# Patient Record
Sex: FEMALE | Race: WHITE | Employment: OTHER | URBAN - NONMETROPOLITAN AREA
[De-identification: names, ages, dates, MRNs, and addresses within clinical notes are randomized per-mention and may not be internally consistent; named-entity substitution may affect disease eponyms.]

---

## 2017-01-27 ENCOUNTER — HOSPITAL ENCOUNTER (OUTPATIENT)
Dept: ULTRASOUND IMAGING | Age: 77
Discharge: HOME | End: 2017-01-27
Payer: MEDICARE

## 2017-01-27 DIAGNOSIS — N95.0 PMB (POSTMENOPAUSAL BLEEDING): ICD-10-CM

## 2017-01-27 DIAGNOSIS — R93.89 THICKENED ENDOMETRIUM: ICD-10-CM

## 2017-01-27 PROCEDURE — 76830 TRANSVAGINAL US NON-OB: CPT

## 2017-01-31 ENCOUNTER — OFFICE VISIT (OUTPATIENT)
Dept: OBGYN | Age: 77
End: 2017-01-31
Payer: MEDICARE

## 2017-01-31 VITALS
WEIGHT: 223 LBS | DIASTOLIC BLOOD PRESSURE: 80 MMHG | SYSTOLIC BLOOD PRESSURE: 148 MMHG | BODY MASS INDEX: 35.84 KG/M2 | HEIGHT: 66 IN

## 2017-01-31 DIAGNOSIS — N95.0 PMB (POSTMENOPAUSAL BLEEDING): Primary | ICD-10-CM

## 2017-01-31 DIAGNOSIS — R93.89 ENDOMETRIAL THICKENING ON ULTRA SOUND: ICD-10-CM

## 2017-01-31 PROCEDURE — 99213 OFFICE O/P EST LOW 20 MIN: CPT | Performed by: OBSTETRICS & GYNECOLOGY

## 2017-01-31 ASSESSMENT — ENCOUNTER SYMPTOMS
GASTROINTESTINAL NEGATIVE: 1
EYES NEGATIVE: 1
ALLERGIC/IMMUNOLOGIC NEGATIVE: 1
RESPIRATORY NEGATIVE: 1

## 2017-02-08 ENCOUNTER — HOSPITAL ENCOUNTER (OUTPATIENT)
Dept: PREADMISSION TESTING | Age: 77
Discharge: HOME | End: 2017-02-08
Payer: MEDICARE

## 2017-02-08 VITALS — BODY MASS INDEX: 35.03 KG/M2 | WEIGHT: 218 LBS | HEIGHT: 66 IN

## 2017-02-08 LAB
ANION GAP SERPL CALCULATED.3IONS-SCNC: 17 MMOL/L (ref 7–19)
BASOPHILS ABSOLUTE: 0 K/UL (ref 0–0.2)
BASOPHILS RELATIVE PERCENT: 0.5 % (ref 0–1)
BUN BLDV-MCNC: 32 MG/DL (ref 8–23)
CALCIUM SERPL-MCNC: 9.5 MG/DL (ref 8.8–10.2)
CHLORIDE BLD-SCNC: 100 MMOL/L (ref 98–111)
CO2: 25 MMOL/L (ref 22–29)
CREAT SERPL-MCNC: 2.3 MG/DL (ref 0.5–0.9)
EOSINOPHILS ABSOLUTE: 0.1 K/UL (ref 0–0.6)
EOSINOPHILS RELATIVE PERCENT: 1.4 % (ref 0–5)
GFR NON-AFRICAN AMERICAN: 21
GLUCOSE BLD-MCNC: 88 MG/DL (ref 74–109)
HCT VFR BLD CALC: 39.2 % (ref 37–47)
HEMOGLOBIN: 13 G/DL (ref 12–16)
LYMPHOCYTES ABSOLUTE: 1.8 K/UL (ref 1.1–4.5)
LYMPHOCYTES RELATIVE PERCENT: 24.5 % (ref 20–40)
MCH RBC QN AUTO: 30.7 PG (ref 27–31)
MCHC RBC AUTO-ENTMCNC: 33.2 G/DL (ref 33–37)
MCV RBC AUTO: 92.5 FL (ref 81–99)
MONOCYTES ABSOLUTE: 0.6 K/UL (ref 0–0.9)
MONOCYTES RELATIVE PERCENT: 8.3 % (ref 0–10)
NEUTROPHILS ABSOLUTE: 4.8 K/UL (ref 1.5–7.5)
NEUTROPHILS RELATIVE PERCENT: 65.3 % (ref 50–65)
PDW BLD-RTO: 12.8 % (ref 11.5–14.5)
PLATELET # BLD: 118 K/UL (ref 130–400)
PMV BLD AUTO: 12.9 FL (ref 7.4–10.4)
POTASSIUM SERPL-SCNC: 4.6 MMOL/L (ref 3.5–5)
RBC # BLD: 4.24 M/UL (ref 4.2–5.4)
SODIUM BLD-SCNC: 142 MMOL/L (ref 136–145)
WBC # BLD: 7.4 K/UL (ref 4.8–10.8)

## 2017-02-08 PROCEDURE — 80048 BASIC METABOLIC PNL TOTAL CA: CPT

## 2017-02-08 PROCEDURE — 85025 COMPLETE CBC W/AUTO DIFF WBC: CPT

## 2017-02-08 PROCEDURE — 93005 ELECTROCARDIOGRAM TRACING: CPT

## 2017-02-08 RX ORDER — PHENOL 1.4 %
1 AEROSOL, SPRAY (ML) MUCOUS MEMBRANE 2 TIMES DAILY
COMMUNITY

## 2017-02-09 DIAGNOSIS — I48.91 ATRIAL FIBRILLATION, UNSPECIFIED TYPE (HCC): ICD-10-CM

## 2017-02-09 LAB
EKG P AXIS: NORMAL DEGREES
EKG P-R INTERVAL: NORMAL MS
EKG Q-T INTERVAL: 492 MS
EKG QRS DURATION: 88 MS
EKG QTC CALCULATION (BAZETT): 480 MS
EKG T AXIS: -3 DEGREES

## 2017-02-16 ENCOUNTER — ANESTHESIA EVENT (OUTPATIENT)
Dept: OPERATING ROOM | Age: 77
End: 2017-02-16
Payer: MEDICARE

## 2017-02-16 ENCOUNTER — HOSPITAL ENCOUNTER (OUTPATIENT)
Age: 77
Setting detail: OUTPATIENT SURGERY
Discharge: HOME | End: 2017-02-16
Attending: OBSTETRICS & GYNECOLOGY | Admitting: OBSTETRICS & GYNECOLOGY
Payer: MEDICARE

## 2017-02-16 ENCOUNTER — ANESTHESIA (OUTPATIENT)
Dept: OPERATING ROOM | Age: 77
End: 2017-02-16
Payer: MEDICARE

## 2017-02-16 ENCOUNTER — SURGERY (OUTPATIENT)
Age: 77
End: 2017-02-16

## 2017-02-16 VITALS
OXYGEN SATURATION: 96 % | RESPIRATION RATE: 8 BRPM | DIASTOLIC BLOOD PRESSURE: 51 MMHG | SYSTOLIC BLOOD PRESSURE: 113 MMHG

## 2017-02-16 VITALS
DIASTOLIC BLOOD PRESSURE: 53 MMHG | HEIGHT: 66 IN | SYSTOLIC BLOOD PRESSURE: 133 MMHG | HEART RATE: 56 BPM | RESPIRATION RATE: 14 BRPM | TEMPERATURE: 97.6 F | BODY MASS INDEX: 35.03 KG/M2 | WEIGHT: 218 LBS | OXYGEN SATURATION: 96 %

## 2017-02-16 PROBLEM — R93.89 ENDOMETRIAL THICKENING ON ULTRA SOUND: Status: ACTIVE | Noted: 2017-02-16

## 2017-02-16 PROBLEM — N95.0 PMB (POSTMENOPAUSAL BLEEDING): Status: ACTIVE | Noted: 2017-02-16

## 2017-02-16 LAB
APTT: 36.5 SEC (ref 26–36.2)
INR BLD: 1.12 (ref 0.88–1.18)
PROTHROMBIN TIME: 14.4 SEC (ref 12–14.6)

## 2017-02-16 PROCEDURE — 7100000000 HC PACU RECOVERY - FIRST 15 MIN: Performed by: OBSTETRICS & GYNECOLOGY

## 2017-02-16 PROCEDURE — 36415 COLL VENOUS BLD VENIPUNCTURE: CPT

## 2017-02-16 PROCEDURE — 88305 TISSUE EXAM BY PATHOLOGIST: CPT

## 2017-02-16 PROCEDURE — 58558 HYSTEROSCOPY BIOPSY: CPT | Performed by: OBSTETRICS & GYNECOLOGY

## 2017-02-16 PROCEDURE — 2500000003 HC RX 250 WO HCPCS: Performed by: ANESTHESIOLOGY

## 2017-02-16 PROCEDURE — 2580000003 HC RX 258: Performed by: NURSE ANESTHETIST, CERTIFIED REGISTERED

## 2017-02-16 PROCEDURE — 6360000002 HC RX W HCPCS: Performed by: OBSTETRICS & GYNECOLOGY

## 2017-02-16 PROCEDURE — 2500000003 HC RX 250 WO HCPCS: Performed by: NURSE ANESTHETIST, CERTIFIED REGISTERED

## 2017-02-16 PROCEDURE — 3600000004 HC SURGERY LEVEL 4 BASE: Performed by: OBSTETRICS & GYNECOLOGY

## 2017-02-16 PROCEDURE — 3700000000 HC ANESTHESIA ATTENDED CARE: Performed by: OBSTETRICS & GYNECOLOGY

## 2017-02-16 PROCEDURE — 85610 PROTHROMBIN TIME: CPT

## 2017-02-16 PROCEDURE — 7100000011 HC PHASE II RECOVERY - ADDTL 15 MIN: Performed by: OBSTETRICS & GYNECOLOGY

## 2017-02-16 PROCEDURE — 2720000001 HC MISC SURG SUPPLY STERILE $51-500: Performed by: OBSTETRICS & GYNECOLOGY

## 2017-02-16 PROCEDURE — 3600000014 HC SURGERY LEVEL 4 ADDTL 15MIN: Performed by: OBSTETRICS & GYNECOLOGY

## 2017-02-16 PROCEDURE — 3700000001 HC ADD 15 MINUTES (ANESTHESIA): Performed by: OBSTETRICS & GYNECOLOGY

## 2017-02-16 PROCEDURE — 85730 THROMBOPLASTIN TIME PARTIAL: CPT

## 2017-02-16 PROCEDURE — 2580000003 HC RX 258: Performed by: OBSTETRICS & GYNECOLOGY

## 2017-02-16 PROCEDURE — 7100000010 HC PHASE II RECOVERY - FIRST 15 MIN: Performed by: OBSTETRICS & GYNECOLOGY

## 2017-02-16 PROCEDURE — 7100000001 HC PACU RECOVERY - ADDTL 15 MIN: Performed by: OBSTETRICS & GYNECOLOGY

## 2017-02-16 PROCEDURE — 6360000002 HC RX W HCPCS: Performed by: NURSE ANESTHETIST, CERTIFIED REGISTERED

## 2017-02-16 RX ORDER — METOCLOPRAMIDE HYDROCHLORIDE 5 MG/ML
10 INJECTION INTRAMUSCULAR; INTRAVENOUS
Status: DISCONTINUED | OUTPATIENT
Start: 2017-02-16 | End: 2017-02-16 | Stop reason: HOSPADM

## 2017-02-16 RX ORDER — DEXAMETHASONE SODIUM PHOSPHATE 10 MG/ML
INJECTION INTRAMUSCULAR; INTRAVENOUS PRN
Status: DISCONTINUED | OUTPATIENT
Start: 2017-02-16 | End: 2017-02-16 | Stop reason: SDUPTHER

## 2017-02-16 RX ORDER — SODIUM CHLORIDE, SODIUM LACTATE, POTASSIUM CHLORIDE, CALCIUM CHLORIDE 600; 310; 30; 20 MG/100ML; MG/100ML; MG/100ML; MG/100ML
INJECTION, SOLUTION INTRAVENOUS CONTINUOUS
Status: DISCONTINUED | OUTPATIENT
Start: 2017-02-16 | End: 2017-02-16 | Stop reason: HOSPADM

## 2017-02-16 RX ORDER — DIPHENHYDRAMINE HYDROCHLORIDE 50 MG/ML
12.5 INJECTION INTRAMUSCULAR; INTRAVENOUS
Status: DISCONTINUED | OUTPATIENT
Start: 2017-02-16 | End: 2017-02-16 | Stop reason: HOSPADM

## 2017-02-16 RX ORDER — ONDANSETRON 2 MG/ML
INJECTION INTRAMUSCULAR; INTRAVENOUS PRN
Status: DISCONTINUED | OUTPATIENT
Start: 2017-02-16 | End: 2017-02-16 | Stop reason: SDUPTHER

## 2017-02-16 RX ORDER — FENTANYL CITRATE 50 UG/ML
INJECTION, SOLUTION INTRAMUSCULAR; INTRAVENOUS PRN
Status: DISCONTINUED | OUTPATIENT
Start: 2017-02-16 | End: 2017-02-16 | Stop reason: SDUPTHER

## 2017-02-16 RX ORDER — PROMETHAZINE HYDROCHLORIDE 25 MG/ML
6.25 INJECTION, SOLUTION INTRAMUSCULAR; INTRAVENOUS
Status: DISCONTINUED | OUTPATIENT
Start: 2017-02-16 | End: 2017-02-16 | Stop reason: HOSPADM

## 2017-02-16 RX ORDER — MIDAZOLAM HYDROCHLORIDE 1 MG/ML
2 INJECTION INTRAMUSCULAR; INTRAVENOUS
Status: DISCONTINUED | OUTPATIENT
Start: 2017-02-16 | End: 2017-02-16 | Stop reason: HOSPADM

## 2017-02-16 RX ORDER — KETOROLAC TROMETHAMINE 30 MG/ML
INJECTION, SOLUTION INTRAMUSCULAR; INTRAVENOUS PRN
Status: DISCONTINUED | OUTPATIENT
Start: 2017-02-16 | End: 2017-02-16 | Stop reason: SDUPTHER

## 2017-02-16 RX ORDER — SODIUM CHLORIDE, SODIUM LACTATE, POTASSIUM CHLORIDE, CALCIUM CHLORIDE 600; 310; 30; 20 MG/100ML; MG/100ML; MG/100ML; MG/100ML
INJECTION, SOLUTION INTRAVENOUS CONTINUOUS PRN
Status: DISCONTINUED | OUTPATIENT
Start: 2017-02-16 | End: 2017-02-16 | Stop reason: SDUPTHER

## 2017-02-16 RX ORDER — SCOLOPAMINE TRANSDERMAL SYSTEM 1 MG/1
1 PATCH, EXTENDED RELEASE TRANSDERMAL
Status: DISCONTINUED | OUTPATIENT
Start: 2017-02-16 | End: 2017-02-16 | Stop reason: HOSPADM

## 2017-02-16 RX ORDER — MORPHINE SULFATE 4 MG/ML
4 INJECTION, SOLUTION INTRAMUSCULAR; INTRAVENOUS EVERY 5 MIN PRN
Status: DISCONTINUED | OUTPATIENT
Start: 2017-02-16 | End: 2017-02-16 | Stop reason: HOSPADM

## 2017-02-16 RX ORDER — SODIUM CHLORIDE 0.9 % (FLUSH) 0.9 %
10 SYRINGE (ML) INJECTION PRN
Status: DISCONTINUED | OUTPATIENT
Start: 2017-02-16 | End: 2017-02-16 | Stop reason: HOSPADM

## 2017-02-16 RX ORDER — SODIUM CHLORIDE 0.9 % (FLUSH) 0.9 %
10 SYRINGE (ML) INJECTION EVERY 12 HOURS SCHEDULED
Status: DISCONTINUED | OUTPATIENT
Start: 2017-02-16 | End: 2017-02-16 | Stop reason: HOSPADM

## 2017-02-16 RX ORDER — PROPOFOL 10 MG/ML
INJECTION, EMULSION INTRAVENOUS PRN
Status: DISCONTINUED | OUTPATIENT
Start: 2017-02-16 | End: 2017-02-16 | Stop reason: SDUPTHER

## 2017-02-16 RX ORDER — LABETALOL HYDROCHLORIDE 5 MG/ML
5 INJECTION, SOLUTION INTRAVENOUS EVERY 10 MIN PRN
Status: DISCONTINUED | OUTPATIENT
Start: 2017-02-16 | End: 2017-02-16 | Stop reason: HOSPADM

## 2017-02-16 RX ORDER — MORPHINE SULFATE 4 MG/ML
4 INJECTION, SOLUTION INTRAMUSCULAR; INTRAVENOUS
Status: DISCONTINUED | OUTPATIENT
Start: 2017-02-16 | End: 2017-02-16 | Stop reason: HOSPADM

## 2017-02-16 RX ORDER — LIDOCAINE HYDROCHLORIDE 10 MG/ML
INJECTION, SOLUTION EPIDURAL; INFILTRATION; INTRACAUDAL; PERINEURAL PRN
Status: DISCONTINUED | OUTPATIENT
Start: 2017-02-16 | End: 2017-02-16 | Stop reason: SDUPTHER

## 2017-02-16 RX ORDER — MEPERIDINE HYDROCHLORIDE 25 MG/ML
12.5 INJECTION INTRAMUSCULAR; INTRAVENOUS; SUBCUTANEOUS EVERY 5 MIN PRN
Status: DISCONTINUED | OUTPATIENT
Start: 2017-02-16 | End: 2017-02-16 | Stop reason: HOSPADM

## 2017-02-16 RX ORDER — HYDRALAZINE HYDROCHLORIDE 20 MG/ML
5 INJECTION INTRAMUSCULAR; INTRAVENOUS EVERY 10 MIN PRN
Status: DISCONTINUED | OUTPATIENT
Start: 2017-02-16 | End: 2017-02-16 | Stop reason: HOSPADM

## 2017-02-16 RX ORDER — LIDOCAINE HYDROCHLORIDE 10 MG/ML
1 INJECTION, SOLUTION EPIDURAL; INFILTRATION; INTRACAUDAL; PERINEURAL ONCE
Status: DISCONTINUED | OUTPATIENT
Start: 2017-02-16 | End: 2017-02-16 | Stop reason: HOSPADM

## 2017-02-16 RX ORDER — MORPHINE SULFATE 4 MG/ML
2 INJECTION, SOLUTION INTRAMUSCULAR; INTRAVENOUS EVERY 5 MIN PRN
Status: DISCONTINUED | OUTPATIENT
Start: 2017-02-16 | End: 2017-02-16 | Stop reason: HOSPADM

## 2017-02-16 RX ORDER — LIDOCAINE HYDROCHLORIDE 10 MG/ML
1 INJECTION, SOLUTION EPIDURAL; INFILTRATION; INTRACAUDAL; PERINEURAL
Status: COMPLETED | OUTPATIENT
Start: 2017-02-16 | End: 2017-02-16

## 2017-02-16 RX ORDER — FENTANYL CITRATE 50 UG/ML
50 INJECTION, SOLUTION INTRAMUSCULAR; INTRAVENOUS
Status: DISCONTINUED | OUTPATIENT
Start: 2017-02-16 | End: 2017-02-16 | Stop reason: HOSPADM

## 2017-02-16 RX ADMIN — LIDOCAINE HYDROCHLORIDE 50 MG: 10 INJECTION, SOLUTION EPIDURAL; INFILTRATION; INTRACAUDAL; PERINEURAL at 08:46

## 2017-02-16 RX ADMIN — FENTANYL CITRATE 100 MCG: 50 INJECTION INTRAMUSCULAR; INTRAVENOUS at 08:46

## 2017-02-16 RX ADMIN — CEFAZOLIN SODIUM 1 G: 1 INJECTION, SOLUTION INTRAVENOUS at 08:43

## 2017-02-16 RX ADMIN — LIDOCAINE HYDROCHLORIDE 1 ML: 10 INJECTION, SOLUTION EPIDURAL; INFILTRATION; INTRACAUDAL; PERINEURAL at 07:05

## 2017-02-16 RX ADMIN — PROPOFOL 180 MG: 10 INJECTION, EMULSION INTRAVENOUS at 08:46

## 2017-02-16 RX ADMIN — SODIUM CHLORIDE, SODIUM LACTATE, POTASSIUM CHLORIDE, AND CALCIUM CHLORIDE: 600; 310; 30; 20 INJECTION, SOLUTION INTRAVENOUS at 08:39

## 2017-02-16 RX ADMIN — SODIUM CHLORIDE, POTASSIUM CHLORIDE, SODIUM LACTATE AND CALCIUM CHLORIDE: 600; 310; 30; 20 INJECTION, SOLUTION INTRAVENOUS at 07:05

## 2017-02-16 RX ADMIN — KETOROLAC TROMETHAMINE 30 MG: 30 INJECTION, SOLUTION INTRAMUSCULAR at 09:03

## 2017-02-16 RX ADMIN — DEXAMETHASONE SODIUM PHOSPHATE 5 MG: 10 INJECTION INTRAMUSCULAR; INTRAVENOUS at 08:46

## 2017-02-16 RX ADMIN — ONDANSETRON HYDROCHLORIDE 4 MG: 2 INJECTION, SOLUTION INTRAVENOUS at 08:46

## 2017-02-16 ASSESSMENT — PAIN SCALES - GENERAL
PAINLEVEL_OUTOF10: 0

## 2017-02-16 ASSESSMENT — PAIN - FUNCTIONAL ASSESSMENT: PAIN_FUNCTIONAL_ASSESSMENT: 0-10

## 2017-02-17 ENCOUNTER — TELEPHONE (OUTPATIENT)
Dept: OBGYN | Age: 77
End: 2017-02-17

## 2017-02-21 ENCOUNTER — OFFICE VISIT (OUTPATIENT)
Dept: OBGYN | Age: 77
End: 2017-02-21

## 2017-02-21 VITALS
WEIGHT: 219 LBS | SYSTOLIC BLOOD PRESSURE: 130 MMHG | BODY MASS INDEX: 35.2 KG/M2 | DIASTOLIC BLOOD PRESSURE: 70 MMHG | HEIGHT: 66 IN

## 2017-02-21 DIAGNOSIS — Z09 POSTOP CHECK: Primary | ICD-10-CM

## 2017-02-21 PROCEDURE — 99024 POSTOP FOLLOW-UP VISIT: CPT | Performed by: OBSTETRICS & GYNECOLOGY

## 2017-02-21 ASSESSMENT — ENCOUNTER SYMPTOMS
EYES NEGATIVE: 1
RESPIRATORY NEGATIVE: 1
GASTROINTESTINAL NEGATIVE: 1

## 2017-05-12 DIAGNOSIS — I48.0 PAF (PAROXYSMAL ATRIAL FIBRILLATION) (HCC): ICD-10-CM

## 2017-05-12 RX ORDER — METOPROLOL SUCCINATE 25 MG/1
TABLET, EXTENDED RELEASE ORAL
Qty: 180 TABLET | Refills: 5 | Status: SHIPPED | OUTPATIENT
Start: 2017-05-12 | End: 2018-05-14 | Stop reason: SDUPTHER

## 2017-05-16 ENCOUNTER — OFFICE VISIT (OUTPATIENT)
Dept: CARDIOLOGY | Age: 77
End: 2017-05-16
Payer: MEDICARE

## 2017-05-16 VITALS
WEIGHT: 222 LBS | SYSTOLIC BLOOD PRESSURE: 130 MMHG | DIASTOLIC BLOOD PRESSURE: 70 MMHG | HEART RATE: 66 BPM | BODY MASS INDEX: 35.68 KG/M2 | HEIGHT: 66 IN

## 2017-05-16 DIAGNOSIS — Z79.01 CHRONIC ANTICOAGULATION: ICD-10-CM

## 2017-05-16 DIAGNOSIS — J01.10 ACUTE FRONTAL SINUSITIS, RECURRENCE NOT SPECIFIED: ICD-10-CM

## 2017-05-16 DIAGNOSIS — E78.2 MIXED HYPERLIPIDEMIA: ICD-10-CM

## 2017-05-16 DIAGNOSIS — I10 ESSENTIAL HYPERTENSION: ICD-10-CM

## 2017-05-16 DIAGNOSIS — I48.0 PAROXYSMAL ATRIAL FIBRILLATION (HCC): Primary | ICD-10-CM

## 2017-05-16 PROCEDURE — 93000 ELECTROCARDIOGRAM COMPLETE: CPT | Performed by: NURSE PRACTITIONER

## 2017-05-16 PROCEDURE — 99213 OFFICE O/P EST LOW 20 MIN: CPT | Performed by: NURSE PRACTITIONER

## 2017-05-16 RX ORDER — AMOXICILLIN 500 MG/1
500 CAPSULE ORAL 3 TIMES DAILY
Qty: 30 CAPSULE | Refills: 0 | Status: SHIPPED | OUTPATIENT
Start: 2017-05-16 | End: 2017-05-26

## 2017-05-16 RX ORDER — FLUTICASONE PROPIONATE 50 MCG
1 SPRAY, SUSPENSION (ML) NASAL DAILY
Qty: 1 BOTTLE | Refills: 3 | Status: SHIPPED | OUTPATIENT
Start: 2017-05-16 | End: 2018-02-27

## 2017-06-12 ENCOUNTER — OFFICE VISIT (OUTPATIENT)
Dept: CARDIOLOGY | Age: 77
End: 2017-06-12
Payer: MEDICARE

## 2017-06-12 VITALS
HEIGHT: 66 IN | SYSTOLIC BLOOD PRESSURE: 130 MMHG | WEIGHT: 221 LBS | DIASTOLIC BLOOD PRESSURE: 78 MMHG | BODY MASS INDEX: 35.52 KG/M2 | HEART RATE: 98 BPM

## 2017-06-12 DIAGNOSIS — I10 ESSENTIAL HYPERTENSION: ICD-10-CM

## 2017-06-12 DIAGNOSIS — I48.0 PAF (PAROXYSMAL ATRIAL FIBRILLATION) (HCC): Primary | ICD-10-CM

## 2017-06-12 LAB
INTERNATIONAL NORMALIZATION RATIO, POC: 2
PROTHROMBIN TIME, POC: NORMAL

## 2017-06-12 PROCEDURE — 99213 OFFICE O/P EST LOW 20 MIN: CPT | Performed by: INTERNAL MEDICINE

## 2017-06-12 PROCEDURE — 93000 ELECTROCARDIOGRAM COMPLETE: CPT | Performed by: INTERNAL MEDICINE

## 2017-06-12 PROCEDURE — 85610 PROTHROMBIN TIME: CPT | Performed by: INTERNAL MEDICINE

## 2017-06-14 ENCOUNTER — TELEPHONE (OUTPATIENT)
Dept: CARDIOLOGY | Age: 77
End: 2017-06-14

## 2017-06-14 ENCOUNTER — OFFICE VISIT (OUTPATIENT)
Dept: CARDIOLOGY | Age: 77
End: 2017-06-14
Payer: MEDICARE

## 2017-06-14 VITALS
BODY MASS INDEX: 36.82 KG/M2 | RESPIRATION RATE: 16 BRPM | HEIGHT: 65 IN | DIASTOLIC BLOOD PRESSURE: 82 MMHG | WEIGHT: 221 LBS | HEART RATE: 73 BPM | SYSTOLIC BLOOD PRESSURE: 150 MMHG

## 2017-06-14 DIAGNOSIS — I48.0 PAROXYSMAL ATRIAL FIBRILLATION (HCC): Primary | ICD-10-CM

## 2017-06-14 PROCEDURE — 93000 ELECTROCARDIOGRAM COMPLETE: CPT | Performed by: CLINICAL NURSE SPECIALIST

## 2017-06-14 PROCEDURE — 99999 PR OFFICE/OUTPT VISIT,PROCEDURE ONLY: CPT | Performed by: CLINICAL NURSE SPECIALIST

## 2017-06-27 ENCOUNTER — HOSPITAL ENCOUNTER (OUTPATIENT)
Dept: ULTRASOUND IMAGING | Age: 77
Discharge: HOME | End: 2017-06-27
Payer: MEDICARE

## 2017-06-27 DIAGNOSIS — N18.4 CHRONIC KIDNEY DISEASE, STAGE IV (SEVERE) (HCC): ICD-10-CM

## 2017-06-27 PROCEDURE — 76770 US EXAM ABDO BACK WALL COMP: CPT

## 2017-07-06 ENCOUNTER — ANTI-COAG VISIT (OUTPATIENT)
Dept: CARDIOLOGY | Age: 77
End: 2017-07-06
Payer: MEDICARE

## 2017-07-06 DIAGNOSIS — I48.0 PAF (PAROXYSMAL ATRIAL FIBRILLATION) (HCC): Primary | ICD-10-CM

## 2017-07-06 LAB
INTERNATIONAL NORMALIZATION RATIO, POC: 2.6
PROTHROMBIN TIME, POC: NORMAL

## 2017-07-06 PROCEDURE — 85610 PROTHROMBIN TIME: CPT | Performed by: NURSE PRACTITIONER

## 2017-08-04 ENCOUNTER — ANTI-COAG VISIT (OUTPATIENT)
Dept: CARDIOLOGY | Age: 77
End: 2017-08-04
Payer: MEDICARE

## 2017-08-04 DIAGNOSIS — I48.0 PAF (PAROXYSMAL ATRIAL FIBRILLATION) (HCC): Primary | ICD-10-CM

## 2017-08-04 LAB
INTERNATIONAL NORMALIZATION RATIO, POC: 2.1
PROTHROMBIN TIME, POC: NORMAL

## 2017-08-04 PROCEDURE — 85610 PROTHROMBIN TIME: CPT | Performed by: CLINICAL NURSE SPECIALIST

## 2017-09-07 ENCOUNTER — ANTI-COAG VISIT (OUTPATIENT)
Dept: CARDIOLOGY | Age: 77
End: 2017-09-07
Payer: MEDICARE

## 2017-09-07 DIAGNOSIS — I48.0 PAF (PAROXYSMAL ATRIAL FIBRILLATION) (HCC): Primary | ICD-10-CM

## 2017-09-07 LAB
INTERNATIONAL NORMALIZATION RATIO, POC: 2.1
PROTHROMBIN TIME, POC: NORMAL

## 2017-09-07 PROCEDURE — 85610 PROTHROMBIN TIME: CPT | Performed by: CLINICAL NURSE SPECIALIST

## 2017-10-05 ENCOUNTER — ANTI-COAG VISIT (OUTPATIENT)
Dept: CARDIOLOGY | Age: 77
End: 2017-10-05
Payer: MEDICARE

## 2017-10-05 DIAGNOSIS — I48.0 PAF (PAROXYSMAL ATRIAL FIBRILLATION) (HCC): Primary | ICD-10-CM

## 2017-10-05 LAB
INTERNATIONAL NORMALIZATION RATIO, POC: 2.5
PROTHROMBIN TIME, POC: NORMAL

## 2017-10-05 PROCEDURE — 85610 PROTHROMBIN TIME: CPT | Performed by: CLINICAL NURSE SPECIALIST

## 2017-10-05 NOTE — MR AVS SNAPSHOT
Your BMI as listed above is considered obese (30 or more). BMI is an estimate of body fat, calculated from your height and weight. The higher your BMI, the greater your risk of heart disease, high blood pressure, type 2 diabetes, stroke, gallstones, arthritis, sleep apnea, and certain cancers. BMI is not perfect. It may overestimate body fat in athletes and people who are more muscular. Even a small weight loss (between 5 and 10 percent of your current weight) by decreasing your calorie intake and becoming more physically active will help lower your risk of developing or worsening diseases associated with obesity.      Learn more at: [email or web address]             Medications and Orders      Your Current Medications Are              fluticasone (FLONASE) 50 MCG/ACT nasal spray 1 spray by Nasal route daily    metoprolol succinate (TOPROL XL) 25 MG extended release tablet TAKE ONE TABLET BY MOUTH TWICE DAILY    dronedarone hcl (MULTAQ) 400 MG TABS Take 1 tablet by mouth 2 times daily (with meals) Indications: AFIB    calcium carbonate 600 MG TABS tablet Take 1 tablet by mouth 2 times daily Indications: SUPPLEMENT    warfarin (COUMADIN) 5 MG tablet Take 5 mg by mouth Indications: AFIB 5 MG EVERY OTHER DAY ALTERNATING WITH 2.5 MG EVERY OTHER DAY    spironolactone (ALDACTONE) 25 MG tablet Take 25 mg by mouth nightly Indications: FLUID RETENTION     levothyroxine (SYNTHROID) 50 MCG tablet Take 50 mcg by mouth Daily Indications: HYPOTHROIDISM     polyethylene glycol (MIRALAX) powder Take 17 g by mouth daily Indications: REGULARITY     losartan (COZAAR) 100 MG tablet Take 100 mg by mouth Daily Indications: HYPERTENSION     ferrous sulfate 325 (65 FE) MG tablet Take 325 mg by mouth every other day Indications: ANEMIA     vitamin D (ERGOCALCIFEROL) 40595 UNIT CAPS capsule Take 50,000 Units by mouth once a week Indications: SUPPLEMENT [employer] username and password. If you don't have a [employer] username and password but a parent or guardian has access to your record, the parent or guardian should login with their own [employer] username and password and access your record to view the After Visit Summary. Additional Information  If you have questions, please contact the physician practice where you receive care. Remember, [employer] is NOT to be used for urgent needs. For medical emergencies, dial 911. For questions regarding your [employer] account call [phone]. If you have a clinical question, please call your doctor's office. October 2017 Details    Sun Mon Tue Wed Thu Fri Sat     1               2               3               4               5      2.5 mg   See details      6      2.5 mg         7      5 mg           8      5 mg         9      2.5 mg         10      2.5 mg         11      5 mg         12      2.5 mg         13      2.5 mg         14      5 mg           15      5 mg         16      2.5 mg         17      2.5 mg         18      5 mg         19      2.5 mg         20      2.5 mg         21      5 mg           22      5 mg         23      2.5 mg         24      2.5 mg         25      5 mg         26      2.5 mg         27      2.5 mg         28      5 mg           29      5 mg         30      2.5 mg         31      2.5 mg              Date Details   10/05 This INR check               How to take your warfarin dose              To take:  2.5 mg Take 0.5 of a 5 mg tablet. To take:  5 mg Take 1 of the 5 mg tablets.            November 2017 Details    Sun Mon Tue Wed Thu Fri Sat        1      5 mg         2      2.5 mg         3               4                 5               6               7               8               9               10               11                 12               13               14               15               16               17               18                 19               20 21               22               23               24               25                 26               27               28               29               30                  Date Details   No additional details    Date of next INR:  11/2/2017         How to take your warfarin dose              To take:  2.5 mg Take 0.5 of a 5 mg tablet. To take:  5 mg Take 1 of the 5 mg tablets.

## 2017-10-05 NOTE — PROGRESS NOTES
Patient reports no abnormal bruising or bleeding. Not awaiting cardioversion. Patient is going to have 2 teeth extracted on the 26th of this month with a doctor from [facility] , patient states the doctor told her she would need to be off her coumadin for 3 days. Johnnie Rodriguez ok'd patient to just hold it three days prior to procedure and come in to recheck INR a week after starting it back. No medication changes or antibiotic use, patient instructed to contact office if any changes.

## 2017-11-01 ENCOUNTER — TELEPHONE (OUTPATIENT)
Dept: CARDIOLOGY | Age: 77
End: 2017-11-01

## 2017-11-02 ENCOUNTER — ANTI-COAG VISIT (OUTPATIENT)
Dept: CARDIOLOGY | Age: 77
End: 2017-11-02
Payer: MEDICARE

## 2017-11-02 DIAGNOSIS — I48.0 PAF (PAROXYSMAL ATRIAL FIBRILLATION) (HCC): Primary | ICD-10-CM

## 2017-11-02 LAB
INTERNATIONAL NORMALIZATION RATIO, POC: 1.6
PROTHROMBIN TIME, POC: NORMAL

## 2017-11-02 PROCEDURE — 85610 PROTHROMBIN TIME: CPT | Performed by: CLINICAL NURSE SPECIALIST

## 2017-11-20 ENCOUNTER — OFFICE VISIT (OUTPATIENT)
Dept: CARDIOLOGY | Age: 77
End: 2017-11-20
Payer: MEDICARE

## 2017-11-20 VITALS
HEIGHT: 66 IN | BODY MASS INDEX: 35.2 KG/M2 | WEIGHT: 219 LBS | SYSTOLIC BLOOD PRESSURE: 130 MMHG | DIASTOLIC BLOOD PRESSURE: 82 MMHG

## 2017-11-20 DIAGNOSIS — Z79.01 CHRONIC ANTICOAGULATION: ICD-10-CM

## 2017-11-20 DIAGNOSIS — I10 ESSENTIAL HYPERTENSION: ICD-10-CM

## 2017-11-20 DIAGNOSIS — I48.0 PAF (PAROXYSMAL ATRIAL FIBRILLATION) (HCC): Primary | ICD-10-CM

## 2017-11-20 DIAGNOSIS — E78.2 MIXED HYPERLIPIDEMIA: ICD-10-CM

## 2017-11-20 LAB
INTERNATIONAL NORMALIZATION RATIO, POC: 2.3
PROTHROMBIN TIME, POC: NORMAL

## 2017-11-20 PROCEDURE — G8427 DOCREV CUR MEDS BY ELIG CLIN: HCPCS | Performed by: NURSE PRACTITIONER

## 2017-11-20 PROCEDURE — 4040F PNEUMOC VAC/ADMIN/RCVD: CPT | Performed by: NURSE PRACTITIONER

## 2017-11-20 PROCEDURE — 1036F TOBACCO NON-USER: CPT | Performed by: NURSE PRACTITIONER

## 2017-11-20 PROCEDURE — 1123F ACP DISCUSS/DSCN MKR DOCD: CPT | Performed by: NURSE PRACTITIONER

## 2017-11-20 PROCEDURE — G8484 FLU IMMUNIZE NO ADMIN: HCPCS | Performed by: NURSE PRACTITIONER

## 2017-11-20 PROCEDURE — 1090F PRES/ABSN URINE INCON ASSESS: CPT | Performed by: NURSE PRACTITIONER

## 2017-11-20 PROCEDURE — G8417 CALC BMI ABV UP PARAM F/U: HCPCS | Performed by: NURSE PRACTITIONER

## 2017-11-20 PROCEDURE — G8400 PT W/DXA NO RESULTS DOC: HCPCS | Performed by: NURSE PRACTITIONER

## 2017-11-20 PROCEDURE — 99213 OFFICE O/P EST LOW 20 MIN: CPT | Performed by: NURSE PRACTITIONER

## 2017-11-20 PROCEDURE — 85610 PROTHROMBIN TIME: CPT | Performed by: NURSE PRACTITIONER

## 2017-11-20 RX ORDER — ALLOPURINOL 100 MG/1
100 TABLET ORAL 2 TIMES DAILY
COMMUNITY
Start: 2017-10-06

## 2017-11-20 NOTE — PATIENT INSTRUCTIONS
Continue current medications as prescribed. Continue to follow up with primary care provider for non cardiac medical problems. Call the office with any problems, questions or concerns at [phone]. Follow up as scheduled with your cardiologist - one month coumadin blood test.  The following educational material has been included in this after visit summary for your review:  Coumadin safety.     Additional instructions:  Coumadin can increase your risk of bleeding. If you notice blood in urine or stool, bleeding gums, excessive bruising or cough productive of bloody sputum, notify the office. Information on this blood thinner has been included in your after visit summary. Coronary artery disease risk factors you can control: Smoking, high blood pressure, high cholesterol, diabetes, being overweight, lack of exercise and stress. Continue heart healthy diet. Take medications as directed. Exercise as tolerated. Strive for 15 minutes of exercise most days of the week. If asked to keep a blood pressure log, do so for 2 weeks. Call the office to report readings at [phone]. Blood pressure goal is 140/90 or less. If you are a diabetic, the goal is 130/80 or less. If you are taking cholesterol lowering medications, it is recommended that lab work be checked annually. Always keep a current medication list. Bring your medications to every office visit.      Patient Education     Taking Warfarin Safely: Care Instructions  Your Care Instructions  Warfarin is a medicine that you take to prevent blood clots. It is often called a blood thinner. Doctors give warfarin (such as Coumadin) to reduce the risk of blood clots. You may be at risk for blood clots if you have atrial fibrillation or deep vein thrombosis. Some other health problems may also put you at risk. Warfarin slows the amount of time it takes for your blood to clot. It can cause bleeding problems.  Even if you've been taking warfarin for a while, it's important to know how to take it safely. Foods and other medicines can affect the way warfarin works. Some can make warfarin work too well. This can cause bleeding problems. And some can make it work poorly, so that it does not prevent blood clots very well. You will need regular blood tests to check how long it takes for your blood to form a clot. This test is called a PT or prothrombin time test. The result of the test is called an INR level. Depending on the test results, your doctor or anticoagulation clinic may adjust your dose of warfarin. Follow-up care is a key part of your treatment and safety. Be sure to make and go to all appointments, and call your doctor if you are having problems. It's also a good idea to know your test results and keep a list of the medicines you take. How can you care for yourself at home? Take warfarin safely  · Take your warfarin at the same time each day. · If you miss a dose of warfarin, don't take an extra dose to make up for it. Your doctor can tell you exactly what to do so you don't take too much or too little. · Wear medical alert jewelry that lets others know that you take warfarin. You can buy this at most drugstores. · Don't take warfarin if you are pregnant or planning to get pregnant. Talk to your doctor about how you can prevent getting pregnant while you are taking it. · Don't change your dose or stop taking warfarin unless your doctor tells you to. Effects of medicines and food on warfarin  · Don't start or stop taking any medicines, vitamins, or natural remedies unless you first talk to your doctor. Many medicines can affect how warfarin works. These include aspirin and other pain relievers, over-the-counter medicines, multivitamins, dietary supplements, and herbal products. · Tell all of your doctors and pharmacists that you take warfarin. Some prescription medicines can affect how warfarin works.   · Keep the amount of vitamin K in your diet about the same from day to day. Do not suddenly eat a lot more or a lot less food that is rich in vitamin K than you usually do. Vitamin K affects how warfarin works and how your blood clots. Talk with your doctor before making big changes in your diet. Foods that have a lot of vitamin K include cooked green vegetables, such as:  ¨ Kale, spinach, turnip greens, jennifer greens, Swiss chard, and mustard greens. ¨ [place] sprouts, broccoli, and asparagus. · Limit your use of alcohol. Avoid bleeding by preventing falls and injuries  · Wear slippers or shoes with nonskid soles. · Remove throw rugs and clutter. · Rearrange furniture and electrical cords to keep them out of walking paths. · Keep stairways, porches, and outside walkways well lit. Use night-lights in hallways and bathrooms. · Be extra careful when you work with sharp tools or knives. When should you call for help? Call 911 anytime you think you may need emergency care. For example, call if:  · You have a sudden, severe headache that is different from past headaches. Call your doctor now or seek immediate medical care if:  · You have any abnormal bleeding, such as:  ¨ Nosebleeds. ¨ Vaginal bleeding that is different (heavier, more frequent, at a different time of the month) than what you are used to. ¨ Bloody or black stools, or rectal bleeding. ¨ Bloody or pink urine. Watch closely for changes in your health, and be sure to contact your doctor if you have any problems. Where can you learn more? Go to [email or web address] and sign in to your [employer] account. Enter M713 in the [employer] box to learn more about \"Taking Warfarin Safely: Care Instructions. \"     If you do not have an account, please click on the \"Sign Up Now\" link. Current as of: November 15, 2016  Content Version: 11.3  © 6903-7423 [employer]. Care instructions adapted under license by [facility] ([facility]).  If you have questions about a medical

## 2017-11-20 NOTE — PROGRESS NOTES
No acute distress. Body habitus - Body mass index is 35.89 kg/m². HEENT  Head is normocephalic. No circumoral cyanosis. Dentition is normal.  EYES -   Lids normal without ptosis. No discharge, edema or subconjunctival hemorrhage. Neck - Symmetrical without apparent mass or lymphadenopathy. Respiratory - Normal respiratory effort without use of accessory muscles. Ausculatation reveals vesicular breath sounds without crackles, wheezes, rub or rhonchi. Cardiovascular  No jugular venous distention. Auscultation reveals regular rate and rhythm. No audible clicks, gallop or rub. No murmur. No lower extremity varicosities. No carotid bruits. Abdominal -  No visible distention, mass or pulsations. Extremities - No clubbing or cyanosis. No statis dermatitis or ulcers. No edema. Musculoskeletal -   No Osler's nodes. No kyphosis or scoliosis. Gait is even and regular without limp or shuffle. Ambulates without assistance. Skin -  Warm and dry; no rash or pallor. No new surgical wound. Neurological - No focal neurological deficits. Thought processes coherent. No apparent tremor. Oriented to person, place and time. Psychiatric -  Appropriate affect and mood. Assessment:    1. PAF (paroxysmal atrial fibrillation) (HCC)  POCT INR   2. Essential hypertension     3. Mixed hyperlipidemia     4. Chronic anticoagulation      Coumadin managed by [facility]     Stable CV status without symptoms of overt heart failure, arrhythmia or angina. Patient is compliant with medication regimen.     BP Readings from Last 3 Encounters:   11/20/17 130/82   06/14/17 (!) 150/82   06/12/17 130/78    Pulse Readings from Last 3 Encounters:   06/14/17 73   06/12/17 98   05/16/17 66        Wt Readings from Last 3 Encounters:   11/20/17 219 lb (99.3 kg)   06/14/17 221 lb (100.2 kg)   06/12/17 221 lb (100.2 kg)       Recent Results (from the past 1008 hour(s))   POCT INR    Collection Time: 11/02/17  9:06 AM   Result Value Ref Range    INR 1.6     Protime     POCT INR    Collection Time: 11/20/17 10:23 AM   Result Value Ref Range    INR 2.3     Protime       Plan  Previous cardiac history and records reviewed. Continue current coumadin dose schedule. Continue current medications as prescribed. Continue to follow up with primary care provider for non cardiac medical problems. Call the office with any problems, questions or concerns at [phone]. Follow up as scheduled with your cardiologist - one month coumadin blood test.  The following educational material has been included in this after visit summary for your review:  Coumadin safety.     Additional instructions:  Coumadin can increase your risk of bleeding. If you notice blood in urine or stool, bleeding gums, excessive bruising or cough productive of bloody sputum, notify the office. Information on this blood thinner has been included in your after visit summary. Coronary artery disease risk factors you can control: Smoking, high blood pressure, high cholesterol, diabetes, being overweight, lack of exercise and stress. Continue heart healthy diet. Take medications as directed. Exercise as tolerated. Strive for 15 minutes of exercise most days of the week. If asked to keep a blood pressure log, do so for 2 weeks. Call the office to report readings at [phone]. Blood pressure goal is 140/90 or less. If you are a diabetic, the goal is 130/80 or less. If you are taking cholesterol lowering medications, it is recommended that lab work be checked annually. Always keep a current medication list. Bring your medications to every office visit.       KAYLA Vanessa Ma

## 2017-11-22 ENCOUNTER — HOSPITAL ENCOUNTER (OUTPATIENT)
Dept: MAMMOGRAPHY | Facility: HOSPITAL | Age: 77
Discharge: HOME | End: 2017-11-22
Attending: SPECIALIST | Admitting: SPECIALIST

## 2017-11-22 DIAGNOSIS — Z13.9 SCREENING: ICD-10-CM

## 2017-11-22 PROCEDURE — 77063 BREAST TOMOSYNTHESIS BI: CPT

## 2017-11-22 PROCEDURE — G0202 SCR MAMMO BI INCL CAD: HCPCS

## 2017-11-27 ENCOUNTER — TRANSCRIBE ORDERS (OUTPATIENT)
Dept: ADMINISTRATIVE | Facility: HOSPITAL | Age: 77
End: 2017-11-27

## 2017-11-27 DIAGNOSIS — Z12.31 ENCOUNTER FOR SCREENING MAMMOGRAM FOR MALIGNANT NEOPLASM OF BREAST: Primary | ICD-10-CM

## 2017-12-22 ENCOUNTER — ANTI-COAG VISIT (OUTPATIENT)
Dept: CARDIOLOGY | Age: 77
End: 2017-12-22
Payer: MEDICARE

## 2017-12-22 DIAGNOSIS — I48.0 PAF (PAROXYSMAL ATRIAL FIBRILLATION) (HCC): Primary | ICD-10-CM

## 2017-12-22 LAB
INTERNATIONAL NORMALIZATION RATIO, POC: 2.6
PROTHROMBIN TIME, POC: NORMAL

## 2017-12-22 PROCEDURE — 85610 PROTHROMBIN TIME: CPT | Performed by: CLINICAL NURSE SPECIALIST

## 2018-01-26 ENCOUNTER — ANTI-COAG VISIT (OUTPATIENT)
Dept: CARDIOLOGY | Age: 78
End: 2018-01-26
Payer: MEDICARE

## 2018-01-26 DIAGNOSIS — I48.0 PAF (PAROXYSMAL ATRIAL FIBRILLATION) (HCC): Primary | ICD-10-CM

## 2018-01-26 LAB
INTERNATIONAL NORMALIZATION RATIO, POC: 2.2
PROTHROMBIN TIME, POC: NORMAL

## 2018-01-26 PROCEDURE — 85610 PROTHROMBIN TIME: CPT | Performed by: CLINICAL NURSE SPECIALIST

## 2018-02-05 DIAGNOSIS — I48.91 ATRIAL FIBRILLATION, UNSPECIFIED TYPE (HCC): ICD-10-CM

## 2018-02-05 RX ORDER — DRONEDARONE 400 MG/1
TABLET, FILM COATED ORAL
Qty: 180 TABLET | Refills: 3 | Status: SHIPPED | OUTPATIENT
Start: 2018-02-05 | End: 2019-02-26 | Stop reason: SDUPTHER

## 2018-02-27 ENCOUNTER — OFFICE VISIT (OUTPATIENT)
Dept: OBGYN | Age: 78
End: 2018-02-27
Payer: MEDICARE

## 2018-02-27 VITALS
SYSTOLIC BLOOD PRESSURE: 121 MMHG | DIASTOLIC BLOOD PRESSURE: 67 MMHG | TEMPERATURE: 98.4 F | WEIGHT: 220 LBS | BODY MASS INDEX: 36.65 KG/M2 | HEIGHT: 65 IN | HEART RATE: 59 BPM

## 2018-02-27 DIAGNOSIS — Z01.419 VISIT FOR PELVIC EXAM: Primary | ICD-10-CM

## 2018-02-27 PROCEDURE — G8427 DOCREV CUR MEDS BY ELIG CLIN: HCPCS | Performed by: OBSTETRICS & GYNECOLOGY

## 2018-02-27 PROCEDURE — G8417 CALC BMI ABV UP PARAM F/U: HCPCS | Performed by: OBSTETRICS & GYNECOLOGY

## 2018-02-27 PROCEDURE — G8400 PT W/DXA NO RESULTS DOC: HCPCS | Performed by: OBSTETRICS & GYNECOLOGY

## 2018-02-27 PROCEDURE — 4040F PNEUMOC VAC/ADMIN/RCVD: CPT | Performed by: OBSTETRICS & GYNECOLOGY

## 2018-02-27 PROCEDURE — 1090F PRES/ABSN URINE INCON ASSESS: CPT | Performed by: OBSTETRICS & GYNECOLOGY

## 2018-02-27 PROCEDURE — 1123F ACP DISCUSS/DSCN MKR DOCD: CPT | Performed by: OBSTETRICS & GYNECOLOGY

## 2018-02-27 PROCEDURE — G0101 CA SCREEN;PELVIC/BREAST EXAM: HCPCS | Performed by: OBSTETRICS & GYNECOLOGY

## 2018-02-27 PROCEDURE — 1036F TOBACCO NON-USER: CPT | Performed by: OBSTETRICS & GYNECOLOGY

## 2018-02-27 PROCEDURE — G8484 FLU IMMUNIZE NO ADMIN: HCPCS | Performed by: OBSTETRICS & GYNECOLOGY

## 2018-02-27 ASSESSMENT — ENCOUNTER SYMPTOMS
RESPIRATORY NEGATIVE: 1
EYES NEGATIVE: 1
GASTROINTESTINAL NEGATIVE: 1

## 2018-02-27 NOTE — PROGRESS NOTES
 simvastatin (ZOCOR) 40 MG tablet Take 40 mg by mouth nightly Indications: CHOLESTEROL        No current facility-administered medications for this visit. Allergies: Bactrim [sulfamethoxazole-trimethoprim] and Codeine  Past Medical History:   Diagnosis Date    Atrial fibrillation ([address]     Hyperlipidemia     Hypertension     Hypothyroidism     Kidney disease     type 4    LONG TERM ANTICOAGULENT USE      Past Surgical History:   Procedure Laterality Date    BREAST BIOPSY      CARPAL TUNNEL RELEASE      CARPAL TUNNEL RELEASE      CHOLECYSTECTOMY      COLECTOMY      COLON SURGERY      DILATION AND CURETTAGE OF UTERUS N/A 2/16/2017    DILATATION AND CURETTAGE HYSTEROSCOPY performed by Ramana Schulz MD at [address] [place] [address] [address] OF UTERUS  02/17/2017    JOINT REPLACEMENT      TONSILLECTOMY       Family History   Problem Relation Age of Onset    Heart Disease Other      Family History    Cancer Other      Family History    Hypertension Other      Family History    High Blood Pressure Mother     Colon Cancer Mother     Other Father      UNKNOWN     Social History   Substance Use Topics    Smoking status: Never Smoker    Smokeless tobacco: Never Used    Alcohol use No       Review of Systems   Constitutional: Negative. HENT: Negative. Eyes: Negative. Respiratory: Negative. Cardiovascular: Negative. Gastrointestinal: Negative. Genitourinary: Negative. Musculoskeletal: Negative. Skin: Negative. Neurological: Negative. Psychiatric/Behavioral: Negative. Objective:   Physical Exam   Constitutional: She is oriented to person, place, and time. She appears well-developed and well-nourished. HENT:   Head: Normocephalic and atraumatic. Eyes: Pupils are equal, round, and reactive to light. Neck: Normal range of motion. Genitourinary: Vagina normal.       There is no rash, tenderness, lesion or injury on the right labia.  There is no rash,

## 2018-03-02 ENCOUNTER — ANTI-COAG VISIT (OUTPATIENT)
Dept: CARDIOLOGY | Age: 78
End: 2018-03-02
Payer: MEDICARE

## 2018-03-02 DIAGNOSIS — I48.0 PAF (PAROXYSMAL ATRIAL FIBRILLATION) (HCC): Primary | ICD-10-CM

## 2018-03-02 LAB
INTERNATIONAL NORMALIZATION RATIO, POC: 1
PROTHROMBIN TIME, POC: NORMAL

## 2018-03-02 PROCEDURE — 85610 PROTHROMBIN TIME: CPT | Performed by: CLINICAL NURSE SPECIALIST

## 2018-03-05 ENCOUNTER — ANTI-COAG VISIT (OUTPATIENT)
Dept: CARDIOLOGY | Age: 78
End: 2018-03-05
Payer: MEDICARE

## 2018-03-05 DIAGNOSIS — I48.0 PAF (PAROXYSMAL ATRIAL FIBRILLATION) (HCC): Primary | ICD-10-CM

## 2018-03-05 LAB
INTERNATIONAL NORMALIZATION RATIO, POC: 1.3
PROTHROMBIN TIME, POC: NORMAL

## 2018-03-05 PROCEDURE — 85610 PROTHROMBIN TIME: CPT | Performed by: NURSE PRACTITIONER

## 2018-03-08 ENCOUNTER — ANTI-COAG VISIT (OUTPATIENT)
Dept: CARDIOLOGY | Age: 78
End: 2018-03-08
Payer: MEDICARE

## 2018-03-08 DIAGNOSIS — I48.0 PAF (PAROXYSMAL ATRIAL FIBRILLATION) (HCC): Primary | ICD-10-CM

## 2018-03-08 LAB
INTERNATIONAL NORMALIZATION RATIO, POC: 2.2
PROTHROMBIN TIME, POC: NORMAL

## 2018-03-08 PROCEDURE — 85610 PROTHROMBIN TIME: CPT | Performed by: CLINICAL NURSE SPECIALIST

## 2018-03-23 ENCOUNTER — ANTI-COAG VISIT (OUTPATIENT)
Dept: CARDIOLOGY | Age: 78
End: 2018-03-23
Payer: MEDICARE

## 2018-03-23 DIAGNOSIS — I48.0 PAF (PAROXYSMAL ATRIAL FIBRILLATION) (HCC): Primary | ICD-10-CM

## 2018-03-23 LAB
INTERNATIONAL NORMALIZATION RATIO, POC: 2.5
PROTHROMBIN TIME, POC: NORMAL

## 2018-03-23 PROCEDURE — 85610 PROTHROMBIN TIME: CPT | Performed by: CLINICAL NURSE SPECIALIST

## 2018-04-27 ENCOUNTER — ANTI-COAG VISIT (OUTPATIENT)
Dept: CARDIOLOGY | Age: 78
End: 2018-04-27
Payer: MEDICARE

## 2018-04-27 DIAGNOSIS — I48.0 PAF (PAROXYSMAL ATRIAL FIBRILLATION) (HCC): Primary | ICD-10-CM

## 2018-04-27 LAB
INTERNATIONAL NORMALIZATION RATIO, POC: 2.5
PROTHROMBIN TIME, POC: NORMAL

## 2018-04-27 PROCEDURE — 85610 PROTHROMBIN TIME: CPT | Performed by: CLINICAL NURSE SPECIALIST

## 2018-05-14 DIAGNOSIS — I48.0 PAF (PAROXYSMAL ATRIAL FIBRILLATION) (HCC): ICD-10-CM

## 2018-05-14 RX ORDER — METOPROLOL SUCCINATE 25 MG/1
TABLET, EXTENDED RELEASE ORAL
Qty: 180 TABLET | Refills: 3 | Status: SHIPPED | OUTPATIENT
Start: 2018-05-14 | End: 2019-05-28 | Stop reason: SDUPTHER

## 2018-06-04 ENCOUNTER — OFFICE VISIT (OUTPATIENT)
Dept: CARDIOLOGY | Age: 78
End: 2018-06-04
Payer: MEDICARE

## 2018-06-04 VITALS
HEIGHT: 66 IN | DIASTOLIC BLOOD PRESSURE: 84 MMHG | BODY MASS INDEX: 35.2 KG/M2 | HEART RATE: 60 BPM | WEIGHT: 219 LBS | SYSTOLIC BLOOD PRESSURE: 132 MMHG

## 2018-06-04 DIAGNOSIS — I48.0 PAF (PAROXYSMAL ATRIAL FIBRILLATION) (HCC): Primary | ICD-10-CM

## 2018-06-04 DIAGNOSIS — I10 ESSENTIAL HYPERTENSION: ICD-10-CM

## 2018-06-04 LAB
INTERNATIONAL NORMALIZATION RATIO, POC: 2.9
PROTHROMBIN TIME, POC: NORMAL

## 2018-06-04 PROCEDURE — 99214 OFFICE O/P EST MOD 30 MIN: CPT | Performed by: INTERNAL MEDICINE

## 2018-06-04 PROCEDURE — 1123F ACP DISCUSS/DSCN MKR DOCD: CPT | Performed by: INTERNAL MEDICINE

## 2018-06-04 PROCEDURE — 85610 PROTHROMBIN TIME: CPT | Performed by: INTERNAL MEDICINE

## 2018-06-04 PROCEDURE — G8417 CALC BMI ABV UP PARAM F/U: HCPCS | Performed by: INTERNAL MEDICINE

## 2018-06-04 PROCEDURE — G8427 DOCREV CUR MEDS BY ELIG CLIN: HCPCS | Performed by: INTERNAL MEDICINE

## 2018-06-04 PROCEDURE — 1036F TOBACCO NON-USER: CPT | Performed by: INTERNAL MEDICINE

## 2018-06-04 PROCEDURE — G8400 PT W/DXA NO RESULTS DOC: HCPCS | Performed by: INTERNAL MEDICINE

## 2018-06-04 PROCEDURE — 1090F PRES/ABSN URINE INCON ASSESS: CPT | Performed by: INTERNAL MEDICINE

## 2018-06-04 PROCEDURE — 4040F PNEUMOC VAC/ADMIN/RCVD: CPT | Performed by: INTERNAL MEDICINE

## 2018-06-26 NOTE — PROGRESS NOTES
[address], [address] [place] [address], [address] [facility]  The following was transcribed by Albert Downing M.T. Juvenal Dubois : 1940, 65 y.o. Female        Office Visit:  2018    Chief Complaint   Patient presents with    6 Month Follow-Up     6 month follow up, pt denies chest pain at this time and SOB     Atrial Fibrillation    Shortness of Breath     Reason for visit:   1. Follow up of paroxysmal atrial fibrillation and hypertension. History of Present Illness:   Ms. Juvenal Dubois presents today for routine follow-up. She is doing well from a cardiac standpoint. There has been no chest discomfort, unusual dyspnea, paroxysmal nocturnal dyspnea, orthopnea, syncope or near syncope. Cholesterol is monitored by her primary care provider. There has been no reported bleeding issues on Coumadin.       Patient Active Problem List   Diagnosis Code    Hypothyroidism E03.9    Mixed hyperlipidemia E78.2    Hypertension I10    Atrial fibrillation ([address] I48.91    PAF (paroxysmal atrial fibrillation) (HCC) I48.0    Essential hypertension I10    Endometrial thickening on ultra sound R93.8    PMB (postmenopausal bleeding) N95.0    Acute frontal sinusitis J01.10    Chronic anticoagulation Z79.01     Past Medical History:   Diagnosis Date    Atrial fibrillation (HCC)     Hyperlipidemia     Hypertension     Hypothyroidism     Kidney disease     type 4    LONG TERM ANTICOAGULENT USE      Past Surgical History:   Procedure Laterality Date    BREAST BIOPSY      CARPAL TUNNEL RELEASE      CARPAL TUNNEL RELEASE      CHOLECYSTECTOMY      COLECTOMY      COLON SURGERY      DILATION AND CURETTAGE OF UTERUS N/A 2017    DILATATION AND CURETTAGE HYSTEROSCOPY performed by Da Julien MD at [address]  2017    JOINT REPLACEMENT      TONSILLECTOMY        Family History   Problem Relation Age and have updated the computerized patient record. Data:   BP Readings from Last 3 Encounters:   06/04/18 132/84   02/27/18 121/67   11/20/17 130/82    Pulse Readings from Last 3 Encounters:   06/04/18 60   02/27/18 59   06/14/17 73        Wt Readings from Last 3 Encounters:   06/04/18 219 lb (99.3 kg)   02/27/18 220 lb (99.8 kg)   11/20/17 219 lb (99.3 kg)      Estimated body mass index is 35.35 kg/m² as calculated from the following:    Height as of this encounter: 5' 6\" (1.676 m). Weight as of this encounter: 219 lb (99.3 kg). Review of Systems - As per HPI, otherwise negative. Ten organ review performed. Physical Exam:  Vital Signs:  /84   Pulse 60   Ht 5' 6\" (1.676 m)   Wt 219 lb (99.3 kg)   BMI 35.35 kg/m²   Constitutional:  The patient is a pleasant 65 y.o. female in no acute distress. She appears well-developed and well-nourished. HEAD:  Normocephalic without evidence of old or recent trauma. EYES:  Sclerae clear. Conjunctivae pink. EOMs intact. Pupils equal and round. NOSE:  No nasal discharge or epistaxis. MOUTH:  Teeth, gums and palate normal.   THROAT:  No lesions on lips or buccal mucosa. Tongue protrudes in midline and is well papillated. NECK:  There are no carotid bruits. No noted jugular venous distention. No thyromegaly. CHEST:  Clear bilateral breath sounds without wheezes or rhonchi. RESPIRATORY:  The lungs are clear to auscultation bilaterally. CARDIOVASCULAR:  The heart's rhythm is regular without audible murmurs or gallops. ABDOMEN:  Soft, no tenderness or mass. UPPER EXTREMITY EVALUATION:  Radial pulses palpable bilaterally. LOWER EXTREMITY EVALUATION:  Negative for peripheral edema. Femoral, popliteal, dorsalis pedis, and posterior tibialis pulses 2+ to palpation bilaterally. No cyanosis or clubbing. MUSCULOSKELETAL:  Normal muscle strength and tone. No atrophy or abnormalities. SKIN:  Warm, dry, intact.   No dermatitis or

## 2018-07-06 ENCOUNTER — ANTI-COAG VISIT (OUTPATIENT)
Dept: CARDIOLOGY | Age: 78
End: 2018-07-06
Payer: MEDICARE

## 2018-07-06 DIAGNOSIS — I48.0 PAF (PAROXYSMAL ATRIAL FIBRILLATION) (HCC): Primary | ICD-10-CM

## 2018-07-06 LAB
INTERNATIONAL NORMALIZATION RATIO, POC: 3.2
PROTHROMBIN TIME, POC: NORMAL

## 2018-07-06 PROCEDURE — 85610 PROTHROMBIN TIME: CPT | Performed by: CLINICAL NURSE SPECIALIST

## 2018-08-10 ENCOUNTER — ANTI-COAG VISIT (OUTPATIENT)
Dept: CARDIOLOGY | Age: 78
End: 2018-08-10
Payer: MEDICARE

## 2018-08-10 DIAGNOSIS — I48.0 PAF (PAROXYSMAL ATRIAL FIBRILLATION) (HCC): Primary | ICD-10-CM

## 2018-08-10 LAB
INTERNATIONAL NORMALIZATION RATIO, POC: 3.3
PROTHROMBIN TIME, POC: NORMAL

## 2018-08-10 PROCEDURE — 85610 PROTHROMBIN TIME: CPT | Performed by: CLINICAL NURSE SPECIALIST

## 2018-08-15 ENCOUNTER — HOSPITAL ENCOUNTER (OUTPATIENT)
Dept: NON INVASIVE DIAGNOSTICS | Age: 78
Discharge: HOME | End: 2018-08-15
Payer: MEDICARE

## 2018-08-15 ENCOUNTER — OFFICE VISIT (OUTPATIENT)
Dept: CARDIOLOGY | Age: 78
End: 2018-08-15
Payer: MEDICARE

## 2018-08-15 VITALS
HEART RATE: 85 BPM | WEIGHT: 217 LBS | SYSTOLIC BLOOD PRESSURE: 98 MMHG | HEIGHT: 65 IN | BODY MASS INDEX: 36.15 KG/M2 | DIASTOLIC BLOOD PRESSURE: 58 MMHG

## 2018-08-15 DIAGNOSIS — R53.83 OTHER FATIGUE: ICD-10-CM

## 2018-08-15 DIAGNOSIS — I48.91 ATRIAL FIBRILLATION, UNSPECIFIED TYPE (HCC): ICD-10-CM

## 2018-08-15 DIAGNOSIS — I10 ESSENTIAL HYPERTENSION: ICD-10-CM

## 2018-08-15 DIAGNOSIS — I34.0 MITRAL VALVE INSUFFICIENCY, UNSPECIFIED ETIOLOGY: Primary | ICD-10-CM

## 2018-08-15 DIAGNOSIS — E03.9 HYPOTHYROIDISM, UNSPECIFIED TYPE: ICD-10-CM

## 2018-08-15 LAB
ALBUMIN SERPL-MCNC: 4.3 G/DL (ref 3.5–5.2)
ALP BLD-CCNC: 78 U/L (ref 35–104)
ALT SERPL-CCNC: 16 U/L (ref 5–33)
ANION GAP SERPL CALCULATED.3IONS-SCNC: 16 MMOL/L (ref 7–19)
ANISOCYTOSIS: ABNORMAL
AST SERPL-CCNC: 20 U/L (ref 5–32)
BASOPHILS ABSOLUTE: 0.1 K/UL (ref 0–0.2)
BASOPHILS RELATIVE PERCENT: 0.5 % (ref 0–1)
BILIRUB SERPL-MCNC: 0.4 MG/DL (ref 0.2–1.2)
BUN BLDV-MCNC: 32 MG/DL (ref 8–23)
CALCIUM SERPL-MCNC: 9.4 MG/DL (ref 8.8–10.2)
CHLORIDE BLD-SCNC: 96 MMOL/L (ref 98–111)
CO2: 27 MMOL/L (ref 22–29)
CREAT SERPL-MCNC: 2.7 MG/DL (ref 0.5–0.9)
EOSINOPHILS ABSOLUTE: 0.2 K/UL (ref 0–0.6)
EOSINOPHILS RELATIVE PERCENT: 1.5 % (ref 0–5)
GFR NON-AFRICAN AMERICAN: 17
GLUCOSE BLD-MCNC: 92 MG/DL (ref 74–109)
HCT VFR BLD CALC: 40.2 % (ref 37–47)
HEMOGLOBIN: 12.9 G/DL (ref 12–16)
LYMPHOCYTES ABSOLUTE: 3.1 K/UL (ref 1.1–4.5)
LYMPHOCYTES RELATIVE PERCENT: 29.9 % (ref 20–40)
MCH RBC QN AUTO: 30.8 PG (ref 27–31)
MCHC RBC AUTO-ENTMCNC: 32.1 G/DL (ref 33–37)
MCV RBC AUTO: 95.9 FL (ref 81–99)
MONOCYTES ABSOLUTE: 0.8 K/UL (ref 0–0.9)
MONOCYTES RELATIVE PERCENT: 7.6 % (ref 0–10)
NEUTROPHILS ABSOLUTE: 6.3 K/UL (ref 1.5–7.5)
NEUTROPHILS RELATIVE PERCENT: 59.7 % (ref 50–65)
PDW BLD-RTO: 13.8 % (ref 11.5–14.5)
PLATELET # BLD: 174 K/UL (ref 130–400)
PLATELET SLIDE REVIEW: ADEQUATE
PMV BLD AUTO: 11.7 FL (ref 9.4–12.3)
POTASSIUM SERPL-SCNC: 4.1 MMOL/L (ref 3.5–5)
RBC # BLD: 4.19 M/UL (ref 4.2–5.4)
SODIUM BLD-SCNC: 139 MMOL/L (ref 136–145)
T3 FREE: 2.7 PG/ML (ref 2–4.4)
T4 FREE: 1.6 NG/DL (ref 0.9–1.7)
TOTAL PROTEIN: 7.1 G/DL (ref 6.6–8.7)
TSH SERPL DL<=0.05 MIU/L-ACNC: 3.88 UIU/ML (ref 0.27–4.2)
WBC # BLD: 10.5 K/UL (ref 4.8–10.8)

## 2018-08-15 PROCEDURE — 93226 XTRNL ECG REC<48 HR SCAN A/R: CPT

## 2018-08-15 PROCEDURE — 93000 ELECTROCARDIOGRAM COMPLETE: CPT | Performed by: NURSE PRACTITIONER

## 2018-08-15 PROCEDURE — 4040F PNEUMOC VAC/ADMIN/RCVD: CPT | Performed by: NURSE PRACTITIONER

## 2018-08-15 PROCEDURE — G8427 DOCREV CUR MEDS BY ELIG CLIN: HCPCS | Performed by: NURSE PRACTITIONER

## 2018-08-15 PROCEDURE — G8400 PT W/DXA NO RESULTS DOC: HCPCS | Performed by: NURSE PRACTITIONER

## 2018-08-15 PROCEDURE — G8417 CALC BMI ABV UP PARAM F/U: HCPCS | Performed by: NURSE PRACTITIONER

## 2018-08-15 PROCEDURE — 1123F ACP DISCUSS/DSCN MKR DOCD: CPT | Performed by: NURSE PRACTITIONER

## 2018-08-15 PROCEDURE — 93227 XTRNL ECG REC<48 HR R&I: CPT | Performed by: INTERNAL MEDICINE

## 2018-08-15 PROCEDURE — 1036F TOBACCO NON-USER: CPT | Performed by: NURSE PRACTITIONER

## 2018-08-15 PROCEDURE — 1101F PT FALLS ASSESS-DOCD LE1/YR: CPT | Performed by: NURSE PRACTITIONER

## 2018-08-15 PROCEDURE — 99214 OFFICE O/P EST MOD 30 MIN: CPT | Performed by: NURSE PRACTITIONER

## 2018-08-15 PROCEDURE — 1090F PRES/ABSN URINE INCON ASSESS: CPT | Performed by: NURSE PRACTITIONER

## 2018-08-15 PROCEDURE — 93225 XTRNL ECG REC<48 HRS REC: CPT

## 2018-08-16 NOTE — PROGRESS NOTES
Dear Dr. Kermit Alvarez MD,    Thank you for allowing me to participate in the care of Ms. Zayda Wang. She presents today at the [address] [place] [address] in the [facility]. As you know, Ms. Adilia Hooker is a 65 y.o. female with history of hypertension, hyperlipidemia, PAF, chronic anticoagulation, and CKD Stage IV who presents with the chief complaint of being out of rhythm. She states that she has been out of rhythm since Saturday. She has felt fatigued and her hear fluttering and racing at times. She is on Multaq, Toprol, and anticoagulated on Coumadin. We manage her coumadin. She denies any bleeding issues. She denies any chest pain or SOA. She was seen for sinus infection last Wednesday and was started on an antibiotic and steroid. She has been having episodes for 6 months to 1 year where she will be up doing something and a real weak feeling comes over her. She states that she does not really get dizzy but states that she feels if she doesn't sit down that she might faint. She sits down and it goes away. She denies any fast or slow heart rhythms with this. It only lasts a few seconds but she feel like it is starting to happen more frequently. It is happening about once a month. She has been compliant with her medications. She otherwise denies chest pain, SOA, CARVALHO, PND, orthopnea, syncope or near syncope. She has no other complaints. Review of Systems   Constitutional: Positive for fatigue. Negative for fever, chills, diaphoresis, activity change, appetite change,  and unexpected weight change. Eyes: Negative for photophobia, pain, redness and visual disturbance. Respiratory: Negative for apnea, cough, chest tightness, shortness of breath, wheezing and stridor. Cardiovascular: Positive for fluttering and racing. Negative for chest pain and leg swelling. Gastrointestinal: Negative for abdominal distention.    Genitourinary: Negative for dysuria, urgency XL) 25 MG extended release tablet, TAKE ONE TABLET BY MOUTH TWICE DAILY, Disp: 180 tablet, Rfl: 3    MULTAQ 400 MG TABS, TAKE ONE TABLET BY MOUTH TWICE DAILY WITH MEALS, Disp: 180 tablet, Rfl: 3    allopurinol (ZYLOPRIM) 100 MG tablet, Take 100 mg by mouth daily , Disp: , Rfl:     calcium carbonate 600 MG TABS tablet, Take 1 tablet by mouth 2 times daily Indications: SUPPLEMENT, Disp: , Rfl:     warfarin (COUMADIN) 5 MG tablet, Take 5 mg by mouth 2 days of 5mg and then 2 days of 2.5 (alternating every 2 days), Disp: , Rfl:     spironolactone (ALDACTONE) 25 MG tablet, Take 25 mg by mouth nightly Indications: FLUID RETENTION , Disp: , Rfl:     levothyroxine (SYNTHROID) 50 MCG tablet, Take 50 mcg by mouth Daily Indications: HYPOTHROIDISM , Disp: , Rfl:     polyethylene glycol (MIRALAX) powder, Take 17 g by mouth daily Indications: REGULARITY , Disp: , Rfl:     losartan (COZAAR) 100 MG tablet, Take 100 mg by mouth Daily Indications: HYPERTENSION , Disp: , Rfl:     ferrous sulfate 325 (65 FE) MG tablet, Take 325 mg by mouth every other day Indications: ANEMIA , Disp: , Rfl:     vitamin D (ERGOCALCIFEROL) 47427 UNIT CAPS capsule, Take 50,000 Units by mouth once a week Indications: SUPPLEMENT , Disp: , Rfl:     bumetanide (BUMEX) 1 MG tablet, Take 0.5 mg by mouth daily , Disp: , Rfl:     simvastatin (ZOCOR) 40 MG tablet, Take 40 mg by mouth nightly Indications: CHOLESTEROL , Disp: , Rfl:     PE:  Vitals:    08/15/18 1528   BP: (!) 98/58   Pulse:        Estimated body mass index is 36.11 kg/m² as calculated from the following:    Height as of this encounter: 5' 5\" (1.651 m). Weight as of this encounter: 217 lb (98.4 kg). Constitutional: She is oriented to person, place, and time. She appears well-developed and well-nourished in no acute distress. Head: Normocephalic and atraumatic. Neck:  Neck supple without JVD present. Cardiovascular: Normal rate, Irregular rhythm, normal heart sounds.  no murmur

## 2018-08-17 ENCOUNTER — HOSPITAL ENCOUNTER (OUTPATIENT)
Dept: NON INVASIVE DIAGNOSTICS | Age: 78
Discharge: HOME | End: 2018-08-17
Payer: MEDICARE

## 2018-08-17 DIAGNOSIS — I48.91 ATRIAL FIBRILLATION, UNSPECIFIED TYPE (HCC): ICD-10-CM

## 2018-08-17 DIAGNOSIS — R53.83 OTHER FATIGUE: ICD-10-CM

## 2018-08-17 DIAGNOSIS — E03.9 HYPOTHYROIDISM, UNSPECIFIED TYPE: ICD-10-CM

## 2018-08-17 DIAGNOSIS — I10 ESSENTIAL HYPERTENSION: ICD-10-CM

## 2018-08-17 DIAGNOSIS — I34.0 MITRAL VALVE INSUFFICIENCY, UNSPECIFIED ETIOLOGY: ICD-10-CM

## 2018-08-17 LAB
LV EF: 60 %
LVEF MODALITY: NORMAL

## 2018-08-17 PROCEDURE — 93306 TTE W/DOPPLER COMPLETE: CPT

## 2018-08-20 ENCOUNTER — OFFICE VISIT (OUTPATIENT)
Dept: CARDIOLOGY | Age: 78
End: 2018-08-20
Payer: MEDICARE

## 2018-08-20 VITALS
HEART RATE: 60 BPM | HEIGHT: 65 IN | DIASTOLIC BLOOD PRESSURE: 70 MMHG | BODY MASS INDEX: 35.99 KG/M2 | WEIGHT: 216 LBS | SYSTOLIC BLOOD PRESSURE: 138 MMHG

## 2018-08-20 DIAGNOSIS — I48.0 PAF (PAROXYSMAL ATRIAL FIBRILLATION) (HCC): Primary | ICD-10-CM

## 2018-08-20 DIAGNOSIS — E78.2 MIXED HYPERLIPIDEMIA: ICD-10-CM

## 2018-08-20 DIAGNOSIS — Z79.01 CHRONIC ANTICOAGULATION: ICD-10-CM

## 2018-08-20 DIAGNOSIS — I10 ESSENTIAL HYPERTENSION: ICD-10-CM

## 2018-08-20 LAB
INTERNATIONAL NORMALIZATION RATIO, POC: 2.5
PROTHROMBIN TIME, POC: NORMAL

## 2018-08-20 PROCEDURE — 85610 PROTHROMBIN TIME: CPT | Performed by: NURSE PRACTITIONER

## 2018-08-20 PROCEDURE — 1101F PT FALLS ASSESS-DOCD LE1/YR: CPT | Performed by: NURSE PRACTITIONER

## 2018-08-20 PROCEDURE — G8417 CALC BMI ABV UP PARAM F/U: HCPCS | Performed by: NURSE PRACTITIONER

## 2018-08-20 PROCEDURE — G8427 DOCREV CUR MEDS BY ELIG CLIN: HCPCS | Performed by: NURSE PRACTITIONER

## 2018-08-20 PROCEDURE — 1090F PRES/ABSN URINE INCON ASSESS: CPT | Performed by: NURSE PRACTITIONER

## 2018-08-20 PROCEDURE — G8400 PT W/DXA NO RESULTS DOC: HCPCS | Performed by: NURSE PRACTITIONER

## 2018-08-20 PROCEDURE — 1123F ACP DISCUSS/DSCN MKR DOCD: CPT | Performed by: NURSE PRACTITIONER

## 2018-08-20 PROCEDURE — 93000 ELECTROCARDIOGRAM COMPLETE: CPT | Performed by: NURSE PRACTITIONER

## 2018-08-20 PROCEDURE — 99213 OFFICE O/P EST LOW 20 MIN: CPT | Performed by: NURSE PRACTITIONER

## 2018-08-20 PROCEDURE — 1036F TOBACCO NON-USER: CPT | Performed by: NURSE PRACTITIONER

## 2018-08-20 PROCEDURE — 4040F PNEUMOC VAC/ADMIN/RCVD: CPT | Performed by: NURSE PRACTITIONER

## 2018-08-20 NOTE — PROGRESS NOTES
normal. Thought content normal.     Lab Results   Component Value Date    CREATININE 2.7 08/15/2018    CREATININE 2.3 02/08/2017    HGB 12.9 08/15/2018    HGB 13.0 02/08/2017       ECG 08/20/18  Normal sinus rhythm and BPM 61    TTE 8/17/2018    Summary   Left ventricular size is normal .   Normal left ventricular wall thickness.   Left ventricular ejection fraction is visually estimated at 60%.   No regional wall motion abnormalities.      Signature      ----------------------------------------------------------------   Electronically signed by James Varma MD(Interpreting   physician) on 08/19/2018 09:59 AM        Assessment    1. PAF (paroxysmal atrial fibrillation) ([facility] [address]    2. Mixed hyperlipidemia    3. Essential hypertension    4. Chronic anticoagulation          Plan:    INR 2.5 today. Resume normal dosing and recheck in one month. Continue current medications. No need for cardioversion as she is in NSR. Prelim on 48 hour does show some atrial flutter. On coumadin already. Keep regular follow ups. Disposition - RTC in keep already scheduled apts months or sooner if needed    Please do not hesitate to contact me for any questions or concerns.     Sincerely yours,    KAYLA Antunez

## 2018-08-21 NOTE — PROCEDURES
HOLTER MONITOR REPORT    PATIENT:  Nilay Bhagat   :  1940  MRN:  [id number]    ORDERING PROVIDER:  Ms ABBE Levi:   afib or Irregular heart rate    TEST DATE:  8-15-18    INTERPRETATION DATE: 2018      INTERPRETING CARDIOLOGIST:  Graciela Conn MD, MSc    FINDINGS  1. Nearly 45 hours of rhythm were processed. 2. There were [id number] total beats. 3. The underlying rhythm is normal sinus at a mean heart rate of 66 bpm, with a range of 53 to 111 bpm. The patient was in atrial flutter from the baseline recording till 2 hrs and 20 minutes later. No other recordings show afib/flutter  4. There was normal diurnal variation. 5. Bradycardia was present for 15 hr(s) and 19 min. 6. Tachycardia was present 5 min. 7. There were 1688 extra supraventricular beats. The majority of these were single PAC's. Some of the SV events have been labeled as PACs, but it is atrial flutter. 8. There were 3 extra ventricular beats. The majority of these were single PVC's. 9. Prolonged pause(s) was not noted. 10. A symptom diary was not included for rhythm-symptom correlation    CONCLUSIONS  This Holter monitor shows no evidence of significant supraventricular or ventricular ectopy and is without prolonged pauses. There was atrial flutter seen for at least 2 hrs and 20 min.     Carrie Gray MD  [facility]  18

## 2018-09-19 ENCOUNTER — ANTI-COAG VISIT (OUTPATIENT)
Dept: CARDIOLOGY | Age: 78
End: 2018-09-19
Payer: MEDICARE

## 2018-09-19 DIAGNOSIS — I48.0 PAF (PAROXYSMAL ATRIAL FIBRILLATION) (HCC): Primary | ICD-10-CM

## 2018-09-19 LAB
INTERNATIONAL NORMALIZATION RATIO, POC: 2.9
PROTHROMBIN TIME, POC: NORMAL

## 2018-09-19 PROCEDURE — 85610 PROTHROMBIN TIME: CPT | Performed by: CLINICAL NURSE SPECIALIST

## 2018-09-20 LAB
BACTERIA: NEGATIVE /HPF
BILIRUBIN URINE: NEGATIVE
BLOOD, URINE: ABNORMAL
CLARITY: ABNORMAL
COLOR: ABNORMAL
EPITHELIAL CELLS, UA: 0 /HPF (ref 0–5)
GLUCOSE URINE: NEGATIVE MG/DL
HYALINE CASTS: 4 /HPF (ref 0–8)
KETONES, URINE: NEGATIVE MG/DL
LEUKOCYTE ESTERASE, URINE: ABNORMAL
NITRITE, URINE: NEGATIVE
PH UA: 6
PROTEIN UA: 30 MG/DL
RBC UA: >900 /HPF (ref 0–4)
SPECIFIC GRAVITY UA: 1.01
UROBILINOGEN, URINE: 0.2 E.U./DL
WBC UA: 135 /HPF (ref 0–5)

## 2018-10-17 ENCOUNTER — ANTI-COAG VISIT (OUTPATIENT)
Dept: CARDIOLOGY | Age: 78
End: 2018-10-17
Payer: MEDICARE

## 2018-10-17 DIAGNOSIS — I48.0 PAF (PAROXYSMAL ATRIAL FIBRILLATION) (HCC): Primary | ICD-10-CM

## 2018-10-17 LAB
INTERNATIONAL NORMALIZATION RATIO, POC: 2.6
PROTHROMBIN TIME, POC: NORMAL

## 2018-10-17 PROCEDURE — 85610 PROTHROMBIN TIME: CPT | Performed by: CLINICAL NURSE SPECIALIST

## 2018-11-26 ENCOUNTER — HOSPITAL ENCOUNTER (OUTPATIENT)
Dept: MAMMOGRAPHY | Facility: HOSPITAL | Age: 78
Discharge: HOME | End: 2018-11-26
Attending: SPECIALIST | Admitting: SPECIALIST

## 2018-11-26 DIAGNOSIS — Z12.31 ENCOUNTER FOR SCREENING MAMMOGRAM FOR MALIGNANT NEOPLASM OF BREAST: ICD-10-CM

## 2018-11-26 PROCEDURE — 77063 BREAST TOMOSYNTHESIS BI: CPT

## 2018-11-26 PROCEDURE — 77067 SCR MAMMO BI INCL CAD: CPT

## 2018-11-29 ENCOUNTER — ANTI-COAG VISIT (OUTPATIENT)
Dept: CARDIOLOGY | Age: 78
End: 2018-11-29
Payer: MEDICARE

## 2018-11-29 DIAGNOSIS — I48.0 PAF (PAROXYSMAL ATRIAL FIBRILLATION) (HCC): Primary | ICD-10-CM

## 2018-11-29 LAB
INTERNATIONAL NORMALIZATION RATIO, POC: 2.7
PROTHROMBIN TIME, POC: NORMAL

## 2018-11-29 PROCEDURE — 85610 PROTHROMBIN TIME: CPT | Performed by: CLINICAL NURSE SPECIALIST

## 2018-12-03 ENCOUNTER — TRANSCRIBE ORDERS (OUTPATIENT)
Dept: ADMINISTRATIVE | Facility: HOSPITAL | Age: 78
End: 2018-12-03

## 2018-12-03 DIAGNOSIS — Z12.31 ENCOUNTER FOR SCREENING MAMMOGRAM FOR MALIGNANT NEOPLASM OF BREAST: Primary | ICD-10-CM

## 2018-12-18 ENCOUNTER — OFFICE VISIT (OUTPATIENT)
Dept: CARDIOLOGY | Age: 78
End: 2018-12-18
Payer: MEDICARE

## 2018-12-18 VITALS
SYSTOLIC BLOOD PRESSURE: 122 MMHG | BODY MASS INDEX: 36.65 KG/M2 | HEART RATE: 57 BPM | HEIGHT: 65 IN | DIASTOLIC BLOOD PRESSURE: 68 MMHG | WEIGHT: 220 LBS

## 2018-12-18 DIAGNOSIS — Z79.01 CHRONIC ANTICOAGULATION: ICD-10-CM

## 2018-12-18 DIAGNOSIS — I10 ESSENTIAL HYPERTENSION: ICD-10-CM

## 2018-12-18 DIAGNOSIS — E78.2 MIXED HYPERLIPIDEMIA: ICD-10-CM

## 2018-12-18 DIAGNOSIS — I48.0 PAF (PAROXYSMAL ATRIAL FIBRILLATION) (HCC): Primary | ICD-10-CM

## 2018-12-18 PROCEDURE — G8484 FLU IMMUNIZE NO ADMIN: HCPCS | Performed by: NURSE PRACTITIONER

## 2018-12-18 PROCEDURE — G8417 CALC BMI ABV UP PARAM F/U: HCPCS | Performed by: NURSE PRACTITIONER

## 2018-12-18 PROCEDURE — G8400 PT W/DXA NO RESULTS DOC: HCPCS | Performed by: NURSE PRACTITIONER

## 2018-12-18 PROCEDURE — 4040F PNEUMOC VAC/ADMIN/RCVD: CPT | Performed by: NURSE PRACTITIONER

## 2018-12-18 PROCEDURE — 1101F PT FALLS ASSESS-DOCD LE1/YR: CPT | Performed by: NURSE PRACTITIONER

## 2018-12-18 PROCEDURE — 1036F TOBACCO NON-USER: CPT | Performed by: NURSE PRACTITIONER

## 2018-12-18 PROCEDURE — 93000 ELECTROCARDIOGRAM COMPLETE: CPT | Performed by: NURSE PRACTITIONER

## 2018-12-18 PROCEDURE — 1123F ACP DISCUSS/DSCN MKR DOCD: CPT | Performed by: NURSE PRACTITIONER

## 2018-12-18 PROCEDURE — 1090F PRES/ABSN URINE INCON ASSESS: CPT | Performed by: NURSE PRACTITIONER

## 2018-12-18 PROCEDURE — G8427 DOCREV CUR MEDS BY ELIG CLIN: HCPCS | Performed by: NURSE PRACTITIONER

## 2018-12-18 PROCEDURE — 99213 OFFICE O/P EST LOW 20 MIN: CPT | Performed by: NURSE PRACTITIONER

## 2018-12-18 NOTE — PATIENT INSTRUCTIONS
greens, jennifer greens, Swiss chard, and mustard greens. ? [place] sprouts, broccoli, and asparagus. · Limit your use of alcohol. Avoid bleeding by preventing falls and injuries  · Wear slippers or shoes with nonskid soles. · Remove throw rugs and clutter. · Rearrange furniture and electrical cords to keep them out of walking paths. · Keep stairways, porches, and outside walkways well lit. Use night-lights in hallways and bathrooms. · Be extra careful when you work with sharp tools or knives. When should you call for help? Call 911 anytime you think you may need emergency care. For example, call if:    · You have a sudden, severe headache that is different from past headaches.    Call your doctor now or seek immediate medical care if:    · You have any abnormal bleeding, such as:  ? Nosebleeds. ? Vaginal bleeding that is different (heavier, more frequent, at a different time of the month) than what you are used to.  ? Bloody or black stools, or rectal bleeding. ? Bloody or pink urine.    Watch closely for changes in your health, and be sure to contact your doctor if you have any problems. Where can you learn more? Go to [email or web address] org and sign in to your MyVR account. Enter O152 in the [employer] box to learn more about \"Taking Warfarin Safely: Care Instructions. \"     If you do not have an account, please click on the \"Sign Up Now\" link. Current as of: December 6, 2017  Content Version: 11.8  © 7068-3614 Healthwise, Incorporated. Care instructions adapted under license by [facility] ([facility]). If you have questions about a medical condition or this instruction, always ask your healthcare professional. [place] [address] any warranty or liability for your use of this information.

## 2018-12-18 NOTE — PROGRESS NOTES
respiratory effort without use of accessory muscles. Ausculatation reveals vesicular breath sounds without crackles, wheezes, rub or rhonchi. Cardiovascular - No jugular venous distention. Auscultation reveals regular rate and rhythm. No audible clicks, gallop or rub. No murmur. No lower extremity varicosities. No carotid bruits. Abdominal -  No visible distention, mass or pulsations. Extremities - No clubbing or cyanosis. No statis dermatitis or ulcers. No edema. Musculoskeletal -   No Osler's nodes. No kyphosis or scoliosis. Gait is even and regular without limp or shuffle. Ambulates without assistance. Skin -  Warm and dry; no rash or pallor. No new surgical wound. Neurological - No focal neurological deficits. Thought processes coherent. No apparent tremor. Oriented to person, place and time. Psychiatric -  Appropriate affect and mood. Assessment:     Diagnosis Orders   1. PAF (paroxysmal atrial fibrillation) (HCC)  EKG 12 lead   2. Essential hypertension     3. Mixed hyperlipidemia     4. Chronic anticoagulation       EKG reviewed:  NSR at 57 bpm; no acute ischemic changes or ectopy. QTc .461  Stable CV status without symptoms of overt heart failure, sustained arrhythmia or angina. BP well controlled. No bleeding issues on Coumadin. No recurrent AF on Multaq. PCP follows lipids. Tolerating Zocor. Patient is compliant with medication regimen. BP Readings from Last 3 Encounters:   12/18/18 122/68   08/20/18 138/70   08/15/18 (!) 98/58    Pulse Readings from Last 3 Encounters:   12/18/18 57   08/20/18 60   08/15/18 85        Wt Readings from Last 3 Encounters:   12/18/18 220 lb (99.8 kg)   08/20/18 216 lb (98 kg)   08/15/18 217 lb (98.4 kg)       Recent Results (from the past 1008 hour(s))   POCT INR    Collection Time: 11/29/18  9:15 AM   Result Value Ref Range    INR 2.7     Protime       Plan  Previous cardiac history and records reviewed.        Valentino Jacobson, APRN

## 2019-01-10 ENCOUNTER — ANTI-COAG VISIT (OUTPATIENT)
Dept: CARDIOLOGY | Age: 79
End: 2019-01-10
Payer: MEDICARE

## 2019-01-10 DIAGNOSIS — I48.0 PAF (PAROXYSMAL ATRIAL FIBRILLATION) (HCC): Primary | ICD-10-CM

## 2019-01-10 LAB
INTERNATIONAL NORMALIZATION RATIO, POC: 3
PROTHROMBIN TIME, POC: NORMAL

## 2019-01-10 PROCEDURE — 85610 PROTHROMBIN TIME: CPT | Performed by: CLINICAL NURSE SPECIALIST

## 2019-02-21 ENCOUNTER — ANTI-COAG VISIT (OUTPATIENT)
Dept: CARDIOLOGY | Age: 79
End: 2019-02-21
Payer: MEDICARE

## 2019-02-21 DIAGNOSIS — I48.0 PAF (PAROXYSMAL ATRIAL FIBRILLATION) (HCC): Primary | ICD-10-CM

## 2019-02-21 LAB
INTERNATIONAL NORMALIZATION RATIO, POC: 2.7
PROTHROMBIN TIME, POC: NORMAL

## 2019-02-21 PROCEDURE — 85610 PROTHROMBIN TIME: CPT | Performed by: CLINICAL NURSE SPECIALIST

## 2019-02-26 DIAGNOSIS — I48.91 ATRIAL FIBRILLATION, UNSPECIFIED TYPE (HCC): ICD-10-CM

## 2019-02-26 RX ORDER — DRONEDARONE 400 MG/1
TABLET, FILM COATED ORAL
Qty: 180 TABLET | Refills: 1 | Status: SHIPPED | OUTPATIENT
Start: 2019-02-26 | End: 2019-08-26 | Stop reason: SDUPTHER

## 2019-03-28 ENCOUNTER — OFFICE VISIT (OUTPATIENT)
Dept: OBGYN | Age: 79
End: 2019-03-28
Payer: MEDICARE

## 2019-03-28 VITALS
BODY MASS INDEX: 36.15 KG/M2 | SYSTOLIC BLOOD PRESSURE: 132 MMHG | DIASTOLIC BLOOD PRESSURE: 82 MMHG | HEART RATE: 76 BPM | WEIGHT: 217 LBS | HEIGHT: 65 IN | RESPIRATION RATE: 16 BRPM

## 2019-03-28 DIAGNOSIS — Z01.419 VISIT FOR PELVIC EXAM: Primary | ICD-10-CM

## 2019-03-28 DIAGNOSIS — Z12.39 SCREENING BREAST EXAMINATION: ICD-10-CM

## 2019-03-28 PROCEDURE — G8427 DOCREV CUR MEDS BY ELIG CLIN: HCPCS | Performed by: OBSTETRICS & GYNECOLOGY

## 2019-03-28 PROCEDURE — 99213 OFFICE O/P EST LOW 20 MIN: CPT | Performed by: OBSTETRICS & GYNECOLOGY

## 2019-03-28 PROCEDURE — 1090F PRES/ABSN URINE INCON ASSESS: CPT | Performed by: OBSTETRICS & GYNECOLOGY

## 2019-03-28 PROCEDURE — G8417 CALC BMI ABV UP PARAM F/U: HCPCS | Performed by: OBSTETRICS & GYNECOLOGY

## 2019-03-28 PROCEDURE — 4040F PNEUMOC VAC/ADMIN/RCVD: CPT | Performed by: OBSTETRICS & GYNECOLOGY

## 2019-03-28 PROCEDURE — G8400 PT W/DXA NO RESULTS DOC: HCPCS | Performed by: OBSTETRICS & GYNECOLOGY

## 2019-03-28 PROCEDURE — 1036F TOBACCO NON-USER: CPT | Performed by: OBSTETRICS & GYNECOLOGY

## 2019-03-28 PROCEDURE — 1123F ACP DISCUSS/DSCN MKR DOCD: CPT | Performed by: OBSTETRICS & GYNECOLOGY

## 2019-03-28 PROCEDURE — G8484 FLU IMMUNIZE NO ADMIN: HCPCS | Performed by: OBSTETRICS & GYNECOLOGY

## 2019-03-28 ASSESSMENT — ENCOUNTER SYMPTOMS
GASTROINTESTINAL NEGATIVE: 1
RESPIRATORY NEGATIVE: 1
EYES NEGATIVE: 1

## 2019-04-04 ENCOUNTER — ANTI-COAG VISIT (OUTPATIENT)
Dept: CARDIOLOGY | Age: 79
End: 2019-04-04
Payer: MEDICARE

## 2019-04-04 DIAGNOSIS — I48.0 PAF (PAROXYSMAL ATRIAL FIBRILLATION) (HCC): Primary | ICD-10-CM

## 2019-04-04 LAB
INTERNATIONAL NORMALIZATION RATIO, POC: 3.1
PROTHROMBIN TIME, POC: NORMAL

## 2019-04-04 PROCEDURE — 93793 ANTICOAG MGMT PT WARFARIN: CPT | Performed by: NURSE PRACTITIONER

## 2019-04-04 PROCEDURE — 85610 PROTHROMBIN TIME: CPT | Performed by: NURSE PRACTITIONER

## 2019-04-11 ENCOUNTER — TELEPHONE (OUTPATIENT)
Dept: CARDIOLOGY | Age: 79
End: 2019-04-11

## 2019-04-11 NOTE — TELEPHONE ENCOUNTER
Busy; Unable to contact pt; Dr. Luis Alberto Juárez will be out of the office; OK to r/s with any NP;       Pt has 2 appts in June;  Appt with Dr. Luis Alberto Juárez will be cancelled and pt is to keep appt with Carjoselito Scales

## 2019-05-16 ENCOUNTER — ANTI-COAG VISIT (OUTPATIENT)
Dept: CARDIOLOGY | Age: 79
End: 2019-05-16
Payer: MEDICARE

## 2019-05-16 DIAGNOSIS — I48.0 PAF (PAROXYSMAL ATRIAL FIBRILLATION) (HCC): Primary | ICD-10-CM

## 2019-05-16 LAB
INTERNATIONAL NORMALIZATION RATIO, POC: 2.3
PROTHROMBIN TIME, POC: NORMAL

## 2019-05-16 PROCEDURE — 93793 ANTICOAG MGMT PT WARFARIN: CPT | Performed by: CLINICAL NURSE SPECIALIST

## 2019-05-16 PROCEDURE — 85610 PROTHROMBIN TIME: CPT | Performed by: CLINICAL NURSE SPECIALIST

## 2019-05-28 DIAGNOSIS — I48.0 PAF (PAROXYSMAL ATRIAL FIBRILLATION) (HCC): ICD-10-CM

## 2019-05-28 RX ORDER — METOPROLOL SUCCINATE 25 MG/1
TABLET, EXTENDED RELEASE ORAL
Qty: 180 TABLET | Refills: 1 | Status: SHIPPED | OUTPATIENT
Start: 2019-05-28 | End: 2019-11-18 | Stop reason: SDUPTHER

## 2019-06-14 ENCOUNTER — TELEPHONE (OUTPATIENT)
Dept: CARDIOLOGY | Age: 79
End: 2019-06-14

## 2019-06-14 NOTE — TELEPHONE ENCOUNTER
Pt called stating she is in A-Fib. Pt is on blood thinner and feels fine. No SOA, no chest pain. Pt just wanted us aware. Pt states she has apt 6-16-19. Pt advised if symptoms get worse to report to ER before her apt. Pt verbally understood.

## 2019-06-19 ENCOUNTER — OFFICE VISIT (OUTPATIENT)
Dept: CARDIOLOGY | Age: 79
End: 2019-06-19
Payer: MEDICARE

## 2019-06-19 VITALS
DIASTOLIC BLOOD PRESSURE: 66 MMHG | HEIGHT: 67 IN | SYSTOLIC BLOOD PRESSURE: 122 MMHG | BODY MASS INDEX: 33.43 KG/M2 | HEART RATE: 72 BPM | WEIGHT: 213 LBS

## 2019-06-19 DIAGNOSIS — E78.2 MIXED HYPERLIPIDEMIA: ICD-10-CM

## 2019-06-19 DIAGNOSIS — I10 ESSENTIAL HYPERTENSION: ICD-10-CM

## 2019-06-19 DIAGNOSIS — I48.0 PAF (PAROXYSMAL ATRIAL FIBRILLATION) (HCC): Primary | ICD-10-CM

## 2019-06-19 DIAGNOSIS — Z79.01 CHRONIC ANTICOAGULATION: ICD-10-CM

## 2019-06-19 LAB
INTERNATIONAL NORMALIZATION RATIO, POC: 3.3
PROTHROMBIN TIME, POC: NORMAL

## 2019-06-19 PROCEDURE — 1090F PRES/ABSN URINE INCON ASSESS: CPT | Performed by: NURSE PRACTITIONER

## 2019-06-19 PROCEDURE — G8400 PT W/DXA NO RESULTS DOC: HCPCS | Performed by: NURSE PRACTITIONER

## 2019-06-19 PROCEDURE — 4040F PNEUMOC VAC/ADMIN/RCVD: CPT | Performed by: NURSE PRACTITIONER

## 2019-06-19 PROCEDURE — 85610 PROTHROMBIN TIME: CPT | Performed by: NURSE PRACTITIONER

## 2019-06-19 PROCEDURE — 99214 OFFICE O/P EST MOD 30 MIN: CPT | Performed by: NURSE PRACTITIONER

## 2019-06-19 PROCEDURE — G8417 CALC BMI ABV UP PARAM F/U: HCPCS | Performed by: NURSE PRACTITIONER

## 2019-06-19 PROCEDURE — 1036F TOBACCO NON-USER: CPT | Performed by: NURSE PRACTITIONER

## 2019-06-19 PROCEDURE — 1123F ACP DISCUSS/DSCN MKR DOCD: CPT | Performed by: NURSE PRACTITIONER

## 2019-06-19 PROCEDURE — G8427 DOCREV CUR MEDS BY ELIG CLIN: HCPCS | Performed by: NURSE PRACTITIONER

## 2019-06-19 NOTE — PROGRESS NOTES
[facility] [place], [place]. [address], [place] [address] [facility]  [phone] office  [phone] fax      OFFICE VISIT:  2019    Leeroy Antunez - : 1940    Reason For Visit:  Heladio Colorado is a 77 y.o. female who is here for 6 Month Follow-Up; Atrial Fibrillation; Hypertension; and Hyperlipidemia  Patient followed for:  PAF (paroxysmal atrial fibrillation) (HCC)    Essential hypertension    Chronic anticoagulation    Mixed hyperlipidemia      The patient presents today for cardiology follow up. Overall, the patient is doing well from a cardiac standpoint without symptoms to suggest myocardial ischemia. BP has been running low on current regimen. The patient's PCP monitors cholesterol. 10 AF episodes this year. Last week out from Wed to Monday of this week. Subjective  Aniya denies exertional chest pain, shortness of breath, orthopnea, paroxysmal nocturnal dyspnea, syncope, presyncope,  edema and fatigue. The patient denies numbness or weakness to suggest cerebrovascular accident or transient ischemic attack. + arrhythmia.     Leeroy Antunez has the following history as recorded in [facility]:    Patient Active Problem List   Diagnosis Code    Hypothyroidism E03.9    Mixed hyperlipidemia E78.2    Hypertension I10    PAF (paroxysmal atrial fibrillation) (HCC) I48.0    Endometrial thickening on ultrasound R93.89    PMB (postmenopausal bleeding) N95.0    Acute frontal sinusitis J01.10    Chronic anticoagulation Z79.01     Past Medical History:   Diagnosis Date    Hyperlipidemia     Hypertension     Hypothyroidism     Kidney disease     type 4    LONG TERM ANTICOAGULENT USE      Past Surgical History:   Procedure Laterality Date    BREAST BIOPSY      CARPAL TUNNEL RELEASE      CARPAL TUNNEL RELEASE      CHOLECYSTECTOMY      COLECTOMY      COLON SURGERY      DILATION AND CURETTAGE OF UTERUS N/A 2017 DILATATION AND CURETTAGE HYSTEROSCOPY performed by Dianne Grossman MD at [address]  02/17/2017    JOINT REPLACEMENT      TONSILLECTOMY       Family History   Problem Relation Age of Onset    Heart Disease Other         Family History    Cancer Other         Family History    Hypertension Other         Family History    High Blood Pressure Mother     Colon Cancer Mother     Other Father         UNKNOWN     Social History     Tobacco Use    Smoking status: Never Smoker    Smokeless tobacco: Never Used   Substance Use Topics    Alcohol use: No     Alcohol/week: 0.0 oz      Current Outpatient Medications   Medication Sig Dispense Refill    metoprolol succinate (TOPROL XL) 25 MG extended release tablet TAKE 1 TABLET BY MOUTH TWICE DAILY 180 tablet 1    MULTAQ 400 MG TABS TAKE ONE TABLET BY MOUTH TWICE DAILY WITH MEALS 180 tablet 1    vitamin D (CHOLECALCIFEROL) 1000 UNIT TABS tablet Take 1,000 Units by mouth daily      allopurinol (ZYLOPRIM) 100 MG tablet Take 100 mg by mouth daily       calcium carbonate 600 MG TABS tablet Take 1 tablet by mouth 2 times daily Indications: SUPPLEMENT      warfarin (COUMADIN) 5 MG tablet Take as directed      spironolactone (ALDACTONE) 25 MG tablet Take 25 mg by mouth nightly Indications: FLUID RETENTION       levothyroxine (SYNTHROID) 50 MCG tablet Take 50 mcg by mouth Daily Indications: HYPOTHROIDISM       polyethylene glycol (MIRALAX) powder Take 17 g by mouth daily Indications: REGULARITY       losartan (COZAAR) 100 MG tablet Take 100 mg by mouth Daily Indications: HYPERTENSION       ferrous sulfate 325 (65 FE) MG tablet Take 325 mg by mouth every other day Indications: ANEMIA       bumetanide (BUMEX) 1 MG tablet Take 0.5 mg by mouth daily       simvastatin (ZOCOR) 40 MG tablet Take 40 mg by mouth nightly Indications: CHOLESTEROL        No current facility-administered medications for this visit.       Allergies: Bactrim [sulfamethoxazole-trimethoprim] and Codeine    Review of Systems  Constitutional - no appetite change, or unexpected weight change. No fever, chills or diaphoresis. No significant change in activity level or new onset of fatigue. HEENT - no significant rhinorrhea or epistaxis. No tinnitus or significant hearing loss. Eyes - no sudden vision change or amaurosis. No corneal arcus, xantholasma, subconjunctival hemorrhage or discharge. Respiratory - no significant wheezing, stridor, apnea or cough. No dyspnea on exertion or shortness of air. Cardiovascular - no exertional chest pain to suggest myocardial ischemia. No orthopnea or PND. No occurrence of slow heart rate. No palpitations. No claudication. No leg edema. sensation of sustained arrythmia. Gastrointestinal - no abdominal swelling or pain. No blood in stool. No severe constipation, diarrhea, nausea, or vomiting. Genitourinary - no dysuria, frequency, or urgency. No flank pain or hematuria. Musculoskeletal - no back pain or myalgia. No problems with gait. Extremities - no clubbing, cyanosis or edema. Skin - no color change or rash. No pallor. No new surgical incision. Neurologic - no speech difficulty, facial asymmetry or lateralizing weakness. No seizures, presyncope or syncope. No significant dizziness. Hematologic - no easy bruising or excessive bleeding. Psychiatric - no severe anxiety or insomnia. No confusion. All other review of systems are negative. Objective  Vital Signs - /66   Pulse 72   Ht 5' 7\" (1.702 m)   Wt 213 lb (96.6 kg)   BMI 33.36 kg/m²   General - Aniya is alert, cooperative, and pleasant. Well groomed. No acute distress. Body habitus - Body mass index is 33.36 kg/m². HEENT - Head is normocephalic. No circumoral cyanosis. Dentition is normal.  EYES -   Lids normal without ptosis. No discharge, edema or subconjunctival hemorrhage.    Neck - Symmetrical without apparent mass or lymphadenopathy. Respiratory - Normal respiratory effort without use of accessory muscles. Ausculatation reveals vesicular breath sounds without crackles, wheezes, rub or rhonchi. Cardiovascular - No jugular venous distention. Auscultation reveals regular rate and rhythm. No audible clicks, gallop or rub. No murmur. No lower extremity varicosities. No carotid bruits. Abdominal -  No visible distention, mass or pulsations. Extremities - No clubbing or cyanosis. No statis dermatitis or ulcers. No edema. Musculoskeletal -   No Osler's nodes. No kyphosis or scoliosis. Gait is even and regular without limp or shuffle. Ambulates without assistance. Skin -  Warm and dry; no rash or pallor. No new surgical wound. Neurological - No focal neurological deficits. Thought processes coherent. No apparent tremor. Oriented to person, place and time. Psychiatric -  Appropriate affect and mood. Assessment:     Diagnosis Orders   1. PAF (paroxysmal atrial fibrillation) ([facility] [address]     2. Essential hypertension     3. Chronic anticoagulation      Coumadin   4.  Mixed hyperlipidemia       Data reviewed:  8/19/18 echo   Conclusions    Summary   Left ventricular size is normal .   Normal left ventricular wall thickness.   Left ventricular ejection fraction is visually estimated at 60%.   No regional wall motion abnormalities.    Signature    ----------------------------------------------------------------   Electronically signed by Pola Lucero MD(Interpreting   physician) on 08/19/2018 09:59 AM   ----------------------------------------------------------------   Findings    Mitral Valve   Mitral valve leaflets are mildly thickened with preserved leaflet   mobility.   Moderate mitral regurgitation is present.    Aortic Valve   The aortic valve is trileaflet, moderately thickened & calcified with some   restriction of leaflet mobility.   No evidence of aortic stenosis.   No evidence of aortic valve regurgitation.    Tricuspid Valve   Tricuspid valve is structurally normal.   Mild tricuspid regurgitation with estimated RVSP of 36 mm Hg.    Pulmonic Valve   The pulmonic valve was not well visualized .    Left Atrium   Mildly dilated left atrium.    Left Ventricle   Left ventricular size is normal .   Normal left ventricular wall thickness.   Left ventricular ejection fraction is visually estimated at 60%.   No regional wall motion abnormalities.    Right Atrium   Mildly dilated right atrium.    Right Ventricle   Normal right ventricular size with preserved RV function.    Pericardial Effusion   No evidence of significant pericardial effusion is noted.    Miscellaneous   Aortic root dimension within normal limits. Stable CV status without symptoms of overt heart failure or angina. Some intermittent AF on Toprol XL and Coumadin. Tolerating well at this time. BP well controlled. PCP follows lipids - tolerating Zocor. Patient is compliant with medication regimen. BP Readings from Last 3 Encounters:   06/19/19 122/66   03/28/19 132/82   12/18/18 122/68    Pulse Readings from Last 3 Encounters:   06/19/19 72   03/28/19 76   12/18/18 57        Wt Readings from Last 3 Encounters:   06/19/19 213 lb (96.6 kg)   03/28/19 217 lb (98.4 kg)   12/18/18 220 lb (99.8 kg)       Recent Results (from the past 1008 hour(s))   POCT INR    Collection Time: 05/16/19 10:07 AM   Result Value Ref Range    INR 2.3     Protime       Plan  Previous cardiac history and records reviewed. Decrease Losartan to 100 mg 1/2 tab once daily due to report of some low blood pressure readings. Continue other current medications as prescribed. Continue to follow up with primary care provider for non cardiac medical problems. Call the office with any problems, questions or concerns at [phone]. Follow up as scheduled with your cardiologist.  Dr. Austen Martines 3 months.   The following educational material has been included in this after visit increases your length and quality of life. 5)  Eat better - A healthy diet is one of your best weapons for fighting cardiovascular disease. When you eat a heart healthy diet, you improve your chances for feeling good and staying healthy for life. 6)  Lose weight - when you shed extra fat an unnecessary pounds, you reduce the burden on your hear, lungs, blood vessels and skeleton. You give yourself the gift of active living, you lower your blood pressure and help yourself feel better. 7) Stop smoking - cigarette smokers have a higher risk of developing cardiovascular disease. If  You smoke, quitting is the best thing you can do for your health. Check American [employer] Association on line for more information on Life's Simple 7 and tips for healthy living.      KAYLA Justice

## 2019-06-19 NOTE — PATIENT INSTRUCTIONS
Decrease Losartan to 100 mg 1/2 tab once daily due to report of some low blood pressure readings. Continue other current medications as prescribed. Continue to follow up with primary care provider for non cardiac medical problems. Call the office with any problems, questions or concerns at [phone]. Follow up as scheduled with your cardiologist.  Dr. Makayla Kraft 3 months. The following educational material has been included in this after visit summary for your review: Life simple 7. Coumadin safety. Atrial fibrillation.     Additional instructions: Follow coumadin dose schedule. Follow up as scheduled for recheck. Coumadin can increase your risk of bleeding. If you notice blood in urine or stool, bleeding gums, excessive bruising or cough productive of bloody sputum, notify the office. Information on this blood thinner has been included in your after visit summary. Coronary artery disease risk factors you can control: Smoking, high blood pressure, high cholesterol, diabetes, being overweight, lack of exercise and stress. Continue heart healthy diet. Take medications as directed. Exercise as tolerated. Strive for 15 minutes of exercise most days of the week. Keep a blood pressure log, do so for 2 weeks. Call the office to report readings at [phone]. Blood pressure goal  is less than 120/70. Elevated blood pressure at 120-129/80 or less. High blood pressure at 130-139/80-89. If you are taking cholesterol lowering medications, it is recommended that lab work be checked annually. Always keep a current medication list. Bring your medications to every office visit. Life simple 7  1) Manage blood pressure - high blood pressure is a major risk factor for heart disease and stroke. Keeping blood pressure in health range reduces strain on your heart, arteries and kidneys. 2) Control cholesterol - contributes to plaque, which can clog arteries and lead to heart disease and stroke.  When you control your cholesterol you are giving your arteries their best chance to remain clear. 3) Reduce blood sugar - most of the food we eat is turning into glucose or blood sugar that our body uses for energy. Over time, high levels of blood sugar can damage your heart, kidneys, eyes and nerves. 4) Get active - living an active life is one of the most rewarding gifts you can give yourself and those you love. Simply put, daily physical activity increases your length and quality of life. 5)  Eat better - A healthy diet is one of your best weapons for fighting cardiovascular disease. When you eat a heart healthy diet, you improve your chances for feeling good and staying healthy for life. 6)  Lose weight - when you shed extra fat an unnecessary pounds, you reduce the burden on your hear, lungs, blood vessels and skeleton. You give yourself the gift of active living, you lower your blood pressure and help yourself feel better. 7) Stop smoking - cigarette smokers have a higher risk of developing cardiovascular disease. If  You smoke, quitting is the best thing you can do for your health. Check American Heart Association on line for more information on Life's Simple 7 and tips for healthy living.       Patient Education        Learning About Atrial Fibrillation  What is atrial fibrillation? Atrial fibrillation (say \"AY-tree-debbie mwd-okih-ZHQ-shun\") is the most common type of irregular heartbeat (arrhythmia). Normally, the heart beats in a strong, steady rhythm. In atrial fibrillation, a problem with the heart's electrical system causes the two upper parts of the heart (the atria) to quiver, or fibrillate. Your heart rate also may be faster than normal.  Atrial fibrillation can be dangerous because if the heartbeat isn't strong and steady, blood can collect, or pool, in the atria. And pooled blood is more likely to form clots. Clots can travel to the brain, block blood flow, and cause a stroke.  Atrial fibrillation can also lead to heart failure. Treatment for atrial fibrillation helps prevent stroke and heart failure. It also helps relieve symptoms. Atrial fibrillation is often caused by another heart problem. It may happen after heart surgery. It may also be caused by other problems, such as an overactive thyroid gland or lung disease. Many people with atrial fibrillation are able to live full and active lives. What are the symptoms? Some people feel symptoms when they have episodes of atrial fibrillation. But other people don't notice any symptoms. If you have symptoms, you may feel:  · A fluttering, racing, or pounding feeling in your chest called palpitations. · Weak or tired. · Dizzy or lightheaded. · Short of breath. · Chest pain. · Confused. You may notice signs of atrial fibrillation when you check your pulse. Your pulse may seem uneven or fast.  What can you expect when you have atrial fibrillation? At first, spells of atrial fibrillation may come on suddenly and last a short time. It may go away on its own or it goes away after treatment. This is called paroxysmal atrial fibrillation. Over time, the spells may last longer and occur more often. They often don't go away on their own. How is it treated? Treatments can help you feel better and prevent future problems, especially stroke and heart failure. The main types of treatment slow the heart rate, control the heart rhythm, and help prevent stroke. Your treatment will depend on the cause of your atrial fibrillation, your symptoms, and your risk for stroke. · Heart rate treatment. Medicine may be used to slow your heart rate. Your heartbeat may still be irregular. But these medicines keep your heart from beating too fast. They may also help relieve your symptoms. · Heart rhythm treatment. Different treatments may be used to try to stop atrial fibrillation and keep it from returning. They can also relieve symptoms.  These treatments include medicine, electrical cardioversion to shock the heart back to a normal rhythm, a procedure called catheter ablation, and heart surgery. · Stroke prevention. You and your doctor can decide how to lower your risk. You may decide to take a blood-thinning medicine, such as aspirin or an anticoagulant. How can you live well with it? You can live well and help manage atrial fibrillation by having a heart-healthy lifestyle. This lifestyle may help reduce how often you have episodes of atrial fibrillation. If you are overweight, losing weight can help relieve symptoms. To have a heart-healthy lifestyle:  · Don't smoke. · Eat heart-healthy foods. · Be active. Talk to your doctor about what type and level of exercise is safe for you. · Stay at a healthy weight. Lose weight if you need to. · Avoid alcohol if it triggers symptoms. · Manage other health problems such as high blood pressure, high cholesterol, and diabetes. · Avoid getting sick from the flu. Get a flu shot every year. · Manage stress. Where can you learn more? Go to https://TrustRadius.TeraDiode. org and sign in to your [employer] account. Enter X476 in the [employer] box to learn more about \"Learning About Atrial Fibrillation. \"     If you do not have an account, please click on the \"Sign Up Now\" link. Current as of: July 22, 2018  Content Version: 12.0  © 6350-0372 [employer]. Care instructions adapted under license by [facility] ([facility] [place] If you have questions about a medical condition or this instruction, always ask your healthcare professional. [place] [address] any warranty or liability for your use of this information. Patient Education        Taking Warfarin Safely: Care Instructions  Your Care Instructions    Warfarin is a medicine that you take to prevent blood clots. It is often called a blood thinner. Doctors give warfarin (such as Coumadin) to reduce the risk of blood clots.  You may be at risk for blood clots if you have atrial fibrillation or deep vein thrombosis. Some other health problems may also put you at risk. Warfarin slows the amount of time it takes for your blood to clot. It can cause bleeding problems. Even if you've been taking warfarin for a while, it's important to know how to take it safely. Foods and other medicines can affect the way warfarin works. Some can make warfarin work too well. This can cause bleeding problems. And some can make it work poorly, so that it does not prevent blood clots very well. You will need regular blood tests to check how long it takes for your blood to form a clot. This test is called a PT or prothrombin time test. The result of the test is called an INR level. Depending on the test results, your doctor or anticoagulation clinic may adjust your dose of warfarin. Follow-up care is a key part of your treatment and safety. Be sure to make and go to all appointments, and call your doctor if you are having problems. It's also a good idea to know your test results and keep a list of the medicines you take. How can you care for yourself at home? Take warfarin safely  · Take your warfarin at the same time each day. · If you miss a dose of warfarin, don't take an extra dose to make up for it. Your doctor can tell you exactly what to do so you don't take too much or too little. · Wear medical alert jewelry that lets others know that you take warfarin. You can buy this at most drugstores. · Don't take warfarin if you are pregnant or planning to get pregnant. Talk to your doctor about how you can prevent getting pregnant while you are taking it. · Don't change your dose or stop taking warfarin unless your doctor tells you to. Effects of medicines and food on warfarin  · Don't start or stop taking any medicines, vitamins, or natural remedies unless you first talk to your doctor. Many medicines can affect how warfarin works.  These include aspirin and other pain relievers, over-the-counter medicines, multivitamins, dietary supplements, and herbal products. · Tell all of your doctors and pharmacists that you take warfarin. Some prescription medicines can affect how warfarin works. · Keep the amount of vitamin K in your diet about the same from day to day. Do not suddenly eat a lot more or a lot less food that is rich in vitamin K than you usually do. Vitamin K affects how warfarin works and how your blood clots. Talk with your doctor before making big changes in your diet. Foods that have a lot of vitamin K include cooked green vegetables, such as:  ? Kale, spinach, turnip greens, jennifer greens, Swiss chard, and mustard greens. ? [place] sprouts, broccoli, and asparagus. · Limit your use of alcohol. Avoid bleeding by preventing falls and injuries  · Wear slippers or shoes with nonskid soles. · Remove throw rugs and clutter. · Rearrange furniture and electrical cords to keep them out of walking paths. · Keep stairways, porches, and outside walkways well lit. Use night-lights in hallways and bathrooms. · Be extra careful when you work with sharp tools or knives. When should you call for help? Call 911 anytime you think you may need emergency care. For example, call if:    · You have a sudden, severe headache that is different from past headaches.    Call your doctor now or seek immediate medical care if:    · You have any abnormal bleeding, such as:  ? Nosebleeds. ? Vaginal bleeding that is different (heavier, more frequent, at a different time of the month) than what you are used to.  ? Bloody or black stools, or rectal bleeding. ? Bloody or pink urine.    Watch closely for changes in your health, and be sure to contact your doctor if you have any problems. Where can you learn more? Go to https://nataliyaeb.Smart Education. org and sign in to your [employer] account.  Enter F742 in the [employer]staRx box to learn more about \"Taking Warfarin Safely: Care Instructions. \"     If you do not have an account, please click on the \"Sign Up Now\" link. Current as of: July 22, 2018  Content Version: 12.0  © 3049-1865 [employer]. Care instructions adapted under license by [facility] ([facility]). If you have questions about a medical condition or this instruction, always ask your healthcare professional. [address] any warranty or liability for your use of this information.

## 2019-06-25 ENCOUNTER — OFFICE VISIT (OUTPATIENT)
Dept: GASTROENTEROLOGY | Facility: CLINIC | Age: 79
End: 2019-06-25

## 2019-06-25 VITALS
WEIGHT: 211 LBS | BODY MASS INDEX: 33.91 KG/M2 | HEART RATE: 69 BPM | HEIGHT: 66 IN | DIASTOLIC BLOOD PRESSURE: 70 MMHG | OXYGEN SATURATION: 96 % | SYSTOLIC BLOOD PRESSURE: 128 MMHG

## 2019-06-25 DIAGNOSIS — Z80.0 FAMILY HX OF COLON CANCER: ICD-10-CM

## 2019-06-25 DIAGNOSIS — I10 HTN (HYPERTENSION), BENIGN: ICD-10-CM

## 2019-06-25 DIAGNOSIS — Z79.01 ANTICOAGULATED ON COUMADIN: ICD-10-CM

## 2019-06-25 DIAGNOSIS — Z86.010 HX OF ADENOMATOUS COLONIC POLYPS: Primary | ICD-10-CM

## 2019-06-25 PROBLEM — Z86.0101 HX OF ADENOMATOUS COLONIC POLYPS: Status: ACTIVE | Noted: 2019-06-25

## 2019-06-25 PROCEDURE — S0260 H&P FOR SURGERY: HCPCS | Performed by: CLINICAL NURSE SPECIALIST

## 2019-06-25 RX ORDER — POLYETHYLENE GLYCOL 3350 17 G/17G
17 POWDER, FOR SOLUTION ORAL DAILY
COMMUNITY

## 2019-06-25 RX ORDER — SIMVASTATIN 40 MG
40 TABLET ORAL DAILY
Refills: 3 | COMMUNITY
Start: 2019-06-17 | End: 2020-02-24

## 2019-06-25 RX ORDER — ALLOPURINOL 100 MG/1
100 TABLET ORAL 2 TIMES DAILY
COMMUNITY
Start: 2017-10-06 | End: 2019-06-25 | Stop reason: SDUPTHER

## 2019-06-25 RX ORDER — LOSARTAN POTASSIUM 100 MG/1
50 TABLET ORAL 2 TIMES DAILY
Refills: 3 | COMMUNITY
Start: 2019-04-02 | End: 2020-05-04

## 2019-06-25 RX ORDER — BUMETANIDE 1 MG/1
0.5 TABLET ORAL DAILY
COMMUNITY
End: 2020-04-09

## 2019-06-25 RX ORDER — FERROUS SULFATE 325(65) MG
325 TABLET ORAL 3 TIMES WEEKLY
Refills: 5 | COMMUNITY
Start: 2019-03-22 | End: 2020-06-15

## 2019-06-25 RX ORDER — SPIRONOLACTONE 25 MG/1
25 TABLET ORAL DAILY
Refills: 3 | COMMUNITY
Start: 2019-04-02 | End: 2020-04-09

## 2019-06-25 RX ORDER — METOPROLOL SUCCINATE 25 MG/1
25 TABLET, EXTENDED RELEASE ORAL 2 TIMES DAILY
Refills: 1 | COMMUNITY
Start: 2019-05-28

## 2019-06-25 RX ORDER — SODIUM, POTASSIUM,MAG SULFATES 17.5-3.13G
SOLUTION, RECONSTITUTED, ORAL ORAL
Qty: 2 BOTTLE | Refills: 0 | Status: SHIPPED | OUTPATIENT
Start: 2019-06-25 | End: 2019-12-26

## 2019-06-25 RX ORDER — ALLOPURINOL 100 MG/1
100 TABLET ORAL 2 TIMES DAILY
Refills: 3 | COMMUNITY
Start: 2019-05-29

## 2019-06-25 RX ORDER — LEVOTHYROXINE SODIUM 0.05 MG/1
50 TABLET ORAL DAILY
Refills: 3 | COMMUNITY
Start: 2019-04-16 | End: 2020-10-05

## 2019-06-25 RX ORDER — WARFARIN SODIUM 5 MG/1
TABLET ORAL TAKE AS DIRECTED
Refills: 5 | COMMUNITY
Start: 2019-06-17

## 2019-06-25 NOTE — PROGRESS NOTES
Heidi Ansari  1940 6/25/2019  Chief Complaint   Patient presents with   • Colonoscopy     Subjective   HPI  Heidi Ansari is a 78 y.o. female who presents with a complaint of hx of polyps. She has no current rectal bleeding. No change in bowels. No lower abdominal pain. No wt loss. No fever chills or sweats. No current issues. Her mother had a hx of colon cancer at the age of 73.    Past Medical History:   Diagnosis Date   • Atrial fibrillation (CMS/HCC)    • Disease of thyroid gland    • Hx of colonic polyp    • Hyperlipidemia    • Hypertension      Past Surgical History:   Procedure Laterality Date   • BREAST BIOPSY Left 2015   • BREAST EXCISIONAL BIOPSY Left 2001   • BREAST EXCISIONAL BIOPSY Left 2001   • CHOLECYSTECTOMY     • COLON SURGERY      Partial right   • COLONOSCOPY W/ POLYPECTOMY  06/30/2016    2 Adenomatous polyps at 80 & 40 cm, Diverticulosis repeat exam in 3 years   • HERNIA REPAIR     • NECK SURGERY     • REPLACEMENT TOTAL KNEE         Outpatient Medications Marked as Taking for the 6/25/19 encounter (Office Visit) with Clarice Ramirez APRN   Medication Sig Dispense Refill   • allopurinol (ZYLOPRIM) 100 MG tablet Take 200 mg by mouth Daily.  3   • bumetanide (BUMEX) 1 MG tablet Take 0.5 mg by mouth Daily.     • Cholecalciferol (VITAMIN D) 1000 units tablet Take 1,000 Units by mouth Daily.     • dronedarone (MULTAQ) 400 MG tablet TAKE ONE TABLET BY MOUTH TWICE DAILY WITH MEALS     • ferrous sulfate 325 (65 FE) MG tablet Three times a week  5   • levothyroxine (SYNTHROID, LEVOTHROID) 50 MCG tablet Daily.  3   • losartan (COZAAR) 100 MG tablet Daily.  3   • metoprolol succinate XL (TOPROL-XL) 25 MG 24 hr tablet Take 25 mg by mouth 2 (Two) Times a Day.  1   • polyethylene glycol (MIRALAX) powder Take 17 g by mouth. daily     • simvastatin (ZOCOR) 40 MG tablet Take 40 mg by mouth Daily.  3   • spironolactone (ALDACTONE) 25 MG tablet Daily.  3   • warfarin (COUMADIN) 5 MG tablet Take  5 mg by mouth See Admin Instructions.  5     Allergies   Allergen Reactions   • Bactrim [Sulfamethoxazole-Trimethoprim] Other (See Comments)     Patient has kidney disease shouldn't take Bactrim   • Codeine Nausea Only     Social History     Socioeconomic History   • Marital status:      Spouse name: Not on file   • Number of children: Not on file   • Years of education: Not on file   • Highest education level: Not on file   Tobacco Use   • Smoking status: Never Smoker   • Smokeless tobacco: Never Used   Substance and Sexual Activity   • Alcohol use: No     Frequency: Never     Family History   Problem Relation Age of Onset   • Colon cancer Mother    • Colon polyps Mother    • No Known Problems Father    • No Known Problems Sister    • No Known Problems Brother    • No Known Problems Daughter    • No Known Problems Son    • No Known Problems Maternal Grandmother    • No Known Problems Paternal Grandmother    • No Known Problems Maternal Aunt    • No Known Problems Paternal Aunt    • Breast cancer Neg Hx    • BRCA 1/2 Neg Hx    • Endometrial cancer Neg Hx    • Ovarian cancer Neg Hx      Health Maintenance   Topic Date Due   • TDAP/TD VACCINES (1 - Tdap) 12/08/1958   • ZOSTER VACCINE (1 of 2) 12/08/1989   • PNEUMOCOCCAL VACCINES (65+ LOW/MEDIUM RISK) (1 of 2 - PCV13) 12/08/2004   • MEDICARE ANNUAL WELLNESS  03/07/2019   • LIPID PANEL  06/24/2019   • INFLUENZA VACCINE  08/01/2019     Review of Systems   Constitutional: Negative for activity change, appetite change, chills, diaphoresis, fatigue, fever and unexpected weight change.   HENT: Negative for ear pain, hearing loss, mouth sores, sore throat, trouble swallowing and voice change.    Eyes: Negative.    Respiratory: Negative for cough, choking, shortness of breath and wheezing.    Cardiovascular: Negative for chest pain and palpitations.   Gastrointestinal: Negative for abdominal pain, blood in stool, constipation, diarrhea, nausea and vomiting.   Endocrine:  "Negative for cold intolerance and heat intolerance.   Genitourinary: Negative for decreased urine volume, dysuria, frequency, hematuria and urgency.   Musculoskeletal: Negative for back pain, gait problem and myalgias.   Skin: Negative for color change, pallor and rash.   Allergic/Immunologic: Negative for food allergies and immunocompromised state.   Neurological: Negative for dizziness, tremors, seizures, syncope, weakness, light-headedness, numbness and headaches.   Hematological: Negative for adenopathy. Does not bruise/bleed easily.   Psychiatric/Behavioral: Negative for agitation and confusion. The patient is not nervous/anxious.    All other systems reviewed and are negative.    Objective   Vitals:    06/25/19 1019   BP: 128/70   Pulse: 69   SpO2: 96%   Weight: 95.7 kg (211 lb)   Height: 166.4 cm (65.5\")     Body mass index is 34.58 kg/m².  Physical Exam   Constitutional: She is oriented to person, place, and time. She appears well-developed and well-nourished.   HENT:   Head: Normocephalic and atraumatic.   Eyes: Pupils are equal, round, and reactive to light.   Neck: Normal range of motion. Neck supple. No tracheal deviation present.   Cardiovascular: Normal rate, regular rhythm and normal heart sounds. Exam reveals no gallop and no friction rub.   No murmur heard.  Pulmonary/Chest: Effort normal and breath sounds normal. No respiratory distress. She has no wheezes. She has no rales. She exhibits no tenderness.   Abdominal: Soft. Bowel sounds are normal. She exhibits no distension. There is no hepatosplenomegaly. There is no tenderness. There is no rigidity, no rebound and no guarding.   Musculoskeletal: Normal range of motion. She exhibits no edema, tenderness or deformity.   Neurological: She is alert and oriented to person, place, and time. She has normal reflexes.   Skin: Skin is warm and dry. No rash noted. No pallor.   Psychiatric: She has a normal mood and affect. Her behavior is normal. Judgment and " thought content normal.     Assessment/Plan   Heidi was seen today for colonoscopy.    Diagnoses and all orders for this visit:    Hx of adenomatous colonic polyps  -     Case Request; Standing  -     Follow Anesthesia Guidelines / Standing Orders; Future  -     Obtain Informed Consent; Future  -     Implement Anesthesia Orders Day of Procedure; Standing  -     Obtain Informed Consent; Standing  -     Verify bowel prep was successful; Standing  -     Case Request  -     SUPREP BOWEL PREP KIT 17.5-3.13-1.6 GM/177ML solution oral solution; Take as directed by office instructions provided    HTN (hypertension), benign  Comments:  cont bp medication the day of procedure    Anticoagulated on Coumadin  Comments:  TO hold per Candis Covarrubias with [facility] she takes due to Afib     Family hx of colon cancer      COLONOSCOPY WITH ANESTHESIA (N/A)  EMR Dragon/transcription disclaimer: Much of this encounter note is electronic transcription/translation of spoken language to printed text. The electronic translation of spoken language may be erroneous, or at times, nonsensical words or phrases may be inadvertently transcribed. Although I have reviewed the note for such errors, some may still exist.  Body mass index is 34.58 kg/m².  No Follow-up on file.  There are no Patient Instructions on file for this visit.  Patient's Body mass index is 34.58 kg/m². BMI is above normal parameters. Recommendations include: nutrition counseling.      All risks, benefits, alternatives, and indications of colonoscopy and/or Endoscopy procedure have been discussed with the patient. Risks to include perforation of the colon requiring possible surgery or colostomy, risk of bleeding from biopsies or removal of colon tissue, possibility of missing a colon polyp or cancer, or adverse drug reaction.  Benefits to include the diagnosis and management of disease of the colon and rectum. Alternatives to include barium enema, radiographic evaluation, lab  testing or no intervention. Pt verbalizes understanding and agrees.     Clarice French James, APRN  6/25/2019  10:45 AM      Obesity, Adult  Obesity is the condition of having too much total body fat. Being overweight or obese means that your weight is greater than what is considered healthy for your body size. Obesity is determined by a measurement called BMI. BMI is an estimate of body fat and is calculated from height and weight. For adults, a BMI of 30 or higher is considered obese.  Obesity can eventually lead to other health concerns and major illnesses, including:  · Stroke.  · Coronary artery disease (CAD).  · Type 2 diabetes.  · Some types of cancer, including cancers of the colon, breast, uterus, and gallbladder.  · Osteoarthritis.  · High blood pressure (hypertension).  · High cholesterol.  · Sleep apnea.  · Gallbladder stones.  · Infertility problems.  What are the causes?  The main cause of obesity is taking in (consuming) more calories than your body uses for energy. Other factors that contribute to this condition may include:  · Being born with genes that make you more likely to become obese.  · Having a medical condition that causes obesity. These conditions include:  ¨ Hypothyroidism.  ¨ Polycystic ovarian syndrome (PCOS).  ¨ Binge-eating disorder.  ¨ Cushing syndrome.  · Taking certain medicines, such as steroids, antidepressants, and seizure medicines.  · Not being physically active (sedentary lifestyle).  · Living where there are limited places to exercise safely or buy healthy foods.  · Not getting enough sleep.  What increases the risk?  The following factors may increase your risk of this condition:  · Having a family history of obesity.  · Being a woman of -American descent.  · Being a man of  descent.  What are the signs or symptoms?  Having excessive body fat is the main symptom of this condition.  How is this diagnosed?  This condition may be diagnosed based on:  · Your  symptoms.  · Your medical history.  · A physical exam. Your health care provider may measure:  ¨ Your BMI. If you are an adult with a BMI between 25 and less than 30, you are considered overweight. If you are an adult with a BMI of 30 or higher, you are considered obese.  ¨ The distances around your hips and your waist (circumferences). These may be compared to each other to help diagnose your condition.  ¨ Your skinfold thickness. Your health care provider may gently pinch a fold of your skin and measure it.  How is this treated?  Treatment for this condition often includes changing your lifestyle. Treatment may include some or all of the following:  · Dietary changes. Work with your health care provider and a dietitian to set a weight-loss goal that is healthy and reasonable for you. Dietary changes may include eating:  ¨ Smaller portions. A portion size is the amount of a particular food that is healthy for you to eat at one time. This varies from person to person.  ¨ Low-calorie or low-fat options.  ¨ More whole grains, fruits, and vegetables.  · Regular physical activity. This may include aerobic activity (cardio) and strength training.  · Medicine to help you lose weight. Your health care provider may prescribe medicine if you are unable to lose 1 pound a week after 6 weeks of eating more healthily and doing more physical activity.  · Surgery. Surgical options may include gastric banding and gastric bypass. Surgery may be done if:  ¨ Other treatments have not helped to improve your condition.  ¨ You have a BMI of 40 or higher.  ¨ You have life-threatening health problems related to obesity.  Follow these instructions at home:     Eating and drinking     · Follow recommendations from your health care provider about what you eat and drink. Your health care provider may advise you to:  ¨ Limit fast foods, sweets, and processed snack foods.  ¨ Choose low-fat options, such as low-fat milk instead of whole  milk.  ¨ Eat 5 or more servings of fruits or vegetables every day.  ¨ Eat at home more often. This gives you more control over what you eat.  ¨ Choose healthy foods when you eat out.  ¨ Learn what a healthy portion size is.  ¨ Keep low-fat snacks on hand.  ¨ Avoid sugary drinks, such as soda, fruit juice, iced tea sweetened with sugar, and flavored milk.  ¨ Eat a healthy breakfast.  · Drink enough water to keep your urine clear or pale yellow.  · Do not go without eating for long periods of time (do not fast) or follow a fad diet. Fasting and fad diets can be unhealthy and even dangerous.  Physical Activity   · Exercise regularly, as told by your health care provider. Ask your health care provider what types of exercise are safe for you and how often you should exercise.  · Warm up and stretch before being active.  · Cool down and stretch after being active.  · Rest between periods of activity.  Lifestyle   · Limit the time that you spend in front of your TV, computer, or video game system.  · Find ways to reward yourself that do not involve food.  · Limit alcohol intake to no more than 1 drink a day for nonpregnant women and 2 drinks a day for men. One drink equals 12 oz of beer, 5 oz of wine, or 1½ oz of hard liquor.  General instructions   · Keep a weight loss journal to keep track of the food you eat and how much you exercise you get.  · Take over-the-counter and prescription medicines only as told by your health care provider.  · Take vitamins and supplements only as told by your health care provider.  · Consider joining a support group. Your health care provider may be able to recommend a support group.  · Keep all follow-up visits as told by your health care provider. This is important.  Contact a health care provider if:  · You are unable to meet your weight loss goal after 6 weeks of dietary and lifestyle changes.  This information is not intended to replace advice given to you by your health care provider.  Make sure you discuss any questions you have with your health care provider.  Document Released: 01/25/2006 Document Revised: 05/22/2017 Document Reviewed: 10/05/2016  [employer] Interactive Patient Education © 2017 [employer]      If you smoke or use tobacco, 4 minutes reading provided  Steps to Quit Smoking  Smoking tobacco can be harmful to your health and can affect almost every organ in your body. Smoking puts you, and those around you, at risk for developing many serious chronic diseases. Quitting smoking is difficult, but it is one of the best things that you can do for your health. It is never too late to quit.  What are the benefits of quitting smoking?  When you quit smoking, you lower your risk of developing serious diseases and conditions, such as:  · Lung cancer or lung disease, such as COPD.  · Heart disease.  · Stroke.  · Heart attack.  · Infertility.  · Osteoporosis and bone fractures.  Additionally, symptoms such as coughing, wheezing, and shortness of breath may get better when you quit. You may also find that you get sick less often because your body is stronger at fighting off colds and infections. If you are pregnant, quitting smoking can help to reduce your chances of having a baby of low birth weight.  How do I get ready to quit?  When you decide to quit smoking, create a plan to make sure that you are successful. Before you quit:  · Pick a date to quit. Set a date within the next two weeks to give you time to prepare.  · Write down the reasons why you are quitting. Keep this list in places where you will see it often, such as on your bathroom mirror or in your car or wallet.  · Identify the people, places, things, and activities that make you want to smoke (triggers) and avoid them. Make sure to take these actions:  ¨ Throw away all cigarettes at home, at work, and in your car.  ¨ Throw away smoking accessories, such as ashtrays and lighters.  ¨ Clean your car and make sure to empty the  ashtray.  ¨ Clean your home, including curtains and carpets.  · Tell your family, friends, and coworkers that you are quitting. Support from your loved ones can make quitting easier.  · Talk with your health care provider about your options for quitting smoking.  · Find out what treatment options are covered by your health insurance.  What strategies can I use to quit smoking?  Talk with your healthcare provider about different strategies to quit smoking. Some strategies include:  · Quitting smoking altogether instead of gradually lessening how much you smoke over a period of time. Research shows that quitting “cold turkey” is more successful than gradually quitting.  · Attending in-person counseling to help you build problem-solving skills. You are more likely to have success in quitting if you attend several counseling sessions. Even short sessions of 10 minutes can be effective.  · Finding resources and support systems that can help you to quit smoking and remain smoke-free after you quit. These resources are most helpful when you use them often. They can include:  ¨ Online chats with a counselor.  ¨ Telephone quitlines.  ¨ Printed self-help materials.  ¨ Support groups or group counseling.  ¨ Text messaging programs.  ¨ Mobile phone applications.  · Taking medicines to help you quit smoking. (If you are pregnant or breastfeeding, talk with your health care provider first.) Some medicines contain nicotine and some do not. Both types of medicines help with cravings, but the medicines that include nicotine help to relieve withdrawal symptoms. Your health care provider may recommend:  ¨ Nicotine patches, gum, or lozenges.  ¨ Nicotine inhalers or sprays.  ¨ Non-nicotine medicine that is taken by mouth.  Talk with your health care provider about combining strategies, such as taking medicines while you are also receiving in-person counseling. Using these two strategies together makes you more likely to succeed in  quitting than if you used either strategy on its own.  If you are pregnant or breastfeeding, talk with your health care provider about finding counseling or other support strategies to quit smoking. Do not take medicine to help you quit smoking unless told to do so by your health care provider.  What things can I do to make it easier to quit?  Quitting smoking might feel overwhelming at first, but there is a lot that you can do to make it easier. Take these important actions:  · Reach out to your family and friends and ask that they support and encourage you during this time. Call telephone quitlines, reach out to support groups, or work with a counselor for support.  · Ask people who smoke to avoid smoking around you.  · Avoid places that trigger you to smoke, such as bars, parties, or smoke-break areas at work.  · Spend time around people who do not smoke.  · Lessen stress in your life, because stress can be a smoking trigger for some people. To lessen stress, try:  ¨ Exercising regularly.  ¨ Deep-breathing exercises.  ¨ Yoga.  ¨ Meditating.  ¨ Performing a body scan. This involves closing your eyes, scanning your body from head to toe, and noticing which parts of your body are particularly tense. Purposefully relax the muscles in those areas.  · Download or purchase mobile phone or tablet apps (applications) that can help you stick to your quit plan by providing reminders, tips, and encouragement. There are many free apps, such as QuitGuide from the CDC (Centers for Disease Control and Prevention). You can find other support for quitting smoking (smoking cessation) through smokefree.gov and other websites.  How will I feel when I quit smoking?  Within the first 24 hours of quitting smoking, you may start to feel some withdrawal symptoms. These symptoms are usually most noticeable 2-3 days after quitting, but they usually do not last beyond 2-3 weeks. Changes or symptoms that you might experience include:  · Mood  swings.  · Restlessness, anxiety, or irritation.  · Difficulty concentrating.  · Dizziness.  · Strong cravings for sugary foods in addition to nicotine.  · Mild weight gain.  · Constipation.  · Nausea.  · Coughing or a sore throat.  · Changes in how your medicines work in your body.  · A depressed mood.  · Difficulty sleeping (insomnia).  After the first 2-3 weeks of quitting, you may start to notice more positive results, such as:  · Improved sense of smell and taste.  · Decreased coughing and sore throat.  · Slower heart rate.  · Lower blood pressure.  · Clearer skin.  · The ability to breathe more easily.  · Fewer sick days.  Quitting smoking is very challenging for most people. Do not get discouraged if you are not successful the first time. Some people need to make many attempts to quit before they achieve long-term success. Do your best to stick to your quit plan, and talk with your health care provider if you have any questions or concerns.  This information is not intended to replace advice given to you by your health care provider. Make sure you discuss any questions you have with your health care provider.  Document Released: 12/12/2002 Document Revised: 08/15/2017 Document Reviewed: 05/03/2016  [employer] Interactive Patient Education © 2017 [employer]

## 2019-07-23 ENCOUNTER — ANTI-COAG VISIT (OUTPATIENT)
Dept: CARDIOLOGY | Age: 79
End: 2019-07-23
Payer: MEDICARE

## 2019-07-23 DIAGNOSIS — I48.0 PAF (PAROXYSMAL ATRIAL FIBRILLATION) (HCC): Primary | ICD-10-CM

## 2019-07-23 LAB
INTERNATIONAL NORMALIZATION RATIO, POC: 3.9
PROTHROMBIN TIME, POC: NORMAL

## 2019-07-23 PROCEDURE — 85610 PROTHROMBIN TIME: CPT | Performed by: NURSE PRACTITIONER

## 2019-07-24 ENCOUNTER — TELEPHONE (OUTPATIENT)
Dept: CARDIOLOGY | Age: 79
End: 2019-07-24

## 2019-07-26 NOTE — ADDENDUM NOTE
Addended by: Marguerite Friedman on: 7/26/2019 10:05 AM     Modules accepted: Level of Service, SmartSet

## 2019-08-02 RX ORDER — WARFARIN SODIUM 5 MG/1
TABLET ORAL
Qty: 30 TABLET | Refills: 3 | Status: SHIPPED | OUTPATIENT
Start: 2019-08-02 | End: 2020-03-02

## 2019-08-26 DIAGNOSIS — I48.91 ATRIAL FIBRILLATION, UNSPECIFIED TYPE (HCC): ICD-10-CM

## 2019-08-26 RX ORDER — DRONEDARONE 400 MG/1
TABLET, FILM COATED ORAL
Qty: 180 TABLET | Refills: 3 | Status: SHIPPED | OUTPATIENT
Start: 2019-08-26 | End: 2020-08-24

## 2019-09-03 ENCOUNTER — ANTI-COAG VISIT (OUTPATIENT)
Dept: CARDIOLOGY | Age: 79
End: 2019-09-03
Payer: MEDICARE

## 2019-09-03 DIAGNOSIS — I48.0 PAF (PAROXYSMAL ATRIAL FIBRILLATION) (HCC): Primary | ICD-10-CM

## 2019-09-03 LAB
INTERNATIONAL NORMALIZATION RATIO, POC: 2
PROTHROMBIN TIME, POC: NORMAL

## 2019-09-03 PROCEDURE — 85610 PROTHROMBIN TIME: CPT | Performed by: CLINICAL NURSE SPECIALIST

## 2019-09-17 ENCOUNTER — TELEPHONE (OUTPATIENT)
Dept: CARDIOLOGY | Age: 79
End: 2019-09-17

## 2019-09-24 ENCOUNTER — ANTI-COAG VISIT (OUTPATIENT)
Dept: CARDIOLOGY | Age: 79
End: 2019-09-24
Payer: MEDICARE

## 2019-09-24 DIAGNOSIS — I48.0 PAF (PAROXYSMAL ATRIAL FIBRILLATION) (HCC): Primary | ICD-10-CM

## 2019-09-24 LAB
INTERNATIONAL NORMALIZATION RATIO, POC: 2.3
PROTHROMBIN TIME, POC: NORMAL

## 2019-09-24 PROCEDURE — 85610 PROTHROMBIN TIME: CPT | Performed by: CLINICAL NURSE SPECIALIST

## 2019-10-09 ENCOUNTER — OFFICE VISIT (OUTPATIENT)
Dept: CARDIOLOGY | Age: 79
End: 2019-10-09
Payer: MEDICARE

## 2019-10-09 VITALS
DIASTOLIC BLOOD PRESSURE: 72 MMHG | HEIGHT: 66 IN | BODY MASS INDEX: 35.03 KG/M2 | SYSTOLIC BLOOD PRESSURE: 150 MMHG | HEART RATE: 65 BPM | WEIGHT: 218 LBS

## 2019-10-09 DIAGNOSIS — I10 ESSENTIAL HYPERTENSION: ICD-10-CM

## 2019-10-09 DIAGNOSIS — I48.0 PAF (PAROXYSMAL ATRIAL FIBRILLATION) (HCC): Primary | ICD-10-CM

## 2019-10-09 PROCEDURE — 93000 ELECTROCARDIOGRAM COMPLETE: CPT | Performed by: INTERNAL MEDICINE

## 2019-10-09 PROCEDURE — 99213 OFFICE O/P EST LOW 20 MIN: CPT | Performed by: INTERNAL MEDICINE

## 2019-10-09 ASSESSMENT — ENCOUNTER SYMPTOMS
WHEEZING: 0
DIARRHEA: 0
VOMITING: 0
BACK PAIN: 0
SHORTNESS OF BREATH: 0
COUGH: 0
EYE DISCHARGE: 0
CONSTIPATION: 0
ABDOMINAL DISTENTION: 0
BLOOD IN STOOL: 0

## 2019-10-17 ENCOUNTER — ANESTHESIA EVENT (OUTPATIENT)
Dept: GASTROENTEROLOGY | Facility: HOSPITAL | Age: 79
End: 2019-10-17

## 2019-10-17 ENCOUNTER — HOSPITAL ENCOUNTER (OUTPATIENT)
Facility: HOSPITAL | Age: 79
Setting detail: HOSPITAL OUTPATIENT SURGERY
Discharge: HOME | End: 2019-10-17
Attending: INTERNAL MEDICINE | Admitting: INTERNAL MEDICINE

## 2019-10-17 ENCOUNTER — ANESTHESIA (OUTPATIENT)
Dept: GASTROENTEROLOGY | Facility: HOSPITAL | Age: 79
End: 2019-10-17

## 2019-10-17 VITALS
RESPIRATION RATE: 18 BRPM | OXYGEN SATURATION: 95 % | TEMPERATURE: 97.2 F | SYSTOLIC BLOOD PRESSURE: 135 MMHG | DIASTOLIC BLOOD PRESSURE: 57 MMHG | BODY MASS INDEX: 33.75 KG/M2 | HEIGHT: 66 IN | HEART RATE: 61 BPM | WEIGHT: 210 LBS

## 2019-10-17 DIAGNOSIS — Z86.010 HX OF ADENOMATOUS COLONIC POLYPS: ICD-10-CM

## 2019-10-17 PROCEDURE — 25010000002 PROPOFOL 10 MG/ML EMULSION: Performed by: NURSE ANESTHETIST, CERTIFIED REGISTERED

## 2019-10-17 PROCEDURE — 88305 TISSUE EXAM BY PATHOLOGIST: CPT | Performed by: INTERNAL MEDICINE

## 2019-10-17 PROCEDURE — 45385 COLONOSCOPY W/LESION REMOVAL: CPT | Performed by: INTERNAL MEDICINE

## 2019-10-17 RX ORDER — PROPOFOL 10 MG/ML
VIAL (ML) INTRAVENOUS AS NEEDED
Status: DISCONTINUED | OUTPATIENT
Start: 2019-10-17 | End: 2019-10-17 | Stop reason: SURG

## 2019-10-17 RX ORDER — SODIUM CHLORIDE 0.9 % (FLUSH) 0.9 %
10 SYRINGE (ML) INJECTION AS NEEDED
Status: DISCONTINUED | OUTPATIENT
Start: 2019-10-17 | End: 2019-10-17 | Stop reason: HOSPADM

## 2019-10-17 RX ORDER — SODIUM CHLORIDE 9 MG/ML
500 INJECTION, SOLUTION INTRAVENOUS CONTINUOUS PRN
Status: DISCONTINUED | OUTPATIENT
Start: 2019-10-17 | End: 2019-10-17 | Stop reason: HOSPADM

## 2019-10-17 RX ADMIN — PROPOFOL 300 MG: 10 INJECTION, EMULSION INTRAVENOUS at 10:46

## 2019-10-17 RX ADMIN — SODIUM CHLORIDE 500 ML: 9 INJECTION, SOLUTION INTRAVENOUS at 09:59

## 2019-10-17 RX ADMIN — SODIUM CHLORIDE: 9 INJECTION, SOLUTION INTRAVENOUS at 10:55

## 2019-10-17 RX ADMIN — LIDOCAINE HYDROCHLORIDE 100 MG: 20 INJECTION, SOLUTION INTRAVENOUS at 10:46

## 2019-10-17 NOTE — ANESTHESIA PREPROCEDURE EVALUATION
Anesthesia Evaluation     no history of anesthetic complications:  NPO Solid Status: > 8 hours  NPO Liquid Status: > 2 hours           Airway   Mallampati: I  TM distance: >3 FB  Neck ROM: full  No difficulty expected  Dental      Pulmonary    (-) sleep apnea  Cardiovascular   Exercise tolerance: poor (<4 METS)    Patient on routine beta blocker and Beta blocker given within 24 hours of surgery    (+) hypertension, dysrhythmias Atrial Fib, hyperlipidemia,       Neuro/Psych  (-) seizures, TIA, CVA  GI/Hepatic/Renal/Endo    (+)   renal disease CRI, hypothyroidism,   (-) liver disease, diabetes    Musculoskeletal     Abdominal    Substance History      OB/GYN          Other                        Anesthesia Plan    ASA 3     MAC     intravenous induction   Anesthetic plan, all risks, benefits, and alternatives have been provided, discussed and informed consent has been obtained with: patient.

## 2019-10-17 NOTE — H&P
[facility]  Pre Procedure History & Physical    Chief Complaint:   History of polyps    Subjective     HPI:   Here today for colonoscopy.  History of polyps    Past Medical History:   Past Medical History:   Diagnosis Date   • Arthritis    • Atrial fibrillation (CMS/HCC)    • Disease of thyroid gland    • Hx of colonic polyp    • Hyperlipidemia    • Hypertension        Past Surgical History:  Past Surgical History:   Procedure Laterality Date   • BREAST BIOPSY Left 2015   • BREAST EXCISIONAL BIOPSY Left 2001   • BREAST EXCISIONAL BIOPSY Left 2001   • CHOLECYSTECTOMY     • COLON SURGERY      Partial right   • COLONOSCOPY     • COLONOSCOPY W/ POLYPECTOMY  06/30/2016    2 Adenomatous polyps at 80 & 40 cm, Diverticulosis repeat exam in 3 years   • HERNIA REPAIR     • REPLACEMENT TOTAL KNEE         Family History:  Family History   Problem Relation Age of Onset   • Colon cancer Mother    • Colon polyps Mother    • No Known Problems Father    • No Known Problems Sister    • No Known Problems Brother    • No Known Problems Daughter    • No Known Problems Son    • No Known Problems Maternal Grandmother    • No Known Problems Paternal Grandmother    • No Known Problems Maternal Aunt    • No Known Problems Paternal Aunt    • Breast cancer Neg Hx    • BRCA 1/2 Neg Hx    • Endometrial cancer Neg Hx    • Ovarian cancer Neg Hx        Social History:   reports that she has never smoked. She has never used smokeless tobacco. She reports that she does not drink alcohol or use drugs.    Medications:   Prior to Admission medications    Medication Sig Start Date End Date Taking? Authorizing Provider   allopurinol (ZYLOPRIM) 100 MG tablet Take 200 mg by mouth Daily. 5/29/19  Yes Ceci Alcala MD   bumetanide (BUMEX) 1 MG tablet Take 0.5 mg by mouth Daily.   Yes Ceci Alcala MD   Cholecalciferol (VITAMIN D) 1000 units tablet Take 1,000 Units by mouth Daily.   Yes Ceci Alcala MD   dronedarone  "(MULTAQ) 400 MG tablet TAKE ONE TABLET BY MOUTH TWICE DAILY WITH MEALS 2/26/19  Yes Ceci Alcala MD   ferrous sulfate 325 (65 FE) MG tablet Three times a week 3/22/19  Yes Ceci Alcala MD   levothyroxine (SYNTHROID, LEVOTHROID) 50 MCG tablet Daily. 4/16/19  Yes Ceci Alcala MD   losartan (COZAAR) 100 MG tablet Daily. 4/2/19  Yes Ceci Alcala MD   metoprolol succinate XL (TOPROL-XL) 25 MG 24 hr tablet Take 25 mg by mouth 2 (Two) Times a Day. 5/28/19  Yes Ceci Alcala MD   polyethylene glycol (MIRALAX) powder Take 17 g by mouth. daily   Yes Ceci Alcala MD   simvastatin (ZOCOR) 40 MG tablet Take 40 mg by mouth Daily. 6/17/19  Yes Ceci Alcala MD   spironolactone (ALDACTONE) 25 MG tablet Daily. 4/2/19  Yes Ceci Alcala MD   SUPREP BOWEL PREP KIT 17.5-3.13-1.6 GM/177ML solution oral solution Take as directed by office instructions provided 6/25/19  Yes Clarice Ramirez APRN   warfarin (COUMADIN) 5 MG tablet Take 5 mg by mouth See Admin Instructions. 6/17/19   Ceci Alcala MD       Allergies:  Bactrim [sulfamethoxazole-trimethoprim] and Codeine    Objective     Blood pressure (!) 181/70, pulse 70, temperature 97.2 °F (36.2 °C), temperature source Temporal, resp. rate 18, height 167.6 cm (66\"), weight 95.3 kg (210 lb), SpO2 95 %, not currently breastfeeding.    Physical Exam   Constitutional: Pt is oriented to person, place, and in no distress.   HENT: Mouth/Throat: Oropharynx is clear.   Cardiovascular: Normal rate, regular rhythm.    Pulmonary/Chest: Effort normal. No respiratory distress. No  wheezes.   Abdominal: Soft. Non-distended.  Skin: Skin is warm and dry.   Psychiatric: Mood, memory, affect and judgment appear normal.     Assessment/Plan     Diagnosis:  History of polyps    Anticipated Surgical Procedure:    Proceed with colonoscopy as scheduled    The following major R/B/A were discussed with the patient, however the list is " not all inclusive . Risk:  Bleeding (immediate and delayed), perforation (rupture or tear), reaction to medication, missed lesion/cancer, pain during the procedure, infection, need for surgery, need for ostomy, need for mechanical ventilation (breathing machine), death.  Benefits: removal of polyp/tissue, burn/clip/or inject to stop bleeding, removal of foreign body, dilate any stricture.  Alternatives: Xray or CT, surgery, do nothing with associated risk   The patient was given time to ask question and received explanation, and agrees to proceed as per History and Physical.   No guarantee given or expressed.    EMR Dragon/transcription disclaimer: Much of this encounter note is an electronic transcription/translation of spoken language to printed text.  The electronic translation of spoken language may permit erroneous, or at times, nonsensical words or phrases to be inadvertently transcribed.  Although I have reviewed the note for such errors, some may still exist.    Rigoberto Shaw MD  10:44 AM  10/17/2019

## 2019-10-20 LAB
CYTO UR: NORMAL
LAB AP CASE REPORT: NORMAL
PATH REPORT.FINAL DX SPEC: NORMAL
PATH REPORT.GROSS SPEC: NORMAL

## 2019-11-05 ENCOUNTER — ANTI-COAG VISIT (OUTPATIENT)
Dept: CARDIOLOGY | Age: 79
End: 2019-11-05
Payer: MEDICARE

## 2019-11-05 DIAGNOSIS — I48.0 PAF (PAROXYSMAL ATRIAL FIBRILLATION) (HCC): Primary | ICD-10-CM

## 2019-11-05 LAB
INTERNATIONAL NORMALIZATION RATIO, POC: 2.5
PROTHROMBIN TIME, POC: NORMAL

## 2019-11-05 PROCEDURE — 85610 PROTHROMBIN TIME: CPT | Performed by: NURSE PRACTITIONER

## 2019-11-18 DIAGNOSIS — I48.0 PAF (PAROXYSMAL ATRIAL FIBRILLATION) (HCC): ICD-10-CM

## 2019-11-18 RX ORDER — METOPROLOL SUCCINATE 25 MG/1
TABLET, EXTENDED RELEASE ORAL
Qty: 180 TABLET | Refills: 3 | Status: SHIPPED | OUTPATIENT
Start: 2019-11-18 | End: 2020-11-30

## 2019-11-27 ENCOUNTER — HOSPITAL ENCOUNTER (OUTPATIENT)
Dept: ULTRASOUND IMAGING | Facility: HOSPITAL | Age: 79
Discharge: HOME | End: 2019-11-27

## 2019-11-27 ENCOUNTER — HOSPITAL ENCOUNTER (OUTPATIENT)
Dept: MAMMOGRAPHY | Facility: HOSPITAL | Age: 79
Discharge: HOME | End: 2019-11-27
Attending: SPECIALIST | Admitting: SPECIALIST

## 2019-11-27 DIAGNOSIS — N63.20 LUMP OF LEFT BREAST: ICD-10-CM

## 2019-11-27 DIAGNOSIS — N63.20 LEFT BREAST LUMP: ICD-10-CM

## 2019-11-27 DIAGNOSIS — Z12.31 ENCOUNTER FOR SCREENING MAMMOGRAM FOR MALIGNANT NEOPLASM OF BREAST: ICD-10-CM

## 2019-11-27 PROCEDURE — 77066 DX MAMMO INCL CAD BI: CPT

## 2019-11-27 PROCEDURE — 76642 ULTRASOUND BREAST LIMITED: CPT

## 2019-11-27 PROCEDURE — G0279 TOMOSYNTHESIS, MAMMO: HCPCS

## 2019-12-02 ENCOUNTER — TRANSCRIBE ORDERS (OUTPATIENT)
Dept: ADMINISTRATIVE | Facility: HOSPITAL | Age: 79
End: 2019-12-02

## 2019-12-02 DIAGNOSIS — R92.8 ABNORMAL MAMMOGRAM: Primary | ICD-10-CM

## 2019-12-06 ENCOUNTER — APPOINTMENT (OUTPATIENT)
Dept: LAB | Facility: HOSPITAL | Age: 79
End: 2019-12-06

## 2019-12-06 ENCOUNTER — HOSPITAL ENCOUNTER (OUTPATIENT)
Dept: MAMMOGRAPHY | Facility: HOSPITAL | Age: 79
Discharge: HOME | End: 2019-12-06

## 2019-12-06 ENCOUNTER — HOSPITAL ENCOUNTER (OUTPATIENT)
Dept: ULTRASOUND IMAGING | Facility: HOSPITAL | Age: 79
Discharge: HOME | End: 2019-12-06
Admitting: SPECIALIST

## 2019-12-06 DIAGNOSIS — R92.8 ABNORMAL MAMMOGRAM: ICD-10-CM

## 2019-12-06 LAB
INR PPP: 1.2 (ref 0.91–1.09)
PROTHROMBIN TIME: 14 SECONDS (ref 10–13.8)

## 2019-12-06 PROCEDURE — 88342 IMHCHEM/IMCYTCHM 1ST ANTB: CPT | Performed by: SPECIALIST

## 2019-12-06 PROCEDURE — 88361 TUMOR IMMUNOHISTOCHEM/COMPUT: CPT

## 2019-12-06 PROCEDURE — 88305 TISSUE EXAM BY PATHOLOGIST: CPT | Performed by: SPECIALIST

## 2019-12-06 PROCEDURE — 85610 PROTHROMBIN TIME: CPT

## 2019-12-06 PROCEDURE — A4648 IMPLANTABLE TISSUE MARKER: HCPCS

## 2019-12-06 PROCEDURE — 88377 M/PHMTRC ALYS ISHQUANT/SEMIQ: CPT

## 2019-12-06 PROCEDURE — 88341 IMHCHEM/IMCYTCHM EA ADD ANTB: CPT | Performed by: SPECIALIST

## 2019-12-06 RX ORDER — LIDOCAINE HYDROCHLORIDE 10 MG/ML
5 INJECTION, SOLUTION INFILTRATION; PERINEURAL ONCE
Status: DISCONTINUED | OUTPATIENT
Start: 2019-12-06 | End: 2019-12-26

## 2019-12-06 RX ORDER — LIDOCAINE HYDROCHLORIDE AND EPINEPHRINE 10; 10 MG/ML; UG/ML
5 INJECTION, SOLUTION INFILTRATION; PERINEURAL ONCE
Status: DISCONTINUED | OUTPATIENT
Start: 2019-12-06 | End: 2019-12-26

## 2019-12-16 ENCOUNTER — TRANSCRIBE ORDERS (OUTPATIENT)
Dept: ADMINISTRATIVE | Facility: HOSPITAL | Age: 79
End: 2019-12-16

## 2019-12-16 DIAGNOSIS — D05.90 CARCINOMA IN SITU OF BREAST, UNSPECIFIED LATERALITY, UNSPECIFIED TYPE: Primary | ICD-10-CM

## 2019-12-19 ENCOUNTER — ANTI-COAG VISIT (OUTPATIENT)
Dept: CARDIOLOGY | Age: 79
End: 2019-12-19
Payer: MEDICARE

## 2019-12-19 DIAGNOSIS — I48.0 PAF (PAROXYSMAL ATRIAL FIBRILLATION) (HCC): Primary | ICD-10-CM

## 2019-12-19 LAB
INTERNATIONAL NORMALIZATION RATIO, POC: 2.1
LAB AP CASE REPORT: NORMAL
LAB AP CLINICAL INFORMATION: NORMAL
LAB AP DIAGNOSIS COMMENT: NORMAL
Lab: NORMAL
PATH REPORT.ADDENDUM SPEC: NORMAL
PATH REPORT.FINAL DX SPEC: NORMAL
PATH REPORT.GROSS SPEC: NORMAL
PROTHROMBIN TIME, POC: NORMAL

## 2019-12-19 PROCEDURE — 85610 PROTHROMBIN TIME: CPT | Performed by: CLINICAL NURSE SPECIALIST

## 2019-12-26 ENCOUNTER — APPOINTMENT (OUTPATIENT)
Dept: PREADMISSION TESTING | Facility: HOSPITAL | Age: 79
End: 2019-12-26

## 2019-12-26 VITALS
RESPIRATION RATE: 16 BRPM | OXYGEN SATURATION: 94 % | HEIGHT: 65 IN | SYSTOLIC BLOOD PRESSURE: 150 MMHG | BODY MASS INDEX: 35 KG/M2 | WEIGHT: 210.1 LBS | HEART RATE: 61 BPM | DIASTOLIC BLOOD PRESSURE: 69 MMHG

## 2019-12-26 LAB
ALBUMIN SERPL-MCNC: 4.3 G/DL (ref 3.5–5.2)
ALBUMIN/GLOB SERPL: 1.4 G/DL
ALP SERPL-CCNC: 78 U/L (ref 39–117)
ALT SERPL W P-5'-P-CCNC: 7 U/L (ref 1–33)
ANION GAP SERPL CALCULATED.3IONS-SCNC: 13 MMOL/L (ref 5–15)
AST SERPL-CCNC: 16 U/L (ref 1–32)
BASOPHILS # BLD AUTO: 0.02 10*3/MM3 (ref 0–0.2)
BASOPHILS NFR BLD AUTO: 0.3 % (ref 0–1.5)
BILIRUB SERPL-MCNC: 0.7 MG/DL (ref 0.2–1.2)
BUN BLD-MCNC: 17 MG/DL (ref 8–23)
BUN/CREAT SERPL: 10.3 (ref 7–25)
CALCIUM SPEC-SCNC: 9.8 MG/DL (ref 8.6–10.5)
CHLORIDE SERPL-SCNC: 100 MMOL/L (ref 98–107)
CO2 SERPL-SCNC: 29 MMOL/L (ref 22–29)
CREAT BLD-MCNC: 1.65 MG/DL (ref 0.57–1)
DEPRECATED RDW RBC AUTO: 46.7 FL (ref 37–54)
EOSINOPHIL # BLD AUTO: 0.09 10*3/MM3 (ref 0–0.4)
EOSINOPHIL NFR BLD AUTO: 1.3 % (ref 0.3–6.2)
ERYTHROCYTE [DISTWIDTH] IN BLOOD BY AUTOMATED COUNT: 13.5 % (ref 12.3–15.4)
GFR SERPL CREATININE-BSD FRML MDRD: 30 ML/MIN/1.73
GLOBULIN UR ELPH-MCNC: 3 GM/DL
GLUCOSE BLD-MCNC: 95 MG/DL (ref 65–99)
HCT VFR BLD AUTO: 37.2 % (ref 34–46.6)
HGB BLD-MCNC: 12.3 G/DL (ref 12–15.9)
LYMPHOCYTES # BLD AUTO: 1.4 10*3/MM3 (ref 0.7–3.1)
LYMPHOCYTES NFR BLD AUTO: 20.6 % (ref 19.6–45.3)
MCH RBC QN AUTO: 31.1 PG (ref 26.6–33)
MCHC RBC AUTO-ENTMCNC: 33.1 G/DL (ref 31.5–35.7)
MCV RBC AUTO: 93.9 FL (ref 79–97)
MONOCYTES # BLD AUTO: 0.46 10*3/MM3 (ref 0.1–0.9)
MONOCYTES NFR BLD AUTO: 6.8 % (ref 5–12)
NEUTROPHILS # BLD AUTO: 4.81 10*3/MM3 (ref 1.7–7)
NEUTROPHILS NFR BLD AUTO: 70.6 % (ref 42.7–76)
PLATELET # BLD AUTO: 90 10*3/MM3 (ref 140–450)
PMV BLD AUTO: 12.8 FL (ref 6–12)
POTASSIUM BLD-SCNC: 4.1 MMOL/L (ref 3.5–5.2)
PROT SERPL-MCNC: 7.3 G/DL (ref 6–8.5)
RBC # BLD AUTO: 3.96 10*6/MM3 (ref 3.77–5.28)
SODIUM BLD-SCNC: 142 MMOL/L (ref 136–145)
WBC NRBC COR # BLD: 6.81 10*3/MM3 (ref 3.4–10.8)

## 2019-12-26 PROCEDURE — 36415 COLL VENOUS BLD VENIPUNCTURE: CPT

## 2019-12-26 PROCEDURE — 85025 COMPLETE CBC W/AUTO DIFF WBC: CPT | Performed by: SPECIALIST

## 2019-12-26 PROCEDURE — 80053 COMPREHEN METABOLIC PANEL: CPT | Performed by: SPECIALIST

## 2019-12-26 RX ORDER — CARBOXYMETHYLCELLULOSE SODIUM 5 MG/ML
1 SOLUTION/ DROPS OPHTHALMIC 3 TIMES DAILY PRN
COMMUNITY

## 2019-12-26 RX ORDER — ACETAMINOPHEN 500 MG
500 TABLET ORAL EVERY 6 HOURS PRN
COMMUNITY

## 2019-12-26 NOTE — DISCHARGE INSTRUCTIONS
DAY OF SURGERY INSTRUCTIONS        YOUR SURGEON: ***Dr. Nikki Zuniga    PROCEDURE: ***left simple mastectomy    DATE OF SURGERY: ***1/2/2020    ARRIVAL TIME: AS DIRECTED BY OFFICE    YOU MAY TAKE THE FOLLOWING MEDICATION(S) THE MORNING OF SURGERY WITH A SIP OF WATER: ***metoprolol, multaq      ALL OTHER HOME MEDICATION CHECK WITH YOUR PHYSICIAN                MANAGING PAIN AFTER SURGERY    We know you are probably wondering what your pain will be like after surgery.  Following surgery it is unrealistic to expect you will not have pain.   Pain is how our bodies let us know that something is wrong or cautions us to be careful.  That said, our goal is to make your pain tolerable.    Methods we may use to treat your pain include (oral or IV medications, PCAs, epidurals, nerve blocks, etc.)   While some procedures require IV pain medications for a short time after surgery, transitioning to pain medications by mouth allows for better management of pain.   Your nurse will encourage you to take oral pain medications whenever possible.  IV medications work almost immediately, but only last a short while.  Taking medications by mouth allows for a more constant level of medication in your blood stream for a longer period of time.      Once your pain is out of control it is harder to get back under control.  It is important you are aware when your next dose of pain medication is due.  If you are admitted, your nurse may write the time of your next dose on the white board in your room to help you remember.      We are interested in your pain and encourage you to inform us about aggravating factors during your visit.   Many times a simple repositioning every few hours can make a big difference.    If your physician says it is okay, do not let your pain prevent you from getting out of bed. Be sure to call your nurse for assistance prior to getting up so you do not fall.      Before surgery, please decide your tolerable pain  goal.  These faces help describe the pain ratings we use on a 0-10 scale.   Be prepared to tell us your goal and whether or not you take pain or anxiety medications at home.          BEFORE YOU COME TO THE HOSPITAL  (Pre-op instructions)  • Do not eat, drink, smoke or chew gum after midnight the night before surgery.  This also includes no mints.  • Morning of surgery take only the medicines you have been instructed with a sip of water unless otherwise instructed  by your physician.  • Do not shave, wear makeup or dark nail polish.  • Remove all jewelry including rings.  • Leave anything you consider valuable at home.  • Leave your suitcase in the car until after your surgery.  • Bring the following with you if applicable:  o Picture ID and insurance, Medicare or Medicaid cards  o Co-pay/deductible required by insurance (cash, check, credit card)  o Copy of advance directive, living will or power-of- documents if not brought to PAT  o CPAP or BIPAP mask and tubing  o Relaxation aids ( book, magazine), etc.  o Hearing aids                        ON THE DAY OF SURGERY  · On the day of surgery check in at registration located at the main entrance of the hospital.   ? You will be registered and given a beeper with instructions where to wait in the main lobby.  ? When your beeper lights up and vibrates a member of the Outpatient Surgery staff will meet you at the double doors under the stair steps and escort you to your preoperative room.   · You may have cloth compression devices placed on your legs. These help to prevent blood clots and reduce swelling in your legs.  · An IV may be inserted into one of your veins.  · In the operating room, you may be given one or more of the following:  ? A medicine to help you relax (sedative).  ? A medicine to numb the area (local anesthetic).  ? A medicine to make you fall asleep (general anesthetic).  ? A medicine that is injected into an area of your body to numb  "everything below the injection site (regional anesthetic).  · Your surgical site will be marked or identified.  · You may be given an antibiotic through your IV to help prevent infection.  Contact a health care provider if you:  · Develop a fever of more than 100.4°F (38°C) or other feelings of illness during the 48 hours before your surgery.  · Have symptoms that get worse.  Have questions or concerns about your surgery    General Anesthesia/Surgery, Adult  General anesthesia is the use of medicines to make a person \"go to sleep\" (unconscious) for a medical procedure. General anesthesia must be used for certain procedures, and is often recommended for procedures that:  · Last a long time.  · Require you to be still or in an unusual position.  · Are major and can cause blood loss.  The medicines used for general anesthesia are called general anesthetics. As well as making you unconscious for a certain amount of time, these medicines:  · Prevent pain.  · Control your blood pressure.  · Relax your muscles.  Tell a health care provider about:  · Any allergies you have.  · All medicines you are taking, including vitamins, herbs, eye drops, creams, and over-the-counter medicines.  · Any problems you or family members have had with anesthetic medicines.  · Types of anesthetics you have had in the past.  · Any blood disorders you have.  · Any surgeries you have had.  · Any medical conditions you have.  · Any recent upper respiratory, chest, or ear infections.  · Any history of:  ? Heart or lung conditions, such as heart failure, sleep apnea, asthma, or chronic obstructive pulmonary disease (COPD).  ?  service.  ? Depression or anxiety.  · Any tobacco or drug use, including marijuana or alcohol use.  · Whether you are pregnant or may be pregnant.  What are the risks?  Generally, this is a safe procedure. However, problems may occur, including:  · Allergic reaction.  · Lung and heart problems.  · Inhaling food or " liquid from the stomach into the lungs (aspiration).  · Nerve injury.  · Air in the bloodstream, which can lead to stroke.  · Extreme agitation or confusion (delirium) when you wake up from the anesthetic.  · Waking up during your procedure and being unable to move. This is rare.  These problems are more likely to develop if you are having a major surgery or if you have an advanced or serious medical condition. You can prevent some of these complications by answering all of your health care provider's questions thoroughly and by following all instructions before your procedure.  General anesthesia can cause side effects, including:  · Nausea or vomiting.  · A sore throat from the breathing tube.  · Hoarseness.  · Wheezing or coughing.  · Shaking chills.  · Tiredness.  · Body aches.  · Anxiety.  · Sleepiness or drowsiness.  · Confusion or agitation.  RISKS AND COMPLICATIONS OF SURGERY  Your health care provider will discuss possible risks and complications with you before surgery. Common risks and complications include:    · Problems due to the use of anesthetics.  · Blood loss and replacement (does not apply to minor surgical procedures).  · Temporary increase in pain due to surgery.  · Uncorrected pain or problems that the surgery was meant to correct.  · Infection.  · New damage.    What happens before the procedure?    Medicines  Ask your health care provider about:  · Changing or stopping your regular medicines. This is especially important if you are taking diabetes medicines or blood thinners.  · Taking medicines such as aspirin and ibuprofen. These medicines can thin your blood. Do not take these medicines unless your health care provider tells you to take them.  · Taking over-the-counter medicines, vitamins, herbs, and supplements. Do not take these during the week before your procedure unless your health care provider approves them.  General instructions  · Starting 3-6 weeks before the procedure, do not  use any products that contain nicotine or tobacco, such as cigarettes and e-cigarettes. If you need help quitting, ask your health care provider.  · If you brush your teeth on the morning of the procedure, make sure to spit out all of the toothpaste.  · Tell your health care provider if you become ill or develop a cold, cough, or fever.  · If instructed by your health care provider, bring your sleep apnea device with you on the day of your surgery (if applicable).  · Ask your health care provider if you will be going home the same day, the following day, or after a longer hospital stay.  ? Plan to have someone take you home from the hospital or clinic.  ? Plan to have a responsible adult care for you for at least 24 hours after you leave the hospital or clinic. This is important.  What happens during the procedure?  · You will be given anesthetics through both of the following:  ? A mask placed over your nose and mouth.  ? An IV in one of your veins.  · You may receive a medicine to help you relax (sedative).  · After you are unconscious, a breathing tube may be inserted down your throat to help you breathe. This will be removed before you wake up.  · An anesthesia specialist will stay with you throughout your procedure. He or she will:  ? Keep you comfortable and safe by continuing to give you medicines and adjusting the amount of medicine that you get.  ? Monitor your blood pressure, pulse, and oxygen levels to make sure that the anesthetics do not cause any problems.  The procedure may vary among health care providers and hospitals.  What happens after the procedure?  · Your blood pressure, temperature, heart rate, breathing rate, and blood oxygen level will be monitored until the medicines you were given have worn off.  · You will wake up in a recovery area. You may wake up slowly.  · If you feel anxious or agitated, you may be given medicine to help you calm down.  · If you will be going home the same day, your  health care provider may check to make sure you can walk, drink, and urinate.  · Your health care provider will treat any pain or side effects you have before you go home.  · Do not drive for 24 hours if you were given a sedative.  Summary  · General anesthesia is used to keep you still and prevent pain during a procedure.  · It is important to tell your healthcare provider about your medical history and any surgeries you have had, and previous experience with anesthesia.  · Follow your healthcare provider’s instructions about when to stop eating, drinking, or taking certain medicines before your procedure.  · Plan to have someone take you home from the hospital or clinic.  This information is not intended to replace advice given to you by your health care provider. Make sure you discuss any questions you have with your health care provider.  Document Released: 03/26/2009 Document Revised: 08/03/2018 Document Reviewed: 08/03/2018  [employer] Interactive Patient Education © 2019 [employer]       Fall Prevention in Hospitals, Adult  As a hospital patient, your condition and the treatments you receive can increase your risk for falls. Some additional risk factors for falls in a hospital include:  · Being in an unfamiliar environment.  · Being on bed rest.  · Your surgery.  · Taking certain medicines.  · Your tubing requirements, such as intravenous (IV) therapy or catheters.  It is important that you learn how to decrease fall risks while at the hospital. Below are important tips that can help prevent falls.  SAFETY TIPS FOR PREVENTING FALLS  Talk about your risk of falling.  · Ask your health care provider why you are at risk for falling. Is it your medicine, illness, tubing placement, or something else?  · Make a plan with your health care provider to keep you safe from falls.  · Ask your health care provider or pharmacist about side effects of your medicines. Some medicines can make you dizzy or affect your  coordination.  Ask for help.  · Ask for help before getting out of bed. You may need to press your call button.  · Ask for assistance in getting safely to the toilet.  · Ask for a walker or cane to be put at your bedside. Ask that most of the side rails on your bed be placed up before your health care provider leaves the room.  · Ask family or friends to sit with you.  · Ask for things that are out of your reach, such as your glasses, hearing aids, telephone, bedside table, or call button.  Follow these tips to avoid falling:  · Stay lying or seated, rather than standing, while waiting for help.  · Wear rubber-soled slippers or shoes whenever you walk in the hospital.  · Avoid quick, sudden movements.  ¨ Change positions slowly.  ¨ Sit on the side of your bed before standing.  ¨ Stand up slowly and wait before you start to walk.  · Let your health care provider know if there is a spill on the floor.  · Pay careful attention to the medical equipment, electrical cords, and tubes around you.  · When you need help, use your call button by your bed or in the bathroom. Wait for one of your health care providers to help you.  · If you feel dizzy or unsure of your footing, return to bed and wait for assistance.  · Avoid being distracted by the TV, telephone, or another person in your room.  · Do not lean or support yourself on rolling objects, such as IV poles or bedside tables.     This information is not intended to replace advice given to you by your health care provider. Make sure you discuss any questions you have with your health care provider.     Document Released: 12/15/2001 Document Revised: 01/08/2016 Document Reviewed: 08/25/2013  [employer] Interactive Patient Education ©2016 [employer]       Surgical Site Infections FAQs  What is a Surgical Site Infection (SSI)?  A surgical site infection is an infection that occurs after surgery in the part of the body where the surgery took place. Most patients who have  surgery do not develop an infection. However, infections develop in about 1 to 3 out of every 100 patients who have surgery.  Some of the common symptoms of a surgical site infection are:  · Redness and pain around the area where you had surgery  · Drainage of cloudy fluid from your surgical wound  · Fever  Can SSIs be treated?  Yes. Most surgical site infections can be treated with antibiotics. The antibiotic given to you depends on the bacteria (germs) causing the infection. Sometimes patients with SSIs also need another surgery to treat the infection.  What are some of the things that hospitals are doing to prevent SSIs?  To prevent SSIs, doctors, nurses, and other healthcare providers:  · Clean their hands and arms up to their elbows with an antiseptic agent just before the surgery.  · Clean their hands with soap and water or an alcohol-based hand rub before and after caring for each patient.  · May remove some of your hair immediately before your surgery using electric clippers if the hair is in the same area where the procedure will occur. They should not shave you with a razor.  · Wear special hair covers, masks, gowns, and gloves during surgery to keep the surgery area clean.  · Give you antibiotics before your surgery starts. In most cases, you should get antibiotics within 60 minutes before the surgery starts and the antibiotics should be stopped within 24 hours after surgery.  · Clean the skin at the site of your surgery with a special soap that kills germs.  What can I do to help prevent SSIs?  Before your surgery:  · Tell your doctor about other medical problems you may have. Health problems such as allergies, diabetes, and obesity could affect your surgery and your treatment.  · Quit smoking. Patients who smoke get more infections. Talk to your doctor about how you can quit before your surgery.  · Do not shave near where you will have surgery. Shaving with a razor can irritate your skin and make it  easier to develop an infection.  At the time of your surgery:  · Speak up if someone tries to shave you with a razor before surgery. Ask why you need to be shaved and talk with your surgeon if you have any concerns.  · Ask if you will get antibiotics before surgery.  After your surgery:  · Make sure that your healthcare providers clean their hands before examining you, either with soap and water or an alcohol-based hand rub.  · If you do not see your providers clean their hands, please ask them to do so.  · Family and friends who visit you should not touch the surgical wound or dressings.  · Family and friends should clean their hands with soap and water or an alcohol-based hand rub before and after visiting you. If you do not see them clean their hands, ask them to clean their hands.  What do I need to do when I go home from the hospital?  · Before you go home, your doctor or nurse should explain everything you need to know about taking care of your wound. Make sure you understand how to care for your wound before you leave the hospital.  · Always clean your hands before and after caring for your wound.  · Before you go home, make sure you know who to contact if you have questions or problems after you get home.  · If you have any symptoms of an infection, such as redness and pain at the surgery site, drainage, or fever, call your doctor immediately.  If you have additional questions, please ask your doctor or nurse.  Developed and co-sponsored by The Society for Healthcare Epidemiology of [place] (SHEA); Infectious Diseases Society of [place] (IDSA); American Hospital Association; Association for Professionals in Infection Control and Epidemiology (APIC); Centers for Disease Control and Prevention (CDC); and The Joint Commission.     This information is not intended to replace advice given to you by your health care provider. Make sure you discuss any questions you have with your health care provider.     Document  Released: 12/23/2014 Document Revised: 01/08/2016 Document Reviewed: 03/02/2016  [employer] Interactive Patient Education ©2016 [employer]           [facility]  CHG 4% Patient Instruction Sheet    Chlorhexidine Before Surgery  Chlorhexidine gluconate (CHG) is a germ-killing (antiseptic) solution that is used to clean the skin. It gets rid of the bacteria that normally live on the skin. Cleaning your skin with CHG before surgery helps lower the risk for infection after surgery.    How to use CHG solution  · You will take 2 showers, one shower the night before surgery, the second shower the morning of surgery before coming to the hospital.  · Use CHG only as told by your health care provider, and follow the instructions on the label.  · Use CHG solution while taking a shower. Follow these steps when using CHG solution (unless your health care provider gives you different instructions):  1. Start the shower.  2. Use your normal soap and shampoo to wash your face and hair.  3. Turn off the shower or move out of the shower stream.  4. Pour the CHG onto a clean washcloth. Do not use any type of brush or rough-edged sponge.  5. Starting at your neck, lather your body down to your toes. Make sure you:  6. Pay special attention to the part of your body where you will be having surgery. Scrub this area for at least 1 minute.  7. Use the full amount of CHG as directed. Usually, this is one half bottle for each shower.  8. Do not use CHG on your head or face. If the solution gets into your ears or eyes, rinse them well with water.  9. Avoid your genital area.  10. Avoid any areas of skin that have broken skin, cuts, or scrapes.  11. Scrub your back and under your arms. Make sure to wash skin folds.  12. Let the lather sit on your skin for 1-2 minutes or as long as told by your health care  provider.  13. Thoroughly rinse your entire body in the shower. Make sure that all body creases and crevices are rinsed  well.  14. Dry off with a clean towel. Do not put any substances on your body afterward, such as powder, lotion, or perfume.  15. Put on clean clothes or pajamas.  16. If it is the night before your surgery, sleep in clean sheets.    What are the risks?  Risks of using CHG include:  · A skin reaction.  · Hearing loss, if CHG gets in your ears.  · Eye injury, if CHG gets in your eyes and is not rinsed out.  · The CHG product catching fire.  Make sure that you avoid smoking and flames after applying CHG to your skin.  Do not use CHG:  · If you have a chlorhexidine allergy or have previously reacted to chlorhexidine.  · On babies younger than 2 months of age.      On the day of surgery, when you are taken to your room in Outpatient Surgery you will be given a CHG prepackaged cloth to wipe the site for your surgery.  How to use CHG prepackaged cloths  · Follow the instructions on the label.  · Use the CHG cloth on clean, dry skin. Follow these steps when using a CHG cloth (unless your health care provider gives you different instructions):  1. Using the CHG cloth, vigorously scrub the part of your body where you will be having surgery. Scrub using a back-and-forth motion for 3 minutes. The area on your body should be completely wet with CHG when you are finished scrubbing.  2. Do not rinse. Discard the cloth and let the area air-dry for 1 minute. Do not put any substances on your body afterward, such as powder, lotion, or perfume.  Contact a health care provider if:  · Your skin gets irritated after scrubbing.  · You have questions about using your solution or cloth.  Get help right away if:  · Your eyes become very red or swollen.  · Your eyes itch badly.  · Your skin itches badly and is red or swollen.  · Your hearing changes.  · You have trouble seeing.  · You have swelling or tingling in your mouth or throat.  · You have trouble breathing.  · You swallow any chlorhexidine.  Summary  · Chlorhexidine gluconate (CHG)  is a germ-killing (antiseptic) solution that is used to clean the skin. Cleaning your skin with CHG before surgery helps lower the risk for infection after surgery.  · You may be given CHG to use at home. It may be in a bottle or in a prepackaged cloth to use on your skin. Carefully follow your health care provider's instructions and the instructions on the product label.  · Do not use CHG if you have a chlorhexidine allergy.  · Contact your health care provider if your skin gets irritated after scrubbing.  This information is not intended to replace advice given to you by your health care provider. Make sure you discuss any questions you have with your health care provider.  Document Released: 09/11/2013 Document Revised: 11/15/2018 Document Reviewed: 11/15/2018  [employer] Interactive Patient Education © 2019 [employer]

## 2020-01-02 ENCOUNTER — ANESTHESIA (OUTPATIENT)
Dept: PERIOP | Facility: HOSPITAL | Age: 80
End: 2020-01-02

## 2020-01-02 ENCOUNTER — HOSPITAL ENCOUNTER (OUTPATIENT)
Facility: HOSPITAL | Age: 80
Discharge: HOME | End: 2020-01-03
Attending: SPECIALIST | Admitting: SPECIALIST

## 2020-01-02 ENCOUNTER — HOSPITAL ENCOUNTER (OUTPATIENT)
Dept: NUCLEAR MEDICINE | Facility: HOSPITAL | Age: 80
Discharge: HOME | End: 2020-01-02

## 2020-01-02 ENCOUNTER — ANESTHESIA EVENT (OUTPATIENT)
Dept: PERIOP | Facility: HOSPITAL | Age: 80
End: 2020-01-02

## 2020-01-02 DIAGNOSIS — C50.919 BREAST CANCER (HCC): ICD-10-CM

## 2020-01-02 DIAGNOSIS — D05.90 CARCINOMA IN SITU OF BREAST, UNSPECIFIED LATERALITY, UNSPECIFIED TYPE: ICD-10-CM

## 2020-01-02 LAB
INR PPP: 1.11 (ref 0.91–1.09)
PROTHROMBIN TIME: 14.7 SECONDS (ref 11.9–14.6)

## 2020-01-02 PROCEDURE — 63710000001 DRONEDARONE 400 MG TABLET: Performed by: SPECIALIST

## 2020-01-02 PROCEDURE — A9270 NON-COVERED ITEM OR SERVICE: HCPCS | Performed by: SPECIALIST

## 2020-01-02 PROCEDURE — 63710000001 LOSARTAN 50 MG TABLET: Performed by: SPECIALIST

## 2020-01-02 PROCEDURE — 25010000002 FENTANYL CITRATE (PF) 100 MCG/2ML SOLUTION: Performed by: NURSE ANESTHETIST, CERTIFIED REGISTERED

## 2020-01-02 PROCEDURE — 25010000002 ONDANSETRON PER 1 MG: Performed by: NURSE ANESTHETIST, CERTIFIED REGISTERED

## 2020-01-02 PROCEDURE — 25010000002 DEXAMETHASONE PER 1 MG: Performed by: NURSE ANESTHETIST, CERTIFIED REGISTERED

## 2020-01-02 PROCEDURE — 88307 TISSUE EXAM BY PATHOLOGIST: CPT | Performed by: SPECIALIST

## 2020-01-02 PROCEDURE — 63710000001 OXYCODONE-ACETAMINOPHEN 10-325 MG TABLET: Performed by: NURSE ANESTHETIST, CERTIFIED REGISTERED

## 2020-01-02 PROCEDURE — 0 TECHNETIUM FILTERED SULFUR COLLOID: Performed by: SPECIALIST

## 2020-01-02 PROCEDURE — 85610 PROTHROMBIN TIME: CPT | Performed by: SPECIALIST

## 2020-01-02 PROCEDURE — 25010000002 PROPOFOL 10 MG/ML EMULSION: Performed by: NURSE ANESTHETIST, CERTIFIED REGISTERED

## 2020-01-02 PROCEDURE — G0378 HOSPITAL OBSERVATION PER HR: HCPCS

## 2020-01-02 PROCEDURE — 25010000002 MIDAZOLAM PER 1 MG: Performed by: NURSE ANESTHETIST, CERTIFIED REGISTERED

## 2020-01-02 PROCEDURE — A9270 NON-COVERED ITEM OR SERVICE: HCPCS | Performed by: NURSE ANESTHETIST, CERTIFIED REGISTERED

## 2020-01-02 PROCEDURE — 88309 TISSUE EXAM BY PATHOLOGIST: CPT | Performed by: SPECIALIST

## 2020-01-02 PROCEDURE — 94760 N-INVAS EAR/PLS OXIMETRY 1: CPT

## 2020-01-02 PROCEDURE — 94799 UNLISTED PULMONARY SVC/PX: CPT

## 2020-01-02 PROCEDURE — 63710000001 SPIRONOLACTONE 25 MG TABLET: Performed by: SPECIALIST

## 2020-01-02 PROCEDURE — 63710000001 BUMETANIDE 0.5 MG TABLET: Performed by: SPECIALIST

## 2020-01-02 PROCEDURE — 63710000001 ALLOPURINOL 100 MG TABLET: Performed by: SPECIALIST

## 2020-01-02 PROCEDURE — A9541 TC99M SULFUR COLLOID: HCPCS | Performed by: SPECIALIST

## 2020-01-02 PROCEDURE — 78195 LYMPH SYSTEM IMAGING: CPT

## 2020-01-02 PROCEDURE — 25010000002 NEOSTIGMINE 10 MG/10ML SOLUTION 10 ML VIAL: Performed by: NURSE ANESTHETIST, CERTIFIED REGISTERED

## 2020-01-02 RX ORDER — SPIRONOLACTONE 25 MG/1
25 TABLET ORAL DAILY
Status: DISCONTINUED | OUTPATIENT
Start: 2020-01-02 | End: 2020-01-03 | Stop reason: HOSPADM

## 2020-01-02 RX ORDER — ROCURONIUM BROMIDE 10 MG/ML
INJECTION, SOLUTION INTRAVENOUS AS NEEDED
Status: DISCONTINUED | OUTPATIENT
Start: 2020-01-02 | End: 2020-01-02 | Stop reason: SURG

## 2020-01-02 RX ORDER — PROPOFOL 10 MG/ML
VIAL (ML) INTRAVENOUS AS NEEDED
Status: DISCONTINUED | OUTPATIENT
Start: 2020-01-02 | End: 2020-01-02 | Stop reason: SURG

## 2020-01-02 RX ORDER — OXYCODONE HYDROCHLORIDE AND ACETAMINOPHEN 5; 325 MG/1; MG/1
1 TABLET ORAL EVERY 4 HOURS PRN
Status: DISCONTINUED | OUTPATIENT
Start: 2020-01-02 | End: 2020-01-03 | Stop reason: HOSPADM

## 2020-01-02 RX ORDER — LIDOCAINE HYDROCHLORIDE 40 MG/ML
SOLUTION TOPICAL AS NEEDED
Status: DISCONTINUED | OUTPATIENT
Start: 2020-01-02 | End: 2020-01-02 | Stop reason: SURG

## 2020-01-02 RX ORDER — LOSARTAN POTASSIUM 50 MG/1
50 TABLET ORAL EVERY 12 HOURS SCHEDULED
Status: DISCONTINUED | OUTPATIENT
Start: 2020-01-03 | End: 2020-01-03 | Stop reason: HOSPADM

## 2020-01-02 RX ORDER — LIDOCAINE AND PRILOCAINE 25; 25 MG/G; MG/G
CREAM TOPICAL ONCE
Status: COMPLETED | OUTPATIENT
Start: 2020-01-02 | End: 2020-01-02

## 2020-01-02 RX ORDER — IBUPROFEN 600 MG/1
600 TABLET ORAL ONCE AS NEEDED
Status: DISCONTINUED | OUTPATIENT
Start: 2020-01-02 | End: 2020-01-02 | Stop reason: HOSPADM

## 2020-01-02 RX ORDER — GUAIFENESIN 600 MG/1
1200 TABLET, EXTENDED RELEASE ORAL DAILY PRN
COMMUNITY
End: 2020-06-08 | Stop reason: ALTCHOICE

## 2020-01-02 RX ORDER — ONDANSETRON 2 MG/ML
4 INJECTION INTRAMUSCULAR; INTRAVENOUS ONCE AS NEEDED
Status: DISCONTINUED | OUTPATIENT
Start: 2020-01-02 | End: 2020-01-02 | Stop reason: HOSPADM

## 2020-01-02 RX ORDER — FENTANYL CITRATE 50 UG/ML
25 INJECTION, SOLUTION INTRAMUSCULAR; INTRAVENOUS AS NEEDED
Status: DISCONTINUED | OUTPATIENT
Start: 2020-01-02 | End: 2020-01-02 | Stop reason: HOSPADM

## 2020-01-02 RX ORDER — LEVOTHYROXINE SODIUM 0.05 MG/1
50 TABLET ORAL
Status: DISCONTINUED | OUTPATIENT
Start: 2020-01-03 | End: 2020-01-03 | Stop reason: HOSPADM

## 2020-01-02 RX ORDER — GUAIFENESIN 600 MG/1
1200 TABLET, EXTENDED RELEASE ORAL DAILY PRN
Status: DISCONTINUED | OUTPATIENT
Start: 2020-01-02 | End: 2020-01-03 | Stop reason: HOSPADM

## 2020-01-02 RX ORDER — LABETALOL HYDROCHLORIDE 5 MG/ML
5 INJECTION, SOLUTION INTRAVENOUS
Status: DISCONTINUED | OUTPATIENT
Start: 2020-01-02 | End: 2020-01-02 | Stop reason: HOSPADM

## 2020-01-02 RX ORDER — ALLOPURINOL 100 MG/1
200 TABLET ORAL DAILY
Status: DISCONTINUED | OUTPATIENT
Start: 2020-01-02 | End: 2020-01-03 | Stop reason: HOSPADM

## 2020-01-02 RX ORDER — SODIUM CHLORIDE, SODIUM LACTATE, POTASSIUM CHLORIDE, CALCIUM CHLORIDE 600; 310; 30; 20 MG/100ML; MG/100ML; MG/100ML; MG/100ML
75 INJECTION, SOLUTION INTRAVENOUS CONTINUOUS
Status: DISCONTINUED | OUTPATIENT
Start: 2020-01-02 | End: 2020-01-03 | Stop reason: HOSPADM

## 2020-01-02 RX ORDER — MAGNESIUM HYDROXIDE 1200 MG/15ML
LIQUID ORAL AS NEEDED
Status: DISCONTINUED | OUTPATIENT
Start: 2020-01-02 | End: 2020-01-02 | Stop reason: HOSPADM

## 2020-01-02 RX ORDER — GLYCOPYRROLATE 0.2 MG/ML
INJECTION INTRAMUSCULAR; INTRAVENOUS AS NEEDED
Status: DISCONTINUED | OUTPATIENT
Start: 2020-01-02 | End: 2020-01-02 | Stop reason: SURG

## 2020-01-02 RX ORDER — PHENYLEPHRINE HCL IN 0.9% NACL 0.8MG/10ML
SYRINGE (ML) INTRAVENOUS AS NEEDED
Status: DISCONTINUED | OUTPATIENT
Start: 2020-01-02 | End: 2020-01-02 | Stop reason: SURG

## 2020-01-02 RX ORDER — NEOSTIGMINE METHYLSULFATE 1 MG/ML
INJECTION, SOLUTION INTRAVENOUS AS NEEDED
Status: DISCONTINUED | OUTPATIENT
Start: 2020-01-02 | End: 2020-01-02 | Stop reason: SURG

## 2020-01-02 RX ORDER — DEXAMETHASONE SODIUM PHOSPHATE 4 MG/ML
4 INJECTION, SOLUTION INTRA-ARTICULAR; INTRALESIONAL; INTRAMUSCULAR; INTRAVENOUS; SOFT TISSUE ONCE AS NEEDED
Status: COMPLETED | OUTPATIENT
Start: 2020-01-02 | End: 2020-01-02

## 2020-01-02 RX ORDER — IPRATROPIUM BROMIDE AND ALBUTEROL SULFATE 2.5; .5 MG/3ML; MG/3ML
3 SOLUTION RESPIRATORY (INHALATION) ONCE AS NEEDED
Status: DISCONTINUED | OUTPATIENT
Start: 2020-01-02 | End: 2020-01-02 | Stop reason: HOSPADM

## 2020-01-02 RX ORDER — ACETAMINOPHEN 500 MG
1000 TABLET ORAL ONCE
Status: DISCONTINUED | OUTPATIENT
Start: 2020-01-02 | End: 2020-01-02 | Stop reason: HOSPADM

## 2020-01-02 RX ORDER — SODIUM CHLORIDE 0.9 % (FLUSH) 0.9 %
3-10 SYRINGE (ML) INJECTION AS NEEDED
Status: DISCONTINUED | OUTPATIENT
Start: 2020-01-02 | End: 2020-01-02 | Stop reason: HOSPADM

## 2020-01-02 RX ORDER — SODIUM CHLORIDE 0.9 % (FLUSH) 0.9 %
3 SYRINGE (ML) INJECTION EVERY 12 HOURS SCHEDULED
Status: DISCONTINUED | OUTPATIENT
Start: 2020-01-02 | End: 2020-01-02 | Stop reason: HOSPADM

## 2020-01-02 RX ORDER — SODIUM CHLORIDE 0.9 % (FLUSH) 0.9 %
3 SYRINGE (ML) INJECTION AS NEEDED
Status: DISCONTINUED | OUTPATIENT
Start: 2020-01-02 | End: 2020-01-02 | Stop reason: HOSPADM

## 2020-01-02 RX ORDER — MIDAZOLAM HYDROCHLORIDE 1 MG/ML
2 INJECTION INTRAMUSCULAR; INTRAVENOUS
Status: DISCONTINUED | OUTPATIENT
Start: 2020-01-02 | End: 2020-01-02 | Stop reason: HOSPADM

## 2020-01-02 RX ORDER — LOSARTAN POTASSIUM 50 MG/1
100 TABLET ORAL DAILY
Status: DISCONTINUED | OUTPATIENT
Start: 2020-01-02 | End: 2020-01-02

## 2020-01-02 RX ORDER — SODIUM CHLORIDE, SODIUM LACTATE, POTASSIUM CHLORIDE, CALCIUM CHLORIDE 600; 310; 30; 20 MG/100ML; MG/100ML; MG/100ML; MG/100ML
1000 INJECTION, SOLUTION INTRAVENOUS CONTINUOUS
Status: DISCONTINUED | OUTPATIENT
Start: 2020-01-02 | End: 2020-01-02

## 2020-01-02 RX ORDER — ONDANSETRON 2 MG/ML
INJECTION INTRAMUSCULAR; INTRAVENOUS AS NEEDED
Status: DISCONTINUED | OUTPATIENT
Start: 2020-01-02 | End: 2020-01-02 | Stop reason: SURG

## 2020-01-02 RX ORDER — METOCLOPRAMIDE HYDROCHLORIDE 5 MG/ML
5 INJECTION INTRAMUSCULAR; INTRAVENOUS
Status: DISCONTINUED | OUTPATIENT
Start: 2020-01-02 | End: 2020-01-02 | Stop reason: HOSPADM

## 2020-01-02 RX ORDER — BUPIVACAINE HYDROCHLORIDE AND EPINEPHRINE 2.5; 5 MG/ML; UG/ML
INJECTION, SOLUTION INFILTRATION; PERINEURAL AS NEEDED
Status: DISCONTINUED | OUTPATIENT
Start: 2020-01-02 | End: 2020-01-02 | Stop reason: HOSPADM

## 2020-01-02 RX ORDER — SODIUM CHLORIDE, SODIUM LACTATE, POTASSIUM CHLORIDE, CALCIUM CHLORIDE 600; 310; 30; 20 MG/100ML; MG/100ML; MG/100ML; MG/100ML
100 INJECTION, SOLUTION INTRAVENOUS CONTINUOUS
Status: DISCONTINUED | OUTPATIENT
Start: 2020-01-02 | End: 2020-01-02

## 2020-01-02 RX ORDER — OXYCODONE AND ACETAMINOPHEN 10; 325 MG/1; MG/1
1 TABLET ORAL ONCE AS NEEDED
Status: COMPLETED | OUTPATIENT
Start: 2020-01-02 | End: 2020-01-02

## 2020-01-02 RX ORDER — NALOXONE HCL 0.4 MG/ML
0.4 VIAL (ML) INJECTION AS NEEDED
Status: DISCONTINUED | OUTPATIENT
Start: 2020-01-02 | End: 2020-01-02 | Stop reason: HOSPADM

## 2020-01-02 RX ORDER — MIDAZOLAM HYDROCHLORIDE 1 MG/ML
INJECTION INTRAMUSCULAR; INTRAVENOUS AS NEEDED
Status: DISCONTINUED | OUTPATIENT
Start: 2020-01-02 | End: 2020-01-02 | Stop reason: SURG

## 2020-01-02 RX ORDER — BUMETANIDE 0.5 MG/1
0.5 TABLET ORAL DAILY
Status: DISCONTINUED | OUTPATIENT
Start: 2020-01-02 | End: 2020-01-03 | Stop reason: HOSPADM

## 2020-01-02 RX ORDER — MIDAZOLAM HYDROCHLORIDE 1 MG/ML
1 INJECTION INTRAMUSCULAR; INTRAVENOUS
Status: DISCONTINUED | OUTPATIENT
Start: 2020-01-02 | End: 2020-01-02 | Stop reason: HOSPADM

## 2020-01-02 RX ORDER — LIDOCAINE HYDROCHLORIDE 20 MG/ML
INJECTION, SOLUTION INFILTRATION; PERINEURAL AS NEEDED
Status: DISCONTINUED | OUTPATIENT
Start: 2020-01-02 | End: 2020-01-02 | Stop reason: SURG

## 2020-01-02 RX ORDER — METOPROLOL SUCCINATE 25 MG/1
25 TABLET, EXTENDED RELEASE ORAL
Status: DISCONTINUED | OUTPATIENT
Start: 2020-01-03 | End: 2020-01-03 | Stop reason: HOSPADM

## 2020-01-02 RX ORDER — OXYCODONE AND ACETAMINOPHEN 7.5; 325 MG/1; MG/1
2 TABLET ORAL EVERY 4 HOURS PRN
Status: DISCONTINUED | OUTPATIENT
Start: 2020-01-02 | End: 2020-01-02 | Stop reason: HOSPADM

## 2020-01-02 RX ORDER — FENTANYL CITRATE 50 UG/ML
INJECTION, SOLUTION INTRAMUSCULAR; INTRAVENOUS AS NEEDED
Status: DISCONTINUED | OUTPATIENT
Start: 2020-01-02 | End: 2020-01-02 | Stop reason: SURG

## 2020-01-02 RX ADMIN — LIDOCAINE HYDROCHLORIDE 1 EACH: 40 SOLUTION TOPICAL at 11:10

## 2020-01-02 RX ADMIN — FENTANYL CITRATE 50 MCG: 50 INJECTION, SOLUTION INTRAMUSCULAR; INTRAVENOUS at 11:39

## 2020-01-02 RX ADMIN — BUMETANIDE 0.5 MG: 0.5 TABLET ORAL at 15:07

## 2020-01-02 RX ADMIN — LIDOCAINE HYDROCHLORIDE 100 MG: 20 INJECTION, SOLUTION INFILTRATION; PERINEURAL at 11:08

## 2020-01-02 RX ADMIN — LOSARTAN POTASSIUM 50 MG: 50 TABLET, FILM COATED ORAL at 18:49

## 2020-01-02 RX ADMIN — CEFAZOLIN 1 G: 1 INJECTION, POWDER, FOR SOLUTION INTRAMUSCULAR; INTRAVENOUS; PARENTERAL at 11:12

## 2020-01-02 RX ADMIN — TECHNETIUM TC 99M SULFUR COLLOID 1 DOSE: KIT at 09:40

## 2020-01-02 RX ADMIN — FENTANYL CITRATE 50 MCG: 50 INJECTION, SOLUTION INTRAMUSCULAR; INTRAVENOUS at 11:08

## 2020-01-02 RX ADMIN — NEOSTIGMINE METHYLSULFATE 3 MG: 1 INJECTION INTRAVENOUS at 11:46

## 2020-01-02 RX ADMIN — DEXAMETHASONE SODIUM PHOSPHATE 4 MG: 4 INJECTION, SOLUTION INTRAMUSCULAR; INTRAVENOUS at 11:12

## 2020-01-02 RX ADMIN — ALLOPURINOL 200 MG: 100 TABLET ORAL at 18:46

## 2020-01-02 RX ADMIN — ONDANSETRON HYDROCHLORIDE 4 MG: 2 SOLUTION INTRAMUSCULAR; INTRAVENOUS at 11:43

## 2020-01-02 RX ADMIN — FENTANYL CITRATE 25 MCG: 50 INJECTION, SOLUTION INTRAMUSCULAR; INTRAVENOUS at 12:13

## 2020-01-02 RX ADMIN — PROPOFOL 25 MG: 10 INJECTION, EMULSION INTRAVENOUS at 12:04

## 2020-01-02 RX ADMIN — MIDAZOLAM HYDROCHLORIDE 2 MG: 1 INJECTION, SOLUTION INTRAMUSCULAR; INTRAVENOUS at 11:03

## 2020-01-02 RX ADMIN — DRONEDARONE 400 MG: 400 TABLET, FILM COATED ORAL at 18:46

## 2020-01-02 RX ADMIN — ROCURONIUM BROMIDE 30 MG: 10 INJECTION INTRAVENOUS at 11:08

## 2020-01-02 RX ADMIN — SODIUM CHLORIDE, POTASSIUM CHLORIDE, SODIUM LACTATE AND CALCIUM CHLORIDE 75 ML/HR: 600; 310; 30; 20 INJECTION, SOLUTION INTRAVENOUS at 15:07

## 2020-01-02 RX ADMIN — PROPOFOL 150 MG: 10 INJECTION, EMULSION INTRAVENOUS at 11:08

## 2020-01-02 RX ADMIN — SPIRONOLACTONE 25 MG: 25 TABLET ORAL at 18:51

## 2020-01-02 RX ADMIN — OXYCODONE HYDROCHLORIDE AND ACETAMINOPHEN 1 TABLET: 10; 325 TABLET ORAL at 12:42

## 2020-01-02 RX ADMIN — Medication 80 MCG: at 11:13

## 2020-01-02 RX ADMIN — SODIUM CHLORIDE, POTASSIUM CHLORIDE, SODIUM LACTATE AND CALCIUM CHLORIDE 1000 ML: 600; 310; 30; 20 INJECTION, SOLUTION INTRAVENOUS at 07:52

## 2020-01-02 RX ADMIN — LIDOCAINE AND PRILOCAINE 1 APPLICATION: 25; 25 CREAM TOPICAL at 07:40

## 2020-01-02 RX ADMIN — GLYCOPYRROLATE 0.4 MG: 0.2 INJECTION, SOLUTION INTRAMUSCULAR; INTRAVENOUS at 11:46

## 2020-01-02 NOTE — H&P
Nikki Zuniga MD [facility] History and Physical     Referring Provider: Nikki Zuniga MD    Patient Care Team:  Mitch Davidson MD as PCP - General  Mitch Davidson MD as PCP - Family Medicine    Chief complaint breast cancer    Subjective .     History of present illness:  The patient is a 79 y.o. 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    Review of Systems    Review of Systems - General ROS: negative  ENT ROS: negative  Respiratory ROS: no cough, shortness of breath, or wheezing  Cardiovascular ROS: no chest pain or dyspnea on exertion  Gastrointestinal ROS: no abdominal pain, change in bowel habits, or black or bloody stools  Genito-Urinary ROS: no dysuria, trouble voiding, or hematuria  Dermatological ROS: negative   Breast ROS: negative for breast lumps  Hematological and Lymphatic ROS: negative  Musculoskeletal ROS: negative   Neurological ROS: no TIA or stroke symptoms    Psychological ROS: negative  Endocrine ROS: negative    History  Past Medical History:   Diagnosis Date   • Arthritis    • Atrial fibrillation (CMS/HCC)     INTERMITTENT   • Cancer (CMS/HCC)    • Disease of thyroid gland    • Hx of colonic polyp    • Hyperlipidemia    • Hypertension    • Kidney disease     STAGE 4   ,   Past Surgical History:   Procedure Laterality Date   • BREAST BIOPSY Left 2015   • BREAST EXCISIONAL BIOPSY Left 2001   • BREAST EXCISIONAL BIOPSY Left 2001   • CARPAL TUNNEL RELEASE Right    • CHOLECYSTECTOMY     • COLON SURGERY      Partial right   • COLONOSCOPY     • COLONOSCOPY N/A 10/17/2019    Procedure: COLONOSCOPY WITH ANESTHESIA;  Surgeon: Rigoberto Shaw MD;  [facility];  Service: Gastroenterology   • COLONOSCOPY W/ POLYPECTOMY  06/30/2016    2 Adenomatous polyps at 79 & 40 cm, Diverticulosis repeat exam in 3 years   • EYE SURGERY Bilateral     cataract extraction   •  EYE SURGERY Left     detached retina   • HERNIA REPAIR     • REPLACEMENT TOTAL KNEE Bilateral    • THROAT SURGERY      duct   ,   Family History   Problem Relation Age of Onset   • Colon cancer Mother    • Colon polyps Mother    • No Known Problems Father    • No Known Problems Sister    • No Known Problems Brother    • No Known Problems Daughter    • No Known Problems Son    • No Known Problems Maternal Grandmother    • No Known Problems Paternal Grandmother    • No Known Problems Maternal Aunt    • No Known Problems Paternal Aunt    • Breast cancer Neg Hx    • BRCA 1/2 Neg Hx    • Endometrial cancer Neg Hx    • Ovarian cancer Neg Hx    ,   Social History     Tobacco Use   • Smoking status: Never Smoker   • Smokeless tobacco: Never Used   Substance Use Topics   • Alcohol use: No     Frequency: Never   • Drug use: No   ,   Medications Prior to Admission   Medication Sig Dispense Refill Last Dose   • allopurinol (ZYLOPRIM) 100 MG tablet Take 200 mg by mouth Daily.  3 1/1/2020 at 1800   • bumetanide (BUMEX) 1 MG tablet Take 0.5 mg by mouth Daily.   1/1/2020 at 0800   • carboxymethylcellulose (REFRESH PLUS) 0.5 % solution Administer 1 drop to both eyes 3 (Three) Times a Day As Needed for Dry Eyes.   1/1/2020 at 1800   • dronedarone (MULTAQ) 400 MG tablet TAKE ONE TABLET BY MOUTH TWICE DAILY WITH MEALS   1/2/2020 at 0445   • guaiFENesin (MUCINEX) 600 MG 12 hr tablet Take 1,200 mg by mouth Daily As Needed for Cough.   1/1/2020 at 0800   • levothyroxine (SYNTHROID, LEVOTHROID) 50 MCG tablet Take 50 mcg by mouth Daily.  3 1/1/2020 at 0800   • losartan (COZAAR) 100 MG tablet Take 100 mg by mouth Daily.  3 1/1/2020 at 1800   • metoprolol succinate XL (TOPROL-XL) 25 MG 24 hr tablet Take 25 mg by mouth 2 (Two) Times a Day.  1 1/2/2020 at 0445   • polyethylene glycol (MIRALAX) powder Take 17 g by mouth. daily   1/1/2020 at 0800   • simvastatin (ZOCOR) 40 MG tablet Take 40 mg by mouth Daily.  3 1/1/2020 at 1800   •  spironolactone (ALDACTONE) 25 MG tablet Take 25 mg by mouth Daily.  3 1/1/2020 at 1800   • acetaminophen (TYLENOL) 500 MG tablet Take 500 mg by mouth Every 6 (Six) Hours As Needed for Mild Pain .   More than a month at Unknown time   • Calcium Carbonate-Vitamin D (CALTRATE 600+D PO) Take 1 tablet by mouth Daily.   12/28/2019   • Cholecalciferol (VITAMIN D) 1000 units tablet Take 1,000 Units by mouth Daily.   12/28/2019   • ferrous sulfate 325 (65 FE) MG tablet Three times a week  5 12/28/2019   • warfarin (COUMADIN) 5 MG tablet Take 5 mg by mouth See Admin Instructions. 5 mg 1 day per week and 2.5 mg other 6 days  5 12/28/2019    and Allergies:  Bactrim [sulfamethoxazole-trimethoprim] and Codeine    Current Facility-Administered Medications:   •  acetaminophen (TYLENOL) tablet 1,000 mg, 1,000 mg, Oral, Once, Rima Amador CRNA  •  buffered lidocaine syringe 0.5 mL, 0.5 mL, Intradermal, Once PRN, Nikki Zuniga MD  •  buffered lidocaine syringe 0.5 mL, 0.5 mL, Injection, Once PRN, Rima Amador CRNA  •  bupivacaine-EPINEPHrine (MARCAINE w/EPI) injection, , , PRN, Nikki Zuniga MD, 30 mL at 01/02/20 1038  •  ceFAZolin (ANCEF) 1 g/100 mL 0.9% NS IVPB (mbp), 1 g, Intravenous, Once, Nikki Zuniga MD  •  dexamethasone (DECADRON) injection 4 mg, 4 mg, Intravenous, Once PRN, Rima Amador CRNA  •  lactated ringers infusion 1,000 mL, 1,000 mL, Intravenous, Continuous, Nikki Zuniga MD, Last Rate: 25 mL/hr at 01/02/20 0752, 1,000 mL at 01/02/20 0752  •  lactated ringers infusion, 100 mL/hr, Intravenous, Continuous, Rima Amador CRNA  •  midazolam (VERSED) injection 1 mg, 1 mg, Intravenous, Q5 Min PRN **OR** midazolam (VERSED) injection 2 mg, 2 mg, Intravenous, Q5 Min PRN, Rima Amador CRNA  •  sodium chloride 0.9 % flush 3 mL, 3 mL, Intravenous, PRN, Nikki Zuniga MD  •  sodium chloride 0.9 % flush 3 mL, 3 mL, Intravenous, Q12H, Rima Amador CRNA  •   sodium chloride 0.9 % flush 3-10 mL, 3-10 mL, Intravenous, PRN, Rima Amador CRNA  •  sterile water irrigation solution, , , PRN, Nikki Zuniga MD, 1,000 mL at 01/02/20 1038    Objective     Vital Signs   Temp:  [98 °F (36.7 °C)] 98 °F (36.7 °C)  Heart Rate:  [61-66] 61  Resp:  [16] 16  BP: (149)/(65) 149/65    Physical Exam:  General appearance - alert, well appearing, and in no distress  Mental status - alert, oriented to person, place, and time  Neck - supple, no significant adenopathy  Chest - clear to auscultation, no wheezes, rales or rhonchi, symmetric air entry  Heart - normal rate, regular rhythm, normal S1, S2, no murmurs, rubs, clicks or gallops  Abdomen - soft, nontender, nondistended, no masses or organomegaly  Neurological - alert, oriented, normal speech, no focal findings or movement disorder noted  Musculoskeletal - no joint tenderness, deformity or swelling  Extremities - peripheral pulses normal, no pedal edema, no clubbing or cyanosis    Results Review:     Lab Results (last 24 hours)     Procedure Component Value Units Date/Time    Protime-INR [[id number]]  (Abnormal) Collected:  01/02/20 0753    Specimen:  Blood Updated:  01/02/20 0809     Protime 14.7 Seconds      INR 1.11        Imaging Results (Last 24 Hours)     ** No results found for the last 24 hours. **            Assessment/Plan       Left breast cancer.   She will undergo left simple mastectomy with sentinel lymph node biopsy.  The risks, benefits, complications, and possible alternatives were discussed with the patient who agreed to proceed.      Nikki Zuniga MD  01/02/20  10:42 AM

## 2020-01-02 NOTE — PLAN OF CARE
Problem: Patient Care Overview  Goal: Plan of Care Review  Outcome: Ongoing (interventions implemented as appropriate)  Flowsheets (Taken 1/2/2020 9499)  Progress: improving  Plan of Care Reviewed With: patient  Note:   SURGERY TODAY. GAUZE AND TEGADERM DRESSING DRY AND INTACT LEFT CHEST AREA. CINDY X 2 PATENT WITH BLOODY DRNG. NOTED. NO REQUEST FOR PRN PAIN MED AVAILABLE. PT. HAS BEEN UP TO THE BATHROOM AND VOIDED. TOLERATING DIET OK. NO DISTRESS NOTED.  Goal: Individualization and Mutuality  Outcome: Ongoing (interventions implemented as appropriate)  Goal: Discharge Needs Assessment  Outcome: Ongoing (interventions implemented as appropriate)  Goal: Interprofessional Rounds/Family Conf  Outcome: Ongoing (interventions implemented as appropriate)     Problem: Pain, Chronic (Adult)  Goal: Identify Related Risk Factors and Signs and Symptoms  Outcome: Ongoing (interventions implemented as appropriate)  Goal: Acceptable Pain/Comfort Level and Functional Ability  Outcome: Ongoing (interventions implemented as appropriate)     Problem: Patient Care Overview  Goal: Plan of Care Review  Outcome: Ongoing (interventions implemented as appropriate)  Goal: Discharge Needs Assessment  Outcome: Ongoing (interventions implemented as appropriate)     Problem: Pain, Acute (Adult)  Goal: Identify Related Risk Factors and Signs and Symptoms  Outcome: Ongoing (interventions implemented as appropriate)

## 2020-01-02 NOTE — ANESTHESIA POSTPROCEDURE EVALUATION
Patient: Heidi Ansari    Procedure Summary     Date:  01/02/20 Room / Location:   [facility] OR  /  [facility]    Anesthesia Start:  1103 Anesthesia Stop:  1224    Procedure:  LEFT SIMPLE MASTECTOMY WITH SENTINEL NODE BIOPSY, INJECTION AND SCAN, RADIOLOGIST WILL INJECT (Left Breast) Diagnosis:  (BREAST CANCER)    Surgeon:  Nikki Zuniga MD Provider:  Rima Amador CRNA    Anesthesia Type:  general ASA Status:  2          Anesthesia Type: general    Vitals  Vitals Value Taken Time   /54 1/2/2020 12:40 PM   Temp 98.2 °F (36.8 °C) 1/2/2020 12:22 PM   Pulse 59 1/2/2020 12:45 PM   Resp 14 1/2/2020 12:40 PM   SpO2 95 % 1/2/2020 12:45 PM   Vitals shown include unvalidated device data.        Post Anesthesia Care and Evaluation    Patient location during evaluation: PACU  Level of consciousness: awake  Pain management: adequate  Airway patency: patent  Anesthetic complications: No anesthetic complications  PONV Status: none  Cardiovascular status: acceptable  Respiratory status: acceptable  Hydration status: acceptable

## 2020-01-02 NOTE — ANESTHESIA PREPROCEDURE EVALUATION
Anesthesia Evaluation     Patient summary reviewed   no history of anesthetic complications:  NPO Solid Status: > 8 hours             Airway   Mallampati: II  TM distance: >3 FB  Neck ROM: full  Dental      Pulmonary    (-) COPD, sleep apnea, not a smoker  Cardiovascular   Exercise tolerance: good (4-7 METS)    PT is on anticoagulation therapy  Patient on routine beta blocker and Beta blocker given within 24 hours of surgery    (+) hypertension, dysrhythmias Atrial Fib, hyperlipidemia,   (-) pacemaker, angina, cardiac stents, CABG      Neuro/Psych  (-) seizures, CVA  GI/Hepatic/Renal/Endo    (+)   renal disease CRI, thyroid problem hypothyroidism  (-) GERD, liver disease, diabetes    Musculoskeletal     Abdominal    Substance History      OB/GYN          Other                        Anesthesia Plan    ASA 2     general     intravenous induction     Anesthetic plan, all risks, benefits, and alternatives have been provided, discussed and informed consent has been obtained with: patient.

## 2020-01-02 NOTE — NURSING NOTE
Pt to OR without preop meds.  Unable to see any meds ordered for pt in the [facility].  Call to pharmacy, [facility], to notify of this issue.

## 2020-01-02 NOTE — ANESTHESIA PROCEDURE NOTES
Airway  Urgency: elective    Date/Time: 1/2/2020 11:10 AM  Airway not difficult    General Information and Staff    Patient location during procedure: OR  CRNA: Rima Amador CRNA    Indications and Patient Condition  Indications for airway management: airway protection    Preoxygenated: yes  Mask difficulty assessment: 1 - vent by mask    Final Airway Details  Final airway type: endotracheal airway      Successful airway: ETT  Cuffed: yes   Successful intubation technique: direct laryngoscopy  Facilitating devices/methods: intubating stylet and anterior pressure/BURP  Endotracheal tube insertion site: oral  Blade: Guerrero  Blade size: 2  ETT size (mm): 7.5  Cormack-Lehane Classification: grade IIb - view of arytenoids or posterior of glottis only  Placement verified by: chest auscultation and capnometry   Cuff volume (mL): 5  Measured from: lips  ETT/EBT  to lips (cm): 22  Number of attempts at approach: 1  Assessment: lips, teeth, and gum same as pre-op and atraumatic intubation    Additional Comments  Atraumatic

## 2020-01-03 VITALS
RESPIRATION RATE: 18 BRPM | DIASTOLIC BLOOD PRESSURE: 52 MMHG | TEMPERATURE: 97.8 F | OXYGEN SATURATION: 93 % | SYSTOLIC BLOOD PRESSURE: 118 MMHG | WEIGHT: 210 LBS | BODY MASS INDEX: 34.99 KG/M2 | HEART RATE: 63 BPM | HEIGHT: 65 IN

## 2020-01-03 LAB
ANION GAP SERPL CALCULATED.3IONS-SCNC: 13 MMOL/L (ref 5–15)
BUN BLD-MCNC: 19 MG/DL (ref 8–23)
BUN/CREAT SERPL: 13.4 (ref 7–25)
CALCIUM SPEC-SCNC: 9.4 MG/DL (ref 8.6–10.5)
CHLORIDE SERPL-SCNC: 102 MMOL/L (ref 98–107)
CO2 SERPL-SCNC: 24 MMOL/L (ref 22–29)
CREAT BLD-MCNC: 1.42 MG/DL (ref 0.57–1)
DEPRECATED RDW RBC AUTO: 44.4 FL (ref 37–54)
ERYTHROCYTE [DISTWIDTH] IN BLOOD BY AUTOMATED COUNT: 13.3 % (ref 12.3–15.4)
GFR SERPL CREATININE-BSD FRML MDRD: 36 ML/MIN/1.73
GLUCOSE BLD-MCNC: 131 MG/DL (ref 65–99)
HCT VFR BLD AUTO: 32.2 % (ref 34–46.6)
HGB BLD-MCNC: 10.9 G/DL (ref 12–15.9)
INR PPP: 1.12 (ref 0.91–1.09)
MCH RBC QN AUTO: 31.1 PG (ref 26.6–33)
MCHC RBC AUTO-ENTMCNC: 33.9 G/DL (ref 31.5–35.7)
MCV RBC AUTO: 92 FL (ref 79–97)
PLATELET # BLD AUTO: 138 10*3/MM3 (ref 140–450)
PMV BLD AUTO: 12.4 FL (ref 6–12)
POTASSIUM BLD-SCNC: 4.5 MMOL/L (ref 3.5–5.2)
PROTHROMBIN TIME: 14.8 SECONDS (ref 11.9–14.6)
RBC # BLD AUTO: 3.5 10*6/MM3 (ref 3.77–5.28)
SODIUM BLD-SCNC: 139 MMOL/L (ref 136–145)
WBC NRBC COR # BLD: 8.84 10*3/MM3 (ref 3.4–10.8)

## 2020-01-03 PROCEDURE — 85610 PROTHROMBIN TIME: CPT | Performed by: SPECIALIST

## 2020-01-03 PROCEDURE — A9270 NON-COVERED ITEM OR SERVICE: HCPCS | Performed by: SPECIALIST

## 2020-01-03 PROCEDURE — G0378 HOSPITAL OBSERVATION PER HR: HCPCS

## 2020-01-03 PROCEDURE — 63710000001 DRONEDARONE 400 MG TABLET: Performed by: SPECIALIST

## 2020-01-03 PROCEDURE — 80048 BASIC METABOLIC PNL TOTAL CA: CPT | Performed by: SPECIALIST

## 2020-01-03 PROCEDURE — 63710000001 LEVOTHYROXINE 50 MCG TABLET: Performed by: SPECIALIST

## 2020-01-03 PROCEDURE — 63710000001 METOPROLOL SUCCINATE XL 25 MG TABLET SUSTAINED-RELEASE 24 HOUR: Performed by: SPECIALIST

## 2020-01-03 PROCEDURE — 63710000001 BUMETANIDE 0.5 MG TABLET: Performed by: SPECIALIST

## 2020-01-03 PROCEDURE — 63710000001 LOSARTAN 50 MG TABLET: Performed by: SPECIALIST

## 2020-01-03 PROCEDURE — 85027 COMPLETE CBC AUTOMATED: CPT | Performed by: SPECIALIST

## 2020-01-03 RX ORDER — OXYCODONE HYDROCHLORIDE AND ACETAMINOPHEN 5; 325 MG/1; MG/1
1 TABLET ORAL EVERY 4 HOURS PRN
Qty: 20 TABLET | Refills: 0 | Status: SHIPPED | OUTPATIENT
Start: 2020-01-03 | End: 2020-01-12

## 2020-01-03 RX ADMIN — BUMETANIDE 0.5 MG: 0.5 TABLET ORAL at 08:11

## 2020-01-03 RX ADMIN — DRONEDARONE 400 MG: 400 TABLET, FILM COATED ORAL at 08:11

## 2020-01-03 RX ADMIN — LOSARTAN POTASSIUM 50 MG: 50 TABLET, FILM COATED ORAL at 08:11

## 2020-01-03 RX ADMIN — LEVOTHYROXINE SODIUM 50 MCG: 50 TABLET ORAL at 06:14

## 2020-01-03 RX ADMIN — METOPROLOL SUCCINATE 25 MG: 25 TABLET, EXTENDED RELEASE ORAL at 08:11

## 2020-01-03 NOTE — OP NOTE
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 The risks, benefits, complications, and possible alternatives were discussed with the patient who agreed to proceed.  Description of procedure: the patient was laid supine. The chest was prepped and draped.  An elliptical incision was created around the left nipple areolar complex.  The dermis was grasped with single toothed tenaculae and skin flaps were created with bovie.  The flaps were created superiorly to just inferior to the clavicle, medially just lateral to the sternum, and inferiorly just inferior to the pectoralis major.  The breast was elevated from the underlying pectoralis major and minor medially to laterally including the deep fascia with bovie.  The clavipectoral fascia was opened and the axilla was dissected bluntly.  neoprobe was used to identify the sentinel lymph node and it was dissected from the axillary contents and sent to pathology separately.  The breast was labeled with a short stitch superiorly and long laterally and labeled left breast with axillary contents.  Two #7 CINDY drains were placed, anchored to the skin with 2-0 silk, and placed to bulb suction. The skin was closed with 3-0 and 4-0 vicyrl. The sponge, needle, and instrument counts were correct.  Complications:  None  Disposition: good to pacu  Findings;  Left breast and axillary contents

## 2020-01-03 NOTE — PROGRESS NOTES
Nikki Zuniga MD FACS  Progress Note     LOS: 0 days   Patient Care Team:  Mitch Davidson MD as PCP - General  Mtich Davidson MD as PCP - Family Medicine      Subjective     Interval History:      no complaint.       Objective     Vital Signs  Temp:  [97.2 °F (36.2 °C)-98.7 °F (37.1 °C)] 97.8 °F (36.6 °C)  Heart Rate:  [54-69] 63  Resp:  [12-18] 18  BP: (118-143)/(30-60) 118/52    Physical Exam:  General appearance - alert, well appearing, and in no distress  Breasts - clean, CINDY's serosangouions      Results Review:    Lab Results (last 24 hours)     Procedure Component Value Units Date/Time    Basic Metabolic Panel [[id number]]  (Abnormal) Collected:  01/03/20 0546    Specimen:  Blood Updated:  01/03/20 0626     Glucose 131 mg/dL      BUN 19 mg/dL      Creatinine 1.42 mg/dL      Sodium 139 mmol/L      Potassium 4.5 mmol/L      Chloride 102 mmol/L      CO2 24.0 mmol/L      Calcium 9.4 mg/dL      eGFR Non African Amer 36 mL/min/1.73      BUN/Creatinine Ratio 13.4     Anion Gap 13.0 mmol/L     Narrative:       GFR Normal >60  Chronic Kidney Disease <60  Kidney Failure <15      CBC (No Diff) [[id number]]  (Abnormal) Collected:  01/03/20 0546    Specimen:  Blood Updated:  01/03/20 0621     WBC 8.84 10*3/mm3      RBC 3.50 10*6/mm3      Hemoglobin 10.9 g/dL      Hematocrit 32.2 %      MCV 92.0 fL      MCH 31.1 pg      MCHC 33.9 g/dL      RDW 13.3 %      RDW-SD 44.4 fl      MPV 12.4 fL      Platelets 138 10*3/mm3     Protime-INR [[id number]]  (Abnormal) Collected:  01/03/20 0546    Specimen:  Blood Updated:  01/03/20 0616     Protime 14.8 Seconds      INR 1.12    Tissue Pathology Exam [[id number]] Collected:  01/02/20 1031    Specimen:  Tissue from Breast, Left; Tissue from [place] Lymph Node Updated:  01/02/20 1408        Imaging Results (Last 24 Hours)     ** No results found for the last 24 hours. **            Assessment/Plan       home      Nikki Zuniga MD  01/03/20  11:35 AM

## 2020-01-03 NOTE — PLAN OF CARE
Problem: Patient Care Overview  Goal: Plan of Care Review  Outcome: Ongoing (interventions implemented as appropriate)  Flowsheets (Taken 1/3/2020 2499)  Progress: improving  Plan of Care Reviewed With: patient  Outcome Summary:    Left breast incision CDI; JPx2; Up with assist; IVF; void; denies pain and nausea; will cont to monitor

## 2020-01-09 ENCOUNTER — TRANSCRIBE ORDERS (OUTPATIENT)
Dept: ADMINISTRATIVE | Facility: HOSPITAL | Age: 80
End: 2020-01-09

## 2020-01-09 DIAGNOSIS — C50.412 MALIGNANT NEOPLASM OF UPPER-OUTER QUADRANT OF LEFT FEMALE BREAST, UNSPECIFIED ESTROGEN RECEPTOR STATUS (HCC): Primary | ICD-10-CM

## 2020-01-17 ENCOUNTER — HOSPITAL ENCOUNTER (OUTPATIENT)
Dept: CT IMAGING | Facility: HOSPITAL | Age: 80
Discharge: HOME | End: 2020-01-17
Admitting: SPECIALIST

## 2020-01-17 DIAGNOSIS — C50.412 MALIGNANT NEOPLASM OF UPPER-OUTER QUADRANT OF LEFT FEMALE BREAST, UNSPECIFIED ESTROGEN RECEPTOR STATUS (HCC): ICD-10-CM

## 2020-01-17 PROCEDURE — 71250 CT THORAX DX C-: CPT

## 2020-01-17 PROCEDURE — 74176 CT ABD & PELVIS W/O CONTRAST: CPT

## 2020-01-17 PROCEDURE — 70450 CT HEAD/BRAIN W/O DYE: CPT

## 2020-01-20 PROBLEM — N18.30 STAGE III CHRONIC KIDNEY DISEASE: Status: ACTIVE | Noted: 2020-01-20

## 2020-01-20 PROBLEM — D63.1 ANEMIA DUE TO STAGE 3 CHRONIC KIDNEY DISEASE: Status: ACTIVE | Noted: 2020-01-20

## 2020-01-21 NOTE — PROGRESS NOTES
[facility] ONC [facility] HEMATOLOGY AND ONCOLOGY  [address]  [facility] [id number] [phone]    Patient Name: Heidi Ansari  Encounter Date: 01/22/2020  YOB: 1940  Patient Number: [id number]    Initial Note    REASON FOR CONSULTATION: Newly diagnosed left breast cancer    HISTORY OF PRESENT ILLNESS: Heidi Ansari is an 79-year-old female, medical patient of Dr. Jermaine Davidson whose history includes hypertension, colon polyps, hypothyroidism, chronic kidney disease and recently diagnosed left breast carcinoma.    11/27/2019- palpable left breast lump x3 weeks.  Diagnostic mammogram and left breast ultrasound.  Impression: Spiculated mass approximately 2 cm upper outer quadrant of the left breast.  Oval well-circumscribed mass appears new in the lower slightly lateral left breast measuring approximately 10 mm.  Targeted left breast ultrasound at the 2 o'clock position 5 cm from nipple an irregular spiculated hypoechoic mass is seen measuring 1.7 x 1.5 x 1.6 cm.  Smaller adjacent hypoechoic irregular mass seen measuring 0.3 cm.  Third irregular hypoechoic mass at the 3 o'clock position measures 2.2 x 0.9 x 1.2 cm in size.  Ultrasound-guided biopsy recommended.  BI-RADS Category 5-highly suggestive of malignancy.  No mammographic evidence of right breast malignancy.    12/06/2019- ultrasound-guided left breast biopsy.  Final diagnosis: 1.left breast mass 2 o'clock position: Core biopsy: Invasive ductal and lobular carcinoma.  Estimated grade 1, involving approximately 90% of the core biopsy specimens.  Focal ductal carcinoma in situ, nuclear grade 1.  Microcalcifications identified in invasive ductal carcinoma.  2.left breast mass 3 o'clock position, core biopsy: Invasive lobular carcinoma, estimated grade 1, involving approximately 90% of the core biopsy specimens.  Microcalcifications not identified.    12/26/2019- hemoglobin 12.3,  hematocrit 37.2, MCV 93.9, platelets 90,000, WBC 6.81 with a normal differential.  CMP notable for a creatinine of 1.65 (GFR 30) otherwise normal.  Immunohistochemistries revealed: Estrogen +99 to 100%; progesterone +81 to 90%, HER-2 1-2+ (equivocal).  FISH: Negative (signal ratio: 1.2)    01/02/2020- left breast simple mastectomy with sentinel lymph node biopsy.  Final diagnosis: 1.left breast, left skin sparing mastectomy: Invasive ductal and lobular carcinoma, grade 1 (2.3 cm).  Associated low-grade ductal carcinoma in situ, solid type.  Margins of excision free of tumor.  Tumor approaches closest deep margin and 1.6 cm.  2 axillary lymph nodes, negative for metastatic carcinoma.  2.left sentinel lymph node excision: One sentinel lymph node, negative for metastatic carcinoma (0/1).  AJCC pathologic stage: pT2, N0 (SN).    Synoptic checklist: Procedure: Total mastectomy.  Specimen laterality: Left.  Histologic type: Invasive carcinoma with ductal and lobular features (mixed type carcinoma).  Overall ndgndrndanddndend:nd nd2nd. Tumor size: Greatest dimension of largest invasive focus 23 mm.  Tumor focality: Single focus of invasive carcinoma.  DCIS: Present.  Negative for extensive intraductal component.  Grade 1.  Lobular carcinoma in situ: None in specimen.  Tumor extent: Lymphovascular invasion not identified.  Dermal lymphovascular invasion not identified.  Microcalcifications not identified.  Margins: Uninvolved by invasive carcinoma.  Lymph nodes: Number of lymph nodes examined: 3.  TNM classification: pT2, pN0 (SN).    01/17/2020-CT brain: Mild cerebral and cerebellar volume loss with chronic microvascular disease.  Focus of ill-defined hypodensity at the gray-white juncture in the right frontal lobe.  Site of previous infarction versus metastasis.  Suggest follow-up MRI with and without gadolinium.  No additional lesions present.    01/17/2020- CT chest.  Groundglass nodule within the right upper lobe and left lower lobe.   Likely inflammatory in nature.  Coronary calcifications in the LAD and circumflex distribution.  Moderate cardiomegaly.  Postoperative changes left axilla from recent axillary dissection as well as mastectomy.  No enlarged mediastinal or axillary lymph nodes present.  Irregular nodule along the anterior margin of the medial left pectoralis major musculature within the mastectomy bed.  No adjacent inflammatory/postoperative stranding.  May represent postoperative seroma.  Recommend directed ultrasound over this area.    01/17/2020-CT abdomen/pelvis.  Impression: Multiple hypodensities within the liver.  Favor hepatic cysts.  Recommend ultrasound for further evaluations.  Cannot be fully characterized without IV contrast.  Small cortical cyst of the right kidney suspected.  Postoperative changes of the right colon.  Multiple diverticula throughout the colon without evidence of diverticulitis or mechanical bowel obstruction.  Diastases of the abdominal musculature at the umbilicus.  Previous umbilical hernia repair was performed with a mesh.  Residual recurrent tyra-umbilical hernia containing fat.  No evidence of herniated bowel loop.  7 mm groundglass nodule left lower lobe.    Postoperatively the patient is seen for subsequent management considerations.            LABS    Lab Results - Last 18 Months   Lab Units 01/03/20  0546 12/26/19  0954 08/15/18  1644   HEMOGLOBIN g/dL 10.9* 12.3 12.9   HEMATOCRIT % 32.2* 37.2 40.2   MCV fL 92.0 93.9 95.9   WBC 10*3/mm3 8.84 6.81 10.5   RDW % 13.3 13.5 13.8   MPV fL 12.4* 12.8* 11.7   PLATELETS 10*3/mm3 138* 90* 174   NEUTROS ABS 10*3/mm3  --  4.81 6.3   LYMPHS ABS 10*3/mm3  --  1.40 3.1   MONOS ABS 10*3/mm3  --  0.46 0.80   EOS ABS 10*3/mm3  --  0.09 0.20   BASOS ABS 10*3/mm3  --  0.02 0.10   ANISOCYTOSIS   --   --  1+*       Lab Results - Last 18 Months   Lab Units 01/03/20  0546 12/26/19  0954 08/15/18  1644   GLUCOSE mg/dL 131* 95 92   SODIUM mmol/L 139 142 139   POTASSIUM  mmol/L 4.5 4.1 4.1   TOTAL CO2 mmol/L  --   --  27   CO2 mmol/L 24.0 29.0  --    CHLORIDE mmol/L 102 100 96*   ANION GAP mmol/L 13.0 13.0 16   CREATININE mg/dL 1.42* 1.65* 2.7*   BUN mg/dL 19 17 32*   BUN / CREAT RATIO  13.4 10.3  --    CALCIUM mg/dL 9.4 9.8 9.4   EGFR IF NONAFRICN AM mL/min/1.73 36* 30* 17*   ALK PHOS U/L  --  78 78   TOTAL PROTEIN g/dL  --  7.3 7.1   ALT (SGPT) U/L  --  7 16   AST (SGOT) U/L  --  16 20   BILIRUBIN mg/dL  --  0.7 0.4   ALBUMIN g/dL  --  4.30 4.3   GLOBULIN gm/dL  --  3.0  --        No results for input(s): MSPIKE, KAPPALAMB, IGLFLC, URICACID, FREEKAPPAL, CEA, LDH, REFLABREPO in the last 94827 hours.    Lab Results - Last 18 Months   Lab Units 08/15/18  1644   TSH uIU/mL 3.880         PAST MEDICAL HISTORY:  ALLERGIES:  Allergies   Allergen Reactions   • Bactrim [Sulfamethoxazole-Trimethoprim] Other (See Comments)     Patient has kidney disease (stage 4) shouldn't take Bactrim   • Codeine Nausea Only     CURRENT MEDICATIONS:  Outpatient Encounter Medications as of 1/22/2020   Medication Sig Dispense Refill   • acetaminophen (TYLENOL) 500 MG tablet Take 500 mg by mouth Every 6 (Six) Hours As Needed for Mild Pain .     • allopurinol (ZYLOPRIM) 100 MG tablet Take 200 mg by mouth Daily.  3   • bumetanide (BUMEX) 1 MG tablet Take 0.5 mg by mouth Daily.     • Calcium Carbonate-Vitamin D (CALTRATE 600+D PO) Take 1 tablet by mouth 2 (Two) Times a Day.     • carboxymethylcellulose (REFRESH PLUS) 0.5 % solution Administer 1 drop to both eyes 3 (Three) Times a Day As Needed for Dry Eyes.     • Cholecalciferol (VITAMIN D) 1000 units tablet Take 1,000 Units by mouth Daily.     • dronedarone (MULTAQ) 400 MG tablet Take 400 mg by mouth 2 (Two) Times a Day With Meals.     • ferrous sulfate 325 (65 FE) MG tablet Take 325 mg by mouth 3 (Three) Times a Week. Monday, Wednesday, and Friday.  5   • guaiFENesin (MUCINEX) 600 MG 12 hr tablet Take 1,200 mg by mouth Daily As Needed for Cough or Congestion.      • levothyroxine (SYNTHROID, LEVOTHROID) 50 MCG tablet Take 50 mcg by mouth Daily.  3   • losartan (COZAAR) 100 MG tablet Take 50 mg by mouth 2 (Two) Times a Day.  3   • metoprolol succinate XL (TOPROL-XL) 25 MG 24 hr tablet Take 25 mg by mouth 2 (Two) Times a Day.  1   • simvastatin (ZOCOR) 40 MG tablet Take 40 mg by mouth Daily.  3   • spironolactone (ALDACTONE) 25 MG tablet Take 25 mg by mouth Daily.  3   • warfarin (COUMADIN) 5 MG tablet Take  by mouth Take As Directed. Take 1 whole tablet (5 mg) on Wednesday only. Take 1/2 tablet (2.5 mg) all other days.  5   • polyethylene glycol (MIRALAX) powder Take 17 g by mouth Daily.       No facility-administered encounter medications on file as of 1/22/2020.      Adult illnesses:  Hypothyroidism  Atrial fibrillation  Hyperlipidemia  Obesity  Chronic kidney disease stage 4 - Dr. Daniels    ADULT ILLNESSES:  Patient Active Problem List   Diagnosis Code   • Hx of adenomatous colonic polyps Z86.010   • HTN (hypertension), benign I10   • Anticoagulated on Coumadin Z79.01   • Family hx of colon cancer Z80.0   • Breast cancer (CMS/HCC) C50.919   • Anemia due to stage 3 chronic kidney disease (CMS/HCC) N18.3, D63.1   • Acute frontal sinusitis J01.10   • Chronic anticoagulation Z79.01   • Endometrial thickening on ultrasound R93.89   • Excessive anticoagulation [id number]   • History of hypertension Z86.79   • Hypothyroidism E03.9   • Kidney disease N28.9   • Mixed hyperlipidemia E78.2   • Paroxysmal atrial fibrillation (CMS/HCC) I48.0   • PMB (postmenopausal bleeding) N95.0   • Essential hypertension I10     SURGERIES:  Past Surgical History:   Procedure Laterality Date   • BREAST BIOPSY Left 2015   • BREAST EXCISIONAL BIOPSY Left 2001   • BREAST EXCISIONAL BIOPSY Left 2001   • CARPAL TUNNEL RELEASE Right    • CHOLECYSTECTOMY     • COLON SURGERY      Partial right   • COLONOSCOPY     • COLONOSCOPY N/A 10/17/2019    Procedure: COLONOSCOPY WITH ANESTHESIA;  Surgeon: Rigoberto Shaw  MD HOLLI;  Location: [facility] ENDOSCOPY;  Service: Gastroenterology   • COLONOSCOPY W/ POLYPECTOMY  06/30/2016    2 Adenomatous polyps at 80 & 40 cm, Diverticulosis repeat exam in 3 years   • EYE SURGERY Bilateral     cataract extraction   • EYE SURGERY Left     detached retina   • HERNIA REPAIR     • MASTECTOMY W/ SENTINEL NODE BIOPSY Left 1/2/2020    Procedure: LEFT SIMPLE MASTECTOMY WITH SENTINEL NODE BIOPSY, INJECTION AND SCAN, RADIOLOGIST WILL INJECT;  Surgeon: Nikki Zuniga MD;  Location: [facility] OR;  Service: General   • REPLACEMENT TOTAL KNEE Bilateral    • THROAT SURGERY      duct     HEALTH MAINTENANCE ITEMS:  Health Maintenance Due   Topic Date Due   • TDAP/TD VACCINES (1 - Tdap) 12/08/1951   • PNEUMOCOCCAL VACCINE (65+ HIGH RISK) (1 of 2 - PCV13) 12/08/2004   • MEDICARE ANNUAL WELLNESS  03/07/2019   • LIPID PANEL  06/24/2019       <no information>  Last Completed Colonoscopy       Status Date      COLONOSCOPY Done 10/17/2019 COLONOSCOPY     Patient has more history with this topic...          There is no immunization history on file for this patient.  Last Completed Mammogram     Patient has no health maintenance due at this time            FAMILY HISTORY:  Family History   Problem Relation Age of Onset   • Colon cancer Mother    • Colon polyps Mother    • No Known Problems Father    • No Known Problems Sister    • No Known Problems Brother    • No Known Problems Daughter    • No Known Problems Son    • No Known Problems Maternal Grandmother    • No Known Problems Paternal Grandmother    • No Known Problems Maternal Aunt    • No Known Problems Paternal Aunt    • Breast cancer Neg Hx    • BRCA 1/2 Neg Hx    • Endometrial cancer Neg Hx    • Ovarian cancer Neg Hx      SOCIAL HISTORY:  Social History     Socioeconomic History   • Marital status:      Spouse name: Not on file   • Number of children: Not on file   • Years of education: Not on file   • Highest education level: Not on file   Tobacco Use   •  "Smoking status: Never Smoker   • Smokeless tobacco: Never Used   Substance and Sexual Activity   • Alcohol use: No     Frequency: Never   • Drug use: No   • Sexual activity: Defer   Homemaker    REVIEW OF SYSTEMS:  Review of Systems   Constitutional: Positive for fatigue (baseline but manages all her ADLs).   HENT: Positive for postnasal drip and rhinorrhea.    Eyes: Negative.    Respiratory: Negative.    Cardiovascular: Positive for palpitations.   Gastrointestinal: Negative.  Negative for blood in stool (dark stool from oral iron tiw.  gets constipated if more often).   Endocrine: Negative.    Genitourinary: Negative.    Musculoskeletal: Positive for arthralgias (left hip and knees inspite of prior TKRs bilaterally).   Allergic/Immunologic: Negative.    Neurological: Positive for dizziness (postural - standing) and numbness (left foot at times).   Hematological: Bruises/bleeds easily (coumadin).   Psychiatric/Behavioral: Negative.        /82   Pulse 73   Temp 97.6 °F (36.4 °C)   Resp 16   Ht 160.9 cm (63.35\")   Wt 93.6 kg (206 lb 6.4 oz)   SpO2 95%   Breastfeeding No   BMI 36.16 kg/m²  Body surface area is 1.97 meters squared.  Pain Score    01/22/20 1053   PainSc: 0-No pain       Physical Exam:  Physical Exam   Constitutional: She is oriented to person, place, and time. No distress.   Pleasant, cooperative, heavy-set, modeastly kept elderly female.  Ambulatory.  ECOG 0.  She is here with her souse [place] and daughter Fátima VANCET:   Head: Normocephalic and atraumatic.   Mouth/Throat: No oropharyngeal exudate.   Eyes: Pupils are equal, round, and reactive to light. Conjunctivae and EOM are normal. No scleral icterus.   Neck: No JVD present. No tracheal deviation present. No thyromegaly (on synthroid) present.   Cardiovascular:   Irregular rate and rhythm   Pulmonary/Chest: Effort normal and breath sounds normal. No stridor. No respiratory distress. She has no wheezes. She has no rales.   Abdominal: " Soft. Bowel sounds are normal. She exhibits no distension and no mass. There is no tenderness. There is no rebound and no guarding.   Musculoskeletal: Normal range of motion. She exhibits edema (1+ ankles). She exhibits no deformity.   Lymphadenopathy:     She has no cervical adenopathy.   Neurological: She is alert and oriented to person, place, and time. No cranial nerve deficit.   Skin: Skin is warm and dry. No erythema. No pallor.   Psychiatric: She has a normal mood and affect. Her behavior is normal. Judgment and thought content normal.   Vitals reviewed.  Breasts:  Right breast no masses.  Left mastectomy site healing well.     ASSESSMENT:   1. Invasive ductal/lobular (mixed) carcinoma:   · Stage: IIA (pT2, pN0 [sn], MX, G1) ER (+) %, FL (+) 81-90%, HER-2/kelby 1-2 + (IHC) - negative. HER-2 1-2+ (equivocal).  FISH: Negative (signal ratio: 1.2)  · Tumor burden: 23 mm tumor in the left breast. 0/3 axillary sentinel lymph node negative for metastatic carcinoma. Nuclear grade 1.    · Complications of tumor: None.   · Tumor status: No evidence of disease (SUZE) since left skin sparing mastectomy.               2.    Hypothyroidism        3.    Chronic kidney disease, at least stage III.  GFR 36, 01/03/2020.        4.    Normocytic anemia, hemoglobin 10.9 MCV 92, 01/03/2020.  Contribution from chronic kidney disease suspected.        5.    Abnormal CT scans of the head, chest, abdomen and pelvis (see above) indicating ill-defined hypodensity at the gray-white juncture in the right frontal lobe, hepatic and right renal cysts.  Unable to perform MRI with contrast due to chronic kidney disease.     PLAN:   1.   Re: Apprised of the diagnostic information and the reason she has been referred to this examiner. Will need repeat chest CT down the road.  2.   Re: Breast cancer.  NCCN guidelines version 1.2020 reviewed.  4 ductal, lobular, mixed tumors, pT1, pT2, pT3 and pN0 with tumors greater than 0.5 cm-  21 gene RT-PCR assay.  Recurrence score less than 26-adjuvant endocrine therapy.  Recurrence score 26-30- adjuvant endocrine therapy or adjuvant chemotherapy followed by endocrine therapy.  Recurrence score greater than 31-adjuvant chemotherapy followed by endocrine therapy.  However limited data to make chemotherapy recommendations for those greater than 70 years of age.  3.   Re:: The rationale for adjuvant hormonal manipulation with AI's is discussed. The potential risks (to include but not limited to: Hot flashes, asthenia, pain, arthralgia, arthritis, nausea/vomiting, headache, pharyngitis, depression, rash, hypertension, lymphedema, insomnia, edema, weight gain, dyspnea, abdominal pain, constipation, osteoporosis, fractures, cough, bone pain, diarrhea, breast pain, paresthesias, infection, cataracts, myalgias, thromboembolism, angina, endometrial carcinoma, myocardial infarction, stroke, anemia, leukopenia, erythema multiforme, Belle-Collins syndrome) are discussed at length. Questions answered. She agrees to initiate a trial of therapy.  4.  Send path from 1/02/2020 for Oncotype DX.   5.  Draw iron, iron saturation, ferritin, B12, folate, RASHEL, SPIEP, baseline CBC with differential, CMP, CEA, and CA27-29.   6.  Rx:   · Arimidex 1 mg p.o. daily, #30 x 2 RF   · Calcium 1200 mg + 800 units D3  p.o. daily - otc    7.  Schedule DEXA scan at [facility]  8.   Schedule ultrasound of the medial left pectoralis major, liver and right kidney at [facility].  Compare with CT scans of the chest abdomen and pelvis, 01/17/2020.  9.   Return to the [place] office in 5 weeks with pre-office CBC and differential, CMP, CEA and CA-27-29.  10. Further recommendations pending.     MEDICAL DECISION MAKING: High Complexity   AMOUNT OF DATA: Extensive      TIME SPENT: Face to face time on this encounter,as defined by the American Medical Association in the 2020 Current Procedural Terminology codebook; assessment, record review,  lab/imaging/pathology review, planning and education -at least 60 minutes.         Cc:   Nikki Zuniga MD

## 2020-01-22 ENCOUNTER — CONSULT (OUTPATIENT)
Dept: ONCOLOGY | Facility: CLINIC | Age: 80
End: 2020-01-22

## 2020-01-22 ENCOUNTER — LAB (OUTPATIENT)
Dept: LAB | Facility: HOSPITAL | Age: 80
End: 2020-01-22

## 2020-01-22 VITALS
WEIGHT: 206.4 LBS | DIASTOLIC BLOOD PRESSURE: 82 MMHG | OXYGEN SATURATION: 95 % | BODY MASS INDEX: 36.57 KG/M2 | RESPIRATION RATE: 16 BRPM | HEIGHT: 63 IN | SYSTOLIC BLOOD PRESSURE: 148 MMHG | HEART RATE: 73 BPM | TEMPERATURE: 97.6 F

## 2020-01-22 DIAGNOSIS — N18.30 STAGE III CHRONIC KIDNEY DISEASE (HCC): ICD-10-CM

## 2020-01-22 DIAGNOSIS — C50.912 MALIGNANT NEOPLASM OF LEFT BREAST IN FEMALE, ESTROGEN RECEPTOR POSITIVE, UNSPECIFIED SITE OF BREAST (HCC): Primary | ICD-10-CM

## 2020-01-22 DIAGNOSIS — Z86.010 HX OF ADENOMATOUS COLONIC POLYPS: ICD-10-CM

## 2020-01-22 DIAGNOSIS — Z17.0 MALIGNANT NEOPLASM OF LEFT BREAST IN FEMALE, ESTROGEN RECEPTOR POSITIVE, UNSPECIFIED SITE OF BREAST (HCC): ICD-10-CM

## 2020-01-22 DIAGNOSIS — C50.912 MALIGNANT NEOPLASM OF LEFT BREAST IN FEMALE, ESTROGEN RECEPTOR POSITIVE, UNSPECIFIED SITE OF BREAST (HCC): ICD-10-CM

## 2020-01-22 DIAGNOSIS — M81.8 OTHER OSTEOPOROSIS WITHOUT CURRENT PATHOLOGICAL FRACTURE: ICD-10-CM

## 2020-01-22 DIAGNOSIS — Z17.0 MALIGNANT NEOPLASM OF LEFT BREAST IN FEMALE, ESTROGEN RECEPTOR POSITIVE, UNSPECIFIED SITE OF BREAST (HCC): Primary | ICD-10-CM

## 2020-01-22 PROBLEM — E03.9 HYPOTHYROIDISM: Status: ACTIVE | Noted: 2020-01-22

## 2020-01-22 PROBLEM — I10 ESSENTIAL HYPERTENSION: Status: ACTIVE | Noted: 2020-01-22

## 2020-01-22 PROBLEM — N95.0 PMB (POSTMENOPAUSAL BLEEDING): Status: ACTIVE | Noted: 2017-02-16

## 2020-01-22 PROBLEM — Z86.79 HISTORY OF HYPERTENSION: Status: ACTIVE | Noted: 2019-10-31

## 2020-01-22 PROBLEM — N28.9 KIDNEY DISEASE: Status: ACTIVE | Noted: 2019-10-31

## 2020-01-22 PROBLEM — E78.2 MIXED HYPERLIPIDEMIA: Status: ACTIVE | Noted: 2019-10-31

## 2020-01-22 PROBLEM — J01.10 ACUTE FRONTAL SINUSITIS: Status: ACTIVE | Noted: 2017-05-16

## 2020-01-22 PROBLEM — R93.89 ENDOMETRIAL THICKENING ON ULTRASOUND: Status: ACTIVE | Noted: 2017-02-16

## 2020-01-22 PROBLEM — IMO0002 EXCESSIVE ANTICOAGULATION: Status: ACTIVE | Noted: 2020-01-22

## 2020-01-22 PROBLEM — Z79.01 CHRONIC ANTICOAGULATION: Status: ACTIVE | Noted: 2017-05-16

## 2020-01-22 LAB
ALBUMIN SERPL-MCNC: 4.3 G/DL (ref 3.5–5.2)
ALBUMIN/GLOB SERPL: 1.5 G/DL
ALP SERPL-CCNC: 80 U/L (ref 39–117)
ALT SERPL W P-5'-P-CCNC: 7 U/L (ref 1–33)
ANION GAP SERPL CALCULATED.3IONS-SCNC: 11 MMOL/L (ref 5–15)
AST SERPL-CCNC: 17 U/L (ref 1–32)
BASOPHILS # BLD AUTO: 0.03 10*3/MM3 (ref 0–0.2)
BASOPHILS NFR BLD AUTO: 0.6 % (ref 0–1.5)
BILIRUB SERPL-MCNC: 0.7 MG/DL (ref 0.2–1.2)
BUN BLD-MCNC: 18 MG/DL (ref 8–23)
BUN/CREAT SERPL: 11.3 (ref 7–25)
CALCIUM SPEC-SCNC: 9.7 MG/DL (ref 8.6–10.5)
CEA SERPL-MCNC: 1.41 NG/ML
CHLORIDE SERPL-SCNC: 104 MMOL/L (ref 98–107)
CO2 SERPL-SCNC: 26 MMOL/L (ref 22–29)
CREAT BLD-MCNC: 1.6 MG/DL (ref 0.57–1)
DEPRECATED RDW RBC AUTO: 46.5 FL (ref 37–54)
EOSINOPHIL # BLD AUTO: 0.1 10*3/MM3 (ref 0–0.4)
EOSINOPHIL NFR BLD AUTO: 2 % (ref 0.3–6.2)
ERYTHROCYTE [DISTWIDTH] IN BLOOD BY AUTOMATED COUNT: 13.7 % (ref 12.3–15.4)
FERRITIN SERPL-MCNC: 240.9 NG/ML (ref 13–150)
GFR SERPL CREATININE-BSD FRML MDRD: 31 ML/MIN/1.73
GLOBULIN UR ELPH-MCNC: 2.8 GM/DL
GLUCOSE BLD-MCNC: 91 MG/DL (ref 65–99)
HCT VFR BLD AUTO: 34.9 % (ref 34–46.6)
HGB BLD-MCNC: 11.7 G/DL (ref 12–15.9)
HOLD SPECIMEN: NORMAL
IMM GRANULOCYTES # BLD AUTO: 0.01 10*3/MM3 (ref 0–0.05)
IMM GRANULOCYTES NFR BLD AUTO: 0.2 % (ref 0–0.5)
IRON 24H UR-MRATE: 88 MCG/DL (ref 37–145)
IRON SATN MFR SERPL: 23 % (ref 20–50)
LYMPHOCYTES # BLD AUTO: 1.67 10*3/MM3 (ref 0.7–3.1)
LYMPHOCYTES NFR BLD AUTO: 32.9 % (ref 19.6–45.3)
MCH RBC QN AUTO: 30.7 PG (ref 26.6–33)
MCHC RBC AUTO-ENTMCNC: 33.5 G/DL (ref 31.5–35.7)
MCV RBC AUTO: 91.6 FL (ref 79–97)
MONOCYTES # BLD AUTO: 0.35 10*3/MM3 (ref 0.1–0.9)
MONOCYTES NFR BLD AUTO: 6.9 % (ref 5–12)
NEUTROPHILS # BLD AUTO: 2.92 10*3/MM3 (ref 1.7–7)
NEUTROPHILS NFR BLD AUTO: 57.4 % (ref 42.7–76)
NRBC BLD AUTO-RTO: 0 /100 WBC (ref 0–0.2)
PLATELET # BLD AUTO: 130 10*3/MM3 (ref 140–450)
PMV BLD AUTO: 12.2 FL (ref 6–12)
POTASSIUM BLD-SCNC: 4.1 MMOL/L (ref 3.5–5.2)
PROT SERPL-MCNC: 7.1 G/DL (ref 6–8.5)
RBC # BLD AUTO: 3.81 10*6/MM3 (ref 3.77–5.28)
SODIUM BLD-SCNC: 141 MMOL/L (ref 136–145)
TIBC SERPL-MCNC: 390 MCG/DL (ref 298–536)
TRANSFERRIN SERPL-MCNC: 262 MG/DL (ref 200–360)
WBC NRBC COR # BLD: 5.08 10*3/MM3 (ref 3.4–10.8)
WHOLE BLOOD HOLD SPECIMEN: NORMAL

## 2020-01-22 PROCEDURE — 86300 IMMUNOASSAY TUMOR CA 15-3: CPT

## 2020-01-22 PROCEDURE — 86235 NUCLEAR ANTIGEN ANTIBODY: CPT

## 2020-01-22 PROCEDURE — 82378 CARCINOEMBRYONIC ANTIGEN: CPT

## 2020-01-22 PROCEDURE — 99205 OFFICE O/P NEW HI 60 MIN: CPT | Performed by: INTERNAL MEDICINE

## 2020-01-22 PROCEDURE — 86038 ANTINUCLEAR ANTIBODIES: CPT

## 2020-01-22 PROCEDURE — 83540 ASSAY OF IRON: CPT

## 2020-01-22 PROCEDURE — 82784 ASSAY IGA/IGD/IGG/IGM EACH: CPT

## 2020-01-22 PROCEDURE — 85025 COMPLETE CBC W/AUTO DIFF WBC: CPT

## 2020-01-22 PROCEDURE — 84165 PROTEIN E-PHORESIS SERUM: CPT

## 2020-01-22 PROCEDURE — 82728 ASSAY OF FERRITIN: CPT

## 2020-01-22 PROCEDURE — 84466 ASSAY OF TRANSFERRIN: CPT

## 2020-01-22 PROCEDURE — 36415 COLL VENOUS BLD VENIPUNCTURE: CPT

## 2020-01-22 PROCEDURE — 86225 DNA ANTIBODY NATIVE: CPT

## 2020-01-22 PROCEDURE — 86334 IMMUNOFIX E-PHORESIS SERUM: CPT

## 2020-01-22 PROCEDURE — 80053 COMPREHEN METABOLIC PANEL: CPT

## 2020-01-22 RX ORDER — ANASTROZOLE 1 MG/1
1 TABLET ORAL DAILY
Qty: 30 TABLET | Refills: 2 | Status: SHIPPED | OUTPATIENT
Start: 2020-01-22 | End: 2020-04-09

## 2020-01-23 LAB
ALBUMIN SERPL-MCNC: 3.6 G/DL (ref 2.9–4.4)
ALBUMIN/GLOB SERPL: 1.3 {RATIO} (ref 0.7–1.7)
ALPHA1 GLOB FLD ELPH-MCNC: 0.3 G/DL (ref 0–0.4)
ALPHA2 GLOB SERPL ELPH-MCNC: 1.1 G/DL (ref 0.4–1)
B-GLOBULIN SERPL ELPH-MCNC: 1 G/DL (ref 0.7–1.3)
CANCER AG27-29 SERPL-ACNC: 22.8 U/ML (ref 0–38.6)
GAMMA GLOB SERPL ELPH-MCNC: 0.6 G/DL (ref 0.4–1.8)
GLOBULIN SER CALC-MCNC: 3 G/DL (ref 2.2–3.9)
IGA SERPL-MCNC: 215 MG/DL (ref 64–422)
IGG SERPL-MCNC: 588 MG/DL (ref 700–1600)
IGM SERPL-MCNC: 93 MG/DL (ref 26–217)
INTERPRETATION SERPL IEP-IMP: ABNORMAL
Lab: ABNORMAL
M-SPIKE: ABNORMAL G/DL
PROT SERPL-MCNC: 6.6 G/DL (ref 6–8.5)

## 2020-01-25 LAB
ANA HOMOGEN TITR SER: NORMAL {TITER}
ANA SER QL IA: POSITIVE
ANA SPECKLED TITR SER: NORMAL {TITER}
CENTROMERE B AB SER-ACNC: <0.2 AI (ref 0–0.9)
CHROMATIN AB SERPL-ACNC: <0.2 AI (ref 0–0.9)
DSDNA AB SER-ACNC: 1 IU/ML (ref 0–9)
ENA JO1 AB SER-ACNC: <0.2 AI (ref 0–0.9)
ENA RNP AB SER-ACNC: <0.2 AI (ref 0–0.9)
ENA SCL70 AB SER-ACNC: <0.2 AI (ref 0–0.9)
ENA SM AB SER-ACNC: <0.2 AI (ref 0–0.9)
ENA SS-A AB SER-ACNC: <0.2 AI (ref 0–0.9)
ENA SS-B AB SER-ACNC: <0.2 AI (ref 0–0.9)
Lab: ABNORMAL
Lab: NORMAL
Lab: NORMAL

## 2020-01-30 ENCOUNTER — ANTI-COAG VISIT (OUTPATIENT)
Dept: CARDIOLOGY | Age: 80
End: 2020-01-30
Payer: MEDICARE

## 2020-01-30 LAB
ANION GAP SERPL CALCULATED.3IONS-SCNC: 15 MMOL/L (ref 7–19)
BACTERIA: ABNORMAL /HPF
BILIRUBIN URINE: NEGATIVE
BLOOD, URINE: ABNORMAL
BUN BLDV-MCNC: 16 MG/DL (ref 8–23)
CALCIUM SERPL-MCNC: 10 MG/DL (ref 8.8–10.2)
CHLORIDE BLD-SCNC: 100 MMOL/L (ref 98–111)
CLARITY: CLEAR
CO2: 26 MMOL/L (ref 22–29)
COLOR: YELLOW
CREAT SERPL-MCNC: 1.9 MG/DL (ref 0.5–0.9)
EPITHELIAL CELLS, UA: 0 /HPF (ref 0–5)
GFR NON-AFRICAN AMERICAN: 25
GLUCOSE BLD-MCNC: 94 MG/DL (ref 74–109)
GLUCOSE URINE: NEGATIVE MG/DL
HYALINE CASTS: 9 /HPF (ref 0–8)
INTERNATIONAL NORMALIZATION RATIO, POC: 2
KETONES, URINE: NEGATIVE MG/DL
LEUKOCYTE ESTERASE, URINE: ABNORMAL
NITRITE, URINE: NEGATIVE
PH UA: 6 (ref 5–8)
POTASSIUM SERPL-SCNC: 4.2 MMOL/L (ref 3.5–5)
PROTEIN UA: NEGATIVE MG/DL
PROTHROMBIN TIME, POC: NORMAL
RBC UA: 63 /HPF (ref 0–4)
SODIUM BLD-SCNC: 141 MMOL/L (ref 136–145)
SPECIFIC GRAVITY UA: 1.02 (ref 1–1.03)
URINE REFLEX TO CULTURE: YES
UROBILINOGEN, URINE: 0.2 E.U./DL
WBC UA: 77 /HPF (ref 0–5)

## 2020-01-30 PROCEDURE — 85610 PROTHROMBIN TIME: CPT | Performed by: NURSE PRACTITIONER

## 2020-02-01 LAB
ORGANISM: ABNORMAL
URINE CULTURE, ROUTINE: ABNORMAL
URINE CULTURE, ROUTINE: ABNORMAL

## 2020-02-03 LAB
CYTO UR: NORMAL
LAB AP CASE REPORT: NORMAL
LAB AP SYNOPTIC CHECKLIST: NORMAL
PATH REPORT.ADDENDUM SPEC: NORMAL
PATH REPORT.FINAL DX SPEC: NORMAL
PATH REPORT.GROSS SPEC: NORMAL

## 2020-02-05 ENCOUNTER — HOSPITAL ENCOUNTER (OUTPATIENT)
Dept: ULTRASOUND IMAGING | Facility: HOSPITAL | Age: 80
Discharge: HOME | End: 2020-02-05

## 2020-02-05 ENCOUNTER — HOSPITAL ENCOUNTER (OUTPATIENT)
Dept: ULTRASOUND IMAGING | Facility: HOSPITAL | Age: 80
Discharge: HOME | End: 2020-02-05
Admitting: INTERNAL MEDICINE

## 2020-02-05 DIAGNOSIS — C50.912 MALIGNANT NEOPLASM OF LEFT BREAST IN FEMALE, ESTROGEN RECEPTOR POSITIVE, UNSPECIFIED SITE OF BREAST (HCC): ICD-10-CM

## 2020-02-05 DIAGNOSIS — Z17.0 MALIGNANT NEOPLASM OF LEFT BREAST IN FEMALE, ESTROGEN RECEPTOR POSITIVE, UNSPECIFIED SITE OF BREAST (HCC): ICD-10-CM

## 2020-02-05 DIAGNOSIS — N18.30 STAGE III CHRONIC KIDNEY DISEASE (HCC): ICD-10-CM

## 2020-02-05 DIAGNOSIS — Z86.010 HX OF ADENOMATOUS COLONIC POLYPS: ICD-10-CM

## 2020-02-05 PROCEDURE — 76775 US EXAM ABDO BACK WALL LIM: CPT

## 2020-02-05 PROCEDURE — 76705 ECHO EXAM OF ABDOMEN: CPT

## 2020-02-05 PROCEDURE — 76604 US EXAM CHEST: CPT

## 2020-02-20 ENCOUNTER — LAB (OUTPATIENT)
Dept: LAB | Facility: HOSPITAL | Age: 80
End: 2020-02-20

## 2020-02-20 DIAGNOSIS — N18.30 STAGE III CHRONIC KIDNEY DISEASE (HCC): ICD-10-CM

## 2020-02-20 DIAGNOSIS — C50.912 MALIGNANT NEOPLASM OF LEFT BREAST IN FEMALE, ESTROGEN RECEPTOR POSITIVE, UNSPECIFIED SITE OF BREAST (HCC): ICD-10-CM

## 2020-02-20 DIAGNOSIS — Z86.010 HX OF ADENOMATOUS COLONIC POLYPS: ICD-10-CM

## 2020-02-20 DIAGNOSIS — Z17.0 MALIGNANT NEOPLASM OF LEFT BREAST IN FEMALE, ESTROGEN RECEPTOR POSITIVE, UNSPECIFIED SITE OF BREAST (HCC): ICD-10-CM

## 2020-02-20 LAB
BASOPHILS # BLD AUTO: 0.04 10*3/MM3 (ref 0–0.2)
BASOPHILS NFR BLD AUTO: 0.7 % (ref 0–1.5)
DEPRECATED RDW RBC AUTO: 46.2 FL (ref 37–54)
EOSINOPHIL # BLD AUTO: 0.1 10*3/MM3 (ref 0–0.4)
EOSINOPHIL NFR BLD AUTO: 1.6 % (ref 0.3–6.2)
ERYTHROCYTE [DISTWIDTH] IN BLOOD BY AUTOMATED COUNT: 13.7 % (ref 12.3–15.4)
HCT VFR BLD AUTO: 36.4 % (ref 34–46.6)
HGB BLD-MCNC: 12.2 G/DL (ref 12–15.9)
LYMPHOCYTES # BLD AUTO: 2.13 10*3/MM3 (ref 0.7–3.1)
LYMPHOCYTES NFR BLD AUTO: 34.9 % (ref 19.6–45.3)
MCH RBC QN AUTO: 30.8 PG (ref 26.6–33)
MCHC RBC AUTO-ENTMCNC: 33.5 G/DL (ref 31.5–35.7)
MCV RBC AUTO: 91.9 FL (ref 79–97)
MONOCYTES # BLD AUTO: 0.41 10*3/MM3 (ref 0.1–0.9)
MONOCYTES NFR BLD AUTO: 6.7 % (ref 5–12)
NEUTROPHILS # BLD AUTO: 3.42 10*3/MM3 (ref 1.7–7)
NEUTROPHILS NFR BLD AUTO: 55.9 % (ref 42.7–76)
PLATELET # BLD AUTO: 147 10*3/MM3 (ref 140–450)
PMV BLD AUTO: 11.5 FL (ref 6–12)
RBC # BLD AUTO: 3.96 10*6/MM3 (ref 3.77–5.28)
WBC NRBC COR # BLD: 6.11 10*3/MM3 (ref 3.4–10.8)

## 2020-02-20 PROCEDURE — 82378 CARCINOEMBRYONIC ANTIGEN: CPT

## 2020-02-20 PROCEDURE — 86300 IMMUNOASSAY TUMOR CA 15-3: CPT

## 2020-02-20 PROCEDURE — 85025 COMPLETE CBC W/AUTO DIFF WBC: CPT

## 2020-02-20 PROCEDURE — 36415 COLL VENOUS BLD VENIPUNCTURE: CPT

## 2020-02-21 ENCOUNTER — APPOINTMENT (OUTPATIENT)
Dept: BONE DENSITY | Facility: HOSPITAL | Age: 80
End: 2020-02-21

## 2020-02-21 LAB
CANCER AG27-29 SERPL-ACNC: 21.9 U/ML (ref 0–38.6)
CEA SERPL-MCNC: 1.37 NG/ML

## 2020-02-24 RX ORDER — SIMVASTATIN 40 MG
TABLET ORAL
Qty: 90 TABLET | Refills: 0 | Status: SHIPPED | OUTPATIENT
Start: 2020-02-24 | End: 2020-05-26

## 2020-02-24 NOTE — PROGRESS NOTES
MGW ONC [facility] HEMATOLOGY AND ONCOLOGY  [address]  [place] [facility] [address] [phone]    Patient Name: Heidi Ansari  Encounter Date: 02/28/2020  YOB: 1940  Patient Number: [id number]      REASON FOR VISIT: Heidi Ansari is an 79-year-old female who returns in follow-up of newly diagnosed stage IIA invasive ductal/lobular breast carcinoma.  She is seen to discuss the diagnostic information gathered since her initial visit and for subsequent management planning.  She has been on adjuvant Arimidex since 01/22/2020.  She is here with her spouse Montana and daughter Fátima.    DIAGNOSTIC ABNORMALITIES:           1.   11/27/2019- palpable left breast lump x3 weeks.  Diagnostic mammogram and left breast ultrasound.  Impression: Spiculated mass approximately 2 cm upper outer quadrant of the left breast.  Oval well-circumscribed mass appears new in the lower slightly lateral left breast measuring approximately 10 mm.  Targeted left breast ultrasound at the 2 o'clock position 5 cm from nipple an irregular spiculated hypoechoic mass is seen measuring 1.7 x 1.5 x 1.6 cm.  Smaller adjacent hypoechoic irregular mass seen measuring 0.3 cm.  Third irregular hypoechoic mass at the 3 o'clock position measures 2.2 x 0.9 x 1.2 cm in size.  Ultrasound-guided biopsy recommended.  BI-RADS Category 5-highly suggestive of malignancy.  No mammographic evidence of right breast malignancy.          2.   12/06/2019- ultrasound-guided left breast biopsy.  Final diagnosis: 1.left breast mass 2 o'clock position: Core biopsy: Invasive ductal and lobular carcinoma.  Estimated grade 1, involving approximately 90% of the core biopsy specimens.  Focal ductal carcinoma in situ, nuclear grade 1.  Microcalcifications identified in invasive ductal carcinoma.  2.left breast mass 3 o'clock position, core biopsy: Invasive lobular carcinoma, estimated grade 1, involving  approximately 90% of the core biopsy specimens.  Microcalcifications not identified.          3.   12/26/2019- hemoglobin 12.3, hematocrit 37.2, MCV 93.9, platelets 90,000, WBC 6.81 with a normal differential.  CMP notable for a creatinine of 1.65 (GFR 30) otherwise normal.  Immunohistochemistries revealed: Estrogen +99 to 100%; progesterone +81 to 90%, HER-2 1-2+ (equivocal).  FISH: Negative (signal ratio: 1.2)          4.   01/17/2020-CT brain: Mild cerebral and cerebellar volume loss with chronic microvascular disease.  Focus of ill-defined hypodensity at the gray-white juncture in the right frontal lobe.  Site of previous infarction versus metastasis.  Suggest follow-up MRI with and without gadolinium.  No additional lesions present.          5.   01/17/2020- CT chest.  Groundglass nodule within the right upper lobe and left lower lobe.  Likely inflammatory in nature.  Coronary calcifications in the LAD and circumflex distribution.  Moderate cardiomegaly.  Postoperative changes left axilla from recent axillary dissection as well as mastectomy.  No enlarged mediastinal or axillary lymph nodes present.  Irregular nodule along the anterior margin of the medial left pectoralis major musculature within the mastectomy bed.  No adjacent inflammatory/postoperative stranding.  May represent postoperative seroma.  Recommend directed ultrasound over this area.          6.   01/17/2020-CT abdomen/pelvis.  Impression: Multiple hypodensities within the liver.  Favor hepatic cysts.  Recommend ultrasound for further evaluations.  Cannot be fully characterized without IV contrast.  Small cortical cyst of the right kidney suspected.  Postoperative changes of the right colon.  Multiple diverticula throughout the colon without evidence of diverticulitis or mechanical bowel obstruction.  Diastases of the abdominal musculature at the umbilicus.  Previous umbilical hernia repair was performed with a mesh.  Residual recurrent  tyra-umbilical hernia containing fat.  No evidence of herniated bowel loop.  7 mm groundglass nodule left lower lobe.          7.   01/22/2020-CMP notable for creatinine 1.6 and GFR 31 otherwise normal.  CEA 1.41, CA-27-29 22.8, ferritin 240.9, iron 88, iron saturation 23%, TIBC 390, RASHEL positive, homogenous/speckled pattern 1:80, otherwise negative reflex panel.  SPIEP negative (M spike not observed, EARLE: No monoclonality detected).  Hemoglobin 11.7, hematocrit 34.9, MCV 91.6, platelets 130,000, WBC 5.08 with a normal differential.  No schistocytes reported.          8.   02/05/2020- liver ultrasound.  Impression: Multiple hepatic cysts.  Additional tiny hepatic lesions are too small to characterize.          9.   02/05/2020- renal ultrasound.  Impression: Right renal cyst.  Otherwise negative exam.         10.  02/05/2020- chest ultrasound.  Impression: No abnormality identified in the left chest.  Area seen on recent CT exam may represent a postoperative seroma.         11.   02/20/2020- hemoglobin 12.2, hematocrit 36.4, MCV 91.9, platelets 147,000, WBC 6.11 with a normal differential.  CEA 1.37, CA-27-29 21.9.         12.   03/01/2020- Oncotype DX: Recurrence score:  0; distant recurrence risk at 9 years with AI or BARNHART alone: 3%; group average absolute chemotherapy benefit:< 1%    PREVIOUS INTERVENTIONS:          1.   01/02/2020- left breast simple mastectomy with sentinel lymph node biopsy.  Final diagnosis: 1.left breast, left skin sparing mastectomy: Invasive ductal and lobular carcinoma, grade 1 (2.3 cm).  Associated low-grade ductal carcinoma in situ, solid type.  Margins of excision free of tumor.  Tumor approaches closest deep margin and 1.6 cm.  2 axillary lymph nodes, negative for metastatic carcinoma.  2.left sentinel lymph node excision: One sentinel lymph node, negative for metastatic carcinoma (0/1).  AJCC pathologic stage: pT2, N0 (SN).    Synoptic checklist: Procedure: Total mastectomy.  Specimen  laterality: Left.  Histologic type: Invasive carcinoma with ductal and lobular features (mixed type carcinoma).  Overall ndgndrndanddndend:nd nd2nd. Tumor size: Greatest dimension of largest invasive focus 23 mm.  Tumor focality: Single focus of invasive carcinoma.  DCIS: Present.  Negative for extensive intraductal component.  Grade 1.  Lobular carcinoma in situ: None in specimen.  Tumor extent: Lymphovascular invasion not identified.  Dermal lymphovascular invasion not identified.  Microcalcifications not identified.  Margins: Uninvolved by invasive carcinoma.  Lymph nodes: Number of lymph nodes examined: 3.  TNM classification: pT2, pN0 (SN).            2.   01/22/2020- adjuvant Arimidex 1 mg p.o. daily    LABS    Lab Results - Last 18 Months   Lab Units 02/20/20  1323 01/22/20  1214 01/03/20  0546 12/26/19  0954   HEMOGLOBIN g/dL 12.2 11.7* 10.9* 12.3   HEMATOCRIT % 36.4 34.9 32.2* 37.2   MCV fL 91.9 91.6 92.0 93.9   WBC 10*3/mm3 6.11 5.08 8.84 6.81   RDW % 13.7 13.7 13.3 13.5   MPV fL 11.5 12.2* 12.4* 12.8*   PLATELETS 10*3/mm3 147 130* 138* 90*   IMM GRAN % %  --  0.2  --   --    NEUTROS ABS 10*3/mm3 3.42 2.92  --  4.81   LYMPHS ABS 10*3/mm3 2.13 1.67  --  1.40   MONOS ABS 10*3/mm3 0.41 0.35  --  0.46   EOS ABS 10*3/mm3 0.10 0.10  --  0.09   BASOS ABS 10*3/mm3 0.04 0.03  --  0.02   IMMATURE GRANS (ABS) 10*3/mm3  --  0.01  --   --    NRBC /100 WBC  --  0.0  --   --        Lab Results - Last 18 Months   Lab Units 01/30/20  1150 01/22/20  1214 01/03/20  0546 12/26/19  0954   GLUCOSE mg/dL 94 91 131* 95   SODIUM mmol/L 141 141 139 142   POTASSIUM mmol/L 4.2 4.1 4.5 4.1   TOTAL CO2 mmol/L 26  --   --   --    CO2 mmol/L  --  26.0 24.0 29.0   CHLORIDE mmol/L 100 104 102 100   ANION GAP mmol/L 15 11.0 13.0 13.0   CREATININE mg/dL 1.9* 1.60* 1.42* 1.65*   BUN mg/dL 16 18 19 17   BUN / CREAT RATIO   --  11.3 13.4 10.3   CALCIUM mg/dL 10.0 9.7 9.4 9.8   EGFR IF NONAFRICN AM  25* 31* 36* 30*   ALK PHOS U/L  --  80  --  78   TOTAL PROTEIN  g/dL  --  7.1  --  7.3   ALT (SGPT) U/L  --  7  --  7   AST (SGOT) U/L  --  17  --  16   BILIRUBIN mg/dL  --  0.7  --  0.7   ALBUMIN g/dL  --  4.30  3.6  --  4.30   GLOBULIN gm/dL  --  2.8  --  3.0       Lab Results - Last 18 Months   Lab Units 02/20/20  1323 01/22/20  1214   M-SPIKE g/dL  --  Not Observed   CEA ng/mL 1.37 1.41       Lab Results - Last 18 Months   Lab Units 01/22/20  1214   IRON mcg/dL 88   TIBC mcg/dL 390   IRON SATURATION % 23   FERRITIN ng/mL 240.90*         PAST MEDICAL HISTORY:  ALLERGIES:  Allergies   Allergen Reactions   • Bactrim [Sulfamethoxazole-Trimethoprim] Provider Review Needed     Patient has kidney disease (stage 4) shouldn't take Bactrim   • Codeine Nausea Only     CURRENT MEDICATIONS:  Outpatient Encounter Medications as of 2/28/2020   Medication Sig Dispense Refill   • acetaminophen (TYLENOL) 500 MG tablet Take 500 mg by mouth Every 6 (Six) Hours As Needed for Mild Pain .     • allopurinol (ZYLOPRIM) 100 MG tablet Take 200 mg by mouth Daily.  3   • anastrozole (ARIMIDEX) 1 MG tablet Take 1 tablet by mouth Daily. 30 tablet 2   • bumetanide (BUMEX) 1 MG tablet Take 0.5 mg by mouth Daily.     • calcium carbonate-vitamin d (CALCIUM 600+D) 600-400 MG-UNIT per tablet Take 1 tablet by mouth 2 (Two) Times a Day. 60 tablet 3   • carboxymethylcellulose (REFRESH PLUS) 0.5 % solution Administer 1 drop to both eyes 3 (Three) Times a Day As Needed for Dry Eyes.     • dronedarone (MULTAQ) 400 MG tablet Take 400 mg by mouth 2 (Two) Times a Day With Meals.     • ferrous sulfate 325 (65 FE) MG tablet Take 325 mg by mouth 3 (Three) Times a Week. Monday, Wednesday, and Friday.  5   • guaiFENesin (MUCINEX) 600 MG 12 hr tablet Take 1,200 mg by mouth Daily As Needed for Cough or Congestion.     • levothyroxine (SYNTHROID, LEVOTHROID) 50 MCG tablet Take 50 mcg by mouth Daily.  3   • losartan (COZAAR) 100 MG tablet Take 50 mg by mouth 2 (Two) Times a Day.  3   • metoprolol succinate XL (TOPROL-XL) 25 MG  24 hr tablet Take 25 mg by mouth 2 (Two) Times a Day.  1   • polyethylene glycol (MIRALAX) powder Take 17 g by mouth Daily.     • simvastatin (ZOCOR) 40 MG tablet TAKE 1 TABLET BY MOUTH ONCE DAILY 90 tablet 0   • spironolactone (ALDACTONE) 25 MG tablet Take 25 mg by mouth Daily.  3   • warfarin (COUMADIN) 5 MG tablet Take  by mouth Take As Directed. Take 1 whole tablet (5 mg) on Wednesday only. Take 1/2 tablet (2.5 mg) all other days.  5   • Cholecalciferol (VITAMIN D) 1000 units tablet Take 1,000 Units by mouth Daily.     • [DISCONTINUED] calcium carbonate (OS-LAURA) 600 MG tablet Take 1 tablet by mouth.     • [DISCONTINUED] simvastatin (ZOCOR) 40 MG tablet Take 40 mg by mouth Daily.  3     No facility-administered encounter medications on file as of 2/28/2020.      Adult illnesses:  Hypothyroidism  Atrial fibrillation  Hyperlipidemia  Obesity  Chronic kidney disease stage 4 - Dr. Daniels    ADULT ILLNESSES:  Patient Active Problem List   Diagnosis Code   • Hx of adenomatous colonic polyps Z86.010   • HTN (hypertension), benign I10   • Anticoagulated on Coumadin Z79.01   • Family hx of colon cancer Z80.0   • Breast cancer (CMS/HCC) C50.919   • Anemia due to stage 3 chronic kidney disease (CMS/HCC) N18.3, D63.1   • Acute frontal sinusitis J01.10   • Chronic anticoagulation Z79.01   • Endometrial thickening on ultrasound R93.89   • Excessive anticoagulation [id number]   • History of hypertension Z86.79   • Hypothyroidism E03.9   • Kidney disease N28.9   • Mixed hyperlipidemia E78.2   • Paroxysmal atrial fibrillation (CMS/HCC) I48.0   • PMB (postmenopausal bleeding) N95.0   • Essential hypertension I10     SURGERIES:  Past Surgical History:   Procedure Laterality Date   • BREAST BIOPSY Left 2015   • BREAST EXCISIONAL BIOPSY Left 2001   • BREAST EXCISIONAL BIOPSY Left 2001   • CARPAL TUNNEL RELEASE Right    • CHOLECYSTECTOMY     • COLON SURGERY      Partial right   • COLONOSCOPY     • COLONOSCOPY N/A 10/17/2019     Procedure: COLONOSCOPY WITH ANESTHESIA;  Surgeon: Rigoberto Shaw MD;  Location: [facility] ENDOSCOPY;  Service: Gastroenterology   • COLONOSCOPY W/ POLYPECTOMY  06/30/2016    2 Adenomatous polyps at 80 & 40 cm, Diverticulosis repeat exam in 3 years   • EYE SURGERY Bilateral     cataract extraction   • EYE SURGERY Left     detached retina   • HERNIA REPAIR     • MASTECTOMY W/ SENTINEL NODE BIOPSY Left 1/2/2020    Procedure: LEFT SIMPLE MASTECTOMY WITH SENTINEL NODE BIOPSY, INJECTION AND SCAN, RADIOLOGIST WILL INJECT;  Surgeon: Nikki Zuniga MD;  Location: [facility] OR;  Service: General   • REPLACEMENT TOTAL KNEE Bilateral    • THROAT SURGERY      duct     HEALTH MAINTENANCE ITEMS:  Health Maintenance Due   Topic Date Due   • TDAP/TD VACCINES (1 - Tdap) 12/08/1951   • PNEUMOCOCCAL VACCINE (65+ HIGH RISK) (1 of 2 - PCV13) 12/08/2004   • LIPID PANEL  06/24/2019   • DXA SCAN  01/22/2020       <no information>  Last Completed Colonoscopy       Status Date      COLONOSCOPY Done 10/17/2019 COLONOSCOPY     Patient has more history with this topic...          There is no immunization history on file for this patient.  Last Completed Mammogram     Patient has no health maintenance due at this time            FAMILY HISTORY:  Family History   Problem Relation Age of Onset   • Colon cancer Mother    • Colon polyps Mother    • No Known Problems Father    • No Known Problems Sister    • No Known Problems Brother    • No Known Problems Daughter    • No Known Problems Son    • No Known Problems Maternal Grandmother    • No Known Problems Paternal Grandmother    • No Known Problems Maternal Aunt    • No Known Problems Paternal Aunt    • Breast cancer Neg Hx    • BRCA 1/2 Neg Hx    • Endometrial cancer Neg Hx    • Ovarian cancer Neg Hx      SOCIAL HISTORY:  Social History     Socioeconomic History   • Marital status:      Spouse name: Not on file   • Number of children: Not on file   • Years of education: Not on file   •  "Highest education level: Not on file   Tobacco Use   • Smoking status: Never Smoker   • Smokeless tobacco: Never Used   Substance and Sexual Activity   • Alcohol use: No     Frequency: Never   • Drug use: No   • Sexual activity: Defer   Homemaker    REVIEW OF SYSTEMS:  Review of Systems   Constitutional: Positive for fatigue (baseline but manages all her ADLs including the chores, errands and driving.  \"Not as tired.\").        Arimidex tolerance discussed: Doing well.  No worsening hot flashes.  No arthralgias.   HENT: Positive for postnasal drip and rhinorrhea.    Eyes: Negative.    Respiratory: Negative.    Cardiovascular: Positive for palpitations.   Gastrointestinal: Negative.  Negative for blood in stool (dark stool from oral iron tiw.  gets constipated if more often).   Endocrine: Negative.    Genitourinary: Negative.    Musculoskeletal: Positive for arthralgias (left hip and knees inspite of prior TKRs bilaterally).   Allergic/Immunologic: Negative.    Neurological: Positive for dizziness (postural - standing) and numbness (left foot at times).   Hematological: Bruises/bleeds easily (coumadin).   Psychiatric/Behavioral: Negative.        /74   Pulse 70   Temp 96.8 °F (36 °C)   Resp 16   Ht 160.9 cm (63.35\")   Wt 93.1 kg (205 lb 3.2 oz)   SpO2 95%   Breastfeeding No   BMI 35.95 kg/m²  Body surface area is 1.96 meters squared.  Pain Score    02/28/20 0901   PainSc: 0-No pain       Physical Exam:  Physical Exam   Constitutional: She is oriented to person, place, and time. No distress.   Pleasant, cooperative, heavy-set, modeastly kept elderly female.  Ambulatory.  ECOG 0.      She has lost 1 pound since her last visit.   HENT:   Head: Normocephalic and atraumatic.   Mouth/Throat: No oropharyngeal exudate.   Eyes: Pupils are equal, round, and reactive to light. Conjunctivae and EOM are normal. No scleral icterus.   Neck: No JVD present. No tracheal deviation present. No thyromegaly (on synthroid) " present.   Cardiovascular:   Irregular rate and rhythm   Pulmonary/Chest: Effort normal and breath sounds normal. No stridor. No respiratory distress. She has no wheezes. She has no rales.   Abdominal: Soft. Bowel sounds are normal. She exhibits no distension and no mass. There is no tenderness. There is no rebound and no guarding.   Musculoskeletal: Normal range of motion. She exhibits edema (1+ ankles). She exhibits no deformity.   Lymphadenopathy:     She has no cervical adenopathy.   Neurological: She is alert and oriented to person, place, and time. No cranial nerve deficit.   Skin: Skin is warm and dry. No erythema. No pallor.   Psychiatric: She has a normal mood and affect. Her behavior is normal. Judgment and thought content normal.   Vitals reviewed.  Breasts:  Right breast no masses.  Left mastectomy site well healed.     ASSESSMENT:   1. Invasive ductal/lobular (mixed) carcinoma:   · Stage: IIA (pT2, pN0 [sn], MX, G1) ER (+) %, MN (+) 81-90%, HER-2/kelby 1-2 + (IHC) - negative. HER-2 1-2+ (equivocal).  FISH: Negative (signal ratio: 1.2)  · Tumor burden: 23 mm tumor in the left breast. 0/3 axillary sentinel lymph node negative for metastatic carcinoma. Nuclear grade 1.    · Oncotype DX: Recurrence score:  0; distant recurrence risk at 9 years with AI or BARNHART alone: 3%; group average absolute chemotherapy benefit:< 1%  · Complications of tumor: None.   · Tumor status: No evidence of disease (SUZE) since left skin sparing mastectomy.               2.    Chronic kidney disease, at least stage III.  GFR 31, 01/22/2020 (from 36, 01/03/2020).        3.    Hypothyroism        4.    Normocytic anemia, hemoglobin 10.9 MCV 92, 01/03/2020.  Contribution from chronic kidney disease suspected.        5.    Abnormal CT scans of the head, chest, abdomen and pelvis (see above) indicating ill-defined hypodensity at the gray-white juncture in the right frontal lobe, hepatic and right renal cysts.  Unable to perform MRI  with contrast due to chronic kidney disease.        6.    (+) RASHEL with joint pains     PLAN:   1.   Re: Apprised of the labs, 01/22/2020 and 02/20/2020 (above).  Note normal CBC.  Low (stable) GFR, otherwise normal CMP, normal CEA, normal CA-27-29, positive RASHEL but negative reflex panel, negative SPIEP, repleted iron levels.  The liver ultrasound, renal ultrasound and chest ultrasound findings (above) are noted.  Oncotype DX recurrence score (above) noted (RS 0; absolute chemotherapy benefit < 1%).  DEXA scan is pending.  Arimidex tolerance discussed.  So far so good.  2.  Breast cancer.  NCCN guidelines version 1.2020 reviewed.  4 ductal, lobular, mixed tumors, pT1, pT2, pT3 and pN0 with tumors greater than 0.5 cm- 21 gene RT-PCR assay.  Recurrence score less than 26-adjuvant endocrine therapy.  Recurrence score 26-30- adjuvant endocrine therapy or adjuvant chemotherapy followed by endocrine therapy.  Recurrence score greater than 31-adjuvant chemotherapy followed by endocrine therapy.  However limited data to make chemotherapy recommendations for those greater than 70 years of age.  3.  The rationale for adjuvant hormonal manipulation with AI's are previously discussed. The potential risks (to include but not limited to: Hot flashes, asthenia, pain, arthralgia, arthritis, nausea/vomiting, headache, pharyngitis, depression, rash, hypertension, lymphedema, insomnia, edema, weight gain, dyspnea, abdominal pain, constipation, osteoporosis, fractures, cough, bone pain, diarrhea, breast pain, paresthesias, infection, cataracts, myalgias, thromboembolism, angina, endometrial carcinoma, myocardial infarction, stroke, anemia, leukopenia, erythema multiforme, Belle-Collins syndrome) are discussed at length. Questions answered. She agrees to press on with therapy.  4.  Rx:   · Arimidex 1 mg p.o. daily, #30 x 2 RF   · Calcium 1200 mg + 800 units D3  p.o. daily - otc  · Prosthetic bra    5.  [facility]  6.  Appoint to Dr. Jaramillo Re:  Abnormal CT head  7.  Appoint to Dr. Caputo Re:  The (+) RASHEL with joint pains  8.  Return to the [place] office in 12 weeks with pre-office CBC and differential, CMP, CEA and CA-27-29     MEDICAL DECISION MAKING: High Complexity   AMOUNT OF DATA: Extensive      TIME SPENT: Face to face time on this encounter,as defined by the American Medical Association in the 2020 Current Procedural Terminology codebook; assessment, record review, lab/imaging/pathology review, planning and education - 44 minutes.         Cc:   MD Ron Miranda MD Christopher Phillips, MD

## 2020-02-25 ENCOUNTER — TELEPHONE (OUTPATIENT)
Dept: ONCOLOGY | Facility: CLINIC | Age: 80
End: 2020-02-25

## 2020-02-25 NOTE — TELEPHONE ENCOUNTER
I am needing to see where the patient would like to have her bone density scan. The machine is still down at [facility] and they do not know when it will be repaired.

## 2020-02-28 ENCOUNTER — OFFICE VISIT (OUTPATIENT)
Dept: ONCOLOGY | Facility: CLINIC | Age: 80
End: 2020-02-28

## 2020-02-28 VITALS
DIASTOLIC BLOOD PRESSURE: 74 MMHG | BODY MASS INDEX: 36.36 KG/M2 | WEIGHT: 205.2 LBS | HEIGHT: 63 IN | RESPIRATION RATE: 16 BRPM | OXYGEN SATURATION: 95 % | SYSTOLIC BLOOD PRESSURE: 140 MMHG | HEART RATE: 70 BPM | TEMPERATURE: 96.8 F

## 2020-02-28 DIAGNOSIS — C50.912 MALIGNANT NEOPLASM OF LEFT BREAST IN FEMALE, ESTROGEN RECEPTOR POSITIVE, UNSPECIFIED SITE OF BREAST (HCC): Primary | ICD-10-CM

## 2020-02-28 DIAGNOSIS — Z17.0 MALIGNANT NEOPLASM OF LEFT BREAST IN FEMALE, ESTROGEN RECEPTOR POSITIVE, UNSPECIFIED SITE OF BREAST (HCC): Primary | ICD-10-CM

## 2020-02-28 DIAGNOSIS — Z79.01 CHRONIC ANTICOAGULATION: ICD-10-CM

## 2020-02-28 DIAGNOSIS — N18.30 ANEMIA DUE TO STAGE 3 CHRONIC KIDNEY DISEASE (HCC): ICD-10-CM

## 2020-02-28 DIAGNOSIS — D63.1 ANEMIA DUE TO STAGE 3 CHRONIC KIDNEY DISEASE (HCC): ICD-10-CM

## 2020-02-28 LAB
BACTERIA: ABNORMAL /HPF
BILIRUBIN URINE: NEGATIVE
BLOOD, URINE: NEGATIVE
CLARITY: ABNORMAL
COLOR: YELLOW
EPITHELIAL CELLS, UA: 2 /HPF (ref 0–5)
GLUCOSE URINE: NEGATIVE MG/DL
HYALINE CASTS: 14 /HPF (ref 0–8)
KETONES, URINE: NEGATIVE MG/DL
LEUKOCYTE ESTERASE, URINE: ABNORMAL
NITRITE, URINE: NEGATIVE
PH UA: 5.5 (ref 5–8)
PROTEIN UA: NEGATIVE MG/DL
RBC UA: 2 /HPF (ref 0–4)
SPECIFIC GRAVITY UA: 1.01 (ref 1–1.03)
UROBILINOGEN, URINE: 0.2 E.U./DL
WBC UA: 26 /HPF (ref 0–5)

## 2020-02-28 PROCEDURE — 99214 OFFICE O/P EST MOD 30 MIN: CPT | Performed by: INTERNAL MEDICINE

## 2020-02-28 RX ORDER — PHENOL 1.4 %
1 AEROSOL, SPRAY (ML) MUCOUS MEMBRANE
COMMUNITY
End: 2020-02-28 | Stop reason: ALTCHOICE

## 2020-02-28 NOTE — TELEPHONE ENCOUNTER
Spoke with patient about the bone density adyday golden was here for office visit and she would like to wait until the machine is up at [facility].

## 2020-03-01 LAB
ORGANISM: ABNORMAL
URINE CULTURE, ROUTINE: ABNORMAL
URINE CULTURE, ROUTINE: ABNORMAL

## 2020-03-02 ENCOUNTER — TELEPHONE (OUTPATIENT)
Dept: ONCOLOGY | Facility: CLINIC | Age: 80
End: 2020-03-02

## 2020-03-02 RX ORDER — WARFARIN SODIUM 5 MG/1
TABLET ORAL
Qty: 30 TABLET | Refills: 5 | Status: SHIPPED | OUTPATIENT
Start: 2020-03-02 | End: 2021-01-19

## 2020-03-02 NOTE — TELEPHONE ENCOUNTER
SONIA WITH [facility] IN [place], [place] CALLED STATING SHE RECEIVED A REFERRAL DATED 2/28/20 FOR PT TO SEE DR JIM FINNEY. THEY DO NOT HAVE A DR FINNEY IN THEIR OFFICE, NOR DO THEY SEE PT'S FOR POSITIVE A & A RESULTS OR JOINT PAIN. SHE BELIEVES THIS WAS FAXED TO THE INCORRECT OFFICE. PT IS NOT GOING TO BE SEEN IN THIS OFFICE.

## 2020-03-03 ENCOUNTER — TELEPHONE (OUTPATIENT)
Dept: ONCOLOGY | Facility: CLINIC | Age: 80
End: 2020-03-03

## 2020-03-03 DIAGNOSIS — C50.912 MALIGNANT NEOPLASM OF LEFT BREAST IN FEMALE, ESTROGEN RECEPTOR POSITIVE, UNSPECIFIED SITE OF BREAST (HCC): Primary | ICD-10-CM

## 2020-03-03 DIAGNOSIS — Z17.0 MALIGNANT NEOPLASM OF LEFT BREAST IN FEMALE, ESTROGEN RECEPTOR POSITIVE, UNSPECIFIED SITE OF BREAST (HCC): Primary | ICD-10-CM

## 2020-03-03 NOTE — TELEPHONE ENCOUNTER
----- Message from Adriana Law sent at 3/3/2020 11:33 AM CST -----  Regarding: RE: Urgent referral      ----- Message -----  From: Marsha Gentile MA  Sent: 3/3/2020   8:35 AM CST  To: Adriana Law Daniella Brooks  Subject: Urgent referral                                  Received urgent referred for this patient from Dr. Franco on Friday, will be contacting patient for quick appointment. However, would Dr. Franco be willing to order MRI brain with and without contrast for Dr. Jaramillo to review at her appointment in our office? Wasn't sure who his medical assistant was, if I don't have the right person, could you forward it on?    Thanks!  FILIBERTO Larson MD

## 2020-03-06 ENCOUNTER — TELEPHONE (OUTPATIENT)
Dept: ONCOLOGY | Facility: CLINIC | Age: 80
End: 2020-03-06

## 2020-03-09 ENCOUNTER — HOSPITAL ENCOUNTER (OUTPATIENT)
Dept: MRI IMAGING | Facility: HOSPITAL | Age: 80
Discharge: HOME | End: 2020-03-09
Admitting: INTERNAL MEDICINE

## 2020-03-09 DIAGNOSIS — C50.912 MALIGNANT NEOPLASM OF LEFT BREAST IN FEMALE, ESTROGEN RECEPTOR POSITIVE, UNSPECIFIED SITE OF BREAST (HCC): ICD-10-CM

## 2020-03-09 DIAGNOSIS — Z17.0 MALIGNANT NEOPLASM OF LEFT BREAST IN FEMALE, ESTROGEN RECEPTOR POSITIVE, UNSPECIFIED SITE OF BREAST (HCC): ICD-10-CM

## 2020-03-09 PROCEDURE — 70551 MRI BRAIN STEM W/O DYE: CPT

## 2020-03-12 ENCOUNTER — ANTI-COAG VISIT (OUTPATIENT)
Dept: CARDIOLOGY | Age: 80
End: 2020-03-12
Payer: MEDICARE

## 2020-03-12 LAB
INTERNATIONAL NORMALIZATION RATIO, POC: 2
PROTHROMBIN TIME, POC: NORMAL

## 2020-03-12 PROCEDURE — 85610 PROTHROMBIN TIME: CPT | Performed by: NURSE PRACTITIONER

## 2020-03-20 ENCOUNTER — TELEPHONE (OUTPATIENT)
Dept: NEUROSURGERY | Facility: CLINIC | Age: 80
End: 2020-03-20

## 2020-03-20 NOTE — TELEPHONE ENCOUNTER
"Patient calls, she is concerned to attend her appointment on 03/23/2020 due to COVID19 infection and her current status with renal failure and cancer. She is asking if the appointment with Dr. Jaramillo is necessary. Requesting if we can review MRI prior to her coming in to determine if it's truly \"urgent\" like Dr. Franco requested, of if she can wait a few weeks before coming to clinic. Advised I would cancel appointment for 03/23/2020 and would review with Dr. Jaramillo and contact her back sometime Monday afternoon. She voiced understanding.  "

## 2020-03-23 ENCOUNTER — OFFICE VISIT (OUTPATIENT)
Dept: NEUROSURGERY | Facility: CLINIC | Age: 80
End: 2020-03-23

## 2020-03-23 VITALS — HEIGHT: 63 IN | BODY MASS INDEX: 36.68 KG/M2 | WEIGHT: 207 LBS

## 2020-03-23 DIAGNOSIS — E66.9 OBESITY (BMI 30-39.9): ICD-10-CM

## 2020-03-23 DIAGNOSIS — Z78.9 NON-TOBACCO USER: ICD-10-CM

## 2020-03-23 DIAGNOSIS — Z17.0 MALIGNANT NEOPLASM OF LEFT BREAST IN FEMALE, ESTROGEN RECEPTOR POSITIVE, UNSPECIFIED SITE OF BREAST (HCC): Primary | ICD-10-CM

## 2020-03-23 DIAGNOSIS — C50.912 MALIGNANT NEOPLASM OF LEFT BREAST IN FEMALE, ESTROGEN RECEPTOR POSITIVE, UNSPECIFIED SITE OF BREAST (HCC): Primary | ICD-10-CM

## 2020-03-23 DIAGNOSIS — R90.89 MAGNETIC RESONANCE IMAGING OF BRAIN ABNORMAL: ICD-10-CM

## 2020-03-23 PROCEDURE — 99204 OFFICE O/P NEW MOD 45 MIN: CPT | Performed by: NEUROLOGICAL SURGERY

## 2020-03-23 NOTE — PROGRESS NOTES
Primary Care Provider: Mitch Davidson MD    Chief Complaint:   Chief Complaint   Patient presents with   • Abnormal Imaging     Here for evaluation of abnormal CT head. Recently diagnosed with breast cancer Dec 2019, surgery Jan 2020. She has started oral treatment, no chemo or radiation required. Patient has no complaints.        History of Present Illness  Heidi Ansari is a 79 y.o. female is being seen for consultation today at the request of Ender Franco MD       Breast cancer (CMS/[facility])    1/2/2020 Initial Diagnosis     Breast cancer (CMS/[facility])      1/20/2020 Cancer Staged     Staging form: Breast, AJCC 8th Edition  - Clinical stage from 1/20/2020: Stage IB (cT2, cN0, cM0, G1, ER+, KY+, HER2-) - Signed by Ender Franco MD on 1/20/2020         She is being evaluated today for an abnormal head CT in the setting of breast cancer.    She was initially diagnosed with breast cancer in December 2019.  She underwent a left mastectomy with April Jackson without complication.  She has been diagnosed with stage Ib ER positive, KY positive, HER-2 negative breast cancer.  Her other imaging has no history of metastasis per the patient.  On a routine follow-up scan evaluation of her CT from January 17, 2020 showed a hypodensity in the right frontal lobe.  A noncontrast follow-up MRI was obtained.  The patient cannot receive contrast due to renal function.  She is follow-up in my office for further evaluation.    Currently the patient denies any pain.  She has no weakness.  She has no difficulty thinking.  She has no seizure-like activities.  She has no tremulousness.  She has no numbness or tingling.  She still lives at home manages her own finances, does her own grocery shopping, and brought herself to the interview today.  Of note she does have renal disease which bounces back and forth between stage III and stage IV.  This is managed by Dr. Cohen but she is not currently on dialysis.  She is on Coumadin  for atrial fibrillation which is managed by [facility] cardiology.    ECOG: (0) Fully Active - Able to Carry On All Pre-disease Performance Without Restriction  KPS: 100: Normal; no evidence of disease      Review of Systems    Past Medical History:   Diagnosis Date   • Arthritis    • Atrial fibrillation (CMS/HCC)     INTERMITTENT   • Cancer (CMS/HCC)    • Disease of thyroid gland    • Hx of colonic polyp    • Hyperlipidemia    • Hypertension    • Kidney disease     STAGE 4       Past Surgical History:   Procedure Laterality Date   • BREAST BIOPSY Left 2015   • BREAST EXCISIONAL BIOPSY Left 2001   • BREAST EXCISIONAL BIOPSY Left 2001   • CARPAL TUNNEL RELEASE Right    • CHOLECYSTECTOMY     • COLON SURGERY      Partial right   • COLONOSCOPY     • COLONOSCOPY N/A 10/17/2019    Procedure: COLONOSCOPY WITH ANESTHESIA;  Surgeon: Rigoberto Shaw MD;  Location: [facility] ENDOSCOPY;  Service: Gastroenterology   • COLONOSCOPY W/ POLYPECTOMY  06/30/2016    2 Adenomatous polyps at 80 & 40 cm, Diverticulosis repeat exam in 3 years   • EYE SURGERY Bilateral     cataract extraction   • EYE SURGERY Left     detached retina   • HERNIA REPAIR     • MASTECTOMY W/ SENTINEL NODE BIOPSY Left 1/2/2020    Procedure: LEFT SIMPLE MASTECTOMY WITH SENTINEL NODE BIOPSY, INJECTION AND SCAN, RADIOLOGIST WILL INJECT;  Surgeon: Nikki Zuniga MD;  Location: [facility] OR;  Service: General   • REPLACEMENT TOTAL KNEE Bilateral    • THROAT SURGERY      duct       Family History: family history includes Colon cancer in her mother; Colon polyps in her mother; No Known Problems in her brother, daughter, father, maternal aunt, maternal grandmother, paternal aunt, paternal grandmother, sister, and son.    Social History:  reports that she has never smoked. She has never used smokeless tobacco. She reports that she does not drink alcohol or use drugs.    Medications:    Current Outpatient Medications:   •  acetaminophen (TYLENOL) 500 MG tablet, Take 500 mg by  mouth Every 6 (Six) Hours As Needed for Mild Pain ., Disp: , Rfl:   •  allopurinol (ZYLOPRIM) 100 MG tablet, Take 200 mg by mouth Daily., Disp: , Rfl: 3  •  anastrozole (ARIMIDEX) 1 MG tablet, Take 1 tablet by mouth Daily., Disp: 30 tablet, Rfl: 2  •  bumetanide (BUMEX) 1 MG tablet, Take 0.5 mg by mouth Daily., Disp: , Rfl:   •  calcium carbonate-vitamin d (CALCIUM 600+D) 600-400 MG-UNIT per tablet, Take 1 tablet by mouth 2 (Two) Times a Day., Disp: 60 tablet, Rfl: 3  •  carboxymethylcellulose (REFRESH PLUS) 0.5 % solution, Administer 1 drop to both eyes 3 (Three) Times a Day As Needed for Dry Eyes., Disp: , Rfl:   •  dronedarone (MULTAQ) 400 MG tablet, Take 400 mg by mouth 2 (Two) Times a Day With Meals., Disp: , Rfl:   •  ferrous sulfate 325 (65 FE) MG tablet, Take 325 mg by mouth 3 (Three) Times a Week. Monday, Wednesday, and Friday., Disp: , Rfl: 5  •  guaiFENesin (MUCINEX) 600 MG 12 hr tablet, Take 1,200 mg by mouth Daily As Needed for Cough or Congestion., Disp: , Rfl:   •  levothyroxine (SYNTHROID, LEVOTHROID) 50 MCG tablet, Take 50 mcg by mouth Daily., Disp: , Rfl: 3  •  losartan (COZAAR) 100 MG tablet, Take 50 mg by mouth 2 (Two) Times a Day., Disp: , Rfl: 3  •  metoprolol succinate XL (TOPROL-XL) 25 MG 24 hr tablet, Take 25 mg by mouth 2 (Two) Times a Day., Disp: , Rfl: 1  •  polyethylene glycol (MIRALAX) powder, Take 17 g by mouth Daily., Disp: , Rfl:   •  simvastatin (ZOCOR) 40 MG tablet, TAKE 1 TABLET BY MOUTH ONCE DAILY, Disp: 90 tablet, Rfl: 0  •  spironolactone (ALDACTONE) 25 MG tablet, Take 25 mg by mouth Daily., Disp: , Rfl: 3  •  warfarin (COUMADIN) 5 MG tablet, Take  by mouth Take As Directed. Take 1 whole tablet (5 mg) on Wednesday only. Take 1/2 tablet (2.5 mg) all other days., Disp: , Rfl: 5    Allergies:  Bactrim [sulfamethoxazole-trimethoprim] and Codeine    Objective   Physical Exam   Constitutional: She is oriented to person, place, and time. She appears well-developed and well-nourished.    HENT:   Head: Normocephalic and atraumatic.   Eyes: Pupils are equal, round, and reactive to light. EOM are normal.   Neck: Normal range of motion. Neck supple.   Pulmonary/Chest: Effort normal and breath sounds normal.   Abdominal: Soft.   Musculoskeletal: Normal range of motion.   Neurological: She is oriented to person, place, and time. She has a normal Finger-Nose-Finger Test, a normal Heel to Maldonado Test and a normal Tandem Gait Test. Gait normal.   Reflex Scores:       Tricep reflexes are 2+ on the right side and 2+ on the left side.       Bicep reflexes are 2+ on the right side and 2+ on the left side.       Brachioradialis reflexes are 2+ on the right side and 2+ on the left side.       Patellar reflexes are 2+ on the right side and 2+ on the left side.       Achilles reflexes are 2+ on the right side and 2+ on the left side.  Skin: Skin is warm and dry.        Psychiatric: Her speech is normal.     Neurologic Exam     Mental Status   Oriented to person, place, and time.   Registration: recalls 3 of 3 objects. Recall at 5 minutes: recalls 3 of 3 objects.   Attention: normal. Concentration: normal.   Speech: speech is normal   Level of consciousness: alert  Knowledge: consistent with education.     Cranial Nerves     CN II   Visual acuity: normal    CN III, IV, VI   Pupils are equal, round, and reactive to light.  Extraocular motions are normal.   Diplopia: none    CN V   Facial sensation intact.   Right corneal reflex: normal  Left corneal reflex: normal    CN VII   Right facial weakness: none  Left facial weakness: none    CN VIII   Hearing: intact    CN IX, X   Palate: symmetric  Right gag reflex: normal  Left gag reflex: normal    CN XI   Right trapezius strength: normal  Left trapezius strength: normal    CN XII   Tongue deviation: none    Motor Exam   Right arm tone: normal  Left arm tone: normal  Right arm pronator drift: absent  Left arm pronator drift: absent  Right leg tone: normal  Left leg tone:  normal    Strength   Right deltoid: 5/5  Left deltoid: 5/5  Right biceps: 5/5  Left biceps: 5/5  Right triceps: 5/5  Left triceps: 5/5  Right interossei: 5/5  Left interossei: 5/5  Right iliopsoas: 5/5  Left iliopsoas: 5/5  Right quadriceps: 5/5  Left quadriceps: 5/5  Right anterior tibial: 5/5  Left anterior tibial: 5/5  Right gastroc: 5/5  Left gastroc: 5/5Right EHL 5/5   Left EHL 5/5     Sensory Exam   Light touch normal.   Proprioception normal.     Gait, Coordination, and Reflexes     Gait  Gait: normal    Coordination   Finger to nose coordination: normal  Heel to shin coordination: normal  Tandem walking coordination: normal    Reflexes   Right brachioradialis: 2+  Left brachioradialis: 2+  Right biceps: 2+  Left biceps: 2+  Right triceps: 2+  Left triceps: 2+  Right patellar: 2+  Left patellar: 2+  Right achilles: 2+  Left achilles: 2+  Right plantar: normal  Left plantar: normal  Right Juarez: absent  Left Juarez: absent  Right ankle clonus: absent  Left ankle clonus: absent        Imaging: (independent review and interpretation)  No radiology results for the last 30 days.    Noncontrast MRI of the brain is reviewed.  There are 2 areas of flair signal.  One is in the right frontal lobe and one is in the left frontal lobe.  These appear to be in the white matter rather than at the gray-white matter junction.  There does not appear to be any underlying mass.  This examination is limited by the lack of contrast.  There does not appear to be any displacement of the underlying parenchyma.  Furthermore this does not appear to be much larger than her hypodensity seen on her January scan.  Scattered other evidence of microvascular disease.    Noncontrast CT of the head from January 2020.  Small right frontal hypodensity in the subcortical area.  No evidence of hydrocephalus.          ASSESSMENT and PLAN  Heidi Ansari is a 79 y.o. female with a significant comorbidity of hypertension, hyperlipidemia, atrial  fibrillation on Coumadin, renal disease, and breast cancer stage Ib. She presents with a new problem of abnormal head CT. Physical exam findings of neurologically intact.  Her imaging shows 2 areas of increased flair signal without definitive evidence of neoplasm.    Breast cancer, stage Ib  Abnormal brain MRI  Differential diagnosis: Small vessel disease, demyelinating disease, prior stroke, or much less likely neoplasm  Currently liquid remains asymptomatic.  While the imaging is limited by her renal disease and inability to get contrast I feel that her imaging is most consistent with either prior stroke, coalescence of microvascular disease injury, or demyelinating disease.  I think she is extremely low risk for MS given the fact that she is asymptomatic and lesions are identified in the corpus callosum.  We discussed the risk and benefits of biopsy versus continued follow-up.  I would recommend serial imaging prior to biopsy.  If these lesions show increase in size then we could consider biopsy.  I would like to see her back in 3 months with a noncontrast MRI.  Should her renal function increase then we will add contrast at that time.  She understands the risk of observation.  She knows to contact my office should she develop neurological decline, confusion, weakness, or seizure-like activity.    Obesity Counseling  Heidi has a BMI 35.0-39.9        Classification: class II obesity.  We spent 1 minutes in weight management counseling including discussing current weight, current diet, and exercise patterns.  Additional we set goals for weight reduction.  Therefore above normal parameters. Recommendations include: educational material.         Heidi was seen today for abnormal imaging.    Diagnoses and all orders for this visit:    Malignant neoplasm of left breast in female, estrogen receptor positive, unspecified site of breast (CMS/HCC)    Magnetic resonance imaging of brain abnormal    Obesity (BMI  30-39.9)    Non-tobacco user        Return in about 3 months (around 6/23/2020) for brain follow up, with Dr. Jaramillo.    Thank you for this Consultation and the opportunity to participate in [facility]'s care.    Sincerely,  Ron Jaramillo MD    Level of Risk: Moderate due to: undiagnosed new problem  MDM: Moderate Complexity  (Mod = 48477, High = 40373)

## 2020-03-23 NOTE — PATIENT INSTRUCTIONS
"PATIENT TO CONTINUE TO FOLLOW UP WITH HIS/HER PRIMARY CARE PROVIDER FOR YEARLY PHYSICAL EXAMS TO ENSURE COMPLETE HEALTH MAINTENANCE      DASH Eating Plan  DASH stands for \"Dietary Approaches to Stop Hypertension.\" The DASH eating plan is a healthy eating plan that has been shown to reduce high blood pressure (hypertension). It may also reduce your risk for type 2 diabetes, heart disease, and stroke. The DASH eating plan may also help with weight loss.  What are tips for following this plan?    General guidelines  · Avoid eating more than 2,300 mg (milligrams) of salt (sodium) a day. If you have hypertension, you may need to reduce your sodium intake to 1,500 mg a day.  · Limit alcohol intake to no more than 1 drink a day for nonpregnant women and 2 drinks a day for men. One drink equals 12 oz of beer, 5 oz of wine, or 1½ oz of hard liquor.  · Work with your health care provider to maintain a healthy body weight or to lose weight. Ask what an ideal weight is for you.  · Get at least 30 minutes of exercise that causes your heart to beat faster (aerobic exercise) most days of the week. Activities may include walking, swimming, or biking.  · Work with your health care provider or diet and nutrition specialist (dietitian) to adjust your eating plan to your individual calorie needs.  Reading food labels    · Check food labels for the amount of sodium per serving. Choose foods with less than 5 percent of the Daily Value of sodium. Generally, foods with less than 300 mg of sodium per serving fit into this eating plan.  · To find whole grains, look for the word \"whole\" as the first word in the ingredient list.  Shopping  · Buy products labeled as \"low-sodium\" or \"no salt added.\"  · Buy fresh foods. Avoid canned foods and premade or frozen meals.  Cooking  · Avoid adding salt when cooking. Use salt-free seasonings or herbs instead of table salt or sea salt. Check with your health care provider or pharmacist before using salt " substitutes.  · Do not morrissey foods. Cook foods using healthy methods such as baking, boiling, grilling, and broiling instead.  · Cook with heart-healthy oils, such as olive, canola, soybean, or sunflower oil.  Meal planning  · Eat a balanced diet that includes:  ? 5 or more servings of fruits and vegetables each day. At each meal, try to fill half of your plate with fruits and vegetables.  ? Up to 6-8 servings of whole grains each day.  ? Less than 6 oz of lean meat, poultry, or fish each day. A 3-oz serving of meat is about the same size as a deck of cards. One egg equals 1 oz.  ? 2 servings of low-fat dairy each day.  ? A serving of nuts, seeds, or beans 5 times each week.  ? Heart-healthy fats. Healthy fats called Omega-3 fatty acids are found in foods such as flaxseeds and coldwater fish, like sardines, salmon, and mackerel.  · Limit how much you eat of the following:  ? Canned or prepackaged foods.  ? Food that is high in trans fat, such as fried foods.  ? Food that is high in saturated fat, such as fatty meat.  ? Sweets, desserts, sugary drinks, and other foods with added sugar.  ? Full-fat dairy products.  · Do not salt foods before eating.  · Try to eat at least 2 vegetarian meals each week.  · Eat more home-cooked food and less restaurant, buffet, and fast food.  · When eating at a restaurant, ask that your food be prepared with less salt or no salt, if possible.  What foods are recommended?  The items listed may not be a complete list. Talk with your dietitian about what dietary choices are best for you.  Grains  Whole-grain or whole-wheat bread. Whole-grain or whole-wheat pasta. Brown rice. Oatmeal. Quinoa. Bulgur. Whole-grain and low-sodium cereals. Casandra bread. Low-fat, low-sodium crackers. Whole-wheat flour tortillas.  Vegetables  Fresh or frozen vegetables (raw, steamed, roasted, or grilled). Low-sodium or reduced-sodium tomato and vegetable juice. Low-sodium or reduced-sodium tomato sauce and tomato  paste. Low-sodium or reduced-sodium canned vegetables.  Fruits  All fresh, dried, or frozen fruit. Canned fruit in natural juice (without added sugar).  Meat and other protein foods  Skinless chicken or turkey. Ground chicken or turkey. Pork with fat trimmed off. Fish and seafood. Egg whites. Dried beans, peas, or lentils. Unsalted nuts, nut butters, and seeds. Unsalted canned beans. Lean cuts of beef with fat trimmed off. Low-sodium, lean deli meat.  Dairy  Low-fat (1%) or fat-free (skim) milk. Fat-free, low-fat, or reduced-fat cheeses. Nonfat, low-sodium ricotta or cottage cheese. Low-fat or nonfat yogurt. Low-fat, low-sodium cheese.  Fats and oils  Soft margarine without trans fats. Vegetable oil. Low-fat, reduced-fat, or light mayonnaise and salad dressings (reduced-sodium). Canola, safflower, olive, soybean, and sunflower oils. Avocado.  Seasoning and other foods  Herbs. Spices. Seasoning mixes without salt. Unsalted popcorn and pretzels. Fat-free sweets.  What foods are not recommended?  The items listed may not be a complete list. Talk with your dietitian about what dietary choices are best for you.  Grains  Baked goods made with fat, such as croissants, muffins, or some breads. Dry pasta or rice meal packs.  Vegetables  Creamed or fried vegetables. Vegetables in a cheese sauce. Regular canned vegetables (not low-sodium or reduced-sodium). Regular canned tomato sauce and paste (not low-sodium or reduced-sodium). Regular tomato and vegetable juice (not low-sodium or reduced-sodium). Pickles. Olives.  Fruits  Canned fruit in a light or heavy syrup. Fried fruit. Fruit in cream or butter sauce.  Meat and other protein foods  Fatty cuts of meat. Ribs. Fried meat. Alexis. Sausage. Bologna and other processed lunch meats. Salami. Fatback. Hotdogs. Bratwurst. Salted nuts and seeds. Canned beans with added salt. Canned or smoked fish. Whole eggs or egg yolks. Chicken or turkey with skin.  Dairy  Whole or 2% milk,  cream, and half-and-half. Whole or full-fat cream cheese. Whole-fat or sweetened yogurt. Full-fat cheese. Nondairy creamers. Whipped toppings. Processed cheese and cheese spreads.  Fats and oils  Butter. Stick margarine. Lard. Shortening. Ghee. Alexis fat. Tropical oils, such as coconut, palm kernel, or palm oil.  Seasoning and other foods  Salted popcorn and pretzels. Onion salt, garlic salt, seasoned salt, table salt, and sea salt. Worcestershire sauce. Tartar sauce. Barbecue sauce. Teriyaki sauce. Soy sauce, including reduced-sodium. Steak sauce. Canned and packaged gravies. Fish sauce. Oyster sauce. Cocktail sauce. Horseradish that you find on the shelf. Ketchup. Mustard. Meat flavorings and tenderizers. Bouillon cubes. Hot sauce and Tabasco sauce. Premade or packaged marinades. Premade or packaged taco seasonings. Relishes. Regular salad dressings.  Where to find more information:  · National Heart, Lung, and [employer] [place]: www.nhlbi.nih.gov  · American Heart Association: www.heart.org  Summary  · The DASH eating plan is a healthy eating plan that has been shown to reduce high blood pressure (hypertension). It may also reduce your risk for type 2 diabetes, heart disease, and stroke.  · With the DASH eating plan, you should limit salt (sodium) intake to 2,300 mg a day. If you have hypertension, you may need to reduce your sodium intake to 1,500 mg a day.  · When on the DASH eating plan, aim to eat more fresh fruits and vegetables, whole grains, lean proteins, low-fat dairy, and heart-healthy fats.  · Work with your health care provider or diet and nutrition specialist (dietitian) to adjust your eating plan to your individual calorie needs.  This information is not intended to replace advice given to you by your health care provider. Make sure you discuss any questions you have with your health care provider.  Document Released: 12/06/2012 Document Revised: 12/11/2017 Document Reviewed: 12/11/2017  [employer] Patient Education © 2020 [employer]

## 2020-04-09 RX ORDER — ANASTROZOLE 1 MG/1
TABLET ORAL
Qty: 30 TABLET | Refills: 5 | Status: SHIPPED | OUTPATIENT
Start: 2020-04-09 | End: 2020-06-03 | Stop reason: SDUPTHER

## 2020-04-09 RX ORDER — BUMETANIDE 1 MG/1
TABLET ORAL
Qty: 180 TABLET | Refills: 1 | Status: SHIPPED | OUTPATIENT
Start: 2020-04-09 | End: 2020-06-22

## 2020-04-09 RX ORDER — SPIRONOLACTONE 25 MG/1
TABLET ORAL
Qty: 90 TABLET | Refills: 1 | Status: SHIPPED | OUTPATIENT
Start: 2020-04-09 | End: 2020-10-05

## 2020-04-23 ENCOUNTER — ANTI-COAG VISIT (OUTPATIENT)
Dept: CARDIOLOGY | Age: 80
End: 2020-04-23
Payer: MEDICARE

## 2020-04-23 LAB
INTERNATIONAL NORMALIZATION RATIO, POC: 1.4
PROTHROMBIN TIME, POC: NORMAL

## 2020-04-23 PROCEDURE — 85610 PROTHROMBIN TIME: CPT | Performed by: NURSE PRACTITIONER

## 2020-05-04 RX ORDER — LOSARTAN POTASSIUM 100 MG/1
TABLET ORAL
Qty: 90 TABLET | Refills: 0 | Status: SHIPPED | OUTPATIENT
Start: 2020-05-04 | End: 2020-06-22

## 2020-05-06 ENCOUNTER — OFFICE VISIT (OUTPATIENT)
Dept: CARDIOLOGY | Age: 80
End: 2020-05-06
Payer: MEDICARE

## 2020-05-06 VITALS
HEIGHT: 66 IN | WEIGHT: 209 LBS | SYSTOLIC BLOOD PRESSURE: 124 MMHG | HEART RATE: 68 BPM | BODY MASS INDEX: 33.59 KG/M2 | DIASTOLIC BLOOD PRESSURE: 70 MMHG

## 2020-05-06 LAB
INTERNATIONAL NORMALIZATION RATIO, POC: 1.9
PROTHROMBIN TIME, POC: NORMAL

## 2020-05-06 PROCEDURE — 1123F ACP DISCUSS/DSCN MKR DOCD: CPT | Performed by: INTERNAL MEDICINE

## 2020-05-06 PROCEDURE — 4040F PNEUMOC VAC/ADMIN/RCVD: CPT | Performed by: INTERNAL MEDICINE

## 2020-05-06 PROCEDURE — 99213 OFFICE O/P EST LOW 20 MIN: CPT | Performed by: INTERNAL MEDICINE

## 2020-05-06 PROCEDURE — G8417 CALC BMI ABV UP PARAM F/U: HCPCS | Performed by: INTERNAL MEDICINE

## 2020-05-06 PROCEDURE — 1090F PRES/ABSN URINE INCON ASSESS: CPT | Performed by: INTERNAL MEDICINE

## 2020-05-06 PROCEDURE — 93000 ELECTROCARDIOGRAM COMPLETE: CPT | Performed by: INTERNAL MEDICINE

## 2020-05-06 PROCEDURE — G8427 DOCREV CUR MEDS BY ELIG CLIN: HCPCS | Performed by: INTERNAL MEDICINE

## 2020-05-06 PROCEDURE — 1036F TOBACCO NON-USER: CPT | Performed by: INTERNAL MEDICINE

## 2020-05-06 PROCEDURE — G8400 PT W/DXA NO RESULTS DOC: HCPCS | Performed by: INTERNAL MEDICINE

## 2020-05-06 PROCEDURE — 0296T PR EXT ECG > 48HR TO 21 DAY RCRD W/CONECT INTL RCRD: CPT | Performed by: INTERNAL MEDICINE

## 2020-05-06 PROCEDURE — 85610 PROTHROMBIN TIME: CPT | Performed by: INTERNAL MEDICINE

## 2020-05-06 RX ORDER — ANASTROZOLE 1 MG/1
TABLET ORAL
COMMUNITY
Start: 2020-04-09

## 2020-05-06 ASSESSMENT — ENCOUNTER SYMPTOMS
WHEEZING: 0
CONSTIPATION: 0
BACK PAIN: 0
EYE DISCHARGE: 0
ABDOMINAL DISTENTION: 0
BLOOD IN STOOL: 0
DIARRHEA: 0
COUGH: 0
SHORTNESS OF BREATH: 0
VOMITING: 0

## 2020-05-06 NOTE — PROGRESS NOTES
partner: Not on file     Emotionally abused: Not on file     Physically abused: Not on file     Forced sexual activity: Not on file   Other Topics Concern    Not on file   Social History Narrative    Not on file       Family History:       Problem Relation Age of Onset    Heart Disease Other         Family History    Cancer Other         Family History    Hypertension Other         Family History    High Blood Pressure Mother     Colon Cancer Mother     Other Father         UNKNOWN         Physical Examination:  /70   Pulse 68   Ht 5' 6\" (1.676 m)   Wt 209 lb (94.8 kg)   BMI 33.73 kg/m²   Physical Exam  Constitutional:       General: She is not in acute distress. Appearance: She is not diaphoretic. Comments: Blood pressure 130/70 mmHg, pulse 64 bpm with occasional irregularity  Moderate to severe obesity   HENT:      Mouth/Throat:      Pharynx: No oropharyngeal exudate. Eyes:      General: No scleral icterus. Right eye: No discharge. Left eye: No discharge. Neck:      Thyroid: No thyromegaly. Vascular: No JVD. Cardiovascular:      Rate and Rhythm: Normal rate and regular rhythm. No extrasystoles are present. Heart sounds: Normal heart sounds, S1 normal and S2 normal. No murmur. No systolic murmur. No diastolic murmur. No friction rub. No gallop. No S3 or S4 sounds. Comments: No JVD  No edema    Pulmonary:      Effort: Pulmonary effort is normal. No respiratory distress. Breath sounds: Normal breath sounds. No wheezing or rales. Chest:      Chest wall: No tenderness. Abdominal:      General: Bowel sounds are normal. There is no distension. Palpations: Abdomen is soft. There is no mass. Tenderness: There is no abdominal tenderness. There is no guarding or rebound. Hernia: No hernia is present. Comments: Abdomen is soft nontender with no palpable organomegaly   Musculoskeletal: Normal range of motion.    Skin:     General: Skin is warm. Coloration: Skin is not pale. Findings: No rash. Neurological:      Mental Status: She is alert and oriented to person, place, and time. Cranial Nerves: No cranial nerve deficit. Deep Tendon Reflexes: Reflexes normal.           Labs:   CBC: No results for input(s): WBC, HGB, HCT, PLT in the last 72 hours. BMP:No results for input(s): NA, K, CO2, BUN, CREATININE, LABGLOM, GLUCOSE in the last 72 hours. BNP: No results for input(s): BNP in the last 72 hours. PT/INR: No results for input(s): PROTIME, INR in the last 72 hours. APTT:No results for input(s): APTT in the last 72 hours. CARDIAC ENZYMES:No results for input(s): CKTOTAL, CKMB, CKMBINDEX, TROPONINI in the last 72 hours. FASTING LIPID PANEL:No results found for: HDL, LDLDIRECT, LDLCALC, TRIG  LIVER PROFILE:No results for input(s): AST, ALT, LABALBU in the last 72 hours. Imaging:          ASSESSMENT and PLAN:    78-year-old female patient with past medical history of paroxysmal atrial fibrillation on anticoagulation with Coumadin and on multaq with recent bursts of atrial fibrillation, normal LV ejection fraction, CKD stage IV (baseline creatinine 2.7), recent diagnosis of left breast cancer 1/2020 status post mastectomy, started on Arimidex, here for follow-up evaluation. 1.  She reports more frequent bouts of palpitation which occur in brief bouts. I would obtain a ZIO monitor and rule out recurrent atrial fibrillation. She is on Coumadin as well as small tach. The option would be to switch her to an alternative antiarrhythmic. She did see Dr. Jackie Santos and he did not recommend any change in therapy prior to her diagnosis of breast cancer. 2.  She will follow-up with me in 6 months.       Orders:  No orders of the defined types were placed in this encounter. No orders of the defined types were placed in this encounter. No follow-ups on file.       Electronically signed by Nabeel Fox MD on 5/6/2020 at 3:08 PM    [id number] [facility]      Thisdictation was generated by voice recognition computer software. Although all attempts are made to edit the dictation for accuracy, there may be errors in the transcription that are not intended.

## 2020-05-12 ENCOUNTER — TRANSCRIBE ORDERS (OUTPATIENT)
Dept: ADMINISTRATIVE | Facility: HOSPITAL | Age: 80
End: 2020-05-12

## 2020-05-12 DIAGNOSIS — Z12.31 OTHER SCREENING MAMMOGRAM: Primary | ICD-10-CM

## 2020-05-26 RX ORDER — SIMVASTATIN 40 MG
TABLET ORAL
Qty: 90 TABLET | Refills: 3 | Status: SHIPPED | OUTPATIENT
Start: 2020-05-26 | End: 2021-05-20

## 2020-05-27 ENCOUNTER — TELEPHONE (OUTPATIENT)
Dept: ONCOLOGY | Facility: CLINIC | Age: 80
End: 2020-05-27

## 2020-05-27 NOTE — TELEPHONE ENCOUNTER
Spoke with patient and let her know that she is scheduled for a bone density on 06/02/2020 at 9:30 am.

## 2020-06-01 ENCOUNTER — LAB (OUTPATIENT)
Dept: LAB | Facility: HOSPITAL | Age: 80
End: 2020-06-01

## 2020-06-01 DIAGNOSIS — C50.912 MALIGNANT NEOPLASM OF LEFT BREAST IN FEMALE, ESTROGEN RECEPTOR POSITIVE, UNSPECIFIED SITE OF BREAST (HCC): ICD-10-CM

## 2020-06-01 DIAGNOSIS — N18.30 ANEMIA DUE TO STAGE 3 CHRONIC KIDNEY DISEASE (HCC): ICD-10-CM

## 2020-06-01 DIAGNOSIS — D63.1 ANEMIA DUE TO STAGE 3 CHRONIC KIDNEY DISEASE (HCC): ICD-10-CM

## 2020-06-01 DIAGNOSIS — Z17.0 MALIGNANT NEOPLASM OF LEFT BREAST IN FEMALE, ESTROGEN RECEPTOR POSITIVE, UNSPECIFIED SITE OF BREAST (HCC): ICD-10-CM

## 2020-06-01 LAB
ALBUMIN SERPL-MCNC: 4.3 G/DL (ref 3.5–5.2)
ALBUMIN/GLOB SERPL: 1.7 G/DL
ALP SERPL-CCNC: 77 U/L (ref 39–117)
ALT SERPL W P-5'-P-CCNC: 8 U/L (ref 1–33)
ANION GAP SERPL CALCULATED.3IONS-SCNC: 12 MMOL/L (ref 5–15)
AST SERPL-CCNC: 18 U/L (ref 1–32)
BASOPHILS # BLD AUTO: 0.03 10*3/MM3 (ref 0–0.2)
BASOPHILS NFR BLD AUTO: 0.5 % (ref 0–1.5)
BILIRUB SERPL-MCNC: 0.8 MG/DL (ref 0.2–1.2)
BUN BLD-MCNC: 28 MG/DL (ref 8–23)
BUN/CREAT SERPL: 16 (ref 7–25)
CALCIUM SPEC-SCNC: 9.5 MG/DL (ref 8.6–10.5)
CEA SERPL-MCNC: 1.32 NG/ML
CHLORIDE SERPL-SCNC: 105 MMOL/L (ref 98–107)
CO2 SERPL-SCNC: 25 MMOL/L (ref 22–29)
CREAT BLD-MCNC: 1.75 MG/DL (ref 0.57–1)
DEPRECATED RDW RBC AUTO: 47.5 FL (ref 37–54)
EOSINOPHIL # BLD AUTO: 0.08 10*3/MM3 (ref 0–0.4)
EOSINOPHIL NFR BLD AUTO: 1.3 % (ref 0.3–6.2)
ERYTHROCYTE [DISTWIDTH] IN BLOOD BY AUTOMATED COUNT: 14 % (ref 12.3–15.4)
GFR SERPL CREATININE-BSD FRML MDRD: 28 ML/MIN/1.73
GLOBULIN UR ELPH-MCNC: 2.6 GM/DL
GLUCOSE BLD-MCNC: 98 MG/DL (ref 65–99)
HCT VFR BLD AUTO: 35.5 % (ref 34–46.6)
HGB BLD-MCNC: 11.8 G/DL (ref 12–15.9)
LYMPHOCYTES # BLD AUTO: 1.71 10*3/MM3 (ref 0.7–3.1)
LYMPHOCYTES NFR BLD AUTO: 28.3 % (ref 19.6–45.3)
MCH RBC QN AUTO: 31 PG (ref 26.6–33)
MCHC RBC AUTO-ENTMCNC: 33.2 G/DL (ref 31.5–35.7)
MCV RBC AUTO: 93.2 FL (ref 79–97)
MONOCYTES # BLD AUTO: 0.43 10*3/MM3 (ref 0.1–0.9)
MONOCYTES NFR BLD AUTO: 7.1 % (ref 5–12)
NEUTROPHILS # BLD AUTO: 3.77 10*3/MM3 (ref 1.7–7)
NEUTROPHILS NFR BLD AUTO: 62.3 % (ref 42.7–76)
PLATELET # BLD AUTO: 146 10*3/MM3 (ref 140–450)
PMV BLD AUTO: 13.2 FL (ref 6–12)
POTASSIUM BLD-SCNC: 4.4 MMOL/L (ref 3.5–5.2)
PROT SERPL-MCNC: 6.9 G/DL (ref 6–8.5)
RBC # BLD AUTO: 3.81 10*6/MM3 (ref 3.77–5.28)
SODIUM BLD-SCNC: 142 MMOL/L (ref 136–145)
WBC NRBC COR # BLD: 6.05 10*3/MM3 (ref 3.4–10.8)
WHOLE BLOOD HOLD SPECIMEN: NORMAL

## 2020-06-01 PROCEDURE — 80053 COMPREHEN METABOLIC PANEL: CPT

## 2020-06-01 PROCEDURE — 86300 IMMUNOASSAY TUMOR CA 15-3: CPT

## 2020-06-01 PROCEDURE — 85025 COMPLETE CBC W/AUTO DIFF WBC: CPT

## 2020-06-01 PROCEDURE — 82378 CARCINOEMBRYONIC ANTIGEN: CPT

## 2020-06-01 PROCEDURE — 36415 COLL VENOUS BLD VENIPUNCTURE: CPT

## 2020-06-02 ENCOUNTER — HOSPITAL ENCOUNTER (OUTPATIENT)
Dept: BONE DENSITY | Facility: HOSPITAL | Age: 80
Discharge: HOME | End: 2020-06-02
Admitting: INTERNAL MEDICINE

## 2020-06-02 ENCOUNTER — TELEPHONE (OUTPATIENT)
Dept: ONCOLOGY | Facility: CLINIC | Age: 80
End: 2020-06-02

## 2020-06-02 DIAGNOSIS — Z17.0 MALIGNANT NEOPLASM OF LEFT BREAST IN FEMALE, ESTROGEN RECEPTOR POSITIVE, UNSPECIFIED SITE OF BREAST (HCC): ICD-10-CM

## 2020-06-02 DIAGNOSIS — C50.912 MALIGNANT NEOPLASM OF LEFT BREAST IN FEMALE, ESTROGEN RECEPTOR POSITIVE, UNSPECIFIED SITE OF BREAST (HCC): ICD-10-CM

## 2020-06-02 DIAGNOSIS — M81.8 OTHER OSTEOPOROSIS WITHOUT CURRENT PATHOLOGICAL FRACTURE: ICD-10-CM

## 2020-06-02 DIAGNOSIS — N18.30 STAGE III CHRONIC KIDNEY DISEASE (HCC): ICD-10-CM

## 2020-06-02 DIAGNOSIS — Z86.010 HX OF ADENOMATOUS COLONIC POLYPS: ICD-10-CM

## 2020-06-02 LAB — CANCER AG27-29 SERPL-ACNC: 19.6 U/ML (ref 0–38.6)

## 2020-06-02 PROCEDURE — 77080 DXA BONE DENSITY AXIAL: CPT

## 2020-06-02 NOTE — TELEPHONE ENCOUNTER
NOTIFIED PT WILL FAX REPORT TO DR. ZAMAN OFFICE. PT HAS AN UPCOMING APPT WITH HIM.    ----- Message from Ender Franco MD sent at 6/2/2020 11:48 AM CDT -----  DEXA scan, 6/2/2020- osteopenia with increased fracture risk.  Please reappoint to PCP Re: Needs management for osteopenia.  Thank you

## 2020-06-02 NOTE — PROGRESS NOTES
MGW ONC [facility] GROUP HEMATOLOGY AND ONCOLOGY  [address]  [place] [address] [phone]    Patient Name: Heidi Ansari  Encounter Date: 02/28/2020  YOB: 1940  Patient Number: [id number]    REASON FOR VISIT: Heidi Ansari is an 79-year-old female who returns in follow-up of newly diagnosed stage IIA invasive ductal/lobular breast carcinoma.  She has been on adjuvant Arimidex since 01/22/2020 (~ 4.5 months).  She is here alone (previously with her spouse [place] and daughter Fátima).    DIAGNOSTIC ABNORMALITIES:           1.   11/27/2019- palpable left breast lump x3 weeks.  Diagnostic mammogram and left breast ultrasound.  Impression: Spiculated mass approximately 2 cm upper outer quadrant of the left breast.  Oval well-circumscribed mass appears new in the lower slightly lateral left breast measuring approximately 10 mm.  Targeted left breast ultrasound at the 2 o'clock position 5 cm from nipple an irregular spiculated hypoechoic mass is seen measuring 1.7 x 1.5 x 1.6 cm.  Smaller adjacent hypoechoic irregular mass seen measuring 0.3 cm.  Third irregular hypoechoic mass at the 3 o'clock position measures 2.2 x 0.9 x 1.2 cm in size.  Ultrasound-guided biopsy recommended.  BI-RADS Category 5-highly suggestive of malignancy.  No mammographic evidence of right breast malignancy.          2.   12/06/2019- ultrasound-guided left breast biopsy.  Final diagnosis: 1.left breast mass 2 o'clock position: Core biopsy: Invasive ductal and lobular carcinoma.  Estimated grade 1, involving approximately 90% of the core biopsy specimens.  Focal ductal carcinoma in situ, nuclear grade 1.  Microcalcifications identified in invasive ductal carcinoma.  2.left breast mass 3 o'clock position, core biopsy: Invasive lobular carcinoma, estimated grade 1, involving approximately 90% of the core biopsy specimens.  Microcalcifications not identified.           3.   12/26/2019- hemoglobin 12.3, hematocrit 37.2, MCV 93.9, platelets 90,000, WBC 6.81 with a normal differential.  CMP notable for a creatinine of 1.65 (GFR 30) otherwise normal.  Immunohistochemistries revealed: Estrogen +99 to 100%; progesterone +81 to 90%, HER-2 1-2+ (equivocal).  FISH: Negative (signal ratio: 1.2)          4.   01/17/2020-CT brain: Mild cerebral and cerebellar volume loss with chronic microvascular disease.  Focus of ill-defined hypodensity at the gray-white juncture in the right frontal lobe.  Site of previous infarction versus metastasis.  Suggest follow-up MRI with and without gadolinium.  No additional lesions present.          5.   01/17/2020- CT chest.  Groundglass nodule within the right upper lobe and left lower lobe.  Likely inflammatory in nature.  Coronary calcifications in the LAD and circumflex distribution.  Moderate cardiomegaly.  Postoperative changes left axilla from recent axillary dissection as well as mastectomy.  No enlarged mediastinal or axillary lymph nodes present.  Irregular nodule along the anterior margin of the medial left pectoralis major musculature within the mastectomy bed.  No adjacent inflammatory/postoperative stranding.  May represent postoperative seroma.  Recommend directed ultrasound over this area.          6.   01/17/2020-CT abdomen/pelvis.  Impression: Multiple hypodensities within the liver.  Favor hepatic cysts.  Recommend ultrasound for further evaluations.  Cannot be fully characterized without IV contrast.  Small cortical cyst of the right kidney suspected.  Postoperative changes of the right colon.  Multiple diverticula throughout the colon without evidence of diverticulitis or mechanical bowel obstruction.  Diastases of the abdominal musculature at the umbilicus.  Previous umbilical hernia repair was performed with a mesh.  Residual recurrent tyra-umbilical hernia containing fat.  No evidence of herniated bowel loop.  7 mm groundglass nodule  left lower lobe.          7.   01/22/2020-CMP notable for creatinine 1.6 and GFR 31 otherwise normal.  CEA 1.41, CA-27-29 22.8, ferritin 240.9, iron 88, iron saturation 23%, TIBC 390, RASHEL positive, homogenous/speckled pattern 1:80, otherwise negative reflex panel.  SPIEP negative (M spike not observed, EARLE: No monoclonality detected).  Hemoglobin 11.7, hematocrit 34.9, MCV 91.6, platelets 130,000, WBC 5.08 with a normal differential.  No schistocytes reported.          8.   02/05/2020- liver ultrasound.  Impression: Multiple hepatic cysts.  Additional tiny hepatic lesions are too small to characterize.          9.   02/05/2020- renal ultrasound.  Impression: Right renal cyst.  Otherwise negative exam.         10.  02/05/2020- chest ultrasound.  Impression: No abnormality identified in the left chest.  Area seen on recent CT exam may represent a postoperative seroma.         11.   02/20/2020- hemoglobin 12.2, hematocrit 36.4, MCV 91.9, platelets 147,000, WBC 6.11 with a normal differential.  CEA 1.37, CA-27-29 21.9.         12.   03/01/2020- Oncotype DX: Recurrence score:  0; distant recurrence risk at 9 years with AI or BARNHART alone: 3%; group average absolute chemotherapy benefit:< 1%         13.   06/02/2020- DEXA scan.  Impression: Osteopenia.  Low bone mass.  Bone density is between 1.0 and 2.5 standard deviations below the mean for young adult woman.  Increased risk for fracture in these patients.    PREVIOUS INTERVENTIONS:          1.   01/02/2020- left breast simple mastectomy with sentinel lymph node biopsy.  Final diagnosis: 1.left breast, left skin sparing mastectomy: Invasive ductal and lobular carcinoma, grade 1 (2.3 cm).  Associated low-grade ductal carcinoma in situ, solid type.  Margins of excision free of tumor.  Tumor approaches closest deep margin and 1.6 cm.  2 axillary lymph nodes, negative for metastatic carcinoma.  2.left sentinel lymph node excision: One sentinel lymph node, negative for  metastatic carcinoma (0/1).  AJCC pathologic stage: pT2, N0 (SN).    Synoptic checklist: Procedure: Total mastectomy.  Specimen laterality: Left.  Histologic type: Invasive carcinoma with ductal and lobular features (mixed type carcinoma).  Overall ndgndrndanddndend:nd nd2nd. Tumor size: Greatest dimension of largest invasive focus 23 mm.  Tumor focality: Single focus of invasive carcinoma.  DCIS: Present.  Negative for extensive intraductal component.  Grade 1.  Lobular carcinoma in situ: None in specimen.  Tumor extent: Lymphovascular invasion not identified.  Dermal lymphovascular invasion not identified.  Microcalcifications not identified.  Margins: Uninvolved by invasive carcinoma.  Lymph nodes: Number of lymph nodes examined: 3.  TNM classification: pT2, pN0 (SN).            2.   01/22/2020- adjuvant Arimidex 1 mg p.o. daily    LABS:  Lab Results - Last 18 Months   Lab Units 06/01/20  1032 02/20/20  1323 01/22/20  1214 01/03/20  0546 12/26/19  0954   HEMOGLOBIN g/dL 11.8* 12.2 11.7* 10.9* 12.3   HEMATOCRIT % 35.5 36.4 34.9 32.2* 37.2   MCV fL 93.2 91.9 91.6 92.0 93.9   WBC 10*3/mm3 6.05 6.11 5.08 8.84 6.81   RDW % 14.0 13.7 13.7 13.3 13.5   MPV fL 13.2* 11.5 12.2* 12.4* 12.8*   PLATELETS 10*3/mm3 146 147 130* 138* 90*   IMM GRAN % %  --   --  0.2  --   --    NEUTROS ABS 10*3/mm3 3.77 3.42 2.92  --  4.81   LYMPHS ABS 10*3/mm3 1.71 2.13 1.67  --  1.40   MONOS ABS 10*3/mm3 0.43 0.41 0.35  --  0.46   EOS ABS 10*3/mm3 0.08 0.10 0.10  --  0.09   BASOS ABS 10*3/mm3 0.03 0.04 0.03  --  0.02   IMMATURE GRANS (ABS) 10*3/mm3  --   --  0.01  --   --    NRBC /100 WBC  --   --  0.0  --   --        Lab Results - Last 18 Months   Lab Units 06/01/20  1032 01/30/20  1150 01/22/20  1214 01/03/20  0546 12/26/19  0954   GLUCOSE mg/dL 98 94 91 131* 95   SODIUM mmol/L 142 141 141 139 142   POTASSIUM mmol/L 4.4 4.2 4.1 4.5 4.1   TOTAL CO2 mmol/L  --  26  --   --   --    CO2 mmol/L 25.0  --  26.0 24.0 29.0   CHLORIDE mmol/L 105 100 104 102 100    ANION GAP mmol/L 12.0 15 11.0 13.0 13.0   CREATININE mg/dL 1.75* 1.9* 1.60* 1.42* 1.65*   BUN mg/dL 28* 16 18 19 17   BUN / CREAT RATIO  16.0  --  11.3 13.4 10.3   CALCIUM mg/dL 9.5 10.0 9.7 9.4 9.8   EGFR IF NONAFRICN AM mL/min/1.73 28* 25* 31* 36* 30*   ALK PHOS U/L 77  --  80  --  78   TOTAL PROTEIN g/dL 6.9  --  7.1  --  7.3   ALT (SGPT) U/L 8  --  7  --  7   AST (SGOT) U/L 18  --  17  --  16   BILIRUBIN mg/dL 0.8  --  0.7  --  0.7   ALBUMIN g/dL 4.30  --  4.30  3.6  --  4.30   GLOBULIN gm/dL 2.6  --  2.8  --  3.0       Lab Results - Last 18 Months   Lab Units 06/01/20  1032 02/20/20  1323 01/22/20  1214   M-SPIKE g/dL  --   --  Not Observed   CEA ng/mL 1.32 1.37 1.41       Lab Results - Last 18 Months   Lab Units 01/22/20  1214   IRON mcg/dL 88   TIBC mcg/dL 390   IRON SATURATION % 23   FERRITIN ng/mL 240.90*         PAST MEDICAL HISTORY:  ALLERGIES:  Allergies   Allergen Reactions   • Bactrim [Sulfamethoxazole-Trimethoprim] Provider Review Needed     Patient has kidney disease (stage 4) shouldn't take Bactrim   • Codeine Nausea Only     CURRENT MEDICATIONS:  Outpatient Encounter Medications as of 6/8/2020   Medication Sig Dispense Refill   • acetaminophen (TYLENOL) 500 MG tablet Take 500 mg by mouth Every 6 (Six) Hours As Needed for Mild Pain .     • allopurinol (ZYLOPRIM) 100 MG tablet Take 200 mg by mouth Daily.  3   • anastrozole (ARIMIDEX) 1 MG tablet Take 1 tablet by mouth once daily 30 tablet 5   • bumetanide (BUMEX) 1 MG tablet TAKE 1/2 (ONE-HALF) TABLET BY MOUTH ONCE DAILY MAY  TAKE  EXTRA  IF  NEEDED.  PATIENT  NEEDS  TO  CONTACT  OFFICE  FOR  ADDITIONAL  REFILLS. 180 tablet 1   • calcium carbonate-vitamin d 600-400 MG-UNIT per tablet Take 1 tablet by mouth twice daily 60 tablet 0   • carboxymethylcellulose (REFRESH PLUS) 0.5 % solution Administer 1 drop to both eyes 3 (Three) Times a Day As Needed for Dry Eyes.     • dronedarone (MULTAQ) 400 MG tablet Take 400 mg by mouth 2 (Two) Times a Day With  Meals.     • ferrous sulfate 325 (65 FE) MG tablet Take 325 mg by mouth 3 (Three) Times a Week. Monday, Wednesday, and Friday.  5   • levothyroxine (SYNTHROID, LEVOTHROID) 50 MCG tablet Take 50 mcg by mouth Daily.  3   • losartan (COZAAR) 100 MG tablet Take 1 tablet by mouth once daily 90 tablet 0   • metoprolol succinate XL (TOPROL-XL) 25 MG 24 hr tablet Take 25 mg by mouth 2 (Two) Times a Day.  1   • polyethylene glycol (MIRALAX) powder Take 17 g by mouth Daily.     • simvastatin (ZOCOR) 40 MG tablet Take 1 tablet by mouth once daily 90 tablet 3   • spironolactone (ALDACTONE) 25 MG tablet Take 1 tablet by mouth once daily 90 tablet 1   • Vitamin D, Cholecalciferol, 25 MCG (1000 UT) tablet Take  by mouth.     • warfarin (COUMADIN) 5 MG tablet Take  by mouth Take As Directed. Take 1 whole tablet (5 mg) on Wednesday only. Take 1/2 tablet (2.5 mg) all other days.  5   • guaiFENesin (MUCINEX) 600 MG 12 hr tablet Take 1,200 mg by mouth Daily As Needed for Cough or Congestion.       No facility-administered encounter medications on file as of 6/8/2020.      Adult illnesses:  Hypothyroidism  Atrial fibrillation  Hyperlipidemia  Obesity  Chronic kidney disease stage 4 - Dr. Daniels    ADULT ILLNESSES:  Patient Active Problem List   Diagnosis Code   • Hx of adenomatous colonic polyps Z86.010   • HTN (hypertension), benign I10   • Anticoagulated on Coumadin Z79.01   • Family hx of colon cancer Z80.0   • Breast cancer (CMS/HCC) C50.919   • Anemia due to stage 3 chronic kidney disease (CMS/HCC) N18.3, D63.1   • Acute frontal sinusitis J01.10   • Chronic anticoagulation Z79.01   • Endometrial thickening on ultrasound R93.89   • Excessive anticoagulation [id number]   • History of hypertension Z86.79   • Hypothyroidism E03.9   • Kidney disease N28.9   • Mixed hyperlipidemia E78.2   • Paroxysmal atrial fibrillation (CMS/HCC) I48.0   • PMB (postmenopausal bleeding) N95.0   • Essential hypertension I10   • Magnetic resonance imaging  of brain abnormal R90.89   • Obesity (BMI 30-39.9) E66.9   • Non-tobacco user Z78.9     SURGERIES:  Past Surgical History:   Procedure Laterality Date   • BREAST BIOPSY Left 2015   • BREAST EXCISIONAL BIOPSY Left 2001   • BREAST EXCISIONAL BIOPSY Left 2001   • CARPAL TUNNEL RELEASE Right    • CHOLECYSTECTOMY     • COLON SURGERY      Partial right   • COLONOSCOPY     • COLONOSCOPY N/A 10/17/2019    Procedure: COLONOSCOPY WITH ANESTHESIA;  Surgeon: Rigoberto Shaw MD;  Location: [facility] ENDOSCOPY;  Service: Gastroenterology   • COLONOSCOPY W/ POLYPECTOMY  06/30/2016    2 Adenomatous polyps at 79 & 40 cm, Diverticulosis repeat exam in 3 years   • EYE SURGERY Bilateral     cataract extraction   • EYE SURGERY Left     detached retina   • HERNIA REPAIR     • MASTECTOMY W/ SENTINEL NODE BIOPSY Left 1/2/2020    Procedure: LEFT SIMPLE MASTECTOMY WITH SENTINEL NODE BIOPSY, INJECTION AND SCAN, RADIOLOGIST WILL INJECT;  Surgeon: Nikki Zuniga MD;  Location: [facility] OR;  Service: General   • REPLACEMENT TOTAL KNEE Bilateral    • THROAT SURGERY      duct     HEALTH MAINTENANCE ITEMS:  Health Maintenance Due   Topic Date Due   • TDAP/TD VACCINES (1 - Tdap) 12/08/1951   • PNEUMOCOCCAL VACCINE (65+ HIGH RISK) (1 of 2 - PCV13) 12/08/2004   • MEDICARE ANNUAL WELLNESS  03/07/2019   • LIPID PANEL  06/24/2019       <no information>  Last Completed Colonoscopy       Status Date      COLONOSCOPY Done 10/17/2019 Surg:COLONOSCOPY     Patient has more history with this topic...          There is no immunization history on file for this patient.  Last Completed Mammogram     Patient has no health maintenance due at this time            FAMILY HISTORY:  Family History   Problem Relation Age of Onset   • Colon cancer Mother    • Colon polyps Mother    • No Known Problems Father    • No Known Problems Sister    • No Known Problems Brother    • No Known Problems Daughter    • No Known Problems Son    • No Known Problems Maternal Grandmother   "  • No Known Problems Paternal Grandmother    • No Known Problems Maternal Aunt    • No Known Problems Paternal Aunt    • Breast cancer Neg Hx    • BRCA 1/2 Neg Hx    • Endometrial cancer Neg Hx    • Ovarian cancer Neg Hx      SOCIAL HISTORY:  Social History     Socioeconomic History   • Marital status:      Spouse name: Not on file   • Number of children: Not on file   • Years of education: Not on file   • Highest education level: Not on file   Tobacco Use   • Smoking status: Never Smoker   • Smokeless tobacco: Never Used   Substance and Sexual Activity   • Alcohol use: No     Frequency: Never   • Drug use: No   • Sexual activity: Defer   Homemaker    REVIEW OF SYSTEMS:  Review of Systems   Constitutional: Positive for fatigue (baseline but manages all her ADLs including the chores, errands and driving.  \"Some.\"  Does not keep her from  her usual activities.).        Arimidex tolerance discussed: Doing well.  No worsening hot flashes.  No new arthralgias.   HENT: Positive for postnasal drip and rhinorrhea.    Eyes: Negative.    Respiratory: Negative.    Cardiovascular: Positive for palpitations (Intermittent.  Is on maintenance warfarin).   Gastrointestinal: Negative.  Negative for blood in stool (dark stool from oral iron tiw.  gets constipated if more often).   Endocrine: Negative.    Genitourinary: Negative.    Musculoskeletal: Positive for arthralgias (hands, left hip and knees inspite of prior TKRs bilaterally).   Allergic/Immunologic: Negative.    Neurological: Positive for dizziness (postural - standing) and numbness (left foot at times).   Hematological: Bruises/bleeds easily (coumadin).   Psychiatric/Behavioral: Negative.    Breasts:  Admits she is \"hit or miss.\"  Has not noticed       /82   Pulse 76   Temp 95.7 °F (35.4 °C)   Resp 16   Ht 160 cm (63\")   Wt 95.7 kg (211 lb)   SpO2 97%   Breastfeeding No   BMI 37.38 kg/m²  Body surface area is 1.98 meters squared.  Pain Score    " 06/08/20 1048   PainSc: 0-No pain       Physical Exam:  Physical Exam   Constitutional: She is oriented to person, place, and time. No distress.   Pleasant, cooperative, heavy-set, modeastly kept elderly female.  Ambulatory.  ECOG 0.      She has regained 6 pounds (had lost 1 pound at her prior visit) since her last visit.   HENT:   Head: Normocephalic and atraumatic.   Mouth/Throat: No oropharyngeal exudate.   Eyes: Pupils are equal, round, and reactive to light. Conjunctivae and EOM are normal. No scleral icterus.   Neck: No JVD present. No tracheal deviation present. No thyromegaly (on synthroid) present.   Cardiovascular:   Irregular rate and rhythm   Pulmonary/Chest: Effort normal and breath sounds normal. No stridor. No respiratory distress. She has no wheezes. She has no rales.   Abdominal: Soft. Bowel sounds are normal. She exhibits no distension and no mass. There is no tenderness. There is no rebound and no guarding.   Musculoskeletal: Normal range of motion. She exhibits edema (1+ ankles) and deformity (changes of osteoarthritis of the hands).   Lymphadenopathy:     She has no cervical adenopathy.   Neurological: She is alert and oriented to person, place, and time. No cranial nerve deficit.   Skin: Skin is warm and dry. No erythema. No pallor.   Psychiatric: She has a normal mood and affect. Her behavior is normal. Judgment and thought content normal.   Vitals reviewed.  Breasts:  Right breast no masses.  Left mastectomy site well healed. No underlying nodularity.    ASSESSMENT:   1. Invasive ductal/lobular (mixed) carcinoma:   · Stage: IIA (pT2, pN0 [sn], MX, G1) ER (+) %, NV (+) 81-90%, HER-2/kelby 1-2 + (IHC) - negative. HER-2 1-2+ (equivocal).  FISH: Negative (signal ratio: 1.2)  · Tumor burden: 23 mm tumor in the left breast. 0/3 axillary sentinel lymph node negative for metastatic carcinoma. Nuclear grade 1.    · Oncotype DX: Recurrence score:  0; distant recurrence risk at 9 years with AI or  BARNHART alone: 3%; group average absolute chemotherapy benefit:< 1%  · Complications of tumor: None.   · Tumor status: No evidence of disease (SUZE) since left skin sparing mastectomy. Adjuvant Arimidex in progress.         2.    Chronic kidney disease, stage III - IV.  GFR 28, 06/01/2020 (prior:  GFR 17 - 36 mL/min).        3.    Hypothyroism        4.    Normocytic anemia, hemoglobin 11.8; MCV 93.2, 06/01/2020 (prior: Hgb 10.9 - 12.9; MCV 92 - 95.9).  Contribution from chronic kidney disease suspected.        5.    Abnormal CT scans of the head, chest, abdomen and pelvis (see above) indicating ill-defined hypodensity at the gray-white juncture in the right frontal lobe, hepatic and right renal cysts.  Unable to perform MRI with contrast due to chronic kidney disease.  Was seen by Dr. Jaramillo on 03/23/2020.  Noncontrast brain MRI from 1/17/2020 reviewed.  Possibilities include prior stroke microvascular disease, demyelinating disease.  Risks of biopsy discussed.  Recommend serial imaging with biopsy of the lesion progresses.  Follow-up with MRI in 3 months.        6.    (+) RASHEL with joint pains (hands). Has been seen by Dr. Caputo.        7.    Osteopenia (bone density 1.0 and 2.5 standard deviations below the mean on DEXA scan, 06/02/2020).  On calcium and vitamin D.        8.    Paroxysmal atrial fibrillation.  Remains on anticoagulation.  Dr. Salas follows       PLAN:   1.   Re: Apprised of the labs, 06/01/2020 (above).  Note stable anemia, otherwise normal CBC.  Low (stable) GFR, otherwise normal CMP, normal CEA, normal CA-27-29.  2.  Previously noted the positive RASHEL but negative reflex panel, negative SPIEP, repleted iron levels.  The liver ultrasound, renal ultrasound and chest ultrasound findings (above) are noted.  Oncotype DX recurrence score (above) noted (RS 0; absolute chemotherapy benefit < 1%).  DEXA scan shows osteopenia with increased risk of fracture.  Arimidex tolerance discussed.  So far so  good.  3.  Breast cancer.  NCCN guidelines version 1.2020 reviewed.  4 ductal, lobular, mixed tumors, pT1, pT2, pT3 and pN0 with tumors greater than 0.5 cm- 21 gene RT-PCR assay.  Recurrence score less than 26-adjuvant endocrine therapy.  Recurrence score 26-30- adjuvant endocrine therapy or adjuvant chemotherapy followed by endocrine therapy.  Recurrence score greater than 31-adjuvant chemotherapy followed by endocrine therapy.  However limited data to make chemotherapy recommendations for those greater than 70 years of age.  4.  The rationale for adjuvant hormonal manipulation with AI's are previously discussed. The potential risks (to include but not limited to: Hot flashes, asthenia, pain, arthralgia, arthritis, nausea/vomiting, headache, pharyngitis, depression, rash, hypertension, lymphedema, insomnia, edema, weight gain, dyspnea, abdominal pain, constipation, osteoporosis, fractures, cough, bone pain, diarrhea, breast pain, paresthesias, infection, cataracts, myalgias, thromboembolism, angina, endometrial carcinoma, myocardial infarction, stroke, anemia, leukopenia, erythema multiforme, Belle-Collins syndrome) are discussed at length. Questions answered. She agrees to press on with therapy.  5.  Rx:   · Arimidex 1 mg p.o. daily, #30 x 2 RF   · Calcium 1200 mg + 800 units D3  p.o. daily - otc    6.   Reappoint to PCP Re: Management of osteopenia - says she has an appointment with Dr. Davidson.  7.  Review office encounter from Dr. Jaramillo, 03/23/2020 Re:  Abnormal CT head.  Noncontrast brain MRI from 1/17/2020 reviewed.  Images consistent with either prior stroke, coalescence of microvascular disease injury, or demyelinating disease.  I think she is extremely low risk for MS given the fact that she is asymptomatic and lesions are identified in the corpus callosum.  We discussed the risk and benefits of biopsy versus continued follow-up.  I would recommend serial imaging prior to biopsy.  If these lesions  show increase in size then we could consider biopsy.  I would like to see her back in 3 months with a noncontrast MRI - has follow-up on 07/06/2020.  7.  Discussed her visit with Dr. Caputo (no correspondence) Re:  The (+) RASHEL with joint pains- Says she had a Telemed visit.  Has appointment for follow-up in 08/12/2020  8.  Return to the [facility] office in 12 weeks with pre-office CBC and differential, CMP, CEA and CA-27-29.  Annual mammogram will be due 11/30/2020.  Says Dr. Zuniga has already ordered.     MEDICAL DECISION MAKING: High Complexity   AMOUNT OF DATA: Extensive      I spent 44 total minutes, face-to-face, caring for Heidi today.  Greater than 50% of this time involved counseling and/or coordination of care as documented within this note regarding the patient's illness(es), pros and cons of various treatment options, instructions and/or risk reduction.      Cc:   MD Ron Miranda MD Christopher Phillips, MD Jonathan Wilkerson, MD Ronald Wilson, MD

## 2020-06-08 ENCOUNTER — OFFICE VISIT (OUTPATIENT)
Dept: ONCOLOGY | Facility: CLINIC | Age: 80
End: 2020-06-08

## 2020-06-08 VITALS
WEIGHT: 211 LBS | SYSTOLIC BLOOD PRESSURE: 142 MMHG | OXYGEN SATURATION: 97 % | HEART RATE: 76 BPM | HEIGHT: 63 IN | BODY MASS INDEX: 37.39 KG/M2 | TEMPERATURE: 95.7 F | DIASTOLIC BLOOD PRESSURE: 82 MMHG | RESPIRATION RATE: 16 BRPM

## 2020-06-08 DIAGNOSIS — C50.912 MALIGNANT NEOPLASM OF LEFT BREAST IN FEMALE, ESTROGEN RECEPTOR POSITIVE, UNSPECIFIED SITE OF BREAST (HCC): ICD-10-CM

## 2020-06-08 DIAGNOSIS — D63.1 ANEMIA DUE TO STAGE 3 CHRONIC KIDNEY DISEASE (HCC): Primary | ICD-10-CM

## 2020-06-08 DIAGNOSIS — Z17.0 MALIGNANT NEOPLASM OF LEFT BREAST IN FEMALE, ESTROGEN RECEPTOR POSITIVE, UNSPECIFIED SITE OF BREAST (HCC): ICD-10-CM

## 2020-06-08 DIAGNOSIS — N18.30 ANEMIA DUE TO STAGE 3 CHRONIC KIDNEY DISEASE (HCC): Primary | ICD-10-CM

## 2020-06-08 PROCEDURE — 99215 OFFICE O/P EST HI 40 MIN: CPT | Performed by: INTERNAL MEDICINE

## 2020-06-08 RX ORDER — ANASTROZOLE 1 MG/1
1 TABLET ORAL DAILY
Qty: 30 TABLET | Refills: 5 | Status: SHIPPED | OUTPATIENT
Start: 2020-06-08 | End: 2020-09-28 | Stop reason: SDUPTHER

## 2020-06-08 RX ORDER — MULTIVIT-MIN/IRON/FOLIC ACID/K 18-600-40
CAPSULE ORAL
COMMUNITY
End: 2020-06-22

## 2020-06-15 RX ORDER — PNV NO.95/FERROUS FUM/FOLIC AC 28MG-0.8MG
TABLET ORAL
Qty: 90 TABLET | Refills: 2 | Status: SHIPPED | OUTPATIENT
Start: 2020-06-15 | End: 2021-06-22

## 2020-06-19 ENCOUNTER — ANTI-COAG VISIT (OUTPATIENT)
Dept: CARDIOLOGY | Age: 80
End: 2020-06-19
Payer: MEDICARE

## 2020-06-19 LAB
INTERNATIONAL NORMALIZATION RATIO, POC: 2.2
PROTHROMBIN TIME, POC: NORMAL

## 2020-06-19 PROCEDURE — 85610 PROTHROMBIN TIME: CPT | Performed by: NURSE PRACTITIONER

## 2020-06-22 ENCOUNTER — OFFICE VISIT (OUTPATIENT)
Dept: INTERNAL MEDICINE | Facility: CLINIC | Age: 80
End: 2020-06-22

## 2020-06-22 VITALS
DIASTOLIC BLOOD PRESSURE: 72 MMHG | WEIGHT: 211.38 LBS | TEMPERATURE: 98.7 F | HEART RATE: 73 BPM | OXYGEN SATURATION: 98 % | BODY MASS INDEX: 37.45 KG/M2 | HEIGHT: 63 IN | RESPIRATION RATE: 18 BRPM | SYSTOLIC BLOOD PRESSURE: 132 MMHG

## 2020-06-22 DIAGNOSIS — I48.91 ATRIAL FIBRILLATION, CONTROLLED (HCC): ICD-10-CM

## 2020-06-22 DIAGNOSIS — D63.1 ANEMIA IN STAGE 4 CHRONIC KIDNEY DISEASE (HCC): ICD-10-CM

## 2020-06-22 DIAGNOSIS — I10 HTN (HYPERTENSION), BENIGN: Primary | ICD-10-CM

## 2020-06-22 DIAGNOSIS — Z79.01 CHRONIC ANTICOAGULATION: ICD-10-CM

## 2020-06-22 DIAGNOSIS — N18.4 ANEMIA IN STAGE 4 CHRONIC KIDNEY DISEASE (HCC): ICD-10-CM

## 2020-06-22 PROBLEM — M06.9 RHEUMATOID ARTHRITIS (HCC): Status: ACTIVE | Noted: 2020-06-22

## 2020-06-22 PROBLEM — C50.912 BREAST CANCER, LEFT (HCC): Status: ACTIVE | Noted: 2020-01-02

## 2020-06-22 PROBLEM — I51.9 MYXEDEMA HEART DISEASE: Status: ACTIVE | Noted: 2020-06-22

## 2020-06-22 PROBLEM — D69.6 THROMBOCYTOPENIA: Status: ACTIVE | Noted: 2020-06-22

## 2020-06-22 PROBLEM — I51.7 CARDIOMEGALY: Status: ACTIVE | Noted: 2020-06-22

## 2020-06-22 PROBLEM — N95.2 ATROPHIC VAGINITIS: Status: ACTIVE | Noted: 2020-06-22

## 2020-06-22 PROBLEM — L82.1 SEBORRHEIC KERATOSIS: Status: ACTIVE | Noted: 2020-06-22

## 2020-06-22 PROBLEM — E55.9 VITAMIN D DEFICIENCY: Status: ACTIVE | Noted: 2020-06-22

## 2020-06-22 PROBLEM — E03.9 MYXEDEMA HEART DISEASE: Status: ACTIVE | Noted: 2020-06-22

## 2020-06-22 PROBLEM — I49.3 PVC (PREMATURE VENTRICULAR CONTRACTION): Status: ACTIVE | Noted: 2020-06-22

## 2020-06-22 PROBLEM — R31.9 HEMATURIA: Status: ACTIVE | Noted: 2020-06-22

## 2020-06-22 PROCEDURE — 99214 OFFICE O/P EST MOD 30 MIN: CPT | Performed by: INTERNAL MEDICINE

## 2020-06-22 RX ORDER — LOSARTAN POTASSIUM 50 MG/1
50 TABLET ORAL DAILY
Qty: 90 TABLET | Refills: 3 | Status: SHIPPED | OUTPATIENT
Start: 2020-06-22 | End: 2021-06-08

## 2020-06-22 RX ORDER — BUMETANIDE 0.5 MG/1
0.5 TABLET ORAL DAILY
Qty: 90 TABLET | Refills: 3 | Status: SHIPPED | OUTPATIENT
Start: 2020-06-22 | End: 2021-06-08

## 2020-06-22 NOTE — PROGRESS NOTES
Subjective   Heidi Ansari is a 79 y.o. female.   Chief Complaint   Patient presents with   • Hypertension     6 MONTH FOLLOW-UP       Patient is here for routine follow-up.  She has hypertension chronic kidney disease recent diagnosis of breast cancer with a successful mastectomy with no evidence of residual cancer.  He continues to be followed by her oncologist as well as renal she tells me she had a bone density study just recently I reviewed that her scores place her in an osteopenic range.  I was able to review a vitamin D level from December of last year which was in the 40s and considered normal.       The following portions of the patient's history were reviewed and updated as appropriate: allergies, current medications, past family history, past medical history, past social history, past surgical history and problem list.    Review of Systems   Constitutional: Negative for activity change, appetite change, fatigue, fever, unexpected weight gain and unexpected weight loss.   HENT: Negative for swollen glands, trouble swallowing and voice change.    Eyes: Negative for blurred vision and visual disturbance.   Respiratory: Negative for cough and shortness of breath.    Cardiovascular: Negative for chest pain, palpitations and leg swelling.   Gastrointestinal: Negative for abdominal pain, constipation, diarrhea, nausea, vomiting and indigestion.   Endocrine: Negative for cold intolerance, heat intolerance, polydipsia and polyphagia.   Genitourinary: Negative for dysuria and frequency.   Musculoskeletal: Negative for arthralgias, back pain, joint swelling and neck pain.   Skin: Negative for color change, rash and skin lesions.   Neurological: Negative for dizziness, weakness, headache, memory problem and confusion.   Hematological: Does not bruise/bleed easily.   Psychiatric/Behavioral: Negative for agitation, hallucinations and suicidal ideas. The patient is not nervous/anxious.        Objective   Past  Medical History:   Diagnosis Date   • Arthritis    • Atrial fibrillation (CMS/HCC)     INTERMITTENT   • Cancer (CMS/HCC)    • Disease of thyroid gland    • Hx of colonic polyp    • Hyperlipidemia    • Hypertension    • Kidney disease     STAGE 4      Past Surgical History:   Procedure Laterality Date   • BREAST BIOPSY Left 2015   • BREAST EXCISIONAL BIOPSY Left 2001   • BREAST EXCISIONAL BIOPSY Left 2001   • CARPAL TUNNEL RELEASE Right    • CHOLECYSTECTOMY     • COLON SURGERY      Partial right   • COLONOSCOPY     • COLONOSCOPY N/A 10/17/2019    Procedure: COLONOSCOPY WITH ANESTHESIA;  Surgeon: Rigoberto Shaw MD;  Location: [facility] ENDOSCOPY;  Service: Gastroenterology   • COLONOSCOPY W/ POLYPECTOMY  06/30/2016    2 Adenomatous polyps at 80 & 40 cm, Diverticulosis repeat exam in 3 years   • EYE SURGERY Bilateral     cataract extraction   • EYE SURGERY Left     detached retina   • HERNIA REPAIR     • MASTECTOMY W/ SENTINEL NODE BIOPSY Left 1/2/2020    Procedure: LEFT SIMPLE MASTECTOMY WITH SENTINEL NODE BIOPSY, INJECTION AND SCAN, RADIOLOGIST WILL INJECT;  Surgeon: Nikki Zuniga MD;  Location: [facility] OR;  Service: General   • REPLACEMENT TOTAL KNEE Bilateral    • THROAT SURGERY      duct        Current Outpatient Medications:   •  acetaminophen (TYLENOL) 500 MG tablet, Take 500 mg by mouth Every 6 (Six) Hours As Needed for Mild Pain ., Disp: , Rfl:   •  allopurinol (ZYLOPRIM) 100 MG tablet, Take 200 mg by mouth 2 (Two) Times a Day., Disp: , Rfl: 3  •  anastrozole (ARIMIDEX) 1 MG tablet, Take 1 tablet by mouth Daily., Disp: 30 tablet, Rfl: 5  •  bumetanide (BUMEX) 0.5 MG tablet, Take 1 tablet by mouth Daily., Disp: 90 tablet, Rfl: 3  •  calcium carbonate-vitamin d 600-400 MG-UNIT per tablet, Take 1 tablet by mouth twice daily, Disp: 60 tablet, Rfl: 0  •  carboxymethylcellulose (REFRESH PLUS) 0.5 % solution, Administer 1 drop to both eyes 3 (Three) Times a Day As Needed for Dry Eyes., Disp: , Rfl:   •  dronedarone  (MULTAQ) 400 MG tablet, Take 400 mg by mouth 2 (Two) Times a Day With Meals., Disp: , Rfl:   •  ferrous sulfate 325 (65 Fe) MG tablet, TAKE 1 TABLET BY MOUTH THREE TIMES DAILY (Patient taking differently: TAKES 1 TABLET 3 TIMES PER WEEK.), Disp: 90 tablet, Rfl: 2  •  levothyroxine (SYNTHROID, LEVOTHROID) 50 MCG tablet, Take 50 mcg by mouth Daily., Disp: , Rfl: 3  •  losartan (COZAAR) 50 MG tablet, Take 1 tablet by mouth Daily., Disp: 90 tablet, Rfl: 3  •  metoprolol succinate XL (TOPROL-XL) 25 MG 24 hr tablet, Take 25 mg by mouth 2 (Two) Times a Day., Disp: , Rfl: 1  •  polyethylene glycol (MIRALAX) powder, Take 17 g by mouth Daily., Disp: , Rfl:   •  simvastatin (ZOCOR) 40 MG tablet, Take 1 tablet by mouth once daily, Disp: 90 tablet, Rfl: 3  •  spironolactone (ALDACTONE) 25 MG tablet, Take 1 tablet by mouth once daily, Disp: 90 tablet, Rfl: 1  •  warfarin (COUMADIN) 5 MG tablet, Take  by mouth Take As Directed. Take 1 whole tablet (5 mg) on Wednesday only. Take 1/2 tablet (2.5 mg) all other days., Disp: , Rfl: 5     Vitals:    06/22/20 1524   BP: 132/72   Pulse: 73   Resp: 18   Temp: 98.7 °F (37.1 °C)   SpO2: 98%         06/22/20  1524   Weight: 95.9 kg (211 lb 6 oz)     Patient's Body mass index is 37.44 kg/m². BMI is above normal parameters. Recommendations include: exercise counseling and nutrition counseling.      Physical Exam   Constitutional: She is oriented to person, place, and time. She appears well-developed and well-nourished.   HENT:   Head: Normocephalic and atraumatic.   Right Ear: External ear normal.   Left Ear: External ear normal.   Nose: Nose normal.   Mouth/Throat: Oropharynx is clear and moist.   Eyes: Pupils are equal, round, and reactive to light. Conjunctivae and EOM are normal.   Neck: Normal range of motion. Neck supple. No thyromegaly present.   Cardiovascular: Normal rate, regular rhythm, normal heart sounds and intact distal pulses.   Patient has a history of atrial fibrillation  however today her heart rate is regular   Pulmonary/Chest: Effort normal and breath sounds normal.   Abdominal: Soft. Bowel sounds are normal.   Lymphadenopathy:     She has no cervical adenopathy.   Neurological: She is alert and oriented to person, place, and time.   Skin: Skin is warm and dry.   Psychiatric: She has a normal mood and affect. Her behavior is normal. Thought content normal.   Nursing note and vitals reviewed.            Assessment/Plan   Diagnoses and all orders for this visit:    1. HTN (hypertension), benign (Primary)    2. Atrial fibrillation, controlled (CMS/HCC)    3. Chronic anticoagulation    4. Anemia in stage 4 chronic kidney disease (CMS/HCC)    Other orders  -     losartan (COZAAR) 50 MG tablet; Take 1 tablet by mouth Daily.  Dispense: 90 tablet; Refill: 3  -     bumetanide (BUMEX) 0.5 MG tablet; Take 1 tablet by mouth Daily.  Dispense: 90 tablet; Refill: 3      At this point I have changed some of patient's medications so it is easier for her to take.  She was having to break them in half in the past.  Also we spent some time reviewing her bone density and her diagnosis of osteopenia.  While she is at some risk of fracture I feel that with the recent diagnosis of breast cancer and all the things she is going through I do not think I would add alendronate or Prolia at this point.  I am sure her other doctors will weigh in on that and I told her that if they felt strongly about her being treated for her osteopenia otherwise I would just recheck her bone density in 2 years.

## 2020-07-06 ENCOUNTER — HOSPITAL ENCOUNTER (OUTPATIENT)
Dept: MRI IMAGING | Facility: HOSPITAL | Age: 80
Discharge: HOME | End: 2020-07-06
Admitting: NEUROLOGICAL SURGERY

## 2020-07-06 ENCOUNTER — OFFICE VISIT (OUTPATIENT)
Dept: NEUROSURGERY | Facility: CLINIC | Age: 80
End: 2020-07-06

## 2020-07-06 VITALS — HEIGHT: 63 IN | WEIGHT: 208 LBS | BODY MASS INDEX: 36.86 KG/M2

## 2020-07-06 DIAGNOSIS — G93.9 BRAIN LESION: Primary | ICD-10-CM

## 2020-07-06 DIAGNOSIS — C50.912 MALIGNANT NEOPLASM OF LEFT BREAST IN FEMALE, ESTROGEN RECEPTOR POSITIVE, UNSPECIFIED SITE OF BREAST (HCC): ICD-10-CM

## 2020-07-06 DIAGNOSIS — R90.89 MAGNETIC RESONANCE IMAGING OF BRAIN ABNORMAL: ICD-10-CM

## 2020-07-06 DIAGNOSIS — Z17.0 MALIGNANT NEOPLASM OF LEFT BREAST IN FEMALE, ESTROGEN RECEPTOR POSITIVE, UNSPECIFIED SITE OF BREAST (HCC): ICD-10-CM

## 2020-07-06 DIAGNOSIS — E66.9 OBESITY (BMI 30-39.9): ICD-10-CM

## 2020-07-06 DIAGNOSIS — Z78.9 NON-TOBACCO USER: ICD-10-CM

## 2020-07-06 PROCEDURE — 99213 OFFICE O/P EST LOW 20 MIN: CPT | Performed by: NEUROLOGICAL SURGERY

## 2020-07-06 PROCEDURE — 70551 MRI BRAIN STEM W/O DYE: CPT

## 2020-07-06 NOTE — PATIENT INSTRUCTIONS
"PATIENT TO CONTINUE TO FOLLOW UP WITH HIS/HER PRIMARY CARE PROVIDER FOR YEARLY PHYSICAL EXAMS TO ENSURE COMPLETE HEALTH MAINTENANCE      DASH Eating Plan  DASH stands for \"Dietary Approaches to Stop Hypertension.\" The DASH eating plan is a healthy eating plan that has been shown to reduce high blood pressure (hypertension). It may also reduce your risk for type 2 diabetes, heart disease, and stroke. The DASH eating plan may also help with weight loss.  What are tips for following this plan?    General guidelines  · Avoid eating more than 2,300 mg (milligrams) of salt (sodium) a day. If you have hypertension, you may need to reduce your sodium intake to 1,500 mg a day.  · Limit alcohol intake to no more than 1 drink a day for nonpregnant women and 2 drinks a day for men. One drink equals 12 oz of beer, 5 oz of wine, or 1½ oz of hard liquor.  · Work with your health care provider to maintain a healthy body weight or to lose weight. Ask what an ideal weight is for you.  · Get at least 30 minutes of exercise that causes your heart to beat faster (aerobic exercise) most days of the week. Activities may include walking, swimming, or biking.  · Work with your health care provider or diet and nutrition specialist (dietitian) to adjust your eating plan to your individual calorie needs.  Reading food labels    · Check food labels for the amount of sodium per serving. Choose foods with less than 5 percent of the Daily Value of sodium. Generally, foods with less than 300 mg of sodium per serving fit into this eating plan.  · To find whole grains, look for the word \"whole\" as the first word in the ingredient list.  Shopping  · Buy products labeled as \"low-sodium\" or \"no salt added.\"  · Buy fresh foods. Avoid canned foods and premade or frozen meals.  Cooking  · Avoid adding salt when cooking. Use salt-free seasonings or herbs instead of table salt or sea salt. Check with your health care provider or pharmacist before using salt " substitutes.  · Do not morrissey foods. Cook foods using healthy methods such as baking, boiling, grilling, and broiling instead.  · Cook with heart-healthy oils, such as olive, canola, soybean, or sunflower oil.  Meal planning  · Eat a balanced diet that includes:  ? 5 or more servings of fruits and vegetables each day. At each meal, try to fill half of your plate with fruits and vegetables.  ? Up to 6-8 servings of whole grains each day.  ? Less than 6 oz of lean meat, poultry, or fish each day. A 3-oz serving of meat is about the same size as a deck of cards. One egg equals 1 oz.  ? 2 servings of low-fat dairy each day.  ? A serving of nuts, seeds, or beans 5 times each week.  ? Heart-healthy fats. Healthy fats called Omega-3 fatty acids are found in foods such as flaxseeds and coldwater fish, like sardines, salmon, and mackerel.  · Limit how much you eat of the following:  ? Canned or prepackaged foods.  ? Food that is high in trans fat, such as fried foods.  ? Food that is high in saturated fat, such as fatty meat.  ? Sweets, desserts, sugary drinks, and other foods with added sugar.  ? Full-fat dairy products.  · Do not salt foods before eating.  · Try to eat at least 2 vegetarian meals each week.  · Eat more home-cooked food and less restaurant, buffet, and fast food.  · When eating at a restaurant, ask that your food be prepared with less salt or no salt, if possible.  What foods are recommended?  The items listed may not be a complete list. Talk with your dietitian about what dietary choices are best for you.  Grains  Whole-grain or whole-wheat bread. Whole-grain or whole-wheat pasta. Brown rice. Oatmeal. Quinoa. Bulgur. Whole-grain and low-sodium cereals. Casandra bread. Low-fat, low-sodium crackers. Whole-wheat flour tortillas.  Vegetables  Fresh or frozen vegetables (raw, steamed, roasted, or grilled). Low-sodium or reduced-sodium tomato and vegetable juice. Low-sodium or reduced-sodium tomato sauce and tomato  paste. Low-sodium or reduced-sodium canned vegetables.  Fruits  All fresh, dried, or frozen fruit. Canned fruit in natural juice (without added sugar).  Meat and other protein foods  Skinless chicken or turkey. Ground chicken or turkey. Pork with fat trimmed off. Fish and seafood. Egg whites. Dried beans, peas, or lentils. Unsalted nuts, nut butters, and seeds. Unsalted canned beans. Lean cuts of beef with fat trimmed off. Low-sodium, lean deli meat.  Dairy  Low-fat (1%) or fat-free (skim) milk. Fat-free, low-fat, or reduced-fat cheeses. Nonfat, low-sodium ricotta or cottage cheese. Low-fat or nonfat yogurt. Low-fat, low-sodium cheese.  Fats and oils  Soft margarine without trans fats. Vegetable oil. Low-fat, reduced-fat, or light mayonnaise and salad dressings (reduced-sodium). Canola, safflower, olive, soybean, and sunflower oils. Avocado.  Seasoning and other foods  Herbs. Spices. Seasoning mixes without salt. Unsalted popcorn and pretzels. Fat-free sweets.  What foods are not recommended?  The items listed may not be a complete list. Talk with your dietitian about what dietary choices are best for you.  Grains  Baked goods made with fat, such as croissants, muffins, or some breads. Dry pasta or rice meal packs.  Vegetables  Creamed or fried vegetables. Vegetables in a cheese sauce. Regular canned vegetables (not low-sodium or reduced-sodium). Regular canned tomato sauce and paste (not low-sodium or reduced-sodium). Regular tomato and vegetable juice (not low-sodium or reduced-sodium). Pickles. Olives.  Fruits  Canned fruit in a light or heavy syrup. Fried fruit. Fruit in cream or butter sauce.  Meat and other protein foods  Fatty cuts of meat. Ribs. Fried meat. Alexis. Sausage. Bologna and other processed lunch meats. Salami. Fatback. Hotdogs. Bratwurst. Salted nuts and seeds. Canned beans with added salt. Canned or smoked fish. Whole eggs or egg yolks. Chicken or turkey with skin.  Dairy  Whole or 2% milk,  cream, and half-and-half. Whole or full-fat cream cheese. Whole-fat or sweetened yogurt. Full-fat cheese. Nondairy creamers. Whipped toppings. Processed cheese and cheese spreads.  Fats and oils  Butter. Stick margarine. Lard. Shortening. Ghee. Alexis fat. Tropical oils, such as coconut, palm kernel, or palm oil.  Seasoning and other foods  Salted popcorn and pretzels. Onion salt, garlic salt, seasoned salt, table salt, and sea salt. Worcestershire sauce. Tartar sauce. Barbecue sauce. Teriyaki sauce. Soy sauce, including reduced-sodium. Steak sauce. Canned and packaged gravies. Fish sauce. Oyster sauce. Cocktail sauce. Horseradish that you find on the shelf. Ketchup. Mustard. Meat flavorings and tenderizers. Bouillon cubes. Hot sauce and Tabasco sauce. Premade or packaged marinades. Premade or packaged taco seasonings. Relishes. Regular salad dressings.  Where to find more information:  · National Heart, Lung, and [employer] [place]: www.nhlbi.nih.gov  · American Heart Association: www.heart.org  Summary  · The DASH eating plan is a healthy eating plan that has been shown to reduce high blood pressure (hypertension). It may also reduce your risk for type 2 diabetes, heart disease, and stroke.  · With the DASH eating plan, you should limit salt (sodium) intake to 2,300 mg a day. If you have hypertension, you may need to reduce your sodium intake to 1,500 mg a day.  · When on the DASH eating plan, aim to eat more fresh fruits and vegetables, whole grains, lean proteins, low-fat dairy, and heart-healthy fats.  · Work with your health care provider or diet and nutrition specialist (dietitian) to adjust your eating plan to your individual calorie needs.  This information is not intended to replace advice given to you by your health care provider. Make sure you discuss any questions you have with your health care provider.  Document Released: 12/06/2012 Document Revised: 11/30/2018 Document Reviewed: 12/11/2017  [employer]  Patient Education © 2020 [employer]
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
## 2020-07-31 ENCOUNTER — ANTI-COAG VISIT (OUTPATIENT)
Dept: CARDIOLOGY | Age: 80
End: 2020-07-31
Payer: MEDICARE

## 2020-07-31 LAB
INTERNATIONAL NORMALIZATION RATIO, POC: 2.3
PROTHROMBIN TIME, POC: NORMAL

## 2020-07-31 PROCEDURE — 85610 PROTHROMBIN TIME: CPT | Performed by: NURSE PRACTITIONER

## 2020-08-24 RX ORDER — DRONEDARONE 400 MG/1
TABLET, FILM COATED ORAL
Qty: 180 TABLET | Refills: 2 | Status: SHIPPED | OUTPATIENT
Start: 2020-08-24 | End: 2021-05-20

## 2020-09-07 ENCOUNTER — APPOINTMENT (OUTPATIENT)
Dept: LAB | Facility: HOSPITAL | Age: 80
End: 2020-09-07

## 2020-09-09 ENCOUNTER — LAB (OUTPATIENT)
Dept: LAB | Facility: HOSPITAL | Age: 80
End: 2020-09-09

## 2020-09-09 DIAGNOSIS — D63.1 ANEMIA DUE TO STAGE 3 CHRONIC KIDNEY DISEASE (HCC): ICD-10-CM

## 2020-09-09 DIAGNOSIS — C50.912 MALIGNANT NEOPLASM OF LEFT BREAST IN FEMALE, ESTROGEN RECEPTOR POSITIVE, UNSPECIFIED SITE OF BREAST (HCC): ICD-10-CM

## 2020-09-09 DIAGNOSIS — N18.30 ANEMIA DUE TO STAGE 3 CHRONIC KIDNEY DISEASE (HCC): ICD-10-CM

## 2020-09-09 DIAGNOSIS — Z17.0 MALIGNANT NEOPLASM OF LEFT BREAST IN FEMALE, ESTROGEN RECEPTOR POSITIVE, UNSPECIFIED SITE OF BREAST (HCC): ICD-10-CM

## 2020-09-09 LAB
ALBUMIN SERPL-MCNC: 4.5 G/DL (ref 3.5–5.2)
ALBUMIN/GLOB SERPL: 1.9 G/DL
ALP SERPL-CCNC: 80 U/L (ref 39–117)
ALT SERPL W P-5'-P-CCNC: 11 U/L (ref 1–33)
ANION GAP SERPL CALCULATED.3IONS-SCNC: 12 MMOL/L (ref 5–15)
AST SERPL-CCNC: 20 U/L (ref 1–32)
BASOPHILS # BLD MANUAL: 0.06 10*3/MM3 (ref 0–0.2)
BASOPHILS NFR BLD AUTO: 1 % (ref 0–1.5)
BILIRUB SERPL-MCNC: 0.6 MG/DL (ref 0–1.2)
BUN SERPL-MCNC: 35 MG/DL (ref 8–23)
BUN/CREAT SERPL: 17.8 (ref 7–25)
CALCIUM SPEC-SCNC: 9.7 MG/DL (ref 8.6–10.5)
CEA SERPL-MCNC: 1.59 NG/ML
CHLORIDE SERPL-SCNC: 103 MMOL/L (ref 98–107)
CO2 SERPL-SCNC: 26 MMOL/L (ref 22–29)
CREAT SERPL-MCNC: 1.97 MG/DL (ref 0.57–1)
DEPRECATED RDW RBC AUTO: 45.6 FL (ref 37–54)
EOSINOPHIL # BLD MANUAL: 0.06 10*3/MM3 (ref 0–0.4)
EOSINOPHIL NFR BLD MANUAL: 1 % (ref 0.3–6.2)
ERYTHROCYTE [DISTWIDTH] IN BLOOD BY AUTOMATED COUNT: 13.4 % (ref 12.3–15.4)
GFR SERPL CREATININE-BSD FRML MDRD: 24 ML/MIN/1.73
GLOBULIN UR ELPH-MCNC: 2.4 GM/DL
GLUCOSE SERPL-MCNC: 94 MG/DL (ref 65–99)
HCT VFR BLD AUTO: 37 % (ref 34–46.6)
HGB BLD-MCNC: 12.2 G/DL (ref 12–15.9)
LYMPHOCYTES # BLD MANUAL: 1.44 10*3/MM3 (ref 0.7–3.1)
LYMPHOCYTES NFR BLD MANUAL: 23 % (ref 19.6–45.3)
LYMPHOCYTES NFR BLD MANUAL: 9 % (ref 5–12)
MCH RBC QN AUTO: 30.7 PG (ref 26.6–33)
MCHC RBC AUTO-ENTMCNC: 33 G/DL (ref 31.5–35.7)
MCV RBC AUTO: 93.2 FL (ref 79–97)
MONOCYTES # BLD AUTO: 0.57 10*3/MM3 (ref 0.1–0.9)
NEUTROPHILS # BLD AUTO: 4.14 10*3/MM3 (ref 1.7–7)
NEUTROPHILS NFR BLD MANUAL: 66 % (ref 42.7–76)
PLAT MORPH BLD: NORMAL
PLATELET # BLD AUTO: 132 10*3/MM3 (ref 140–450)
PMV BLD AUTO: 12.1 FL (ref 6–12)
POTASSIUM SERPL-SCNC: 4.4 MMOL/L (ref 3.5–5.2)
PROT SERPL-MCNC: 6.9 G/DL (ref 6–8.5)
RBC # BLD AUTO: 3.97 10*6/MM3 (ref 3.77–5.28)
RBC MORPH BLD: NORMAL
SODIUM SERPL-SCNC: 141 MMOL/L (ref 136–145)
WBC # BLD AUTO: 6.28 10*3/MM3 (ref 3.4–10.8)
WBC MORPH BLD: NORMAL
WHOLE BLOOD HOLD SPECIMEN: NORMAL

## 2020-09-09 PROCEDURE — 85007 BL SMEAR W/DIFF WBC COUNT: CPT

## 2020-09-09 PROCEDURE — 82378 CARCINOEMBRYONIC ANTIGEN: CPT

## 2020-09-09 PROCEDURE — 36415 COLL VENOUS BLD VENIPUNCTURE: CPT

## 2020-09-09 PROCEDURE — 85025 COMPLETE CBC W/AUTO DIFF WBC: CPT

## 2020-09-09 PROCEDURE — 86300 IMMUNOASSAY TUMOR CA 15-3: CPT

## 2020-09-09 PROCEDURE — 80053 COMPREHEN METABOLIC PANEL: CPT

## 2020-09-10 LAB — CANCER AG27-29 SERPL-ACNC: 14.2 U/ML (ref 0–38.6)

## 2020-09-11 ENCOUNTER — ANTI-COAG VISIT (OUTPATIENT)
Dept: CARDIOLOGY | Age: 80
End: 2020-09-11
Payer: MEDICARE

## 2020-09-11 LAB
INTERNATIONAL NORMALIZATION RATIO, POC: 2
PROTHROMBIN TIME, POC: NORMAL

## 2020-09-11 PROCEDURE — 85610 PROTHROMBIN TIME: CPT | Performed by: NURSE PRACTITIONER

## 2020-09-21 ENCOUNTER — FLU SHOT (OUTPATIENT)
Dept: INTERNAL MEDICINE | Facility: CLINIC | Age: 80
End: 2020-09-21

## 2020-09-21 DIAGNOSIS — Z23 NEED FOR INFLUENZA VACCINATION: Primary | ICD-10-CM

## 2020-09-22 PROCEDURE — 90694 VACC AIIV4 NO PRSRV 0.5ML IM: CPT | Performed by: INTERNAL MEDICINE

## 2020-09-22 PROCEDURE — G0008 ADMIN INFLUENZA VIRUS VAC: HCPCS | Performed by: INTERNAL MEDICINE

## 2020-09-24 NOTE — PROGRESS NOTES
MGW ONC [facility] GROUP HEMATOLOGY AND ONCOLOGY  [address]  [place] [place] [address] [phone]    Patient Name: Heidi Ansari  Encounter Date: 09/29/2020  YOB: 1940  Patient Number: [id number]      REASON FOR VISIT: Heidi Ansari is an 79-year-old female who returns in follow-up of newly diagnosed stage IIA invasive ductal/lobular breast carcinoma.  She has been on adjuvant Arimidex since 01/22/2020 (over 8 months).  She is here alone (previously with her spouse [place] and daughter Fátima).    DIAGNOSTIC ABNORMALITIES:           1.   11/27/2019- palpable left breast lump x3 weeks.  Diagnostic mammogram and left breast ultrasound.  Impression: Spiculated mass approximately 2 cm upper outer quadrant of the left breast.  Oval well-circumscribed mass appears new in the lower slightly lateral left breast measuring approximately 10 mm.  Targeted left breast ultrasound at the 2 o'clock position 5 cm from nipple an irregular spiculated hypoechoic mass is seen measuring 1.7 x 1.5 x 1.6 cm.  Smaller adjacent hypoechoic irregular mass seen measuring 0.3 cm.  Third irregular hypoechoic mass at the 3 o'clock position measures 2.2 x 0.9 x 1.2 cm in size.  Ultrasound-guided biopsy recommended.  BI-RADS Category 5-highly suggestive of malignancy.  No mammographic evidence of right breast malignancy.          2.   12/06/2019- ultrasound-guided left breast biopsy.  Final diagnosis: 1.left breast mass 2 o'clock position: Core biopsy: Invasive ductal and lobular carcinoma.  Estimated grade 1, involving approximately 90% of the core biopsy specimens.  Focal ductal carcinoma in situ, nuclear grade 1.  Microcalcifications identified in invasive ductal carcinoma.  2.left breast mass 3 o'clock position, core biopsy: Invasive lobular carcinoma, estimated grade 1, involving approximately 90% of the core biopsy specimens.  Microcalcifications not identified.           3.   12/26/2019- hemoglobin 12.3, hematocrit 37.2, MCV 93.9, platelets 90,000, WBC 6.81 with a normal differential.  CMP notable for a creatinine of 1.65 (GFR 30) otherwise normal.  Immunohistochemistries revealed: Estrogen +99 to 100%; progesterone +81 to 90%, HER-2 1-2+ (equivocal).  FISH: Negative (signal ratio: 1.2)          4.   01/17/2020-CT brain: Mild cerebral and cerebellar volume loss with chronic microvascular disease.  Focus of ill-defined hypodensity at the gray-white juncture in the right frontal lobe.  Site of previous infarction versus metastasis.  Suggest follow-up MRI with and without gadolinium.  No additional lesions present.          5.   01/17/2020- CT chest.  Groundglass nodule within the right upper lobe and left lower lobe.  Likely inflammatory in nature.  Coronary calcifications in the LAD and circumflex distribution.  Moderate cardiomegaly.  Postoperative changes left axilla from recent axillary dissection as well as mastectomy.  No enlarged mediastinal or axillary lymph nodes present.  Irregular nodule along the anterior margin of the medial left pectoralis major musculature within the mastectomy bed.  No adjacent inflammatory/postoperative stranding.  May represent postoperative seroma.  Recommend directed ultrasound over this area.          6.   01/17/2020-CT abdomen/pelvis.  Impression: Multiple hypodensities within the liver.  Favor hepatic cysts.  Recommend ultrasound for further evaluations.  Cannot be fully characterized without IV contrast.  Small cortical cyst of the right kidney suspected.  Postoperative changes of the right colon.  Multiple diverticula throughout the colon without evidence of diverticulitis or mechanical bowel obstruction.  Diastases of the abdominal musculature at the umbilicus.  Previous umbilical hernia repair was performed with a mesh.  Residual recurrent tyra-umbilical hernia containing fat.  No evidence of herniated bowel loop.  7 mm groundglass nodule  left lower lobe.          7.   01/22/2020-CMP notable for creatinine 1.6 and GFR 31 otherwise normal.  CEA 1.41, CA-27-29 22.8, ferritin 240.9, iron 88, iron saturation 23%, TIBC 390, RASHEL positive, homogenous/speckled pattern 1:80, otherwise negative reflex panel.  SPIEP negative (M spike not observed, EARLE: No monoclonality detected).  Hemoglobin 11.7, hematocrit 34.9, MCV 91.6, platelets 130,000, WBC 5.08 with a normal differential.  No schistocytes reported.          8.   02/05/2020- liver ultrasound.  Impression: Multiple hepatic cysts.  Additional tiny hepatic lesions are too small to characterize.          9.   02/05/2020- renal ultrasound.  Impression: Right renal cyst.  Otherwise negative exam.         10.  02/05/2020- chest ultrasound.  Impression: No abnormality identified in the left chest.  Area seen on recent CT exam may represent a postoperative seroma.         11.   02/20/2020- hemoglobin 12.2, hematocrit 36.4, MCV 91.9, platelets 147,000, WBC 6.11 with a normal differential.  CEA 1.37, CA-27-29 21.9.         12.   03/01/2020- Oncotype DX: Recurrence score:  0; distant recurrence risk at 9 years with AI or BARNHART alone: 3%; group average absolute chemotherapy benefit:< 1%         13.   06/02/2020- DEXA scan.  Impression: Osteopenia.  Low bone mass.  Bone density is between 1.0 and 2.5 standard deviations below the mean for young adult woman.  Increased risk for fracture in these patients.    PREVIOUS INTERVENTIONS:          1.   01/02/2020- left breast simple mastectomy with sentinel lymph node biopsy.  Final diagnosis: 1.left breast, left skin sparing mastectomy: Invasive ductal and lobular carcinoma, grade 1 (2.3 cm).  Associated low-grade ductal carcinoma in situ, solid type.  Margins of excision free of tumor.  Tumor approaches closest deep margin and 1.6 cm.  2 axillary lymph nodes, negative for metastatic carcinoma.  2.left sentinel lymph node excision: One sentinel lymph node, negative for  metastatic carcinoma (0/1).  AJCC pathologic stage: pT2, N0 (SN).    Synoptic checklist: Procedure: Total mastectomy.  Specimen laterality: Left.  Histologic type: Invasive carcinoma with ductal and lobular features (mixed type carcinoma).  Overall ndgndrndanddndend:nd nd2nd. Tumor size: Greatest dimension of largest invasive focus 23 mm.  Tumor focality: Single focus of invasive carcinoma.  DCIS: Present.  Negative for extensive intraductal component.  Grade 1.  Lobular carcinoma in situ: None in specimen.  Tumor extent: Lymphovascular invasion not identified.  Dermal lymphovascular invasion not identified.  Microcalcifications not identified.  Margins: Uninvolved by invasive carcinoma.  Lymph nodes: Number of lymph nodes examined: 3.  TNM classification: pT2, pN0 (SN).            2.   01/22/2020- adjuvant Arimidex 1 mg p.o. daily    LABS:  Lab Results - Last 18 Months   Lab Units 09/09/20  1104 06/01/20  1032 02/20/20  1323 01/22/20  1214 01/03/20  0546 12/26/19  0954   HEMOGLOBIN g/dL 12.2 11.8* 12.2 11.7* 10.9* 12.3   HEMATOCRIT % 37.0 35.5 36.4 34.9 32.2* 37.2   MCV fL 93.2 93.2 91.9 91.6 92.0 93.9   WBC 10*3/mm3 6.28 6.05 6.11 5.08 8.84 6.81   RDW % 13.4 14.0 13.7 13.7 13.3 13.5   MPV fL 12.1* 13.2* 11.5 12.2* 12.4* 12.8*   PLATELETS 10*3/mm3 132* 146 147 130* 138* 90*   IMM GRAN % %  --   --   --  0.2  --   --    NEUTROS ABS 10*3/mm3 4.14 3.77 3.42 2.92  --  4.81   LYMPHS ABS 10*3/mm3  --  1.71 2.13 1.67  --  1.40   MONOS ABS 10*3/mm3  --  0.43 0.41 0.35  --  0.46   EOS ABS 10*3/mm3 0.06 0.08 0.10 0.10  --  0.09   BASOS ABS 10*3/mm3 0.06 0.03 0.04 0.03  --  0.02   IMMATURE GRANS (ABS) 10*3/mm3  --   --   --  0.01  --   --    NRBC /100 WBC  --   --   --  0.0  --   --    NEUTROPHIL % % 66.0  --   --   --   --   --    MONOCYTES % % 9.0  --   --   --   --   --    BASOPHIL % % 1.0  --   --   --   --   --        Lab Results - Last 18 Months   Lab Units 09/09/20  1104 06/01/20  1032 01/30/20  1150 01/22/20  1214 01/03/20  0546  12/26/19  0954   GLUCOSE mg/dL 94 98 94 91 131* 95   SODIUM mmol/L 141 142 141 141 139 142   POTASSIUM mmol/L 4.4 4.4 4.2 4.1 4.5 4.1   TOTAL CO2 mmol/L  --   --  26  --   --   --    CO2 mmol/L 26.0 25.0  --  26.0 24.0 29.0   CHLORIDE mmol/L 103 105 100 104 102 100   ANION GAP mmol/L 12.0 12.0 15 11.0 13.0 13.0   CREATININE mg/dL 1.97* 1.75* 1.9* 1.60* 1.42* 1.65*   BUN mg/dL 35* 28* 16 18 19 17   BUN / CREAT RATIO  17.8 16.0  --  11.3 13.4 10.3   CALCIUM mg/dL 9.7 9.5 10.0 9.7 9.4 9.8   EGFR IF NONAFRICN AM mL/min/1.73 24* 28* 25* 31* 36* 30*   ALK PHOS U/L 80 77  --  80  --  78   TOTAL PROTEIN g/dL 6.9 6.9  --  7.1  --  7.3   ALT (SGPT) U/L 11 8  --  7  --  7   AST (SGOT) U/L 20 18  --  17  --  16   BILIRUBIN mg/dL 0.6 0.8  --  0.7  --  0.7   ALBUMIN g/dL 4.50 4.30  --  4.30  3.6  --  4.30   GLOBULIN gm/dL 2.4 2.6  --  2.8  --  3.0       Lab Results - Last 18 Months   Lab Units 09/09/20  1104 06/01/20  1032 02/20/20  1323 01/22/20  1214   M-SPIKE g/dL  --   --   --  Not Observed   CEA ng/mL 1.59 1.32 1.37 1.41       Lab Results - Last 18 Months   Lab Units 01/22/20  1214   IRON mcg/dL 88   TIBC mcg/dL 390   IRON SATURATION % 23   FERRITIN ng/mL 240.90*         PAST MEDICAL HISTORY:  ALLERGIES:  Allergies   Allergen Reactions   • Bactrim [Sulfamethoxazole-Trimethoprim] Provider Review Needed     Patient has kidney disease (stage 4) shouldn't take Bactrim   • Codeine Nausea Only     CURRENT MEDICATIONS:  Outpatient Encounter Medications as of 9/29/2020   Medication Sig Dispense Refill   • acetaminophen (TYLENOL) 500 MG tablet Take 500 mg by mouth Every 6 (Six) Hours As Needed for Mild Pain .     • allopurinol (ZYLOPRIM) 100 MG tablet Take 200 mg by mouth 2 (Two) Times a Day.  3   • anastrozole (ARIMIDEX) 1 MG tablet Take 1 tablet by mouth Daily. 30 tablet 5   • bumetanide (BUMEX) 0.5 MG tablet Take 1 tablet by mouth Daily. 90 tablet 3   • calcium carbonate-vitamin d 600-400 MG-UNIT per tablet Take 1 tablet by mouth  twice daily 60 tablet 5   • carboxymethylcellulose (REFRESH PLUS) 0.5 % solution Administer 1 drop to both eyes 3 (Three) Times a Day As Needed for Dry Eyes.     • dronedarone (MULTAQ) 400 MG tablet Take 400 mg by mouth 2 (Two) Times a Day With Meals.     • ferrous sulfate 325 (65 Fe) MG tablet TAKE 1 TABLET BY MOUTH THREE TIMES DAILY (Patient taking differently: TAKES 1 TABLET 4 TIMES PER WEEK.) 90 tablet 2   • levothyroxine (SYNTHROID, LEVOTHROID) 50 MCG tablet Take 50 mcg by mouth Daily.  3   • losartan (COZAAR) 50 MG tablet Take 1 tablet by mouth Daily. 90 tablet 3   • metoprolol succinate XL (TOPROL-XL) 25 MG 24 hr tablet Take 25 mg by mouth 2 (Two) Times a Day.  1   • polyethylene glycol (MIRALAX) powder Take 17 g by mouth Daily.     • simvastatin (ZOCOR) 40 MG tablet Take 1 tablet by mouth once daily 90 tablet 3   • spironolactone (ALDACTONE) 25 MG tablet Take 1 tablet by mouth once daily 90 tablet 1   • warfarin (COUMADIN) 5 MG tablet Take  by mouth Take As Directed. Take 1 whole tablet (5 mg) on Wednesday only. Take 1/2 tablet (2.5 mg) all other days.  5     No facility-administered encounter medications on file as of 9/29/2020.      Adult illnesses:  Hypothyroidism  Atrial fibrillation  Hyperlipidemia  Obesity  Chronic kidney disease stage 4 - Dr. Daniels    ADULT ILLNESSES:  Patient Active Problem List   Diagnosis Code   • Hx of adenomatous colonic polyps Z86.010   • HTN (hypertension), benign I10   • Anticoagulated on Coumadin Z79.01   • Family hx of colon cancer Z80.0   • Breast cancer, left (CMS/HCC) C50.912   • Anemia due to stage 3 chronic kidney disease (CMS/HCC) N18.3, D63.1   • Acute frontal sinusitis J01.10   • Chronic anticoagulation Z79.01   • Endometrial thickening on ultrasound R93.89   • Excessive anticoagulation [id number]   • History of hypertension Z86.79   • Hypothyroidism E03.9   • Kidney disease N28.9   • Mixed hyperlipidemia E78.2   • Atrial fibrillation, controlled (CMS/HCC) I48.91    • PMB (postmenopausal bleeding) N95.0   • Brain lesion G93.9   • Obesity (BMI 30-39.9) E66.9   • Non-tobacco user Z78.9   • Anemia in stage 4 chronic kidney disease (CMS/[facility]) N18.4, D63.1   • Atrophic vaginitis N95.2   • Cardiomegaly I51.7   • Hematuria R31.9   • 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, 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   • PVC (premature ventricular contraction) I49.3   • Rheumatoid arthritis (CMS/[facility]) M06.9   • Seborrheic keratosis L82.1   • Thrombocytopenia (CMS/[facility]) D69.6   • Vitamin D deficiency E55.9     SURGERIES:  Past Surgical History:   Procedure Laterality Date   • BREAST BIOPSY Left 2015   • BREAST EXCISIONAL BIOPSY Left 2001   • BREAST EXCISIONAL BIOPSY Left 2001   • CARPAL TUNNEL RELEASE Right    • CHOLECYSTECTOMY     • COLON SURGERY      Partial right   • COLONOSCOPY     • COLONOSCOPY N/A 10/17/2019    Procedure: COLONOSCOPY WITH ANESTHESIA;  Surgeon: Rigoberto Shaw MD;  Location: [facility] ENDOSCOPY;  Service: Gastroenterology   • COLONOSCOPY W/ POLYPECTOMY  06/30/2016    2 Adenomatous polyps at 79 & 40 cm, Diverticulosis repeat exam in 3 years   • EYE SURGERY Bilateral     cataract extraction   • EYE SURGERY Left     detached retina   • HERNIA REPAIR     • MASTECTOMY W/ SENTINEL NODE BIOPSY Left 1/2/2020    Procedure: LEFT SIMPLE MASTECTOMY WITH SENTINEL NODE BIOPSY, INJECTION AND SCAN, RADIOLOGIST WILL INJECT;  Surgeon: Nikki Zuniga MD;  Location: [facility] OR;  Service: General   • REPLACEMENT TOTAL KNEE Bilateral    • THROAT SURGERY      duct     HEALTH MAINTENANCE ITEMS:  Health Maintenance Due   Topic Date Due   • TDAP/TD VACCINES (1 - Tdap) 12/08/1959   • Pneumococcal Vaccine 65+ (1 of 1 - PPSV23) 12/08/2004   • MEDICARE ANNUAL WELLNESS  03/07/2019   • LIPID PANEL  06/24/2019       <no information>  Last Completed Colonoscopy       Status Date      COLONOSCOPY Done 10/17/2019 Surg:COLONOSCOPY     Patient has more history with this topic...        Immunization History   Administered Date(s) Administered   • Fluad  "Quad 65+ 09/22/2020     Last Completed Mammogram       Status Date      MAMMOGRAM Done 11/4/2015 Ext Proc:  MAMMOGRAM SCREENING BILAT DIGITAL     Patient has more history with this topic...            FAMILY HISTORY:  Family History   Problem Relation Age of Onset   • Colon cancer Mother 70   • Colon polyps Mother    • No Known Problems Father    • No Known Problems Sister    • No Known Problems Brother    • No Known Problems Daughter    • No Known Problems Son    • No Known Problems Maternal Grandmother    • No Known Problems Paternal Grandmother    • No Known Problems Maternal Aunt    • No Known Problems Paternal Aunt    • Breast cancer Neg Hx    • BRCA 1/2 Neg Hx    • Endometrial cancer Neg Hx    • Ovarian cancer Neg Hx      SOCIAL HISTORY:  Social History     Socioeconomic History   • Marital status:      Spouse name: Not on file   • Number of children: Not on file   • Years of education: Not on file   • Highest education level: Not on file   Tobacco Use   • Smoking status: Never Smoker   • Smokeless tobacco: Never Used   Substance and Sexual Activity   • Alcohol use: No     Frequency: Never   • Drug use: No   • Sexual activity: Defer   Homemaker    REVIEW OF SYSTEMS:  Review of Systems   Constitutional: Positive for fatigue (baseline but manages all her ADLs including the chores, errands and driving.  Is up and about more than 50%). Negative for activity change and appetite change.        Arimidex tolerance discussed:  \"I think doing good.\"  No worsening hot flashes.  No new arthralgias. Is able to take daily   HENT: Positive for postnasal drip (seasonal) and rhinorrhea (seasonal).    Eyes: Negative.    Respiratory: Negative.    Cardiovascular: Positive for palpitations (Intermittent.  Is on maintenance warfarin).   Gastrointestinal: Negative.  Negative for blood in stool (dark stool from oral iron tiw.  gets constipated if more often).   Endocrine: Negative.    Genitourinary: Negative.  " "  Musculoskeletal: Positive for arthralgias (hands, left hip and knees inspite of prior TKRs bilaterally).   Allergic/Immunologic: Negative.    Neurological: Positive for dizziness (postural - when standing too abruptly) and numbness (left foot at times, unchanged).   Hematological: Bruises/bleeds easily (coumadin).   Psychiatric/Behavioral: Negative.    Breasts:  Admits she is not diligent with self exams \"hit or miss.\"  Has not noticed       /74   Pulse 64   Temp 97.6 °F (36.4 °C) (Tympanic)   Resp 18   Ht 160 cm (63\")   Wt 97.3 kg (214 lb 6.4 oz)   SpO2 96%   BMI 37.98 kg/m²  Body surface area is 1.99 meters squared.  Pain Score    09/29/20 1500   PainSc: 0-No pain       Physical Exam:  Physical Exam   Constitutional: She is oriented to person, place, and time. No distress.   Pleasant, cooperative, heavy-set, modeastly kept elderly female.  Ambulatory.  ECOG 0.      She has gained 3 pounds (in addition to 6 pounds at her prior visit) since her last visit.   HENT:   Head: Normocephalic and atraumatic.   Mouth/Throat: No oropharyngeal exudate.   He is wearing a surgical mask today   Eyes: Pupils are equal, round, and reactive to light. Conjunctivae are normal. No scleral icterus.   Neck: No JVD present. No tracheal deviation present. No thyromegaly (on synthroid) present.   Cardiovascular:   Irregular rate and rhythm   Pulmonary/Chest: Effort normal and breath sounds normal. No stridor. No respiratory distress. She has no wheezes. She has no rales.   Abdominal: Soft. Bowel sounds are normal. She exhibits no distension and no mass. There is no abdominal tenderness. There is no rebound and no guarding.   Musculoskeletal: Normal range of motion. Deformity (changes of osteoarthritis of the hands) present. No swelling.      Right lower leg: No edema.      Left lower leg: No edema.   Lymphadenopathy:     She has no cervical adenopathy.   Neurological: She is alert and oriented to person, place, and time. No " cranial nerve deficit.   Skin: Skin is warm and dry. No rash noted. No erythema. No pallor.   Psychiatric: Her behavior is normal. Mood, judgment and thought content normal.   Vitals reviewed.  Breasts:  Right breast no masses.  Left mastectomy site well healed. No underlying nodularity.  No lymphedema of the left upper extremity.    ASSESSMENT:   1. Invasive ductal/lobular (mixed) carcinoma:   · Stage: IB (pT2, pN0 [sn], MX, G1) ER (+) %, MI (+) 81-90%, HER-2/kelby 1-2 + (IHC) - negative. HER-2 1-2+ (equivocal).  FISH: Negative (signal ratio: 1.2)  · Tumor burden: 23 mm tumor in the left breast. 0/3 axillary sentinel lymph node negative for metastatic carcinoma. Nuclear grade 1.    · Oncotype DX: Recurrence score:  0; distant recurrence risk at 9 years with AI or BARNHART alone: 3%; group average absolute chemotherapy benefit:< 1%  · Complications of tumor: None.   · Tumor status: No evidence of disease (SUZE) since left skin sparing mastectomy. Adjuvant Arimidex in progress.           2.    Chronic kidney disease, stage III - IV. GFR 24, 09/09/2020 (prior:  GFR 17 - 36 mL/min).        3.    Hypothyroidism.  On Synthroid        4.    Normocytic anemia.  Hgb 12.2; MCV 93.2, 09/09/2020 (prior: Hgb 10.9 - 12.9; MCV 92 - 95.9).  Contribution from chronic kidney disease suspected.        5.    Abnormal CT scans of the head, chest, abdomen and pelvis (see above) indicating ill-defined hypodensity at the gray-white juncture in the right frontal lobe, hepatic and right renal cysts.  Unable to perform MRI with contrast due to chronic kidney disease.            --03/23/2020-seen by Dr. Jaramillo.  Noncontrast brain MRI from 1/17/2020 reviewed.  Possibilities include prior stroke microvascular disease, demyelinating disease.  Risks of biopsy discussed.  Recommend serial imaging with biopsy of the lesion progresses.  Follow-up with MRI in 3 months.          --07/06/2020- MRI.  Mild atrophy of the brain.  Multiple foci of T2  abnormality involving the periventricular and higher white matter tracts.  Cortical lesions also present.  Overall stable from 03/2020.  Favor small vessel ischemic changes.  No evidence of restricted diffusion to suggest acute ischemia or infarct.  No mass-effect or shift in midline.        6.    (+) RASHEL with joint pains (hands). Has been seen by Dr. Caputo.        7.    Osteopenia (bone density 1.0 and 2.5 standard deviations below the mean on DEXA scan, 06/02/2020).  On calcium and vitamin D. Followed by Dr. Davidson.        8.    Paroxysmal atrial fibrillation.  Remains on anticoagulation.  Dr. Salas follows       PLAN:   1.   Re: Apprised of the labs, 09/09/2020 (above).  Note resolution of anemia, otherwise normal CBC.  Low (stable) GFR, elevated BUN/creatinine otherwise normal CMP, normal CEA, normal CA-27-29.  2.  Apprised of MRI, 07/06/2020 (above).  No acute ischemia, infarct, mass-effect.    3.  Previously noted the positive RASHEL but negative reflex panel, negative SPIEP, repleted iron levels.  The liver ultrasound, renal ultrasound and chest ultrasound findings (above) are noted.  Oncotype DX recurrence score (above) noted (RS 0; absolute chemotherapy benefit < 1%).  DEXA scan shows osteopenia with increased risk of fracture.  Arimidex tolerance discussed.  So far so good.  4.  Breast cancer.  NCCN guidelines version 1.2020 reviewed.  4 ductal, lobular, mixed tumors, pT1, pT2, pT3 and pN0 with tumors greater than 0.5 cm- 21 gene RT-PCR assay.  Recurrence score less than 26-adjuvant endocrine therapy.  Recurrence score 26-30- adjuvant endocrine therapy or adjuvant chemotherapy followed by endocrine therapy.  Recurrence score greater than 31-adjuvant chemotherapy followed by endocrine therapy.  However limited data to make chemotherapy recommendations for those greater than 70 years of age.  5.  The rationale for adjuvant hormonal manipulation with AI's are previously discussed. The potential risks  (to include but not limited to: Hot flashes, asthenia, pain, arthralgia, arthritis, nausea/vomiting, headache, pharyngitis, depression, rash, hypertension, lymphedema, insomnia, edema, weight gain, dyspnea, abdominal pain, constipation, osteoporosis, fractures, cough, bone pain, diarrhea, breast pain, paresthesias, infection, cataracts, myalgias, thromboembolism, angina, endometrial carcinoma, myocardial infarction, stroke, anemia, leukopenia, erythema multiforme, Belle-Collins syndrome) are discussed at length. Questions answered. She agrees to press on with therapy.  6.  Rx:   · Arimidex 1 mg p.o. daily, #30 x 2 RF   · Calcium 1200 mg + 800 units D3  p.o. daily - otc    7.   Return to the [facility] office in 16 weeks with pre-office CBC and differential, iron, fe sat , ferritin, CMP, CEA and CA-27-29.  Annual mammogram will be due 11/30/2020.  Says Dr. Zuniga has already ordered.     MEDICAL DECISION MAKING: High Complexity   AMOUNT OF DATA: Extensive      I spent - 32 total minutes, face-to-face, caring for Heidi today.  Greater than 50% of this time involved counseling and/or coordination of care as documented within this note regarding the patient's illness(es), pros and cons of various treatment options, instructions and/or risk reduction.      Cc:   MD Ron Miranda MD Christopher Phillips, MD Jonathan Wilkerson, MD Ronald Wilson, MD

## 2020-09-29 ENCOUNTER — OFFICE VISIT (OUTPATIENT)
Dept: ONCOLOGY | Facility: CLINIC | Age: 80
End: 2020-09-29

## 2020-09-29 VITALS
HEART RATE: 64 BPM | TEMPERATURE: 97.6 F | BODY MASS INDEX: 37.99 KG/M2 | WEIGHT: 214.4 LBS | OXYGEN SATURATION: 96 % | RESPIRATION RATE: 18 BRPM | HEIGHT: 63 IN | SYSTOLIC BLOOD PRESSURE: 134 MMHG | DIASTOLIC BLOOD PRESSURE: 74 MMHG

## 2020-09-29 DIAGNOSIS — Z17.0 MALIGNANT NEOPLASM OF LEFT BREAST IN FEMALE, ESTROGEN RECEPTOR POSITIVE, UNSPECIFIED SITE OF BREAST (HCC): Primary | ICD-10-CM

## 2020-09-29 DIAGNOSIS — C50.912 MALIGNANT NEOPLASM OF LEFT BREAST IN FEMALE, ESTROGEN RECEPTOR POSITIVE, UNSPECIFIED SITE OF BREAST (HCC): Primary | ICD-10-CM

## 2020-09-29 PROCEDURE — 99214 OFFICE O/P EST MOD 30 MIN: CPT | Performed by: INTERNAL MEDICINE

## 2020-09-29 RX ORDER — ANASTROZOLE 1 MG/1
1 TABLET ORAL DAILY
Qty: 30 TABLET | Refills: 5 | Status: SHIPPED | OUTPATIENT
Start: 2020-09-29 | End: 2021-02-26 | Stop reason: SDUPTHER

## 2020-10-01 ENCOUNTER — TELEPHONE (OUTPATIENT)
Dept: ONCOLOGY | Facility: CLINIC | Age: 80
End: 2020-10-01

## 2020-10-01 NOTE — TELEPHONE ENCOUNTER
Spoke with patient and she reports that Dr. Davidson is aware of the osteopenia. She will see Dr. Davidson again on 12/21/2020.

## 2020-10-05 RX ORDER — LEVOTHYROXINE SODIUM 0.05 MG/1
TABLET ORAL
Qty: 90 TABLET | Refills: 1 | Status: SHIPPED | OUTPATIENT
Start: 2020-10-05 | End: 2021-03-29

## 2020-10-05 RX ORDER — SPIRONOLACTONE 25 MG/1
TABLET ORAL
Qty: 90 TABLET | Refills: 1 | Status: SHIPPED | OUTPATIENT
Start: 2020-10-05 | End: 2021-03-29

## 2020-10-23 ENCOUNTER — ANTI-COAG VISIT (OUTPATIENT)
Dept: CARDIOLOGY | Age: 80
End: 2020-10-23
Payer: MEDICARE

## 2020-10-23 LAB
INTERNATIONAL NORMALIZATION RATIO, POC: 2.3
PROTHROMBIN TIME, POC: NORMAL

## 2020-10-23 PROCEDURE — 85610 PROTHROMBIN TIME: CPT | Performed by: CLINICAL NURSE SPECIALIST

## 2020-11-30 ENCOUNTER — HOSPITAL ENCOUNTER (OUTPATIENT)
Dept: MAMMOGRAPHY | Facility: HOSPITAL | Age: 80
Discharge: HOME | End: 2020-11-30
Admitting: SPECIALIST

## 2020-11-30 PROCEDURE — 77063 BREAST TOMOSYNTHESIS BI: CPT

## 2020-11-30 PROCEDURE — 77067 SCR MAMMO BI INCL CAD: CPT

## 2020-11-30 RX ORDER — METOPROLOL SUCCINATE 25 MG/1
TABLET, EXTENDED RELEASE ORAL
Qty: 180 TABLET | Refills: 1 | Status: SHIPPED | OUTPATIENT
Start: 2020-11-30 | End: 2021-05-20

## 2020-12-03 ENCOUNTER — OFFICE VISIT (OUTPATIENT)
Dept: CARDIOLOGY | Age: 80
End: 2020-12-03
Payer: MEDICARE

## 2020-12-03 ENCOUNTER — HOSPITAL ENCOUNTER (OUTPATIENT)
Dept: MAMMOGRAPHY | Facility: HOSPITAL | Age: 80
Discharge: HOME | End: 2020-12-03
Admitting: SPECIALIST

## 2020-12-03 VITALS
HEART RATE: 81 BPM | DIASTOLIC BLOOD PRESSURE: 74 MMHG | HEIGHT: 65 IN | WEIGHT: 216 LBS | BODY MASS INDEX: 35.99 KG/M2 | SYSTOLIC BLOOD PRESSURE: 126 MMHG

## 2020-12-03 DIAGNOSIS — R92.8 ABNORMAL MAMMOGRAM: ICD-10-CM

## 2020-12-03 PROBLEM — I34.0 MODERATE MITRAL REGURGITATION BY PRIOR ECHOCARDIOGRAM: Status: ACTIVE | Noted: 2020-12-03

## 2020-12-03 LAB
INTERNATIONAL NORMALIZATION RATIO, POC: 2
PROTHROMBIN TIME, POC: 22.8

## 2020-12-03 PROCEDURE — 1036F TOBACCO NON-USER: CPT | Performed by: NURSE PRACTITIONER

## 2020-12-03 PROCEDURE — G8427 DOCREV CUR MEDS BY ELIG CLIN: HCPCS | Performed by: NURSE PRACTITIONER

## 2020-12-03 PROCEDURE — 85610 PROTHROMBIN TIME: CPT | Performed by: NURSE PRACTITIONER

## 2020-12-03 PROCEDURE — 77065 DX MAMMO INCL CAD UNI: CPT

## 2020-12-03 PROCEDURE — 99214 OFFICE O/P EST MOD 30 MIN: CPT | Performed by: NURSE PRACTITIONER

## 2020-12-03 PROCEDURE — G8400 PT W/DXA NO RESULTS DOC: HCPCS | Performed by: NURSE PRACTITIONER

## 2020-12-03 PROCEDURE — 1123F ACP DISCUSS/DSCN MKR DOCD: CPT | Performed by: NURSE PRACTITIONER

## 2020-12-03 PROCEDURE — 1090F PRES/ABSN URINE INCON ASSESS: CPT | Performed by: NURSE PRACTITIONER

## 2020-12-03 PROCEDURE — G8417 CALC BMI ABV UP PARAM F/U: HCPCS | Performed by: NURSE PRACTITIONER

## 2020-12-03 PROCEDURE — 93000 ELECTROCARDIOGRAM COMPLETE: CPT | Performed by: NURSE PRACTITIONER

## 2020-12-03 PROCEDURE — G8484 FLU IMMUNIZE NO ADMIN: HCPCS | Performed by: NURSE PRACTITIONER

## 2020-12-03 PROCEDURE — 4040F PNEUMOC VAC/ADMIN/RCVD: CPT | Performed by: NURSE PRACTITIONER

## 2020-12-03 PROCEDURE — G0279 TOMOSYNTHESIS, MAMMO: HCPCS

## 2020-12-03 NOTE — PROGRESS NOTES
[facility], [place]. [address] [facility] [address] [facility]  [phone] office  [phone] fax      OFFICE VISIT:  12/3/2020    Hortensia Mancia - : 1940  Reason For Visit:  Shirlyn Burkitt is a [de-identified] y.o. female who is here for 6 Month Follow-Up and Atrial Fibrillation    History:   Diagnosis Orders   1. PAF (paroxysmal atrial fibrillation) (HCC)  EKG 12 lead    POCT INR   2. Essential hypertension     3. Chronic anticoagulation     4. Mixed hyperlipidemia     5. Moderate mitral regurgitation by prior echocardiogram       The patient presents today for cardiology follow up and INR. The patient reports intermittent AF. She reports \"I went out of rhythm on Tuesday and feel like I am still out. \"  She is on Multaq, metoprolol and coumadin. No bleeding issues reported. The patient denies symptoms to suggest myocardial ischemia or heart failure. BP is well controlled on current regimen. The patient's PCP monitors cholesterol. The patient reports being diagnosed iwht left breast cancer 2020 s/p mastectomy with no treatments other than estrogen receptor blocker x 5 years. She report a mammogram on Monday showed small calcifications and radiologist has recommended a biopsy. Subjective  Aniya denies exertional chest pain, shortness of breath, orthopnea, paroxysmal nocturnal dyspnea, syncope, presyncope, edema and fatigue. The patient denies numbness or weakness to suggest cerebrovascular accident or transient ischemic attack. + intermittent AF.     Hortensia Mancia has the following history as recorded in [facility]:  Patient Active Problem List   Diagnosis Code    Hypothyroidism E03.9    Mixed hyperlipidemia E78.2    Hypertension I10    PAF (paroxysmal atrial fibrillation) (HCC) I48.0    Endometrial thickening on ultrasound R93.89    PMB (postmenopausal bleeding) N95.0    Acute frontal sinusitis J01.10    Chronic anticoagulation Z79.01     Past Medical History:   Diagnosis Date    Hyperlipidemia     Hypertension     Hypothyroidism     Kidney disease     type 4    LONG TERM ANTICOAGULENT USE      Past Surgical History:   Procedure Laterality Date    BREAST BIOPSY      CARPAL TUNNEL RELEASE      CARPAL TUNNEL RELEASE      CHOLECYSTECTOMY      COLECTOMY      COLON SURGERY      DILATION AND CURETTAGE OF UTERUS N/A 2/16/2017    DILATATION AND CURETTAGE HYSTEROSCOPY performed by Mango Dallas MD at [address] [facility] [address] OF UTERUS  02/17/2017    JOINT REPLACEMENT      TONSILLECTOMY       Family History   Problem Relation Age of Onset    Heart Disease Other         Family History    Cancer Other         Family History    Hypertension Other         Family History    High Blood Pressure Mother     Colon Cancer Mother     Other Father         UNKNOWN     Social History     Tobacco Use    Smoking status: Never Smoker    Smokeless tobacco: Never Used   Substance Use Topics    Alcohol use: No     Alcohol/week: 0.0 standard drinks      Current Outpatient Medications   Medication Sig Dispense Refill    metoprolol succinate (TOPROL XL) 25 MG extended release tablet Take 1 tablet by mouth twice daily 180 tablet 1    MULTAQ 400 MG TABS TAKE 1 TABLET BY MOUTH TWICE DAILY WITH MEALS 180 tablet 2    anastrozole (ARIMIDEX) 1 MG tablet Take 1 tablet by mouth once daily      warfarin (COUMADIN) 5 MG tablet TAKE AS DIRECTED 30 tablet 5    Acetaminophen (TYLENOL) 325 MG CAPS Take by mouth daily      Carboxymethylcellulose Sodium (REFRESH TEARS OP) Apply 1 drop to eye as needed      vitamin D (CHOLECALCIFEROL) 1000 UNIT TABS tablet Take 1,000 Units by mouth daily      allopurinol (ZYLOPRIM) 100 MG tablet Take 100 mg by mouth 2 times daily       calcium carbonate 600 MG TABS tablet Take 1 tablet by mouth 2 times daily Indications: SUPPLEMENT      spironolactone (ALDACTONE) 25 MG tablet Take 25 mg by mouth nightly Indications: FLUID RETENTION       levothyroxine (SYNTHROID) 50 MCG tablet Take 50 mcg by mouth Daily Indications: HYPOTHROIDISM       polyethylene glycol (MIRALAX) powder Take 17 g by mouth daily Indications: REGULARITY       losartan (COZAAR) 100 MG tablet Take 50 mg by mouth Daily Indications: HYPERTENSION       ferrous sulfate 325 (65 FE) MG tablet Take 325 mg by mouth every other day Indications: ANEMIA       bumetanide (BUMEX) 1 MG tablet Take 0.5 mg by mouth daily       simvastatin (ZOCOR) 40 MG tablet Take 40 mg by mouth nightly Indications: CHOLESTEROL        No current facility-administered medications for this visit. Allergies: Bactrim [sulfamethoxazole-trimethoprim] and Codeine    Review of Systems  Constitutional - no appetite change, or unexpected weight change. No fever, chills or diaphoresis. No significant change in activity level or new onset of fatigue. HEENT - no significant rhinorrhea or epistaxis. No tinnitus or significant hearing loss. Eyes - no sudden vision change or amaurosis. No corneal arcus, xantholasma, subconjunctival hemorrhage or discharge. Respiratory - no significant wheezing, stridor, apnea or cough. No dyspnea on exertion or shortness of air. Cardiovascular - no exertional chest pain to suggest myocardial ischemia. No orthopnea or PND. No occurrence of slow heart rate. No palpitations. No claudication. + intermittent AF. Gastrointestinal - no abdominal swelling or pain. No blood in stool. No severe constipation, diarrhea, nausea, or vomiting. Genitourinary - no dysuria, frequency, or urgency. No flank pain or hematuria. Musculoskeletal - no back pain or myalgia. No problems with gait. Extremities - no clubbing, cyanosis or extremity edema. Skin - no color change or rash. No pallor. No new surgical incision. Neurologic - no speech difficulty, facial asymmetry or lateralizing weakness. No seizures, presyncope or syncope.   No significant Zocor.    Moderate MR - good BP control on after load reduction. Patient is compliant with medication regimen. BP Readings from Last 3 Encounters:   12/03/20 126/74   05/06/20 124/70   10/09/19 (!) 150/72    Pulse Readings from Last 3 Encounters:   12/03/20 81   05/06/20 68   10/09/19 65        Wt Readings from Last 3 Encounters:   12/03/20 216 lb (98 kg)   05/06/20 209 lb (94.8 kg)   10/09/19 218 lb (98.9 kg)     Plan  Previous cardiac history and records reviewed. Continue current medical management. Patient will call back if AF persists greater than a week. INR 2.0 today. Recheck INR in 6 weeks. Continue other current medications as directed. Continue to follow up with primary care provider for non cardiac medical problems. Call the office with any problems, questions or concerns at [phone]. Cardiology follow up: 6 weeks INR; 6 months Dr. Enma Knapp. Educational included in patient instructions. Heart health.      KAYLA Roberson

## 2020-12-03 NOTE — PATIENT INSTRUCTIONS
New instructions for today:  Coumadin can increase your risk of bleeding. If you notice blood in urine or stool, bleeding gums, excessive bruising or cough productive of bloody sputum, notify the office. Information on this blood thinner has been included in your after visit summary. Patient Instructions:  Continue current medications as prescribed. Always keep a current medication list. Bring your medications to every office visit. Continue to follow up with primary care provider for non cardiac medical problems. Call the office with any problems, questions or concerns at [phone]. If you have been asked to keep a blood pressure log, do so for 2 weeks. Call the office to report readings to the triage nurse at [phone]. Follow up with cardiologist as scheduled. The following educational material has been included in this after visit summary for your review: Life simple 7. Heart health. Life simple 7  1) Manage blood pressure - high blood pressure is a major risk factor for heart disease and stroke. Keeping blood pressure in health range reduces strain on your heart, arteries and kidneys. Blood pressure goal is less than 130/80. 2) Control cholesterol - contributes to plaque, which can clog arteries and lead to heart disease and stroke. When you control your cholesterol you are giving your arteries their best chance to remain clear. It is recommended that you get cholesterol lab work done once a year. 3) Reduce blood sugar - most of the food we eat is turning into glucose or blood sugar that our body uses for energy. Over time, high levels of blood sugar can damage your heart, kidneys, eyes and nerves. 4) Get active - living an active life is one of the most rewarding gifts you can give yourself and those you love. Simply put, daily physical activity increases your length and quality of life. Strive to exercise 15 minutes most days of the week.   5)  Eat better - A healthy diet is one of your best weapons for fighting cardiovascular disease. When you eat a heart healthy diet, you improve your chances for feeling good and staying healthy for life. 6)  Lose weight - when you shed extra fat an unnecessary pounds, you reduce the burden on your hear, lungs, blood vessels and skeleton. You give yourself the gift of active living, you lower your blood pressure and help yourself feel better. 7) Stop smoking - cigarette smokers have a higher risk of developing cardiovascular disease. If  You smoke, quitting is the best thing you can do for your health. Check American Heart Association on line for more information on Life's Simple 7 and tips for healthy living. A Healthy Heart: Care Instructions  Your Care Instructions     Coronary artery disease, also called heart disease, occurs when a substance called plaque builds up in the vessels that supply oxygen-rich blood to your heart muscle. This can narrow the blood vessels and reduce blood flow. A heart attack happens when blood flow is completely blocked. A high-fat diet, smoking, and other factors increase the risk of heart disease. Your doctor has found that you have a chance of having heart disease. You can do lots of things to keep your heart healthy. It may not be easy, but you can change your diet, exercise more, and quit smoking. These steps really work to lower your chance of heart disease. Follow-up care is a key part of your treatment and safety. Be sure to make and go to all appointments, and call your doctor if you are having problems. It's also a good idea to know your test results and keep a list of the medicines you take. How can you care for yourself at home? Diet  · Use less salt when you cook and eat. This helps lower your blood pressure. Taste food before salting. Add only a little salt when you think you need it. With time, your taste buds will adjust to less salt.   · Eat fewer snack items, fast foods, canned soups, and other high-salt, high-fat, processed foods. · Read food labels and try to avoid saturated and trans fats. They increase your risk of heart disease by raising cholesterol levels. · Limit the amount of solid fat-butter, margarine, and shortening-you eat. Use olive, peanut, or canola oil when you cook. Bake, broil, and steam foods instead of frying them. · Eat a variety of fruit and vegetables every day. Dark green, deep orange, red, or yellow fruits and vegetables are especially good for you. Examples include spinach, carrots, peaches, and berries. · Foods high in fiber can reduce your cholesterol and provide important vitamins and minerals. High-fiber foods include whole-grain cereals and breads, oatmeal, beans, brown rice, citrus fruits, and apples. · Eat lean proteins. Heart-healthy proteins include seafood, lean meats and poultry, eggs, beans, peas, nuts, seeds, and soy products. · Limit drinks and foods with added sugar. These include candy, desserts, and soda pop. Lifestyle changes  · If your doctor recommends it, get more exercise. Walking is a good choice. Bit by bit, increase the amount you walk every day. Try for at least 30 minutes on most days of the week. You also may want to swim, bike, or do other activities. · Do not smoke. If you need help quitting, talk to your doctor about stop-smoking programs and medicines. These can increase your chances of quitting for good. Quitting smoking may be the most important step you can take to protect your heart. It is never too late to quit. · Limit alcohol to 2 drinks a day for men and 1 drink a day for women. Too much alcohol can cause health problems. · Manage other health problems such as diabetes, high blood pressure, and high cholesterol. If you think you may have a problem with alcohol or drug use, talk to your doctor. Medicines  · Take your medicines exactly as prescribed.  Call your doctor if you think you are having a problem with your medicine. · If your doctor recommends aspirin, take the amount directed each day. Make sure you take aspirin and not another kind of pain reliever, such as acetaminophen (Tylenol). When should you call for help? [id number] if you have symptoms of a heart attack. These may include:  · Chest pain or pressure, or a strange feeling in the chest.  · Sweating. · Shortness of breath. · Pain, pressure, or a strange feeling in the back, neck, jaw, or upper belly or in one or both shoulders or arms. · Lightheadedness or sudden weakness. · A fast or irregular heartbeat. After you call 911, the  may tell you to chew 1 adult-strength or 2 to 4 low-dose aspirin. Wait for an ambulance. Do not try to drive yourself. Watch closely for changes in your health, and be sure to contact your doctor if you have any problems. Where can you learn more? Go to https://[employer]. org and sign in to your [employer] account. Enter B259 in the Baton box to learn more about \"A Healthy Heart: Care Instructions. \"     If you do not have an account, please click on the \"Sign Up Now\" link. Current as of: December 16, 2019               Content Version: 12.5  © 4032-3333 [employer], [employer]. Care instructions adapted under license by [facility] [facility] ([facility]). If you have questions about a medical condition or this instruction, always ask your healthcare professional. [place] [address] any warranty or liability for your use of this information.

## 2020-12-04 ENCOUNTER — TRANSCRIBE ORDERS (OUTPATIENT)
Dept: ADMINISTRATIVE | Facility: HOSPITAL | Age: 80
End: 2020-12-04

## 2020-12-04 DIAGNOSIS — R92.8 ABNORMAL MAMMOGRAM: Primary | ICD-10-CM

## 2020-12-09 ENCOUNTER — HOSPITAL ENCOUNTER (OUTPATIENT)
Dept: MAMMOGRAPHY | Facility: HOSPITAL | Age: 80
Discharge: HOME | End: 2020-12-09

## 2020-12-09 DIAGNOSIS — R92.8 ABNORMAL MAMMOGRAM: ICD-10-CM

## 2020-12-09 PROCEDURE — A4648 IMPLANTABLE TISSUE MARKER: HCPCS

## 2020-12-09 PROCEDURE — 88305 TISSUE EXAM BY PATHOLOGIST: CPT | Performed by: SPECIALIST

## 2020-12-09 RX ORDER — LIDOCAINE HYDROCHLORIDE 10 MG/ML
10 INJECTION, SOLUTION INFILTRATION; PERINEURAL ONCE
Status: DISPENSED | OUTPATIENT
Start: 2020-12-09

## 2020-12-09 RX ORDER — LIDOCAINE HYDROCHLORIDE AND EPINEPHRINE 10; 10 MG/ML; UG/ML
10 INJECTION, SOLUTION INFILTRATION; PERINEURAL ONCE
Status: DISPENSED | OUTPATIENT
Start: 2020-12-09

## 2020-12-10 LAB
CYTO UR: NORMAL
LAB AP CASE REPORT: NORMAL
LAB AP CLINICAL INFORMATION: NORMAL
PATH REPORT.FINAL DX SPEC: NORMAL
PATH REPORT.GROSS SPEC: NORMAL

## 2020-12-15 ENCOUNTER — TRANSCRIBE ORDERS (OUTPATIENT)
Dept: ADMINISTRATIVE | Facility: HOSPITAL | Age: 80
End: 2020-12-15

## 2020-12-15 DIAGNOSIS — D36.9 PAPILLOMA: Primary | ICD-10-CM

## 2020-12-21 ENCOUNTER — OFFICE VISIT (OUTPATIENT)
Dept: INTERNAL MEDICINE | Facility: CLINIC | Age: 80
End: 2020-12-21

## 2020-12-21 VITALS
HEART RATE: 63 BPM | WEIGHT: 217 LBS | TEMPERATURE: 97.1 F | HEIGHT: 63 IN | BODY MASS INDEX: 38.45 KG/M2 | SYSTOLIC BLOOD PRESSURE: 134 MMHG | OXYGEN SATURATION: 99 % | DIASTOLIC BLOOD PRESSURE: 70 MMHG

## 2020-12-21 DIAGNOSIS — E78.2 MIXED HYPERLIPIDEMIA: ICD-10-CM

## 2020-12-21 DIAGNOSIS — E03.9 HYPOTHYROIDISM, UNSPECIFIED TYPE: ICD-10-CM

## 2020-12-21 DIAGNOSIS — I10 HTN (HYPERTENSION), BENIGN: ICD-10-CM

## 2020-12-21 DIAGNOSIS — E55.9 VITAMIN D DEFICIENCY: ICD-10-CM

## 2020-12-21 PROBLEM — I34.0 MODERATE MITRAL REGURGITATION BY PRIOR ECHOCARDIOGRAM: Status: ACTIVE | Noted: 2020-12-03

## 2020-12-21 PROCEDURE — G0438 PPPS, INITIAL VISIT: HCPCS | Performed by: INTERNAL MEDICINE

## 2020-12-21 PROCEDURE — 1170F FXNL STATUS ASSESSED: CPT | Performed by: INTERNAL MEDICINE

## 2020-12-21 PROCEDURE — 1159F MED LIST DOCD IN RCRD: CPT | Performed by: INTERNAL MEDICINE

## 2020-12-21 NOTE — PROGRESS NOTES
The ABCs of the Annual Wellness Visit  Initial Medicare Wellness Visit    Chief Complaint   Patient presents with   • Medicare Wellness-Initial Visit       Subjective   History of Present Illness:  Heidi Ansari is a 80 y.o. female who presents for an Initial Medicare Wellness Visit.    HEALTH RISK ASSESSMENT    Recent Hospitalizations:  Recently treated at the following:  [facility]    Current Medical Providers:  Patient Care Team:  Mitch Davidson MD as PCP - General  Mitch Davidson MD as PCP - Family Medicine    Smoking Status:  Social History     Tobacco Use   Smoking Status Never Smoker   Smokeless Tobacco Never Used       Alcohol Consumption:  Social History     Substance and Sexual Activity   Alcohol Use No   • Frequency: Never       Depression Screen:   PHQ-2/PHQ-9 Depression Screening 1/22/2020   Little interest or pleasure in doing things 0   Feeling down, depressed, or hopeless 0   Trouble falling or staying asleep, or sleeping too much 0   Feeling tired or having little energy 0   Poor appetite or overeating 0   Feeling bad about yourself - or that you are a failure or have let yourself or your family down 0   Trouble concentrating on things, such as reading the newspaper or watching television 0   Moving or speaking so slowly that other people could have noticed. Or the opposite - being so fidgety or restless that you have been moving around a lot more than usual 0   Thoughts that you would be better off dead, or of hurting yourself in some way 0   Total Score 0       Fall Risk Screen:  STEADI Fall Risk Assessment has not been completed.    Health Habits and Functional and Cognitive Screening:  No flowsheet data found.      Does the patient have evidence of cognitive impairment? No    Asprin use counseling:Does not need ASA (and currently is not on it)    Age-appropriate Screening Schedule:  Refer to the list below for future screening recommendations based on patient's age, sex  and/or medical conditions. Orders for these recommended tests are listed in the plan section. The patient has been provided with a written plan.    Health Maintenance   Topic Date Due   • TDAP/TD VACCINES (1 - Tdap) 12/08/1959   • LIPID PANEL  06/24/2019   • MAMMOGRAM  12/03/2021   • DXA SCAN  06/02/2022   • COLONOSCOPY  10/17/2024   • INFLUENZA VACCINE  Completed   • ZOSTER VACCINE  Completed          The following portions of the patient's history were reviewed and updated as appropriate: allergies, current medications, past family history, past medical history, past social history, past surgical history and problem list.    Outpatient Medications Prior to Visit   Medication Sig Dispense Refill   • acetaminophen (TYLENOL) 500 MG tablet Take 500 mg by mouth Every 6 (Six) Hours As Needed for Mild Pain .     • allopurinol (ZYLOPRIM) 100 MG tablet Take 200 mg by mouth 2 (Two) Times a Day.  3   • anastrozole (ARIMIDEX) 1 MG tablet Take 1 tablet by mouth Daily. 30 tablet 5   • bumetanide (BUMEX) 0.5 MG tablet Take 1 tablet by mouth Daily. 90 tablet 3   • calcium carbonate-vitamin d 600-400 MG-UNIT per tablet Take 1 tablet by mouth 2 (Two) Times a Day. 60 tablet 5   • carboxymethylcellulose (REFRESH PLUS) 0.5 % solution Administer 1 drop to both eyes 3 (Three) Times a Day As Needed for Dry Eyes.     • dronedarone (MULTAQ) 400 MG tablet Take 400 mg by mouth 2 (Two) Times a Day With Meals.     • ferrous sulfate 325 (65 Fe) MG tablet TAKE 1 TABLET BY MOUTH THREE TIMES DAILY (Patient taking differently: TAKES 1 TABLET 4 TIMES PER WEEK.) 90 tablet 2   • levothyroxine (SYNTHROID, LEVOTHROID) 50 MCG tablet Take 1 tablet by mouth once daily 90 tablet 1   • losartan (COZAAR) 50 MG tablet Take 1 tablet by mouth Daily. 90 tablet 3   • metoprolol succinate XL (TOPROL-XL) 25 MG 24 hr tablet Take 25 mg by mouth 2 (Two) Times a Day.  1   • polyethylene glycol (MIRALAX) powder Take 17 g by mouth Daily.     • simvastatin (ZOCOR) 40 MG  tablet Take 1 tablet by mouth once daily 90 tablet 3   • spironolactone (ALDACTONE) 25 MG tablet Take 1 tablet by mouth once daily 90 tablet 1   • warfarin (COUMADIN) 5 MG tablet Take  by mouth Take As Directed. Take 1 whole tablet (5 mg) on Wednesday only. Take 1/2 tablet (2.5 mg) all other days.  5     Facility-Administered Medications Prior to Visit   Medication Dose Route Frequency Provider Last Rate Last Admin   • lidocaine (XYLOCAINE) 1 % injection 10 mL  10 mL Subcutaneous Once Nikki Zuniga MD       • lidocaine-EPINEPHrine (XYLOCAINE W/EPI) 1 %-1:[id number] injection 10 mL  10 mL Injection Once Nikki Zuniga MD           Patient Active Problem List   Diagnosis   • Hx of adenomatous colonic polyps   • HTN (hypertension), benign   • Anticoagulated on Coumadin   • Family hx of colon cancer   • Breast cancer, left (CMS/HCC)   • Anemia due to stage 3 chronic kidney disease   • Acute frontal sinusitis   • Chronic anticoagulation   • Endometrial thickening on ultrasound   • Excessive anticoagulation   • History of hypertension   • Hypothyroidism   • Kidney disease   • Mixed hyperlipidemia   • Atrial fibrillation, controlled (CMS/HCC)   • PMB (postmenopausal bleeding)   • Brain lesion   • Obesity (BMI 30-39.9)   • Non-tobacco user   • Anemia in stage 4 chronic kidney disease (CMS/HCC)   • Atrophic vaginitis   • Cardiomegaly   • Hematuria   • Myxedema heart disease   • PVC (premature ventricular contraction)   • Rheumatoid arthritis (CMS/HCC)   • Seborrheic keratosis   • Thrombocytopenia (CMS/HCC)   • Vitamin D deficiency       Advanced Care Planning:  ACP discussion was held with the patient during this visit. Patient has an advance directive in EMR which is still valid.     Review of Systems   Constitutional: Negative for activity change, appetite change and chills.   HENT: Negative for congestion, ear pain and facial swelling.    Eyes: Negative for pain, discharge and itching.   Respiratory: Negative for  apnea, cough and shortness of breath.    Cardiovascular: Negative for chest pain, palpitations and leg swelling.   Gastrointestinal: Negative for abdominal distention, abdominal pain and anal bleeding.   Endocrine: Negative for cold intolerance and heat intolerance.   Genitourinary: Negative for difficulty urinating, dysuria and flank pain.   Musculoskeletal: Negative for arthralgias, back pain and joint swelling.   Skin: Negative for color change, pallor and rash.   Allergic/Immunologic: Negative for environmental allergies and food allergies.   Neurological: Negative for dizziness and facial asymmetry.   Hematological: Negative for adenopathy. Does not bruise/bleed easily.   Psychiatric/Behavioral: Negative for agitation, behavioral problems and confusion.       Compared to one year ago, the patient feels her physical health is the same.  Compared to one year ago, the patient feels her mental health is the same.    Reviewed chart for potential of high risk medication in the elderly: yes  Reviewed chart for potential of harmful drug interactions in the elderly:yes    Objective       There were no vitals filed for this visit.    There is no height or weight on file to calculate BMI.  Discussed the patient's BMI with her. The BMI is above average; BMI management plan is completed.    Physical Exam  Constitutional:       Appearance: Normal appearance. She is well-developed.   HENT:      Head: Normocephalic and atraumatic.      Right Ear: External ear normal.      Left Ear: External ear normal.   Eyes:      Extraocular Movements: Extraocular movements intact.      Conjunctiva/sclera: Conjunctivae normal.      Pupils: Pupils are equal, round, and reactive to light.   Neck:      Musculoskeletal: Normal range of motion and neck supple. No neck rigidity.      Vascular: No carotid bruit.   Cardiovascular:      Rate and Rhythm: Normal rate and regular rhythm.      Pulses: Normal pulses.      Heart sounds: Normal heart  sounds. No murmur. No gallop.    Pulmonary:      Effort: Pulmonary effort is normal.      Breath sounds: Normal breath sounds.   Musculoskeletal: Normal range of motion.         General: No swelling or tenderness.   Skin:     General: Skin is warm and dry.   Neurological:      General: No focal deficit present.      Mental Status: She is alert and oriented to person, place, and time.   Psychiatric:         Mood and Affect: Mood normal.         Behavior: Behavior normal.               Assessment/Plan   Medicare Risks and Personalized Health Plan  CMS Preventative Services Quick Reference  Advance Directive Discussion  Breast Cancer/Mammogram Screening  Colon Cancer Screening  Immunizations Discussed/Encouraged (specific immunizations; adacel Tdap )  Osteoprorosis Risk    The above risks/problems have been discussed with the patient.  Pertinent information has been shared with the patient in the After Visit Summary.  Follow up plans and orders are seen below in the Assessment/Plan Section.    Diagnoses and all orders for this visit:    1. HTN (hypertension), benign  -     CBC & Differential  -     Comprehensive Metabolic Panel  -     Urinalysis With Microscopic - Urine, Clean Catch  -     TSH  -     Lipid Panel  -     Uric Acid    2. Mixed hyperlipidemia  -     CBC & Differential  -     Comprehensive Metabolic Panel  -     Urinalysis With Microscopic - Urine, Clean Catch  -     TSH  -     Lipid Panel  -     Uric Acid    3. Vitamin D deficiency  -     CBC & Differential  -     Comprehensive Metabolic Panel  -     Urinalysis With Microscopic - Urine, Clean Catch  -     TSH  -     Lipid Panel  -     Uric Acid  -     Vitamin D 25 Hydroxy    4. Hypothyroidism, unspecified type  -     CBC & Differential  -     Comprehensive Metabolic Panel  -     Urinalysis With Microscopic - Urine, Clean Catch  -     TSH  -     Lipid Panel  -     Uric Acid      Follow Up:  No follow-ups on file.  Patient is scheduled for surgery on her  breast with Dr. Zuniga.  Basically she is doing well from a medical standpoint with good blood pressures.  She is having routine lab work today I have specifically asked her to talk with the people in the laboratory preop to see if they will look at what she is already had done to see if that will suffice rather than repeat her lab.    An After Visit Summary and PPPS were given to the patient.

## 2020-12-22 ENCOUNTER — TRANSCRIBE ORDERS (OUTPATIENT)
Dept: ADMINISTRATIVE | Facility: HOSPITAL | Age: 80
End: 2020-12-22

## 2020-12-22 DIAGNOSIS — Z11.59 SCREENING FOR VIRAL DISEASE: Primary | ICD-10-CM

## 2020-12-22 LAB
25(OH)D3+25(OH)D2 SERPL-MCNC: 39.4 NG/ML (ref 30–100)
ALBUMIN SERPL-MCNC: 4.6 G/DL (ref 3.5–5.2)
ALBUMIN/GLOB SERPL: 1.8 G/DL
ALP SERPL-CCNC: 92 U/L (ref 39–117)
ALT SERPL-CCNC: 10 U/L (ref 1–33)
APPEARANCE UR: CLEAR
AST SERPL-CCNC: 16 U/L (ref 1–32)
BACTERIA #/AREA URNS HPF: NORMAL /HPF
BASOPHILS # BLD AUTO: 0.04 10*3/MM3 (ref 0–0.2)
BASOPHILS NFR BLD AUTO: 0.6 % (ref 0–1.5)
BILIRUB SERPL-MCNC: 0.7 MG/DL (ref 0–1.2)
BILIRUB UR QL STRIP: NEGATIVE
BUN SERPL-MCNC: 26 MG/DL (ref 8–23)
BUN/CREAT SERPL: 13.4 (ref 7–25)
CALCIUM SERPL-MCNC: 9.3 MG/DL (ref 8.6–10.5)
CASTS URNS MICRO: NORMAL
CHLORIDE SERPL-SCNC: 100 MMOL/L (ref 98–107)
CHOLEST SERPL-MCNC: 153 MG/DL (ref 0–200)
CO2 SERPL-SCNC: 24.9 MMOL/L (ref 22–29)
COLOR UR: YELLOW
CREAT SERPL-MCNC: 1.94 MG/DL (ref 0.57–1)
EOSINOPHIL # BLD AUTO: 0.11 10*3/MM3 (ref 0–0.4)
EOSINOPHIL NFR BLD AUTO: 1.5 % (ref 0.3–6.2)
EPI CELLS #/AREA URNS HPF: NORMAL /HPF
ERYTHROCYTE [DISTWIDTH] IN BLOOD BY AUTOMATED COUNT: 13.3 % (ref 12.3–15.4)
GLOBULIN SER CALC-MCNC: 2.5 GM/DL
GLUCOSE SERPL-MCNC: 80 MG/DL (ref 65–99)
GLUCOSE UR QL: NEGATIVE
HCT VFR BLD AUTO: 40.4 % (ref 34–46.6)
HDLC SERPL-MCNC: 56 MG/DL (ref 40–60)
HGB BLD-MCNC: 12.7 G/DL (ref 12–15.9)
HGB UR QL STRIP: NEGATIVE
IMM GRANULOCYTES # BLD AUTO: 0.02 10*3/MM3 (ref 0–0.05)
IMM GRANULOCYTES NFR BLD AUTO: 0.3 % (ref 0–0.5)
KETONES UR QL STRIP: NEGATIVE
LDLC SERPL CALC-MCNC: 67 MG/DL (ref 0–100)
LEUKOCYTE ESTERASE UR QL STRIP: NEGATIVE
LYMPHOCYTES # BLD AUTO: 2.43 10*3/MM3 (ref 0.7–3.1)
LYMPHOCYTES NFR BLD AUTO: 33.8 % (ref 19.6–45.3)
MCH RBC QN AUTO: 31.2 PG (ref 26.6–33)
MCHC RBC AUTO-ENTMCNC: 31.4 G/DL (ref 31.5–35.7)
MCV RBC AUTO: 99.3 FL (ref 79–97)
MONOCYTES # BLD AUTO: 0.53 10*3/MM3 (ref 0.1–0.9)
MONOCYTES NFR BLD AUTO: 7.4 % (ref 5–12)
NEUTROPHILS # BLD AUTO: 4.07 10*3/MM3 (ref 1.7–7)
NEUTROPHILS NFR BLD AUTO: 56.4 % (ref 42.7–76)
NITRITE UR QL STRIP: NEGATIVE
NRBC BLD AUTO-RTO: 0.1 /100 WBC (ref 0–0.2)
PH UR STRIP: 6 [PH] (ref 5–8)
PLATELET # BLD AUTO: 171 10*3/MM3 (ref 140–450)
POTASSIUM SERPL-SCNC: 4.2 MMOL/L (ref 3.5–5.2)
PROT SERPL-MCNC: 7.1 G/DL (ref 6–8.5)
PROT UR QL STRIP: NEGATIVE
RBC # BLD AUTO: 4.07 10*6/MM3 (ref 3.77–5.28)
RBC #/AREA URNS HPF: NORMAL /HPF
SODIUM SERPL-SCNC: 136 MMOL/L (ref 136–145)
SP GR UR: 1.01 (ref 1–1.03)
TRIGL SERPL-MCNC: 184 MG/DL (ref 0–150)
TSH SERPL DL<=0.005 MIU/L-ACNC: 2.12 UIU/ML (ref 0.27–4.2)
URATE SERPL-MCNC: 5.4 MG/DL (ref 2.4–5.7)
UROBILINOGEN UR STRIP-MCNC: NORMAL MG/DL
VLDLC SERPL CALC-MCNC: 30 MG/DL (ref 5–40)
WBC # BLD AUTO: 7.2 10*3/MM3 (ref 3.4–10.8)
WBC #/AREA URNS HPF: NORMAL /HPF

## 2020-12-28 ENCOUNTER — LAB (OUTPATIENT)
Dept: LAB | Facility: HOSPITAL | Age: 80
End: 2020-12-28

## 2020-12-28 ENCOUNTER — APPOINTMENT (OUTPATIENT)
Dept: PREADMISSION TESTING | Facility: HOSPITAL | Age: 80
End: 2020-12-28

## 2020-12-28 VITALS
BODY MASS INDEX: 36.4 KG/M2 | RESPIRATION RATE: 18 BRPM | DIASTOLIC BLOOD PRESSURE: 73 MMHG | SYSTOLIC BLOOD PRESSURE: 152 MMHG | WEIGHT: 218.48 LBS | HEART RATE: 75 BPM | OXYGEN SATURATION: 96 % | HEIGHT: 65 IN

## 2020-12-28 LAB
ALBUMIN SERPL-MCNC: 4.2 G/DL (ref 3.5–5.2)
ALBUMIN/GLOB SERPL: 1.5 G/DL
ALP SERPL-CCNC: 84 U/L (ref 39–117)
ALT SERPL W P-5'-P-CCNC: 9 U/L (ref 1–33)
ANION GAP SERPL CALCULATED.3IONS-SCNC: 9 MMOL/L (ref 5–15)
AST SERPL-CCNC: 16 U/L (ref 1–32)
BASOPHILS # BLD MANUAL: 0.07 10*3/MM3 (ref 0–0.2)
BASOPHILS NFR BLD AUTO: 1 % (ref 0–1.5)
BILIRUB SERPL-MCNC: 0.7 MG/DL (ref 0–1.2)
BUN SERPL-MCNC: 26 MG/DL (ref 8–23)
BUN/CREAT SERPL: 14.3 (ref 7–25)
CALCIUM SPEC-SCNC: 9.5 MG/DL (ref 8.6–10.5)
CHLORIDE SERPL-SCNC: 104 MMOL/L (ref 98–107)
CO2 SERPL-SCNC: 26 MMOL/L (ref 22–29)
CREAT SERPL-MCNC: 1.82 MG/DL (ref 0.57–1)
DEPRECATED RDW RBC AUTO: 45.8 FL (ref 37–54)
EOSINOPHIL # BLD MANUAL: 0.21 10*3/MM3 (ref 0–0.4)
EOSINOPHIL NFR BLD MANUAL: 3 % (ref 0.3–6.2)
ERYTHROCYTE [DISTWIDTH] IN BLOOD BY AUTOMATED COUNT: 13.3 % (ref 12.3–15.4)
GFR SERPL CREATININE-BSD FRML MDRD: 27 ML/MIN/1.73
GLOBULIN UR ELPH-MCNC: 2.8 GM/DL
GLUCOSE SERPL-MCNC: 92 MG/DL (ref 65–99)
HCT VFR BLD AUTO: 35.9 % (ref 34–46.6)
HGB BLD-MCNC: 12.4 G/DL (ref 12–15.9)
LYMPHOCYTES # BLD MANUAL: 1.27 10*3/MM3 (ref 0.7–3.1)
LYMPHOCYTES NFR BLD MANUAL: 18.2 % (ref 19.6–45.3)
LYMPHOCYTES NFR BLD MANUAL: 7.1 % (ref 5–12)
MCH RBC QN AUTO: 32.5 PG (ref 26.6–33)
MCHC RBC AUTO-ENTMCNC: 34.5 G/DL (ref 31.5–35.7)
MCV RBC AUTO: 94.2 FL (ref 79–97)
MONOCYTES # BLD AUTO: 0.5 10*3/MM3 (ref 0.1–0.9)
NEUTROPHILS # BLD AUTO: 4.8 10*3/MM3 (ref 1.7–7)
NEUTROPHILS NFR BLD MANUAL: 68.7 % (ref 42.7–76)
PLATELET # BLD AUTO: 120 10*3/MM3 (ref 140–450)
PMV BLD AUTO: 12.3 FL (ref 6–12)
POTASSIUM SERPL-SCNC: 4.3 MMOL/L (ref 3.5–5.2)
PROT SERPL-MCNC: 7 G/DL (ref 6–8.5)
RBC # BLD AUTO: 3.81 10*6/MM3 (ref 3.77–5.28)
RBC MORPH BLD: NORMAL
SARS-COV-2 ORF1AB RESP QL NAA+PROBE: NOT DETECTED
SMALL PLATELETS BLD QL SMEAR: ABNORMAL
SODIUM SERPL-SCNC: 139 MMOL/L (ref 136–145)
VARIANT LYMPHS NFR BLD MANUAL: 2 % (ref 0–5)
WBC # BLD AUTO: 6.99 10*3/MM3 (ref 3.4–10.8)
WBC MORPH BLD: NORMAL

## 2020-12-28 PROCEDURE — 93010 ELECTROCARDIOGRAM REPORT: CPT | Performed by: INTERNAL MEDICINE

## 2020-12-28 PROCEDURE — 85007 BL SMEAR W/DIFF WBC COUNT: CPT

## 2020-12-28 PROCEDURE — 36415 COLL VENOUS BLD VENIPUNCTURE: CPT

## 2020-12-28 PROCEDURE — 85027 COMPLETE CBC AUTOMATED: CPT

## 2020-12-28 PROCEDURE — 93005 ELECTROCARDIOGRAM TRACING: CPT

## 2020-12-28 PROCEDURE — U0004 COV-19 TEST NON-CDC HGH THRU: HCPCS | Performed by: SPECIALIST

## 2020-12-28 PROCEDURE — 80053 COMPREHEN METABOLIC PANEL: CPT

## 2020-12-28 PROCEDURE — C9803 HOPD COVID-19 SPEC COLLECT: HCPCS | Performed by: SPECIALIST

## 2020-12-29 LAB
QT INTERVAL: 498 MS
QTC INTERVAL: 493 MS

## 2020-12-31 ENCOUNTER — ANESTHESIA (OUTPATIENT)
Dept: PERIOP | Facility: HOSPITAL | Age: 80
End: 2020-12-31

## 2020-12-31 ENCOUNTER — HOSPITAL ENCOUNTER (OUTPATIENT)
Dept: MAMMOGRAPHY | Facility: HOSPITAL | Age: 80
Discharge: HOME | End: 2020-12-31

## 2020-12-31 ENCOUNTER — HOSPITAL ENCOUNTER (OUTPATIENT)
Dept: ULTRASOUND IMAGING | Facility: HOSPITAL | Age: 80
Discharge: HOME | End: 2020-12-31

## 2020-12-31 ENCOUNTER — ANESTHESIA EVENT (OUTPATIENT)
Dept: PERIOP | Facility: HOSPITAL | Age: 80
End: 2020-12-31

## 2020-12-31 ENCOUNTER — HOSPITAL ENCOUNTER (OUTPATIENT)
Facility: HOSPITAL | Age: 80
Setting detail: HOSPITAL OUTPATIENT SURGERY
Discharge: HOME | End: 2020-12-31
Attending: SPECIALIST | Admitting: SPECIALIST

## 2020-12-31 VITALS
TEMPERATURE: 97 F | OXYGEN SATURATION: 96 % | DIASTOLIC BLOOD PRESSURE: 65 MMHG | HEART RATE: 56 BPM | SYSTOLIC BLOOD PRESSURE: 150 MMHG | RESPIRATION RATE: 16 BRPM

## 2020-12-31 DIAGNOSIS — D36.9 PAPILLOMA: ICD-10-CM

## 2020-12-31 DIAGNOSIS — C50.912 MALIGNANT NEOPLASM OF LEFT BREAST IN FEMALE, ESTROGEN RECEPTOR POSITIVE, UNSPECIFIED SITE OF BREAST (HCC): Primary | ICD-10-CM

## 2020-12-31 DIAGNOSIS — Z17.0 MALIGNANT NEOPLASM OF LEFT BREAST IN FEMALE, ESTROGEN RECEPTOR POSITIVE, UNSPECIFIED SITE OF BREAST (HCC): Primary | ICD-10-CM

## 2020-12-31 LAB
GLUCOSE BLDC GLUCOMTR-MCNC: 105 MG/DL (ref 70–130)
INR PPP: 1.13 (ref 0.91–1.09)
PROTHROMBIN TIME: 14.1 SECONDS (ref 11.9–14.6)

## 2020-12-31 PROCEDURE — 82962 GLUCOSE BLOOD TEST: CPT

## 2020-12-31 PROCEDURE — 25010000002 ONDANSETRON PER 1 MG: Performed by: NURSE ANESTHETIST, CERTIFIED REGISTERED

## 2020-12-31 PROCEDURE — 76098 X-RAY EXAM SURGICAL SPECIMEN: CPT

## 2020-12-31 PROCEDURE — 25010000002 ONDANSETRON PER 1 MG: Performed by: ANESTHESIOLOGY

## 2020-12-31 PROCEDURE — 85610 PROTHROMBIN TIME: CPT | Performed by: SPECIALIST

## 2020-12-31 PROCEDURE — 25010000002 FENTANYL CITRATE (PF) 100 MCG/2ML SOLUTION: Performed by: NURSE ANESTHETIST, CERTIFIED REGISTERED

## 2020-12-31 PROCEDURE — 94799 UNLISTED PULMONARY SVC/PX: CPT

## 2020-12-31 PROCEDURE — 25010000002 DEXAMETHASONE PER 1 MG: Performed by: NURSE ANESTHETIST, CERTIFIED REGISTERED

## 2020-12-31 PROCEDURE — 88307 TISSUE EXAM BY PATHOLOGIST: CPT | Performed by: SPECIALIST

## 2020-12-31 PROCEDURE — 25010000002 PROPOFOL 10 MG/ML EMULSION: Performed by: NURSE ANESTHETIST, CERTIFIED REGISTERED

## 2020-12-31 RX ORDER — SODIUM CHLORIDE, SODIUM LACTATE, POTASSIUM CHLORIDE, CALCIUM CHLORIDE 600; 310; 30; 20 MG/100ML; MG/100ML; MG/100ML; MG/100ML
1000 INJECTION, SOLUTION INTRAVENOUS CONTINUOUS
Status: DISCONTINUED | OUTPATIENT
Start: 2020-12-31 | End: 2020-12-31 | Stop reason: HOSPADM

## 2020-12-31 RX ORDER — ONDANSETRON 2 MG/ML
4 INJECTION INTRAMUSCULAR; INTRAVENOUS ONCE AS NEEDED
Status: COMPLETED | OUTPATIENT
Start: 2020-12-31 | End: 2020-12-31

## 2020-12-31 RX ORDER — SODIUM CHLORIDE 0.9 % (FLUSH) 0.9 %
10 SYRINGE (ML) INJECTION EVERY 12 HOURS SCHEDULED
Status: DISCONTINUED | OUTPATIENT
Start: 2020-12-31 | End: 2020-12-31 | Stop reason: HOSPADM

## 2020-12-31 RX ORDER — NALOXONE HCL 0.4 MG/ML
0.4 VIAL (ML) INJECTION AS NEEDED
Status: DISCONTINUED | OUTPATIENT
Start: 2020-12-31 | End: 2020-12-31 | Stop reason: HOSPADM

## 2020-12-31 RX ORDER — DEXTROSE MONOHYDRATE 25 G/50ML
12.5 INJECTION, SOLUTION INTRAVENOUS AS NEEDED
Status: DISCONTINUED | OUTPATIENT
Start: 2020-12-31 | End: 2020-12-31 | Stop reason: HOSPADM

## 2020-12-31 RX ORDER — FENTANYL CITRATE 50 UG/ML
25 INJECTION, SOLUTION INTRAMUSCULAR; INTRAVENOUS
Status: DISCONTINUED | OUTPATIENT
Start: 2020-12-31 | End: 2020-12-31 | Stop reason: HOSPADM

## 2020-12-31 RX ORDER — ONDANSETRON 2 MG/ML
INJECTION INTRAMUSCULAR; INTRAVENOUS AS NEEDED
Status: DISCONTINUED | OUTPATIENT
Start: 2020-12-31 | End: 2020-12-31 | Stop reason: SURG

## 2020-12-31 RX ORDER — PROPOFOL 10 MG/ML
VIAL (ML) INTRAVENOUS AS NEEDED
Status: DISCONTINUED | OUTPATIENT
Start: 2020-12-31 | End: 2020-12-31 | Stop reason: SURG

## 2020-12-31 RX ORDER — IBUPROFEN 600 MG/1
600 TABLET ORAL ONCE AS NEEDED
Status: DISCONTINUED | OUTPATIENT
Start: 2020-12-31 | End: 2020-12-31 | Stop reason: HOSPADM

## 2020-12-31 RX ORDER — MAGNESIUM HYDROXIDE 1200 MG/15ML
LIQUID ORAL AS NEEDED
Status: DISCONTINUED | OUTPATIENT
Start: 2020-12-31 | End: 2020-12-31 | Stop reason: HOSPADM

## 2020-12-31 RX ORDER — LABETALOL HYDROCHLORIDE 5 MG/ML
5 INJECTION, SOLUTION INTRAVENOUS
Status: DISCONTINUED | OUTPATIENT
Start: 2020-12-31 | End: 2020-12-31 | Stop reason: HOSPADM

## 2020-12-31 RX ORDER — SODIUM CHLORIDE 0.9 % (FLUSH) 0.9 %
10 SYRINGE (ML) INJECTION AS NEEDED
Status: DISCONTINUED | OUTPATIENT
Start: 2020-12-31 | End: 2020-12-31 | Stop reason: HOSPADM

## 2020-12-31 RX ORDER — LIDOCAINE HYDROCHLORIDE 10 MG/ML
0.5 INJECTION, SOLUTION EPIDURAL; INFILTRATION; INTRACAUDAL; PERINEURAL ONCE AS NEEDED
Status: DISCONTINUED | OUTPATIENT
Start: 2020-12-31 | End: 2020-12-31 | Stop reason: HOSPADM

## 2020-12-31 RX ORDER — OXYCODONE AND ACETAMINOPHEN 10; 325 MG/1; MG/1
1 TABLET ORAL ONCE AS NEEDED
Status: COMPLETED | OUTPATIENT
Start: 2020-12-31 | End: 2020-12-31

## 2020-12-31 RX ORDER — FENTANYL CITRATE 50 UG/ML
INJECTION, SOLUTION INTRAMUSCULAR; INTRAVENOUS AS NEEDED
Status: DISCONTINUED | OUTPATIENT
Start: 2020-12-31 | End: 2020-12-31 | Stop reason: SURG

## 2020-12-31 RX ORDER — FLUMAZENIL 0.1 MG/ML
0.2 INJECTION INTRAVENOUS AS NEEDED
Status: DISCONTINUED | OUTPATIENT
Start: 2020-12-31 | End: 2020-12-31 | Stop reason: HOSPADM

## 2020-12-31 RX ORDER — OXYCODONE HYDROCHLORIDE AND ACETAMINOPHEN 5; 325 MG/1; MG/1
1-2 TABLET ORAL EVERY 4 HOURS PRN
Qty: 20 TABLET | Refills: 0 | Status: SHIPPED | OUTPATIENT
Start: 2020-12-31 | End: 2021-01-06

## 2020-12-31 RX ORDER — DEXAMETHASONE SODIUM PHOSPHATE 4 MG/ML
INJECTION, SOLUTION INTRA-ARTICULAR; INTRALESIONAL; INTRAMUSCULAR; INTRAVENOUS; SOFT TISSUE AS NEEDED
Status: DISCONTINUED | OUTPATIENT
Start: 2020-12-31 | End: 2020-12-31 | Stop reason: SURG

## 2020-12-31 RX ORDER — BUPIVACAINE HYDROCHLORIDE AND EPINEPHRINE 2.5; 5 MG/ML; UG/ML
INJECTION, SOLUTION INFILTRATION; PERINEURAL AS NEEDED
Status: DISCONTINUED | OUTPATIENT
Start: 2020-12-31 | End: 2020-12-31 | Stop reason: HOSPADM

## 2020-12-31 RX ORDER — OXYCODONE AND ACETAMINOPHEN 7.5; 325 MG/1; MG/1
2 TABLET ORAL EVERY 4 HOURS PRN
Status: DISCONTINUED | OUTPATIENT
Start: 2020-12-31 | End: 2020-12-31 | Stop reason: HOSPADM

## 2020-12-31 RX ORDER — SODIUM CHLORIDE 0.9 % (FLUSH) 0.9 %
3 SYRINGE (ML) INJECTION AS NEEDED
Status: DISCONTINUED | OUTPATIENT
Start: 2020-12-31 | End: 2020-12-31 | Stop reason: HOSPADM

## 2020-12-31 RX ORDER — SODIUM CHLORIDE, SODIUM LACTATE, POTASSIUM CHLORIDE, CALCIUM CHLORIDE 600; 310; 30; 20 MG/100ML; MG/100ML; MG/100ML; MG/100ML
9 INJECTION, SOLUTION INTRAVENOUS CONTINUOUS
Status: DISCONTINUED | OUTPATIENT
Start: 2020-12-31 | End: 2020-12-31 | Stop reason: HOSPADM

## 2020-12-31 RX ORDER — OXYCODONE HYDROCHLORIDE AND ACETAMINOPHEN 5; 325 MG/1; MG/1
1 TABLET ORAL ONCE AS NEEDED
Status: DISCONTINUED | OUTPATIENT
Start: 2020-12-31 | End: 2020-12-31 | Stop reason: HOSPADM

## 2020-12-31 RX ADMIN — OXYCODONE HYDROCHLORIDE AND ACETAMINOPHEN 1 TABLET: 10; 325 TABLET ORAL at 11:54

## 2020-12-31 RX ADMIN — CEFAZOLIN 1 G: 1 INJECTION, POWDER, FOR SOLUTION INTRAMUSCULAR; INTRAVENOUS; PARENTERAL at 11:09

## 2020-12-31 RX ADMIN — SODIUM CHLORIDE, POTASSIUM CHLORIDE, SODIUM LACTATE AND CALCIUM CHLORIDE 1000 ML: 600; 310; 30; 20 INJECTION, SOLUTION INTRAVENOUS at 07:49

## 2020-12-31 RX ADMIN — PROPOFOL 100 MG: 10 INJECTION, EMULSION INTRAVENOUS at 10:59

## 2020-12-31 RX ADMIN — PROPOFOL 100 MG: 10 INJECTION, EMULSION INTRAVENOUS at 11:02

## 2020-12-31 RX ADMIN — ONDANSETRON HYDROCHLORIDE 4 MG: 2 SOLUTION INTRAMUSCULAR; INTRAVENOUS at 11:30

## 2020-12-31 RX ADMIN — ONDANSETRON HYDROCHLORIDE 4 MG: 2 SOLUTION INTRAMUSCULAR; INTRAVENOUS at 13:18

## 2020-12-31 RX ADMIN — FENTANYL CITRATE 100 MCG: 50 INJECTION, SOLUTION INTRAMUSCULAR; INTRAVENOUS at 10:59

## 2020-12-31 RX ADMIN — DEXAMETHASONE SODIUM PHOSPHATE 4 MG: 4 INJECTION, SOLUTION INTRAMUSCULAR; INTRAVENOUS at 11:30

## 2020-12-31 NOTE — ANESTHESIA POSTPROCEDURE EVALUATION
Patient: Heidi Ansari    Procedure Summary     Date: 12/31/20 Room / Location:  [facility]  /  [facility]    Anesthesia Start: 1055 Anesthesia Stop: 1140    Procedure: RIGHT NEEDLE DIRECTED EXCISIONAL BREAST BIOPSY (Right Breast) Diagnosis: (PAPILLOMA)    Surgeon: Nikki Zuniga MD Provider: Amish Dave CRNA    Anesthesia Type: general ASA Status: 3          Anesthesia Type: general    Vitals  Vitals Value Taken Time   /59 12/31/20 1155   Temp 97 °F (36.1 °C) 12/31/20 1138   Pulse 58 12/31/20 1159   Resp 14 12/31/20 1150   SpO2 96 % 12/31/20 1159   Vitals shown include unvalidated device data.        Post Anesthesia Care and Evaluation    Patient location during evaluation: PACU  Patient participation: complete - patient participated  Level of consciousness: awake and alert  Pain management: adequate  Airway patency: patent  Anesthetic complications: No anesthetic complications    Cardiovascular status: acceptable  Respiratory status: acceptable  Hydration status: acceptable    Comments: Blood pressure 141/60, pulse 57, temperature 97 °F (36.1 °C), temperature source Temporal, resp. rate 16, SpO2 92 %, not currently breastfeeding.    Pt discharged from PACU based on marcello score >8

## 2020-12-31 NOTE — ANESTHESIA PROCEDURE NOTES
Airway  Date/Time: 12/31/2020 11:00 AM    General Information and Staff    CRNA: Amish Dave CRNA    Indications and Patient Condition  Indications for airway management: CNS depression    Preoxygenated: yes  Mask difficulty assessment: 0 - not attempted    Final Airway Details  Final airway type: supraglottic airway      Successful airway: unique  Size 3    Number of attempts at approach: 1  Assessment: atraumatic intubation    Additional Comments  Atraumatic Insertion

## 2020-12-31 NOTE — ANESTHESIA PREPROCEDURE EVALUATION
Anesthesia Evaluation     Patient summary reviewed   no history of anesthetic complications:  NPO Solid Status: > 8 hours             Airway   Mallampati: II  TM distance: >3 FB  Neck ROM: full  Dental      Pulmonary    (-) COPD, asthma, sleep apnea, not a smoker  Cardiovascular   Exercise tolerance: good (4-7 METS)    (+) hypertension, dysrhythmias Atrial Fib, hyperlipidemia,   (-) pacemaker, past MI, angina, cardiac stents      Neuro/Psych  (-) seizures, TIA, CVA  GI/Hepatic/Renal/Endo    (+) obesity,   renal disease CRI, thyroid problem hypothyroidism  (-) GERD, liver disease, diabetes    Musculoskeletal     Abdominal    Substance History      OB/GYN          Other                        Anesthesia Plan    ASA 3     general     intravenous induction     Anesthetic plan, all risks, benefits, and alternatives have been provided, discussed and informed consent has been obtained with: patient.

## 2021-01-04 LAB
LAB AP CASE REPORT: NORMAL
PATH REPORT.FINAL DX SPEC: NORMAL
PATH REPORT.GROSS SPEC: NORMAL

## 2021-01-06 ENCOUNTER — OFFICE VISIT (OUTPATIENT)
Dept: NEUROSURGERY | Facility: CLINIC | Age: 81
End: 2021-01-06

## 2021-01-06 ENCOUNTER — HOSPITAL ENCOUNTER (OUTPATIENT)
Dept: MRI IMAGING | Facility: HOSPITAL | Age: 81
Discharge: HOME | End: 2021-01-06
Admitting: NEUROLOGICAL SURGERY

## 2021-01-06 VITALS — WEIGHT: 213 LBS | HEIGHT: 65 IN | BODY MASS INDEX: 35.49 KG/M2

## 2021-01-06 DIAGNOSIS — G93.9 BRAIN LESION: ICD-10-CM

## 2021-01-06 DIAGNOSIS — G93.9 BRAIN LESION: Primary | ICD-10-CM

## 2021-01-06 DIAGNOSIS — E66.9 OBESITY (BMI 30-39.9): ICD-10-CM

## 2021-01-06 PROCEDURE — 70551 MRI BRAIN STEM W/O DYE: CPT

## 2021-01-06 PROCEDURE — 99214 OFFICE O/P EST MOD 30 MIN: CPT | Performed by: NEUROLOGICAL SURGERY
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
## 2021-01-14 ENCOUNTER — ANTI-COAG VISIT (OUTPATIENT)
Dept: CARDIOLOGY CLINIC | Age: 81
End: 2021-01-14
Payer: MEDICARE

## 2021-01-14 DIAGNOSIS — I48.0 PAF (PAROXYSMAL ATRIAL FIBRILLATION) (HCC): Primary | ICD-10-CM

## 2021-01-14 LAB
INTERNATIONAL NORMALIZATION RATIO, POC: 2.1
PROTHROMBIN TIME, POC: NORMAL

## 2021-01-14 PROCEDURE — 85610 PROTHROMBIN TIME: CPT | Performed by: CLINICAL NURSE SPECIALIST

## 2021-01-19 RX ORDER — WARFARIN SODIUM 5 MG/1
TABLET ORAL
Qty: 30 TABLET | Refills: 5 | Status: SHIPPED | OUTPATIENT
Start: 2021-01-19 | End: 2021-11-22

## 2021-01-26 ENCOUNTER — TRANSCRIBE ORDERS (OUTPATIENT)
Dept: ADMINISTRATIVE | Facility: HOSPITAL | Age: 81
End: 2021-01-26

## 2021-01-26 DIAGNOSIS — Z12.31 ENCOUNTER FOR SCREENING MAMMOGRAM FOR MALIGNANT NEOPLASM OF BREAST: ICD-10-CM

## 2021-01-26 DIAGNOSIS — Z85.3 HISTORY OF BREAST CANCER: Primary | ICD-10-CM

## 2021-02-04 ENCOUNTER — LAB (OUTPATIENT)
Dept: LAB | Facility: HOSPITAL | Age: 81
End: 2021-02-04

## 2021-02-04 DIAGNOSIS — Z17.0 MALIGNANT NEOPLASM OF LEFT BREAST IN FEMALE, ESTROGEN RECEPTOR POSITIVE, UNSPECIFIED SITE OF BREAST (HCC): ICD-10-CM

## 2021-02-04 DIAGNOSIS — C50.912 MALIGNANT NEOPLASM OF LEFT BREAST IN FEMALE, ESTROGEN RECEPTOR POSITIVE, UNSPECIFIED SITE OF BREAST (HCC): ICD-10-CM

## 2021-02-04 LAB
ALBUMIN SERPL-MCNC: 4.5 G/DL (ref 3.5–5.2)
ALBUMIN/GLOB SERPL: 1.7 G/DL
ALP SERPL-CCNC: 92 U/L (ref 39–117)
ALT SERPL W P-5'-P-CCNC: 9 U/L (ref 1–33)
ANION GAP SERPL CALCULATED.3IONS-SCNC: 10 MMOL/L (ref 5–15)
AST SERPL-CCNC: 21 U/L (ref 1–32)
BASOPHILS # BLD AUTO: 0.04 10*3/MM3 (ref 0–0.2)
BASOPHILS NFR BLD AUTO: 0.7 % (ref 0–1.5)
BILIRUB SERPL-MCNC: 0.6 MG/DL (ref 0–1.2)
BUN SERPL-MCNC: 30 MG/DL (ref 8–23)
BUN/CREAT SERPL: 16.2 (ref 7–25)
CALCIUM SPEC-SCNC: 9.8 MG/DL (ref 8.6–10.5)
CHLORIDE SERPL-SCNC: 102 MMOL/L (ref 98–107)
CO2 SERPL-SCNC: 25 MMOL/L (ref 22–29)
CREAT SERPL-MCNC: 1.85 MG/DL (ref 0.57–1)
DEPRECATED RDW RBC AUTO: 44.9 FL (ref 37–54)
EOSINOPHIL # BLD AUTO: 0.09 10*3/MM3 (ref 0–0.4)
EOSINOPHIL NFR BLD AUTO: 1.5 % (ref 0.3–6.2)
ERYTHROCYTE [DISTWIDTH] IN BLOOD BY AUTOMATED COUNT: 13.2 % (ref 12.3–15.4)
FERRITIN SERPL-MCNC: 253.9 NG/ML (ref 13–150)
GFR SERPL CREATININE-BSD FRML MDRD: 26 ML/MIN/1.73
GLOBULIN UR ELPH-MCNC: 2.7 GM/DL
GLUCOSE SERPL-MCNC: 96 MG/DL (ref 65–99)
HCT VFR BLD AUTO: 38.8 % (ref 34–46.6)
HGB BLD-MCNC: 12.9 G/DL (ref 12–15.9)
IMM GRANULOCYTES # BLD AUTO: 0.02 10*3/MM3 (ref 0–0.05)
IMM GRANULOCYTES NFR BLD AUTO: 0.3 % (ref 0–0.5)
IRON 24H UR-MRATE: 95 MCG/DL (ref 37–145)
IRON SATN MFR SERPL: 22 % (ref 20–50)
LYMPHOCYTES # BLD AUTO: 1.9 10*3/MM3 (ref 0.7–3.1)
LYMPHOCYTES NFR BLD AUTO: 31.3 % (ref 19.6–45.3)
MCH RBC QN AUTO: 31.1 PG (ref 26.6–33)
MCHC RBC AUTO-ENTMCNC: 33.2 G/DL (ref 31.5–35.7)
MCV RBC AUTO: 93.5 FL (ref 79–97)
MONOCYTES # BLD AUTO: 0.51 10*3/MM3 (ref 0.1–0.9)
MONOCYTES NFR BLD AUTO: 8.4 % (ref 5–12)
NEUTROPHILS NFR BLD AUTO: 3.51 10*3/MM3 (ref 1.7–7)
NEUTROPHILS NFR BLD AUTO: 57.8 % (ref 42.7–76)
NRBC BLD AUTO-RTO: 0 /100 WBC (ref 0–0.2)
PLATELET # BLD AUTO: 139 10*3/MM3 (ref 140–450)
PMV BLD AUTO: 11.8 FL (ref 6–12)
POTASSIUM SERPL-SCNC: 4.3 MMOL/L (ref 3.5–5.2)
PROT SERPL-MCNC: 7.2 G/DL (ref 6–8.5)
RBC # BLD AUTO: 4.15 10*6/MM3 (ref 3.77–5.28)
SODIUM SERPL-SCNC: 137 MMOL/L (ref 136–145)
TIBC SERPL-MCNC: 425 MCG/DL (ref 298–536)
TRANSFERRIN SERPL-MCNC: 285 MG/DL (ref 200–360)
WBC # BLD AUTO: 6.07 10*3/MM3 (ref 3.4–10.8)

## 2021-02-04 PROCEDURE — 85025 COMPLETE CBC W/AUTO DIFF WBC: CPT

## 2021-02-04 PROCEDURE — 84466 ASSAY OF TRANSFERRIN: CPT

## 2021-02-04 PROCEDURE — 83540 ASSAY OF IRON: CPT

## 2021-02-04 PROCEDURE — 82378 CARCINOEMBRYONIC ANTIGEN: CPT

## 2021-02-04 PROCEDURE — 36415 COLL VENOUS BLD VENIPUNCTURE: CPT

## 2021-02-04 PROCEDURE — 82728 ASSAY OF FERRITIN: CPT

## 2021-02-04 PROCEDURE — 80053 COMPREHEN METABOLIC PANEL: CPT

## 2021-02-04 PROCEDURE — 86300 IMMUNOASSAY TUMOR CA 15-3: CPT

## 2021-02-05 LAB
CANCER AG27-29 SERPL-ACNC: 20.5 U/ML (ref 0–38.6)
CEA SERPL-MCNC: 1.47 NG/ML

## 2021-02-21 NOTE — PROGRESS NOTES
MGW ONC [facility] GROUP HEMATOLOGY AND ONCOLOGY  [address]  [place] [address] [phone]    Patient Name: Heidi Ansari  Encounter Date: 02/26/2021  YOB: 1940  Patient Number: [id number]      REASON FOR VISIT: Heidi Ansari is an 80-year-old female who returns in follow-up of stage IIA invasive ductal/lobular breast carcinoma.  She has been on adjuvant Arimidex since 01/22/2020 (13 months).  She is here alone (previously with her spouse [place] and daughter Fátima).    DIAGNOSTIC ABNORMALITIES:           1.   11/27/2019- palpable left breast lump x3 weeks.  Diagnostic mammogram and left breast ultrasound.  Impression: Spiculated mass approximately 2 cm upper outer quadrant of the left breast.  Oval well-circumscribed mass appears new in the lower slightly lateral left breast measuring approximately 10 mm.  Targeted left breast ultrasound at the 2 o'clock position 5 cm from nipple an irregular spiculated hypoechoic mass is seen measuring 1.7 x 1.5 x 1.6 cm.  Smaller adjacent hypoechoic irregular mass seen measuring 0.3 cm.  Third irregular hypoechoic mass at the 3 o'clock position measures 2.2 x 0.9 x 1.2 cm in size.  Ultrasound-guided biopsy recommended.  BI-RADS Category 5-highly suggestive of malignancy.  No mammographic evidence of right breast malignancy.          2.   12/06/2019- ultrasound-guided left breast biopsy.  Final diagnosis: 1.left breast mass 2 o'clock position: Core biopsy: Invasive ductal and lobular carcinoma.  Estimated grade 1, involving approximately 90% of the core biopsy specimens.  Focal ductal carcinoma in situ, nuclear grade 1.  Microcalcifications identified in invasive ductal carcinoma.  2.left breast mass 3 o'clock position, core biopsy: Invasive lobular carcinoma, estimated grade 1, involving approximately 90% of the core biopsy specimens.  Microcalcifications not identified.          3.   12/26/2019-  hemoglobin 12.3, hematocrit 37.2, MCV 93.9, platelets 90,000, WBC 6.81 with a normal differential.  CMP notable for a creatinine of 1.65 (GFR 30) otherwise normal.  Immunohistochemistries revealed: Estrogen +99 to 100%; progesterone +81 to 90%, HER-2 1-2+ (equivocal).  FISH: Negative (signal ratio: 1.2)          4.   01/17/2020-CT brain: Mild cerebral and cerebellar volume loss with chronic microvascular disease.  Focus of ill-defined hypodensity at the gray-white juncture in the right frontal lobe.  Site of previous infarction versus metastasis.  Suggest follow-up MRI with and without gadolinium.  No additional lesions present.          5.   01/17/2020- CT chest.  Groundglass nodule within the right upper lobe and left lower lobe.  Likely inflammatory in nature.  Coronary calcifications in the LAD and circumflex distribution.  Moderate cardiomegaly.  Postoperative changes left axilla from recent axillary dissection as well as mastectomy.  No enlarged mediastinal or axillary lymph nodes present.  Irregular nodule along the anterior margin of the medial left pectoralis major musculature within the mastectomy bed.  No adjacent inflammatory/postoperative stranding.  May represent postoperative seroma.  Recommend directed ultrasound over this area.          6.   01/17/2020-CT abdomen/pelvis.  Impression: Multiple hypodensities within the liver.  Favor hepatic cysts.  Recommend ultrasound for further evaluations.  Cannot be fully characterized without IV contrast.  Small cortical cyst of the right kidney suspected.  Postoperative changes of the right colon.  Multiple diverticula throughout the colon without evidence of diverticulitis or mechanical bowel obstruction.  Diastases of the abdominal musculature at the umbilicus.  Previous umbilical hernia repair was performed with a mesh.  Residual recurrent tyra-umbilical hernia containing fat.  No evidence of herniated bowel loop.  7 mm groundglass nodule left lower lobe.           7.   01/22/2020-CMP notable for creatinine 1.6 and GFR 31 otherwise normal.  CEA 1.41, CA-27-29 22.8, ferritin 240.9, iron 88, iron saturation 23%, TIBC 390, RASHEL positive, homogenous/speckled pattern 1:80, otherwise negative reflex panel.  SPIEP negative (M spike not observed, EARLE: No monoclonality detected).  Hemoglobin 11.7, hematocrit 34.9, MCV 91.6, platelets 130,000, WBC 5.08 with a normal differential.  No schistocytes reported.          8.   02/05/2020- liver ultrasound.  Impression: Multiple hepatic cysts.  Additional tiny hepatic lesions are too small to characterize.          9.   02/05/2020- renal ultrasound.  Impression: Right renal cyst.  Otherwise negative exam.         10.  02/05/2020- chest ultrasound.  Impression: No abnormality identified in the left chest.  Area seen on recent CT exam may represent a postoperative seroma.         11.   02/20/2020- hemoglobin 12.2, hematocrit 36.4, MCV 91.9, platelets 147,000, WBC 6.11 with a normal differential.  CEA 1.37, CA-27-29 21.9.         12.   03/01/2020- Oncotype DX: Recurrence score:  0; distant recurrence risk at 9 years with AI or BARNHART alone: 3%; group average absolute chemotherapy benefit:< 1%         13.   06/02/2020- DEXA scan.  Impression: Osteopenia.  Low bone mass.  Bone density is between 1.0 and 2.5 standard deviations below the mean for young adult woman.  Increased risk for fracture in these patients.    PREVIOUS INTERVENTIONS:          1.   01/02/2020- left breast simple mastectomy with sentinel lymph node biopsy.  Final diagnosis: 1.left breast, left skin sparing mastectomy: Invasive ductal and lobular carcinoma, grade 1 (2.3 cm).  Associated low-grade ductal carcinoma in situ, solid type.  Margins of excision free of tumor.  Tumor approaches closest deep margin and 1.6 cm.  2 axillary lymph nodes, negative for metastatic carcinoma.  2.left sentinel lymph node excision: One sentinel lymph node, negative for metastatic carcinoma (0/1).   AJCC pathologic stage: pT2, N0 (SN).    Synoptic checklist: Procedure: Total mastectomy.  Specimen laterality: Left.  Histologic type: Invasive carcinoma with ductal and lobular features (mixed type carcinoma).  Overall ndgndrndanddndend:nd nd2nd. Tumor size: Greatest dimension of largest invasive focus 23 mm.  Tumor focality: Single focus of invasive carcinoma.  DCIS: Present.  Negative for extensive intraductal component.  Grade 1.  Lobular carcinoma in situ: None in specimen.  Tumor extent: Lymphovascular invasion not identified.  Dermal lymphovascular invasion not identified.  Microcalcifications not identified.  Margins: Uninvolved by invasive carcinoma.  Lymph nodes: Number of lymph nodes examined: 3.  TNM classification: pT2, pN0 (SN).            2.   01/22/2020- adjuvant Arimidex 1 mg p.o. daily    LABS:  Lab Results - Last 18 Months   Lab Units 02/04/21  1328 12/28/20  1311 12/21/20  1323 09/09/20  1104 06/01/20  1032 02/20/20  1323 01/22/20  1214  12/26/19  0954   HEMOGLOBIN g/dL 12.9 12.4 12.7 12.2 11.8* 12.2 11.7*   < > 12.3   HEMATOCRIT % 38.8 35.9 40.4 37.0 35.5 36.4 34.9   < > 37.2   MCV fL 93.5 94.2 99.3* 93.2 93.2 91.9 91.6   < > 93.9   WBC 10*3/mm3 6.07 6.99 7.20 6.28 6.05 6.11 5.08   < > 6.81   RDW % 13.2 13.3 13.3 13.4 14.0 13.7 13.7   < > 13.5   MPV fL 11.8 12.3*  --  12.1* 13.2* 11.5 12.2*   < > 12.8*   PLATELETS 10*3/mm3 139* 120* 171 132* 146 147 130*   < > 90*   IMM GRAN % % 0.3  --   --   --   --   --  0.2  --   --    NEUTROS ABS 10*3/mm3 3.51 4.80 4.07 4.14 3.77 3.42 2.92  --  4.81   LYMPHS ABS 10*3/mm3 1.90  --  2.43  --  1.71 2.13 1.67  --  1.40   MONOS ABS 10*3/mm3 0.51  --  0.53  --  0.43 0.41 0.35  --  0.46   EOS ABS 10*3/mm3 0.09 0.21 0.11 0.06 0.08 0.10 0.10  --  0.09   BASOS ABS 10*3/mm3 0.04 0.07 0.04 0.06 0.03 0.04 0.03  --  0.02   IMMATURE GRANS (ABS) 10*3/mm3 0.02  --   --   --   --   --  0.01  --   --    NRBC /100 WBC 0.0  --  0.1  --   --   --  0.0  --   --    NEUTROPHIL % %  --  68.7  --   66.0  --   --   --   --   --    MONOCYTES % %  --  7.1  --  9.0  --   --   --   --   --    BASOPHIL % %  --  1.0  --  1.0  --   --   --   --   --    ATYP LYMPH % %  --  2.0  --   --   --   --   --   --   --     < > = values in this interval not displayed.       Lab Results - Last 18 Months   Lab Units 02/04/21  1328 12/28/20  1311 12/21/20  1323 09/09/20  1104 06/01/20  1032 01/30/20  1150 01/22/20  1214  12/26/19  0954   GLUCOSE mg/dL 96 92  --  94 98 94 91   < > 95   SODIUM mmol/L 137 139 136 141 142 141 141   < > 142   POTASSIUM mmol/L 4.3 4.3 4.2 4.4 4.4 4.2 4.1   < > 4.1   TOTAL CO2 mmol/L  --   --  24.9  --   --  26  --   --   --    CO2 mmol/L 25.0 26.0  --  26.0 25.0  --  26.0   < > 29.0   CHLORIDE mmol/L 102 104 100 103 105 100 104   < > 100   ANION GAP mmol/L 10.0 9.0  --  12.0 12.0 15 11.0   < > 13.0   CREATININE mg/dL 1.85* 1.82* 1.94* 1.97* 1.75* 1.9* 1.60*   < > 1.65*   BUN mg/dL 30* 26* 26* 35* 28* 16 18   < > 17   BUN / CREAT RATIO  16.2 14.3 13.4 17.8 16.0  --  11.3   < > 10.3   CALCIUM mg/dL 9.8 9.5 9.3 9.7 9.5 10.0 9.7   < > 9.8   EGFR IF NONAFRICN AM mL/min/1.73 26* 27* 25* 24* 28* 25* 31*   < > 30*   ALK PHOS U/L 92 84 92 80 77  --  80  --  78   TOTAL PROTEIN g/dL 7.2 7.0  --  6.9 6.9  --  7.1  --  7.3   ALT (SGPT) U/L 9 9 10 11 8  --  7  --  7   AST (SGOT) U/L 21 16 16 20 18  --  17  --  16   BILIRUBIN mg/dL 0.6 0.7 0.7 0.6 0.8  --  0.7  --  0.7   ALBUMIN g/dL 4.50 4.20 4.60 4.50 4.30  --  4.30  3.6  --  4.30   GLOBULIN gm/dL 2.7 2.8  --  2.4 2.6  --  2.8  --  3.0    < > = values in this interval not displayed.       Lab Results - Last 18 Months   Lab Units 02/04/21  1328 12/21/20  1323 09/09/20  1104 06/01/20  1032 02/20/20  1323 01/22/20  1214   M-SPIKE g/dL  --   --   --   --   --  Not Observed   URIC ACID mg/dL  --  5.4  --   --   --   --    CEA ng/mL 1.47  --  1.59 1.32 1.37 1.41       Lab Results - Last 18 Months   Lab Units 02/04/21  1328 12/21/20  1323 01/22/20  1214   IRON mcg/dL 95   "--  88   TIBC mcg/dL 425  --  390   IRON SATURATION % 22  --  23   FERRITIN ng/mL 253.90*  --  240.90*   TSH uIU/mL  --  2.120  --          PAST MEDICAL HISTORY:  ALLERGIES:  Allergies   Allergen Reactions   • Bactrim [Sulfamethoxazole-Trimethoprim] Provider Review Needed     Patient has kidney disease (stage 4) shouldn't take Bactrim   • Codeine Nausea Only   • Percocet [Oxycodone-Acetaminophen] Nausea Only     Becomes, hot, nauseated, and made her head feel \"weird\".     CURRENT MEDICATIONS:  Outpatient Encounter Medications as of 2/26/2021   Medication Sig Dispense Refill   • acetaminophen (TYLENOL) 500 MG tablet Take 500 mg by mouth Every 6 (Six) Hours As Needed for Mild Pain .     • allopurinol (ZYLOPRIM) 100 MG tablet Take 200 mg by mouth 2 (Two) Times a Day.  3   • anastrozole (ARIMIDEX) 1 MG tablet Take 1 tablet by mouth Daily. 30 tablet 5   • bumetanide (BUMEX) 0.5 MG tablet Take 1 tablet by mouth Daily. 90 tablet 3   • calcium carbonate-vitamin d 600-400 MG-UNIT per tablet Take 1 tablet by mouth 2 (Two) Times a Day. 60 tablet 5   • carboxymethylcellulose (REFRESH PLUS) 0.5 % solution Administer 1 drop to both eyes 3 (Three) Times a Day As Needed for Dry Eyes.     • dronedarone (MULTAQ) 400 MG tablet Take 400 mg by mouth 2 (Two) Times a Day With Meals.     • ferrous sulfate 325 (65 Fe) MG tablet TAKE 1 TABLET BY MOUTH THREE TIMES DAILY (Patient taking differently: TAKES 1 TABLET 3 TIMES PER WEEK.) 90 tablet 2   • levothyroxine (SYNTHROID, LEVOTHROID) 50 MCG tablet Take 1 tablet by mouth once daily 90 tablet 1   • losartan (COZAAR) 50 MG tablet Take 1 tablet by mouth Daily. 90 tablet 3   • metoprolol succinate XL (TOPROL-XL) 25 MG 24 hr tablet Take 25 mg by mouth 2 (Two) Times a Day.  1   • polyethylene glycol (MIRALAX) powder Take 17 g by mouth Daily.     • simvastatin (ZOCOR) 40 MG tablet Take 1 tablet by mouth once daily 90 tablet 3   • spironolactone (ALDACTONE) 25 MG tablet Take 1 tablet by mouth once " daily 90 tablet 1   • warfarin (COUMADIN) 5 MG tablet Take  by mouth Take As Directed. Take 1 whole tablet (5 mg) on Wednesday only. Take 1/2 tablet (2.5 mg) all other days.  5     Facility-Administered Encounter Medications as of 2/26/2021   Medication Dose Route Frequency Provider Last Rate Last Admin   • lidocaine (XYLOCAINE) 1 % injection 10 mL  10 mL Subcutaneous Once Nikki Zuniga MD       • lidocaine-EPINEPHrine (XYLOCAINE W/EPI) 1 %-1:218090 injection 10 mL  10 mL Injection Once Nikki Zuniga MD         Adult illnesses:  Hypothyroidism  Atrial fibrillation  Hyperlipidemia  Obesity  Chronic kidney disease stage 4 - Dr. Daniels    ADULT ILLNESSES:  Patient Active Problem List   Diagnosis Code   • Hx of adenomatous colonic polyps Z86.010   • HTN (hypertension), benign I10   • Anticoagulated on Coumadin Z79.01   • Family hx of colon cancer Z80.0   • Breast cancer, left (CMS/HCC) C50.912   • Anemia due to stage 3 chronic kidney disease (CMS/HCC) N18.30, D63.1   • Acute frontal sinusitis J01.10   • Chronic anticoagulation Z79.01   • Endometrial thickening on ultrasound R93.89   • Excessive anticoagulation [id number]   • History of hypertension Z86.79   • Hypothyroidism E03.9   • Kidney disease N28.9   • Mixed hyperlipidemia E78.2   • Atrial fibrillation, controlled (CMS/HCC) I48.91   • PMB (postmenopausal bleeding) N95.0   • Brain lesion G93.9   • Obesity (BMI 30-39.9) E66.9   • Non-tobacco user Z78.9   • Anemia in stage 4 chronic kidney disease (CMS/HCC) N18.4, D63.1   • Atrophic vaginitis N95.2   • Cardiomegaly I51.7   • Hematuria R31.9   • 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, 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   • PVC (premature ventricular contraction) I49.3   • Rheumatoid arthritis (CMS/HCC) M06.9   • Seborrheic keratosis L82.1   • Thrombocytopenia (CMS/HCC) D69.6   • Vitamin D deficiency E55.9   • Moderate mitral regurgitation by prior echocardiogram I34.0     SURGERIES:  Past Surgical History:   Procedure Laterality Date   •  APPENDECTOMY     • BREAST BIOPSY Left 2015   • BREAST BIOPSY Right 12/31/2020    Procedure: RIGHT NEEDLE DIRECTED EXCISIONAL BREAST BIOPSY;  Surgeon: Nikki Zuniga MD;  Location: [facility] OR;  Service: General;  Laterality: Right;   • BREAST EXCISIONAL BIOPSY Left 2001   • BREAST EXCISIONAL BIOPSY Left 2001   • CARPAL TUNNEL RELEASE Right    • CATARACT EXTRACTION, BILATERAL     • CHOLECYSTECTOMY     • COLON SURGERY      Partial right   • COLONOSCOPY     • COLONOSCOPY N/A 10/17/2019    Procedure: COLONOSCOPY WITH ANESTHESIA;  Surgeon: Rigoberto Shaw MD;  Location: [facility] ENDOSCOPY;  Service: Gastroenterology   • COLONOSCOPY W/ POLYPECTOMY  06/30/2016    2 Adenomatous polyps at 80 & 40 cm, Diverticulosis repeat exam in 3 years   • EYE SURGERY Bilateral     cataract extraction   • EYE SURGERY Left     detached retina   • HERNIA REPAIR     • MASTECTOMY Left 2019   • MASTECTOMY W/ SENTINEL NODE BIOPSY Left 1/2/2020    Procedure: LEFT SIMPLE MASTECTOMY WITH SENTINEL NODE BIOPSY, INJECTION AND SCAN, RADIOLOGIST WILL INJECT;  Surgeon: Nikki Zuniga MD;  Location: [facility] OR;  Service: General   • REPLACEMENT TOTAL KNEE Bilateral    • THROAT SURGERY      duct     HEALTH MAINTENANCE ITEMS:  Health Maintenance Due   Topic Date Due   • COVID-19 Vaccine (1 of 2) 12/08/1956       <no information>  Last Completed Colonoscopy       Status Date      COLONOSCOPY Done 10/17/2019 Surg:COLONOSCOPY     Patient has more history with this topic...        Immunization History   Administered Date(s) Administered   • Fluad Quad 65+ 09/22/2020   • Influenza TIV (IM) 09/22/2015, 10/14/2016   • Pneumococcal Conjugate 13-Valent (PCV13) 11/10/2016   • Pneumococcal Polysaccharide (PPSV23) 11/02/2012   • Shingrix 04/10/2018, 08/15/2018   • Tdap 12/21/2020   • Zostavax 04/23/2013     Last Completed Mammogram       Status Date      MAMMOGRAM Done 11/4/2015 Ext Proc: HC MAMMOGRAM SCREENING BILAT DIGITAL     Patient has more history with this  topic...            FAMILY HISTORY:  Family History   Problem Relation Age of Onset   • Colon cancer Mother 70   • Colon polyps Mother    • No Known Problems Father    • No Known Problems Sister    • No Known Problems Brother    • No Known Problems Daughter    • No Known Problems Son    • No Known Problems Maternal Grandmother    • No Known Problems Paternal Grandmother    • No Known Problems Maternal Aunt    • No Known Problems Paternal Aunt    • No Known Problems Other    • Breast cancer Neg Hx    • BRCA 1/2 Neg Hx    • Endometrial cancer Neg Hx    • Ovarian cancer Neg Hx      SOCIAL HISTORY:  Social History     Socioeconomic History   • Marital status:      Spouse name: Not on file   • Number of children: Not on file   • Years of education: Not on file   • Highest education level: Not on file   Tobacco Use   • Smoking status: Never Smoker   • Smokeless tobacco: Never Used   Substance and Sexual Activity   • Alcohol use: No     Frequency: Never   • Drug use: No   • Sexual activity: Defer   Homemaker    REVIEW OF SYSTEMS:  Review of Systems   Constitutional: Positive for fatigue (baseline/chronic but manages all her ADLs including the chores, errands and driving.  Is up and about more than 50%). Negative for activity change and appetite change.        Arimidex tolerance discussed:  No new issues.  No worsening hot flashes.  No new arthralgias. Is able to take daily   HENT: Positive for postnasal drip (seasonal) and rhinorrhea (seasonal).    Eyes: Negative.    Respiratory: Negative.    Cardiovascular: Positive for palpitations (Intermittent.  Is on maintenance warfarin).   Gastrointestinal: Negative.  Negative for blood in stool (dark stool from oral iron tiw.  gets constipated if more often).   Endocrine: Negative.    Genitourinary: Negative.    Musculoskeletal: Positive for arthralgias (intermittent of the hands, left hip, right shoulder and knees inspite of prior TKRs bilaterally).   Allergic/Immunologic:  "Negative.    Neurological: Positive for dizziness (postural - when standing too abruptly) and numbness (left foot at times, unchanged).   Hematological: Bruises/bleeds easily (coumadin).   Psychiatric/Behavioral: Negative.    Breasts:  Admits she is not diligent with self exams \"hit or miss.\"  Has not noticed       /92   Pulse 71   Temp 96.8 °F (36 °C)   Resp 16   Ht 165.1 cm (65\")   Wt 99.2 kg (218 lb 11.2 oz)   SpO2 95%   Breastfeeding No   BMI 36.39 kg/m²  Body surface area is 2.05 meters squared.  Pain Score    02/26/21 0934   PainSc: 0-No pain       Physical Exam:  Physical Exam   Constitutional: She is oriented to person, place, and time. No distress.   Pleasant, cooperative, heavy-set, modeastly kept elderly female.  Ambulatory.  ECOG 0.      She has gained 4 pounds (in addition to 9 pounds at her p 2 prior visits) since her last visit.   HENT:   Head: Normocephalic and atraumatic.   Mouth/Throat: No oropharyngeal exudate.   She is wearing a surgical mask today   Eyes: Pupils are equal, round, and reactive to light. Conjunctivae are normal. No scleral icterus.   Neck: No JVD present. No tracheal deviation present. No thyromegaly (on synthroid) present.   Cardiovascular:   Irregular rate and rhythm   Pulmonary/Chest: Effort normal and breath sounds normal. No stridor. No respiratory distress. She has no wheezes. She has no rales.   Abdominal: Soft. Bowel sounds are normal. She exhibits no distension and no mass. There is no abdominal tenderness. There is no rebound and no guarding.   Musculoskeletal: Normal range of motion. Deformity (changes of osteoarthritis of the hands) present. No swelling.      Right lower leg: No edema.      Left lower leg: No edema.   Lymphadenopathy:     She has no cervical adenopathy.   Neurological: She is alert and oriented to person, place, and time. No cranial nerve deficit.   Skin: Skin is warm and dry. No rash noted. No erythema. No pallor.   Psychiatric: Her " behavior is normal. Mood, judgment and thought content normal.   Vitals reviewed.  Breasts:  Right breast no masses.  Lumpectomy site just above the left nipple is healed.  Left mastectomy site well healed. No underlying nodularity.  No lymphedema of the left upper extremity.    ASSESSMENT:   1. Invasive ductal/lobular (mixed) carcinoma:   · Stage: IB (pT2, pN0 [sn], MX, G1) ER (+) %, SD (+) 81-90%, HER-2/kelby 1-2 + (IHC) - negative. HER-2 1-2+ (equivocal).  FISH: Negative (signal ratio: 1.2)  · Tumor burden: 23 mm tumor in the left breast. 0/3 axillary sentinel lymph node negative for metastatic carcinoma. Nuclear grade 1.    · Oncotype DX: Recurrence score:  0; distant recurrence risk at 9 years with AI or BARNHART alone: 3%; group average absolute chemotherapy benefit:< 1%  · Complications of tumor: None.   · Tumor status: Adjuvant Arimidex in progress.  · 11/30/2020-mammogram.  Right breast calcifications for which additional imaging recommended.  BI-RADS Category 0.  · 12/28/2020-stereotactic biopsy of right breast calcifications.  Pathology: Benign intraductal papilloma, 3 mm greatest dimension with associated microcalcifications.  · 01/04/2021-Right breast, lumpectomy with needle localization.  Final diagnosis: Extensive biopsy site changes including fat necrosis and fibrosis.  Benign fibrous breast parenchyma.  No evidence of in situ or invasive carcinoma.           2.    Chronic kidney disease, stage III - IV. GFR 26, 02/04/2021 (prior:  GFR 17 - 36 mL/min).        3.    Hypothyroidism.  On Synthroid        4.    Normocytic anemia.  Hgb 12.9; MCV 93.5, 02/04/2021 (prior: Hgb 10.9 - 12.9; MCV 92 - 95.9).  Contribution from chronic kidney disease suspected.        5.    Abnormal CT scans of the head (see above) indicating ill-defined hypodensity at the gray-white juncture in the right frontal lobe, hepatic and right renal cysts.  Unable to perform MRI with contrast due to chronic kidney disease.             --03/23/2020-seen by Dr. Jaramillo.  Noncontrast brain MRI from 1/17/2020 reviewed.  Possibilities include prior stroke microvascular disease, demyelinating disease.  Risks of biopsy discussed.  Recommend serial imaging with biopsy of the lesion progresses.  Follow-up with MRI in 3 months.          --07/06/2020- MRI.  Mild atrophy of the brain.  Multiple foci of T2 abnormality involving the periventricular and higher white matter tracts.  Cortical lesions also present.  Overall stable from 03/2020.  Favor small vessel ischemic changes.  No evidence of restricted diffusion to suggest acute ischemia or infarct.  No mass-effect or shift in midline.          --01/06/2021-brain MRI.  No imaging evidence of intracranial metastatic disease.  Seen by Dr. Jaramillo of neurosurgery.  Favors microvascular disease.  Okay to discontinue imaging.        6.    (+) RASHEL with joint pains (hands). Has been seen by Dr. Caputo.        7.    Osteopenia (bone density 1.0 and 2.5 standard deviations below the mean on DEXA scan, 06/02/2020).  On calcium and vitamin D. Followed by Dr. Davidson.        8.    Paroxysmal atrial fibrillation.  Remains on anticoagulation.  Dr. Salas follows       PLAN:   1.  Apprised of the labs, 02/04/2021 (above).  Note normal CBC.  Low (stable) GFR, elevated BUN/creatinine otherwise normal CMP, normal CEA, normal CA-27-29, repleted iron levels.  2.  Discussed and apprised of mammogram, 11/30/2020, interval stereotactic biopsy of the right breast, 12/28/2020, and interval right breast lumpectomy with needle localization, 01/04/2021 (above).  No malignancy.  3.  Discussed apprised of brain MRI, 01/06/2021 and review office visit with Dr. Jaramillo (neurosurgery).  Review of images over roughly a year showing no progression.  Favors microvascular disease.  Okay to discontinue imaging.  4.  Previously noted the positive RASHEL but negative reflex panel, negative SPIEP, repleted iron levels.  The liver ultrasound,  renal ultrasound and chest ultrasound findings (above) are noted.  Oncotype DX recurrence score (above) noted (RS 0; absolute chemotherapy benefit < 1%).  DEXA scan shows osteopenia with increased risk of fracture.  Arimidex tolerance discussed.  So far so good.  4.  Breast cancer.  NCCN guidelines version 1.2020 reviewed.  4 ductal, lobular, mixed tumors, pT1, pT2, pT3 and pN0 with tumors greater than 0.5 cm- 21 gene RT-PCR assay.  Recurrence score less than 26-adjuvant endocrine therapy.  Recurrence score 26-30- adjuvant endocrine therapy or adjuvant chemotherapy followed by endocrine therapy.  Recurrence score greater than 31-adjuvant chemotherapy followed by endocrine therapy.  However limited data to make chemotherapy recommendations for those greater than 70 years of age.  5.  The rationale for adjuvant hormonal manipulation with AI's are previously discussed. The potential risks (to include but not limited to: Hot flashes, asthenia, pain, arthralgia, arthritis, nausea/vomiting, headache, pharyngitis, depression, rash, hypertension, lymphedema, insomnia, edema, weight gain, dyspnea, abdominal pain, constipation, osteoporosis, fractures, cough, bone pain, diarrhea, breast pain, paresthesias, infection, cataracts, myalgias, thromboembolism, angina, endometrial carcinoma, myocardial infarction, stroke, anemia, leukopenia, erythema multiforme, Belle-Collins syndrome) are discussed at length. Questions answered. She agrees to press on with therapy.  6.  Rx:   · Arimidex 1 mg p.o. daily, #30 x 2 RF   · Calcium 1200 mg + 800 units D3  p.o. daily - otc  · STOP ferrous sulfate    7.   Return to the [place] office in 16 weeks with pre-office CBC and differential, iron, fe sat , ferritin, CMP, CEA and CA-27-29.       MEDICAL DECISION MAKING: Moderate Complexity   AMOUNT OF DATA: Extensive      I spent - 30 total minutes, face-to-face, caring for Heidi today.  Greater than 50% of this time involved counseling and/or  coordination of care as documented within this note regarding the patient's illness(es), pros and cons of various treatment options, instructions and/or risk reduction.      Cc:   MD Ron Miranda MD Christopher Phillips, MD Jonathan Wilkerson, MD Ronald Wilson, MD

## 2021-02-25 ENCOUNTER — ANTI-COAG VISIT (OUTPATIENT)
Dept: CARDIOLOGY CLINIC | Age: 81
End: 2021-02-25
Payer: MEDICARE

## 2021-02-25 DIAGNOSIS — I48.0 PAF (PAROXYSMAL ATRIAL FIBRILLATION) (HCC): Primary | ICD-10-CM

## 2021-02-25 LAB
INTERNATIONAL NORMALIZATION RATIO, POC: 2
PROTHROMBIN TIME, POC: 23.2

## 2021-02-25 PROCEDURE — 85610 PROTHROMBIN TIME: CPT | Performed by: NURSE PRACTITIONER

## 2021-02-26 ENCOUNTER — OFFICE VISIT (OUTPATIENT)
Dept: ONCOLOGY | Facility: CLINIC | Age: 81
End: 2021-02-26

## 2021-02-26 VITALS
TEMPERATURE: 96.8 F | HEIGHT: 65 IN | RESPIRATION RATE: 16 BRPM | SYSTOLIC BLOOD PRESSURE: 164 MMHG | DIASTOLIC BLOOD PRESSURE: 92 MMHG | BODY MASS INDEX: 36.44 KG/M2 | OXYGEN SATURATION: 95 % | WEIGHT: 218.7 LBS | HEART RATE: 71 BPM

## 2021-02-26 DIAGNOSIS — Z17.0 MALIGNANT NEOPLASM OF LEFT BREAST IN FEMALE, ESTROGEN RECEPTOR POSITIVE, UNSPECIFIED SITE OF BREAST (HCC): Primary | ICD-10-CM

## 2021-02-26 DIAGNOSIS — C50.912 MALIGNANT NEOPLASM OF LEFT BREAST IN FEMALE, ESTROGEN RECEPTOR POSITIVE, UNSPECIFIED SITE OF BREAST (HCC): Primary | ICD-10-CM

## 2021-02-26 PROCEDURE — 99214 OFFICE O/P EST MOD 30 MIN: CPT | Performed by: INTERNAL MEDICINE

## 2021-02-26 RX ORDER — ANASTROZOLE 1 MG/1
1 TABLET ORAL DAILY
Qty: 30 TABLET | Refills: 5 | Status: SHIPPED | OUTPATIENT
Start: 2021-02-26 | End: 2021-06-22 | Stop reason: SDUPTHER

## 2021-03-25 ENCOUNTER — ANTI-COAG VISIT (OUTPATIENT)
Dept: CARDIOLOGY CLINIC | Age: 81
End: 2021-03-25
Payer: MEDICARE

## 2021-03-25 DIAGNOSIS — I48.0 PAF (PAROXYSMAL ATRIAL FIBRILLATION) (HCC): Primary | ICD-10-CM

## 2021-03-25 LAB
INTERNATIONAL NORMALIZATION RATIO, POC: 2.2
PROTHROMBIN TIME, POC: NORMAL

## 2021-03-25 PROCEDURE — 85610 PROTHROMBIN TIME: CPT | Performed by: CLINICAL NURSE SPECIALIST

## 2021-03-29 RX ORDER — LEVOTHYROXINE SODIUM 0.05 MG/1
TABLET ORAL
Qty: 90 TABLET | Refills: 3 | Status: SHIPPED | OUTPATIENT
Start: 2021-03-29 | End: 2022-04-07

## 2021-03-29 RX ORDER — SPIRONOLACTONE 25 MG/1
TABLET ORAL
Qty: 90 TABLET | Refills: 3 | Status: SHIPPED | OUTPATIENT
Start: 2021-03-29 | End: 2022-03-24

## 2021-05-13 ENCOUNTER — ANTI-COAG VISIT (OUTPATIENT)
Dept: CARDIOLOGY CLINIC | Age: 81
End: 2021-05-13
Payer: MEDICARE

## 2021-05-13 DIAGNOSIS — I48.0 PAF (PAROXYSMAL ATRIAL FIBRILLATION) (HCC): Primary | ICD-10-CM

## 2021-05-13 LAB
INTERNATIONAL NORMALIZATION RATIO, POC: 2.2
PROTHROMBIN TIME, POC: NORMAL

## 2021-05-13 PROCEDURE — 85610 PROTHROMBIN TIME: CPT | Performed by: NURSE PRACTITIONER

## 2021-05-19 DIAGNOSIS — I48.91 ATRIAL FIBRILLATION, UNSPECIFIED TYPE (HCC): ICD-10-CM

## 2021-05-19 DIAGNOSIS — I48.0 PAF (PAROXYSMAL ATRIAL FIBRILLATION) (HCC): ICD-10-CM

## 2021-05-20 RX ORDER — METOPROLOL SUCCINATE 25 MG/1
TABLET, EXTENDED RELEASE ORAL
Qty: 180 TABLET | Refills: 0 | Status: SHIPPED | OUTPATIENT
Start: 2021-05-20 | End: 2021-08-20

## 2021-05-20 RX ORDER — DRONEDARONE 400 MG/1
TABLET, FILM COATED ORAL
Qty: 180 TABLET | Refills: 0 | Status: SHIPPED | OUTPATIENT
Start: 2021-05-20 | End: 2021-08-20

## 2021-05-20 RX ORDER — SIMVASTATIN 40 MG
TABLET ORAL
Qty: 90 TABLET | Refills: 3 | Status: SHIPPED | OUTPATIENT
Start: 2021-05-20 | End: 2022-05-16 | Stop reason: SDUPTHER

## 2021-06-08 RX ORDER — LOSARTAN POTASSIUM 50 MG/1
TABLET ORAL
Qty: 90 TABLET | Refills: 3 | Status: SHIPPED | OUTPATIENT
Start: 2021-06-08 | End: 2022-06-14 | Stop reason: SDUPTHER

## 2021-06-08 RX ORDER — BUMETANIDE 0.5 MG/1
TABLET ORAL
Qty: 90 TABLET | Refills: 3 | Status: SHIPPED | OUTPATIENT
Start: 2021-06-08 | End: 2022-06-27 | Stop reason: SDUPTHER

## 2021-06-17 ENCOUNTER — LAB (OUTPATIENT)
Dept: LAB | Facility: HOSPITAL | Age: 81
End: 2021-06-17

## 2021-06-17 DIAGNOSIS — Z17.0 MALIGNANT NEOPLASM OF LEFT BREAST IN FEMALE, ESTROGEN RECEPTOR POSITIVE, UNSPECIFIED SITE OF BREAST (HCC): ICD-10-CM

## 2021-06-17 DIAGNOSIS — C50.912 MALIGNANT NEOPLASM OF LEFT BREAST IN FEMALE, ESTROGEN RECEPTOR POSITIVE, UNSPECIFIED SITE OF BREAST (HCC): ICD-10-CM

## 2021-06-17 LAB
ALBUMIN SERPL-MCNC: 4.2 G/DL (ref 3.5–5.2)
ALBUMIN/GLOB SERPL: 1.6 G/DL
ALP SERPL-CCNC: 89 U/L (ref 39–117)
ALT SERPL W P-5'-P-CCNC: 14 U/L (ref 1–33)
ANION GAP SERPL CALCULATED.3IONS-SCNC: 14 MMOL/L (ref 5–15)
AST SERPL-CCNC: 20 U/L (ref 1–32)
BASOPHILS # BLD MANUAL: 0.14 10*3/MM3 (ref 0–0.2)
BASOPHILS NFR BLD AUTO: 2 % (ref 0–1.5)
BILIRUB SERPL-MCNC: 0.6 MG/DL (ref 0–1.2)
BUN SERPL-MCNC: 30 MG/DL (ref 8–23)
BUN/CREAT SERPL: 16.8 (ref 7–25)
CALCIUM SPEC-SCNC: 9.7 MG/DL (ref 8.6–10.5)
CHLORIDE SERPL-SCNC: 103 MMOL/L (ref 98–107)
CLUMPED PLATELETS: PRESENT
CO2 SERPL-SCNC: 22 MMOL/L (ref 22–29)
CREAT SERPL-MCNC: 1.79 MG/DL (ref 0.57–1)
DEPRECATED RDW RBC AUTO: 47.8 FL (ref 37–54)
EOSINOPHIL # BLD MANUAL: 0.21 10*3/MM3 (ref 0–0.4)
EOSINOPHIL NFR BLD MANUAL: 3 % (ref 0.3–6.2)
ERYTHROCYTE [DISTWIDTH] IN BLOOD BY AUTOMATED COUNT: 13.8 % (ref 12.3–15.4)
FERRITIN SERPL-MCNC: 205.8 NG/ML (ref 13–150)
GFR SERPL CREATININE-BSD FRML MDRD: 27 ML/MIN/1.73
GLOBULIN UR ELPH-MCNC: 2.6 GM/DL
GLUCOSE SERPL-MCNC: 198 MG/DL (ref 65–99)
HCT VFR BLD AUTO: 37.7 % (ref 34–46.6)
HGB BLD-MCNC: 12.4 G/DL (ref 12–15.9)
IRON 24H UR-MRATE: 88 MCG/DL (ref 37–145)
IRON SATN MFR SERPL: 21 % (ref 20–50)
LYMPHOCYTES # BLD MANUAL: 2.23 10*3/MM3 (ref 0.7–3.1)
LYMPHOCYTES NFR BLD MANUAL: 32 % (ref 19.6–45.3)
LYMPHOCYTES NFR BLD MANUAL: 5 % (ref 5–12)
MCH RBC QN AUTO: 31.2 PG (ref 26.6–33)
MCHC RBC AUTO-ENTMCNC: 32.9 G/DL (ref 31.5–35.7)
MCV RBC AUTO: 95 FL (ref 79–97)
MONOCYTES # BLD AUTO: 0.35 10*3/MM3 (ref 0.1–0.9)
NEUTROPHILS # BLD AUTO: 4.04 10*3/MM3 (ref 1.7–7)
NEUTROPHILS NFR BLD MANUAL: 56 % (ref 42.7–76)
NEUTS BAND NFR BLD MANUAL: 2 % (ref 0–5)
PLATELET # BLD AUTO: 157 10*3/MM3 (ref 140–450)
PMV BLD AUTO: 13 FL (ref 6–12)
POTASSIUM SERPL-SCNC: 4 MMOL/L (ref 3.5–5.2)
PROT SERPL-MCNC: 6.8 G/DL (ref 6–8.5)
RBC # BLD AUTO: 3.97 10*6/MM3 (ref 3.77–5.28)
RBC MORPH BLD: NORMAL
SODIUM SERPL-SCNC: 139 MMOL/L (ref 136–145)
TIBC SERPL-MCNC: 426 MCG/DL (ref 298–536)
TRANSFERRIN SERPL-MCNC: 286 MG/DL (ref 200–360)
WBC # BLD AUTO: 6.96 10*3/MM3 (ref 3.4–10.8)
WBC MORPH BLD: NORMAL

## 2021-06-17 PROCEDURE — 84466 ASSAY OF TRANSFERRIN: CPT

## 2021-06-17 PROCEDURE — 36415 COLL VENOUS BLD VENIPUNCTURE: CPT

## 2021-06-17 PROCEDURE — 80053 COMPREHEN METABOLIC PANEL: CPT

## 2021-06-17 PROCEDURE — 85007 BL SMEAR W/DIFF WBC COUNT: CPT

## 2021-06-17 PROCEDURE — 85025 COMPLETE CBC W/AUTO DIFF WBC: CPT

## 2021-06-17 PROCEDURE — 83540 ASSAY OF IRON: CPT

## 2021-06-17 PROCEDURE — 82728 ASSAY OF FERRITIN: CPT

## 2021-06-17 PROCEDURE — 82378 CARCINOEMBRYONIC ANTIGEN: CPT

## 2021-06-17 PROCEDURE — 86300 IMMUNOASSAY TUMOR CA 15-3: CPT

## 2021-06-17 NOTE — PROGRESS NOTES
[facility] GROUP HEMATOLOGY AND ONCOLOGY  [address]  [facility] [address] [phone]    Patient Name: Heidi Ansari  Encounter Date: 06/24/2021  YOB: 1940  Patient Number: [id number]      REASON FOR VISIT: Heidi Ansari is an 80-year-old female who returns in follow-up of stage IIA invasive ductal/lobular breast carcinoma.  She has been on adjuvant Arimidex since 01/22/2020 (17 months).  She is here alone (previously with her spouse Montana and daughter Fátima).    DIAGNOSTIC ABNORMALITIES:           1.   11/27/2019- palpable left breast lump x3 weeks.  Diagnostic mammogram and left breast ultrasound.  Impression: Spiculated mass approximately 2 cm upper outer quadrant of the left breast.  Oval well-circumscribed mass appears new in the lower slightly lateral left breast measuring approximately 10 mm.  Targeted left breast ultrasound at the 2 o'clock position 5 cm from nipple an irregular spiculated hypoechoic mass is seen measuring 1.7 x 1.5 x 1.6 cm.  Smaller adjacent hypoechoic irregular mass seen measuring 0.3 cm.  Third irregular hypoechoic mass at the 3 o'clock position measures 2.2 x 0.9 x 1.2 cm in size.  Ultrasound-guided biopsy recommended.  BI-RADS Category 5-highly suggestive of malignancy.  No mammographic evidence of right breast malignancy.          2.   12/06/2019- ultrasound-guided left breast biopsy.  Final diagnosis: 1.left breast mass 2 o'clock position: Core biopsy: Invasive ductal and lobular carcinoma.  Estimated grade 1, involving approximately 90% of the core biopsy specimens.  Focal ductal carcinoma in situ, nuclear grade 1.  Microcalcifications identified in invasive ductal carcinoma.  2.left breast mass 3 o'clock position, core biopsy: Invasive lobular carcinoma, estimated grade 1, involving approximately 90% of the core biopsy specimens.  Microcalcifications not identified.          3.   12/26/2019-  hemoglobin 12.3, hematocrit 37.2, MCV 93.9, platelets 90,000, WBC 6.81 with a normal differential.  CMP notable for a creatinine of 1.65 (GFR 30) otherwise normal.  Immunohistochemistries revealed: Estrogen +99 to 100%; progesterone +81 to 90%, HER-2 1-2+ (equivocal).  FISH: Negative (signal ratio: 1.2)          4.   01/17/2020-CT brain: Mild cerebral and cerebellar volume loss with chronic microvascular disease.  Focus of ill-defined hypodensity at the gray-white juncture in the right frontal lobe.  Site of previous infarction versus metastasis.  Suggest follow-up MRI with and without gadolinium.  No additional lesions present.          5.   01/17/2020- CT chest.  Groundglass nodule within the right upper lobe and left lower lobe.  Likely inflammatory in nature.  Coronary calcifications in the LAD and circumflex distribution.  Moderate cardiomegaly.  Postoperative changes left axilla from recent axillary dissection as well as mastectomy.  No enlarged mediastinal or axillary lymph nodes present.  Irregular nodule along the anterior margin of the medial left pectoralis major musculature within the mastectomy bed.  No adjacent inflammatory/postoperative stranding.  May represent postoperative seroma.  Recommend directed ultrasound over this area.          6.   01/17/2020-CT abdomen/pelvis.  Impression: Multiple hypodensities within the liver.  Favor hepatic cysts.  Recommend ultrasound for further evaluations.  Cannot be fully characterized without IV contrast.  Small cortical cyst of the right kidney suspected.  Postoperative changes of the right colon.  Multiple diverticula throughout the colon without evidence of diverticulitis or mechanical bowel obstruction.  Diastases of the abdominal musculature at the umbilicus.  Previous umbilical hernia repair was performed with a mesh.  Residual recurrent tyra-umbilical hernia containing fat.  No evidence of herniated bowel loop.  7 mm groundglass nodule left lower lobe.           7.   01/22/2020-CMP notable for creatinine 1.6 and GFR 31 otherwise normal.  CEA 1.41, CA-27-29 22.8, ferritin 240.9, iron 88, iron saturation 23%, TIBC 390, RASHEL positive, homogenous/speckled pattern 1:80, otherwise negative reflex panel.  SPIEP negative (M spike not observed, EARLE: No monoclonality detected).  Hemoglobin 11.7, hematocrit 34.9, MCV 91.6, platelets 130,000, WBC 5.08 with a normal differential.  No schistocytes reported.          8.   02/05/2020- liver ultrasound.  Impression: Multiple hepatic cysts.  Additional tiny hepatic lesions are too small to characterize.          9.   02/05/2020- renal ultrasound.  Impression: Right renal cyst.  Otherwise negative exam.         10.  02/05/2020- chest ultrasound.  Impression: No abnormality identified in the left chest.  Area seen on recent CT exam may represent a postoperative seroma.         11.   02/20/2020- hemoglobin 12.2, hematocrit 36.4, MCV 91.9, platelets 147,000, WBC 6.11 with a normal differential.  CEA 1.37, CA-27-29 21.9.         12.   03/01/2020- Oncotype DX: Recurrence score:  0; distant recurrence risk at 9 years with AI or BARNHART alone: 3%; group average absolute chemotherapy benefit:< 1%         13.   06/02/2020- DEXA scan.  Impression: Osteopenia.  Low bone mass.  Bone density is between 1.0 and 2.5 standard deviations below the mean for young adult woman.  Increased risk for fracture in these patients.    PREVIOUS INTERVENTIONS:          1.   01/02/2020- left breast simple mastectomy with sentinel lymph node biopsy.  Final diagnosis: 1.left breast, left skin sparing mastectomy: Invasive ductal and lobular carcinoma, grade 1 (2.3 cm).  Associated low-grade ductal carcinoma in situ, solid type.  Margins of excision free of tumor.  Tumor approaches closest deep margin and 1.6 cm.  2 axillary lymph nodes, negative for metastatic carcinoma.  2.left sentinel lymph node excision: One sentinel lymph node, negative for metastatic carcinoma (0/1).   AJCC pathologic stage: pT2, N0 (SN).    Synoptic checklist: Procedure: Total mastectomy.  Specimen laterality: Left.  Histologic type: Invasive carcinoma with ductal and lobular features (mixed type carcinoma).  Overall ndgndrndanddndend:nd nd2nd. Tumor size: Greatest dimension of largest invasive focus 23 mm.  Tumor focality: Single focus of invasive carcinoma.  DCIS: Present.  Negative for extensive intraductal component.  Grade 1.  Lobular carcinoma in situ: None in specimen.  Tumor extent: Lymphovascular invasion not identified.  Dermal lymphovascular invasion not identified.  Microcalcifications not identified.  Margins: Uninvolved by invasive carcinoma.  Lymph nodes: Number of lymph nodes examined: 3.  TNM classification: pT2, pN0 (SN).            2.   01/22/2020- adjuvant Arimidex 1 mg p.o. daily    LABS:  Lab Results - Last 18 Months   Lab Units 06/17/21  1359 02/04/21  1328 12/28/20  1311 12/21/20  1323 09/09/20  1104 06/01/20  1032 02/20/20  1323 01/22/20  1214 12/26/19  0954   HEMOGLOBIN g/dL 12.4 12.9 12.4 12.7 12.2 11.8* 12.2 11.7* 12.3   HEMATOCRIT % 37.7 38.8 35.9 40.4 37.0 35.5 36.4 34.9 37.2   MCV fL 95.0 93.5 94.2 99.3* 93.2 93.2 91.9 91.6 93.9   WBC 10*3/mm3 6.96 6.07 6.99 7.20 6.28 6.05 6.11 5.08 6.81   RDW % 13.8 13.2 13.3 13.3 13.4 14.0 13.7 13.7 13.5   MPV fL 13.0* 11.8 12.3*  --  12.1* 13.2* 11.5 12.2* 12.8*   PLATELETS 10*3/mm3 157 139* 120* 171 132* 146 147 130* 90*   IMM GRAN % %  --  0.3  --   --   --   --   --  0.2  --    NEUTROS ABS 10*3/mm3 4.04 3.51 4.80 4.07 4.14 3.77 3.42 2.92 4.81   LYMPHS ABS 10*3/mm3  --  1.90  --  2.43  --  1.71 2.13 1.67 1.40   MONOS ABS 10*3/mm3  --  0.51  --  0.53  --  0.43 0.41 0.35 0.46   EOS ABS 10*3/mm3 0.21 0.09 0.21 0.11 0.06 0.08 0.10 0.10 0.09   BASOS ABS 10*3/mm3 0.14 0.04 0.07 0.04 0.06 0.03 0.04 0.03 0.02   IMMATURE GRANS (ABS) 10*3/mm3  --  0.02  --   --   --   --   --  0.01  --    NRBC /100 WBC  --  0.0  --  0.1  --   --   --  0.0  --    NEUTROPHIL % % 56.0  --   68.7  --  66.0  --   --   --   --    MONOCYTES % % 5.0  --  7.1  --  9.0  --   --   --   --    BASOPHIL % % 2.0*  --  1.0  --  1.0  --   --   --   --    ATYP LYMPH % %  --   --  2.0  --   --   --   --   --   --        Lab Results - Last 18 Months   Lab Units 06/17/21  1359 02/04/21  1328 12/28/20  1311 12/21/20  1323 09/09/20  1104 06/01/20  1032 01/30/20  1150 01/22/20  1214   GLUCOSE mg/dL 198* 96 92  --  94 98 94 91   SODIUM mmol/L 139 137 139 136 141 142 141 141   POTASSIUM mmol/L 4.0 4.3 4.3 4.2 4.4 4.4 4.2 4.1   TOTAL CO2 mmol/L  --   --   --  24.9  --   --  26  --    CO2 mmol/L 22.0 25.0 26.0  --  26.0 25.0  --  26.0   CHLORIDE mmol/L 103 102 104 100 103 105 100 104   ANION GAP mmol/L 14.0 10.0 9.0  --  12.0 12.0 15 11.0   CREATININE mg/dL 1.79* 1.85* 1.82* 1.94* 1.97* 1.75* 1.9* 1.60*   BUN mg/dL 30* 30* 26* 26* 35* 28* 16 18   BUN / CREAT RATIO  16.8 16.2 14.3 13.4 17.8 16.0  --  11.3   CALCIUM mg/dL 9.7 9.8 9.5 9.3 9.7 9.5 10.0 9.7   EGFR IF NONAFRICN AM mL/min/1.73 27* 26* 27* 25* 24* 28* 25* 31*   ALK PHOS U/L 89 92 84 92 80 77  --  80   TOTAL PROTEIN g/dL 6.8 7.2 7.0  --  6.9 6.9  --  7.1   ALT (SGPT) U/L 14 9 9 10 11 8  --  7   AST (SGOT) U/L 20 21 16 16 20 18  --  17   BILIRUBIN mg/dL 0.6 0.6 0.7 0.7 0.6 0.8  --  0.7   ALBUMIN g/dL 4.20 4.50 4.20 4.60 4.50 4.30  --  4.30  3.6   GLOBULIN gm/dL 2.6 2.7 2.8  --  2.4 2.6  --  2.8       Lab Results - Last 18 Months   Lab Units 06/17/21  1359 02/04/21  1328 12/21/20  1323 09/09/20  1104 06/01/20  1032 02/20/20  1323 01/22/20  1214   M-SPIKE g/dL  --   --   --   --   --   --  Not Observed   URIC ACID mg/dL  --   --  5.4  --   --   --   --    CEA ng/mL 1.66 1.47  --  1.59 1.32 1.37 1.41       Lab Results - Last 18 Months   Lab Units 06/17/21  1359 02/04/21  1328 12/21/20  1323 01/22/20  1214   IRON mcg/dL 88 95  --  88   TIBC mcg/dL 426 425  --  390   IRON SATURATION % 21 22  --  23   FERRITIN ng/mL 205.80* 253.90*  --  240.90*   TSH uIU/mL  --   --  2.120   "--          PAST MEDICAL HISTORY:  ALLERGIES:  Allergies   Allergen Reactions   • Bactrim [Sulfamethoxazole-Trimethoprim] Provider Review Needed     Patient has kidney disease (stage 4) shouldn't take Bactrim   • Codeine Nausea Only   • Percocet [Oxycodone-Acetaminophen] Nausea Only     Becomes, hot, nauseated, and made her head feel \"weird\".     CURRENT MEDICATIONS:  Outpatient Encounter Medications as of 6/24/2021   Medication Sig Dispense Refill   • acetaminophen (TYLENOL) 500 MG tablet Take 500 mg by mouth Every 6 (Six) Hours As Needed for Mild Pain .     • allopurinol (ZYLOPRIM) 100 MG tablet Take 200 mg by mouth 2 (Two) Times a Day.  3   • anastrozole (ARIMIDEX) 1 MG tablet Take 1 tablet by mouth Daily. 30 tablet 5   • bumetanide (BUMEX) 0.5 MG tablet Take 1 tablet by mouth once daily 90 tablet 3   • calcium carbonate-vitamin d 600-400 MG-UNIT per tablet Take 1 tablet by mouth 2 (Two) Times a Day. 60 tablet 5   • carboxymethylcellulose (REFRESH PLUS) 0.5 % solution Administer 1 drop to both eyes 3 (Three) Times a Day As Needed for Dry Eyes.     • dronedarone (MULTAQ) 400 MG tablet Take 400 mg by mouth 2 (Two) Times a Day With Meals.     • levothyroxine (SYNTHROID, LEVOTHROID) 50 MCG tablet Take 1 tablet by mouth once daily 90 tablet 3   • losartan (COZAAR) 50 MG tablet Take 1 tablet by mouth once daily 90 tablet 3   • metoprolol succinate XL (TOPROL-XL) 25 MG 24 hr tablet Take 25 mg by mouth 2 (Two) Times a Day.  1   • polyethylene glycol (MIRALAX) powder Take 17 g by mouth Daily.     • simvastatin (ZOCOR) 40 MG tablet Take 1 tablet by mouth once daily 90 tablet 3   • spironolactone (ALDACTONE) 25 MG tablet Take 1 tablet by mouth once daily 90 tablet 3   • warfarin (COUMADIN) 5 MG tablet Take  by mouth Take As Directed. Take 1 whole tablet (5 mg) on Wednesday only. Take 1/2 tablet (2.5 mg) all other days.  5   • [DISCONTINUED] ferrous sulfate 325 (65 Fe) MG tablet TAKE 1 TABLET BY MOUTH THREE TIMES DAILY " (Patient taking differently: TAKES 1 TABLET 3 TIMES PER WEEK.) 90 tablet 2     Facility-Administered Encounter Medications as of 6/24/2021   Medication Dose Route Frequency Provider Last Rate Last Admin   • lidocaine (XYLOCAINE) 1 % injection 10 mL  10 mL Subcutaneous Once Nikki Zuniga MD       • lidocaine-EPINEPHrine (XYLOCAINE W/EPI) 1 %-1:[id number] injection 10 mL  10 mL Injection Once Nikki Zuniga MD         Adult illnesses:  Hypothyroidism  Atrial fibrillation  Hyperlipidemia  Obesity  Chronic kidney disease stage 4 - Dr. Daniels    ADULT ILLNESSES:  Patient Active Problem List   Diagnosis Code   • Hx of adenomatous colonic polyps Z86.010   • HTN (hypertension), benign I10   • Anticoagulated on Coumadin Z79.01   • Family hx of colon cancer Z80.0   • Breast cancer, left (CMS/HCC) C50.912   • Anemia due to stage 3 chronic kidney disease (CMS/HCC) N18.30, D63.1   • Acute frontal sinusitis J01.10   • Chronic anticoagulation Z79.01   • Endometrial thickening on ultrasound R93.89   • Excessive anticoagulation [id number]   • History of hypertension Z86.79   • Hypothyroidism E03.9   • Kidney disease N28.9   • Mixed hyperlipidemia E78.2   • Atrial fibrillation, controlled (CMS/HCC) I48.91   • PMB (postmenopausal bleeding) N95.0   • Brain lesion G93.9   • Obesity (BMI 30-39.9) E66.9   • Non-tobacco user Z78.9   • Anemia in stage 4 chronic kidney disease (CMS/HCC) N18.4, D63.1   • Atrophic vaginitis N95.2   • Cardiomegaly I51.7   • Hematuria R31.9   • 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, 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   • PVC (premature ventricular contraction) I49.3   • Rheumatoid arthritis (CMS/HCC) M06.9   • Seborrheic keratosis L82.1   • Thrombocytopenia (CMS/HCC) D69.6   • Vitamin D deficiency E55.9   • Moderate mitral regurgitation by prior echocardiogram I34.0     SURGERIES:  Past Surgical History:   Procedure Laterality Date   • APPENDECTOMY     • BREAST BIOPSY Left 2015   • BREAST BIOPSY Right 12/31/2020    Procedure: RIGHT NEEDLE  DIRECTED EXCISIONAL BREAST BIOPSY;  Surgeon: Nikki Zuniga MD;  Location: [facility] OR;  Service: General;  Laterality: Right;   • BREAST EXCISIONAL BIOPSY Left 2001   • BREAST EXCISIONAL BIOPSY Left 2001   • CARPAL TUNNEL RELEASE Right    • CATARACT EXTRACTION, BILATERAL     • CHOLECYSTECTOMY     • COLON SURGERY      Partial right   • COLONOSCOPY     • COLONOSCOPY N/A 10/17/2019    Procedure: COLONOSCOPY WITH ANESTHESIA;  Surgeon: Rigoberto Shaw MD;  Location: [facility] ENDOSCOPY;  Service: Gastroenterology   • COLONOSCOPY W/ POLYPECTOMY  06/30/2016    2 Adenomatous polyps at 80 & 40 cm, Diverticulosis repeat exam in 3 years   • EYE SURGERY Bilateral     cataract extraction   • EYE SURGERY Left     detached retina   • HERNIA REPAIR     • MASTECTOMY Left 2019   • MASTECTOMY W/ SENTINEL NODE BIOPSY Left 1/2/2020    Procedure: LEFT SIMPLE MASTECTOMY WITH SENTINEL NODE BIOPSY, INJECTION AND SCAN, RADIOLOGIST WILL INJECT;  Surgeon: Nikki Zuniga MD;  Location: [facility] OR;  Service: General   • REPLACEMENT TOTAL KNEE Bilateral    • THROAT SURGERY      duct     HEALTH MAINTENANCE ITEMS:  Health Maintenance Due   Topic Date Due   • COVID-19 Vaccine (1) Never done       <no information>  Last Completed Colonoscopy     This patient has no relevant Health Maintenance data.        Immunization History   Administered Date(s) Administered   • Fluad Quad 65+ 09/22/2020   • Influenza TIV (IM) 09/22/2015, 10/14/2016   • Pneumococcal Conjugate 13-Valent (PCV13) 11/10/2016   • Pneumococcal Polysaccharide (PPSV23) 11/02/2012   • Shingrix 04/10/2018, 08/15/2018   • Tdap 12/21/2020   • Zostavax 04/23/2013     Last Completed Mammogram          Scheduled - MAMMOGRAM (Yearly) Scheduled for 12/6/2021 12/03/2020  Mammo Diagnostic Digital Tomosynthesis Right With CAD    11/30/2020  Mammo Screening Modified With Tomosynthesis Right With CAD    11/27/2019  Mammo Diagnostic Digital Tomosynthesis Bilateral With CAD    11/26/2018  Mammo  "Screening Digital Tomosynthesis Bilateral With CAD    11/22/2017  Mammo screening digital tomosynthesis bilateral w CAD    Only the first 5 history entries have been loaded, but more history exists.                  FAMILY HISTORY:  Family History   Problem Relation Age of Onset   • Colon cancer Mother 70   • Colon polyps Mother    • No Known Problems Father    • No Known Problems Sister    • No Known Problems Brother    • No Known Problems Daughter    • No Known Problems Son    • No Known Problems Maternal Grandmother    • No Known Problems Paternal Grandmother    • No Known Problems Maternal Aunt    • No Known Problems Paternal Aunt    • No Known Problems Other    • Breast cancer Neg Hx    • BRCA 1/2 Neg Hx    • Endometrial cancer Neg Hx    • Ovarian cancer Neg Hx      SOCIAL HISTORY:  Social History     Socioeconomic History   • Marital status:      Spouse name: Not on file   • Number of children: Not on file   • Years of education: Not on file   • Highest education level: Not on file   Tobacco Use   • Smoking status: Never Smoker   • Smokeless tobacco: Never Used   Vaping Use   • Vaping Use: Never used   Substance and Sexual Activity   • Alcohol use: No   • Drug use: No   • Sexual activity: Defer   Homemaker    REVIEW OF SYSTEMS:  Review of Systems   Constitutional: Positive for fatigue (baseline/chronic unchanged). Negative for activity change and appetite change.        Manages her her ADLs, including chores, errands and driving. Is up and about \"all the time.\"    Arimidex tolerance discussed:  No worsening hot flashes.  Right shoulder discomfort otherwise no new arthralgias. Is able to take daily   HENT: Positive for postnasal drip (seasonal) and rhinorrhea (seasonal).    Eyes: Negative.    Respiratory: Negative.    Cardiovascular: Positive for palpitations (Intermittent.  Is on maintenance warfarin).   Gastrointestinal: Negative.  Negative for blood in stool.   Endocrine: Negative.    Genitourinary: " "Negative.    Musculoskeletal: Positive for arthralgias (intermittent of the hands, left hip, right shoulder and knees inspite of prior TKRs bilaterally).   Allergic/Immunologic: Negative.    Neurological: Positive for dizziness (postural - when standing too abruptly. \"Not in awhile.\") and numbness (left foot at times, unchanged).   Hematological: Bruises/bleeds easily (coumadin).   Psychiatric/Behavioral: Negative.    Breasts:  Says she is more diligent with self exams.  Has not noticed any changes        /64   Pulse 62   Temp 97.3 °F (36.3 °C) (Temporal)   Resp 18   Ht 165.1 cm (65\")   Wt 101 kg (221 lb 9.6 oz)   SpO2 97%   BMI 36.88 kg/m²  Body surface area is 2.07 meters squared.  Pain Score    06/24/21 1101   PainSc: 0-No pain  Comment: c/o pain located right shoulder pain which radiates down her arm x 2 month off a       Physical Exam:  Physical Exam   Constitutional: She is oriented to person, place, and time. No distress.   Pleasant, cooperative, heavy-set, modeastly kept elderly female.  Ambulatory.  ECOG 0.      She has gained 3 pounds (in addition to 13 pounds at her 3 prior visits) since her last visit.   HENT:   Head: Normocephalic and atraumatic.   Mouth/Throat: No oropharyngeal exudate.   She is wearing a surgical mask today   Eyes: Pupils are equal, round, and reactive to light. Conjunctivae are normal. No scleral icterus.   Neck: No JVD present. No tracheal deviation present. No thyromegaly (on synthroid) present.   Cardiovascular:   Irregular rate and rhythm   Pulmonary/Chest: Effort normal and breath sounds normal. No stridor. No respiratory distress. She has no wheezes. She has no rales.   Breasts:  Right breast no masses.  Lumpectomy site just above the left nipple is healed.  Left mastectomy site well healed. No underlying nodularity.  No lymphedema of the left upper extremity.     Abdominal: Soft. Bowel sounds are normal. She exhibits no distension and no mass. There is no " abdominal tenderness. There is no rebound and no guarding.   Musculoskeletal: Normal range of motion. Deformity (changes of osteoarthritis of the hands) present. No swelling.      Right lower leg: No edema.      Left lower leg: No edema.   Lymphadenopathy:     She has no cervical adenopathy.        Right: No supraclavicular adenopathy present.        Left: No supraclavicular adenopathy present.   Neurological: She is alert and oriented to person, place, and time. No cranial nerve deficit.   Skin: Skin is warm and dry. No rash noted. No erythema. No pallor.   Psychiatric: Her behavior is normal. Mood, judgment and thought content normal.   Vitals reviewed.    ASSESSMENT:   1. Invasive ductal/lobular (mixed) carcinoma:   · Stage: IB (pT2, pN0 [sn], MX, G1) ER (+) %, ND (+) 81-90%, HER-2/kelby 1-2 + (IHC) - negative. HER-2 1-2+ (equivocal).  FISH: Negative (signal ratio: 1.2)  · Tumor burden: 23 mm tumor in the left breast. 0/3 axillary sentinel lymph node negative for metastatic carcinoma. Nuclear grade 1.    · Oncotype DX: Recurrence score:  0; distant recurrence risk at 9 years with AI or BARNHART alone: 3%; group average absolute chemotherapy benefit:< 1%  · Complications of tumor: None.   · Tumor status: Adjuvant Arimidex in progress.  · 11/30/2020-mammogram.  Right breast calcifications for which additional imaging recommended.  BI-RADS Category 0.  · 12/28/2020-stereotactic biopsy of right breast calcifications.  Pathology: Benign intraductal papilloma, 3 mm greatest dimension with associated microcalcifications.  · 01/04/2021-Right breast, lumpectomy with needle localization.  Final diagnosis: Extensive biopsy site changes including fat necrosis and fibrosis.  Benign fibrous breast parenchyma.  No evidence of in situ or invasive carcinoma.           2.    Chronic kidney disease, stage III - IV.   --GFR 27, 06/17/2021 (prior:  GFR 17 - 36 mL/min).        3.    Hypothyroidism.  On Synthroid        4.    Normocytic  anemia.    --Hgb 12.4; MCV 95, 06/17/2021 (prior: Hgb 10.9 - 12.9; MCV 92 - 95.9).  Contribution from chronic kidney disease suspected.        5.    Abnormal CT scans of the head (see above) indicating ill-defined hypodensity at the gray-white juncture in the right frontal lobe, hepatic and right renal cysts.  Unable to perform MRI with contrast due to chronic kidney disease.            --03/23/2020-seen by Dr. Jaramillo.  Noncontrast brain MRI from 1/17/2020 reviewed.  Possibilities include prior stroke microvascular disease, demyelinating disease.  Risks of biopsy discussed.  Recommend serial imaging with biopsy of the lesion progresses.  Follow-up with MRI in 3 months.          --07/06/2020- MRI.  Mild atrophy of the brain.  Multiple foci of T2 abnormality involving the periventricular and higher white matter tracts.  Cortical lesions also present.  Overall stable from 03/2020.  Favor small vessel ischemic changes.  No evidence of restricted diffusion to suggest acute ischemia or infarct.  No mass-effect or shift in midline.          --01/06/2021-brain MRI.  No imaging evidence of intracranial metastatic disease.  Seen by Dr. Jaramillo of neurosurgery.  Favors microvascular disease.  Okay to discontinue imaging.        6.    (+) RASHEL with joint pains (hands). Has been seen by Dr. Caputo.        7.    Osteopenia (bone density 1.0 and 2.5 standard deviations below the mean on DEXA scan, 06/02/2020).  On calcium and vitamin D. Followed by Dr. Davidson.        8.    Paroxysmal atrial fibrillation.  Remains on anticoagulation.  Dr. Salas follows       PLAN:   1.  Apprised of the labs, 06/17/2021 (above).  Note normal CBC.  Low (stable) GFR, hyperglycemia, elevated BUN/creatinine otherwise normal CMP, normal CEA, normal CA-27-29, repleted iron levels.  2.  Keep appointment for mammogram, 12/06/2021 per Dr. Zuniga  3.  Previously noted the positive RASHEL but negative reflex panel, negative SPIEP, repleted iron levels.  The  liver ultrasound, renal ultrasound and chest ultrasound findings (above) are noted.  Oncotype DX recurrence score (above) noted (RS 0; absolute chemotherapy benefit < 1%).  DEXA scan shows osteopenia with increased risk of fracture.  Arimidex tolerance discussed.  So far so good.  4.  Breast cancer.  NCCN guidelines version 1.2020 reviewed.  4 ductal, lobular, mixed tumors, pT1, pT2, pT3 and pN0 with tumors greater than 0.5 cm- 21 gene RT-PCR assay.  Recurrence score less than 26-adjuvant endocrine therapy.  Recurrence score 26-30- adjuvant endocrine therapy or adjuvant chemotherapy followed by endocrine therapy.  Recurrence score greater than 31-adjuvant chemotherapy followed by endocrine therapy.  However limited data to make chemotherapy recommendations for those greater than 70 years of age.  5.  The rationale for adjuvant hormonal manipulation with AI's are previously discussed. The potential risks (to include but not limited to: Hot flashes, asthenia, pain, arthralgia, arthritis, nausea/vomiting, headache, pharyngitis, depression, rash, hypertension, lymphedema, insomnia, edema, weight gain, dyspnea, abdominal pain, constipation, osteoporosis, fractures, cough, bone pain, diarrhea, breast pain, paresthesias, infection, cataracts, myalgias, thromboembolism, angina, endometrial carcinoma, myocardial infarction, stroke, anemia, leukopenia, erythema multiforme, Belle-Collins syndrome) are discussed at length. Questions answered. She agrees to press on with therapy.  6.  Rx:   · Arimidex 1 mg p.o. daily, #30 x 2 RF   · Calcium 1200 mg + 800 units D3  p.o. daily - otc  · Stay off ferrous sulfate    7.   Return to the [place] office in 20 weeks (after mammogram) with pre-office CBC and differential, iron, fe sat , ferritin, CMP, CEA and CA-27-29.       MEDICAL DECISION MAKING: Moderate Complexity   AMOUNT OF DATA: Extensive      I spent - 30 total minutes, face-to-face, caring for Heidi today.  Greater than 50%  of this time involved counseling and/or coordination of care as documented within this note regarding the patient's illness(es), pros and cons of various treatment options, instructions and/or risk reduction.      Cc:   MD Ron Miranda MD Christopher Phillips, MD Jonathan Wilkerson, MD Ronald Wilson, MD

## 2021-06-18 LAB
CANCER AG27-29 SERPL-ACNC: 20.7 U/ML (ref 0–38.6)
CEA SERPL-MCNC: 1.66 NG/ML

## 2021-06-22 ENCOUNTER — OFFICE VISIT (OUTPATIENT)
Dept: INTERNAL MEDICINE | Facility: CLINIC | Age: 81
End: 2021-06-22

## 2021-06-22 VITALS
HEART RATE: 59 BPM | DIASTOLIC BLOOD PRESSURE: 70 MMHG | WEIGHT: 220 LBS | TEMPERATURE: 97.9 F | SYSTOLIC BLOOD PRESSURE: 140 MMHG | HEIGHT: 65 IN | BODY MASS INDEX: 36.65 KG/M2 | OXYGEN SATURATION: 99 %

## 2021-06-22 DIAGNOSIS — Z79.01 ANTICOAGULATED ON COUMADIN: ICD-10-CM

## 2021-06-22 DIAGNOSIS — I10 HTN (HYPERTENSION), BENIGN: Primary | ICD-10-CM

## 2021-06-22 DIAGNOSIS — I87.303 STASIS EDEMA OF BOTH LOWER EXTREMITIES: ICD-10-CM

## 2021-06-22 DIAGNOSIS — I48.91 ATRIAL FIBRILLATION, CONTROLLED (HCC): ICD-10-CM

## 2021-06-22 PROCEDURE — 99214 OFFICE O/P EST MOD 30 MIN: CPT | Performed by: INTERNAL MEDICINE

## 2021-06-22 NOTE — PROGRESS NOTES
Subjective   Heidi Ansari is a 80 y.o. female.   Chief Complaint   Patient presents with   • Hypertension     6 month follow up    • Numbness     bilateral foot numbness, left seems to beworse than right; ongoing issues over the last few years and just seems to be progressing    • Shoulder Pain     right shoulder pain; noticed the pain a couple of months ago; unsure if it could be one of her cancer medicaitons or arthritis. as somtimes it is not in the shoulder but futher down; hurts worse when she is movign her arm or reaching for something        History of Present Illness this is a 6-month follow-up for patient with hypertension.  She is concerned about some numbness he experiencing on the base of both feet worse on the left than on the right of the left foot also has swelling more so than the right it seems that the numbness only occurs on the soles of her feet and only associated with the swelling.  She also has had some pains in her right shoulder see above for more details.    The following portions of the patient's history were reviewed and updated as appropriate: allergies, current medications, past family history, past medical history, past social history, past surgical history and problem list.    Review of Systems   Constitutional: Negative for activity change, appetite change, fatigue, fever, unexpected weight gain and unexpected weight loss.   HENT: Negative for swollen glands, trouble swallowing and voice change.    Eyes: Negative for blurred vision and visual disturbance.   Respiratory: Negative for cough and shortness of breath.    Cardiovascular: Negative for chest pain, palpitations and leg swelling.   Gastrointestinal: Negative for abdominal pain, constipation, diarrhea, nausea, vomiting and indigestion.   Endocrine: Negative for cold intolerance, heat intolerance, polydipsia and polyphagia.   Genitourinary: Negative for dysuria and frequency.   Musculoskeletal: Positive for arthralgias.  Negative for back pain, joint swelling and neck pain.   Skin: Negative for color change, rash and skin lesions.   Neurological: Positive for numbness. Negative for dizziness, weakness, headache, memory problem and confusion.   Hematological: Does not bruise/bleed easily.   Psychiatric/Behavioral: Negative for agitation, hallucinations and suicidal ideas. The patient is not nervous/anxious.        Objective   Past Medical History:   Diagnosis Date   • A-fib (CMS/HCC)    • Arthritis    • Arthritis     generalized   • Atrial fibrillation (CMS/HCC)     INTERMITTENT   • Basal cell carcinoma     between eyes    • Breast cancer (CMS/HCC)    • Cancer (CMS/HCC)    • Disease of thyroid gland    • Hx of colonic polyp    • Hyperlipidemia    • Hypertension    • Kidney disease     STAGE 4   • Kidney disease       Past Surgical History:   Procedure Laterality Date   • APPENDECTOMY     • BREAST BIOPSY Left 2015   • BREAST BIOPSY Right 12/31/2020    Procedure: RIGHT NEEDLE DIRECTED EXCISIONAL BREAST BIOPSY;  Surgeon: Nikki Zuniga MD;  Location: [facility] OR;  Service: General;  Laterality: Right;   • BREAST EXCISIONAL BIOPSY Left 2001   • BREAST EXCISIONAL BIOPSY Left 2001   • CARPAL TUNNEL RELEASE Right    • CATARACT EXTRACTION, BILATERAL     • CHOLECYSTECTOMY     • COLON SURGERY      Partial right   • COLONOSCOPY     • COLONOSCOPY N/A 10/17/2019    Procedure: COLONOSCOPY WITH ANESTHESIA;  Surgeon: Rigoberto Shaw MD;  Location: [facility] ENDOSCOPY;  Service: Gastroenterology   • COLONOSCOPY W/ POLYPECTOMY  06/30/2016    2 Adenomatous polyps at 80 & 40 cm, Diverticulosis repeat exam in 3 years   • EYE SURGERY Bilateral     cataract extraction   • EYE SURGERY Left     detached retina   • HERNIA REPAIR     • MASTECTOMY Left 2019   • MASTECTOMY W/ SENTINEL NODE BIOPSY Left 1/2/2020    Procedure: LEFT SIMPLE MASTECTOMY WITH SENTINEL NODE BIOPSY, INJECTION AND SCAN, RADIOLOGIST WILL INJECT;  Surgeon: Nikki Zuniga MD;  Location:   [facility] OR;  Service: General   • REPLACEMENT TOTAL KNEE Bilateral    • THROAT SURGERY      duct        Current Outpatient Medications:   •  acetaminophen (TYLENOL) 500 MG tablet, Take 500 mg by mouth Every 6 (Six) Hours As Needed for Mild Pain ., Disp: , Rfl:   •  allopurinol (ZYLOPRIM) 100 MG tablet, Take 200 mg by mouth 2 (Two) Times a Day., Disp: , Rfl: 3  •  anastrozole (ARIMIDEX) 1 MG tablet, Take 1 tablet by mouth Daily., Disp: 30 tablet, Rfl: 5  •  bumetanide (BUMEX) 0.5 MG tablet, Take 1 tablet by mouth once daily, Disp: 90 tablet, Rfl: 3  •  calcium carbonate-vitamin d 600-400 MG-UNIT per tablet, Take 1 tablet by mouth 2 (Two) Times a Day., Disp: 60 tablet, Rfl: 5  •  carboxymethylcellulose (REFRESH PLUS) 0.5 % solution, Administer 1 drop to both eyes 3 (Three) Times a Day As Needed for Dry Eyes., Disp: , Rfl:   •  dronedarone (MULTAQ) 400 MG tablet, Take 400 mg by mouth 2 (Two) Times a Day With Meals., Disp: , Rfl:   •  levothyroxine (SYNTHROID, LEVOTHROID) 50 MCG tablet, Take 1 tablet by mouth once daily, Disp: 90 tablet, Rfl: 3  •  losartan (COZAAR) 50 MG tablet, Take 1 tablet by mouth once daily, Disp: 90 tablet, Rfl: 3  •  metoprolol succinate XL (TOPROL-XL) 25 MG 24 hr tablet, Take 25 mg by mouth 2 (Two) Times a Day., Disp: , Rfl: 1  •  polyethylene glycol (MIRALAX) powder, Take 17 g by mouth Daily., Disp: , Rfl:   •  simvastatin (ZOCOR) 40 MG tablet, Take 1 tablet by mouth once daily, Disp: 90 tablet, Rfl: 3  •  spironolactone (ALDACTONE) 25 MG tablet, Take 1 tablet by mouth once daily, Disp: 90 tablet, Rfl: 3  •  warfarin (COUMADIN) 5 MG tablet, Take  by mouth Take As Directed. Take 1 whole tablet (5 mg) on Wednesday only. Take 1/2 tablet (2.5 mg) all other days., Disp: , Rfl: 5    Current Facility-Administered Medications:   •  lidocaine (XYLOCAINE) 1 % injection 10 mL, 10 mL, Subcutaneous, Once, Nikki Zuniga MD  •  lidocaine-EPINEPHrine (XYLOCAINE W/EPI) 1 %-1:571504 injection 10 mL, 10 mL,  Injection, Once, Nikki Zuniga MD     Vitals:    06/22/21 1053   BP: 140/70   Pulse: 59   Temp: 97.9 °F (36.6 °C)   SpO2: 99%         06/22/21  1053   Weight: 99.8 kg (220 lb)         Physical Exam  Constitutional:       Appearance: She is well-developed.   HENT:      Head: Normocephalic and atraumatic.   Eyes:      Pupils: Pupils are equal, round, and reactive to light.   Cardiovascular:      Rate and Rhythm: Normal rate and regular rhythm.   Pulmonary:      Effort: Pulmonary effort is normal.      Breath sounds: Normal breath sounds.   Abdominal:      General: Bowel sounds are normal.      Palpations: Abdomen is soft.   Musculoskeletal:         General: Normal range of motion.      Cervical back: Normal range of motion and neck supple.      Right lower leg: Edema present.      Left lower leg: Edema present.   Skin:     General: Skin is warm and dry.   Neurological:      Mental Status: She is alert and oriented to person, place, and time.   Psychiatric:         Behavior: Behavior normal.               Assessment/Plan   Diagnoses and all orders for this visit:    1. HTN (hypertension), benign (Primary)    2. Anticoagulated on Coumadin    3. Atrial fibrillation, controlled (CMS/HCC)    4. Stasis edema of both lower extremities      At today's visit patient's blood pressure is adequately controlled.  In addition I am examining her feet it seems that the dysesthesia that she is experiencing only occurs when there is swelling I think that this is a stasis phenomena I have talked to her about salt restriction losing weight using support hose.  She is to continue her current medications as prescribed including Coumadin as an anticoagulation for her atrial fibrillation.

## 2021-06-24 ENCOUNTER — OFFICE VISIT (OUTPATIENT)
Dept: ONCOLOGY | Facility: CLINIC | Age: 81
End: 2021-06-24

## 2021-06-24 ENCOUNTER — ANTI-COAG VISIT (OUTPATIENT)
Dept: CARDIOLOGY CLINIC | Age: 81
End: 2021-06-24
Payer: MEDICARE

## 2021-06-24 VITALS
SYSTOLIC BLOOD PRESSURE: 152 MMHG | OXYGEN SATURATION: 97 % | DIASTOLIC BLOOD PRESSURE: 64 MMHG | BODY MASS INDEX: 36.92 KG/M2 | WEIGHT: 221.6 LBS | RESPIRATION RATE: 18 BRPM | HEART RATE: 62 BPM | HEIGHT: 65 IN | TEMPERATURE: 97.3 F

## 2021-06-24 DIAGNOSIS — I48.0 PAF (PAROXYSMAL ATRIAL FIBRILLATION) (HCC): Primary | ICD-10-CM

## 2021-06-24 DIAGNOSIS — C50.912 MALIGNANT NEOPLASM OF LEFT BREAST IN FEMALE, ESTROGEN RECEPTOR POSITIVE, UNSPECIFIED SITE OF BREAST (HCC): Primary | ICD-10-CM

## 2021-06-24 DIAGNOSIS — Z17.0 MALIGNANT NEOPLASM OF LEFT BREAST IN FEMALE, ESTROGEN RECEPTOR POSITIVE, UNSPECIFIED SITE OF BREAST (HCC): Primary | ICD-10-CM

## 2021-06-24 LAB
INTERNATIONAL NORMALIZATION RATIO, POC: 2.6
PROTHROMBIN TIME, POC: NORMAL

## 2021-06-24 PROCEDURE — 85610 PROTHROMBIN TIME: CPT | Performed by: CLINICAL NURSE SPECIALIST

## 2021-06-24 PROCEDURE — 99214 OFFICE O/P EST MOD 30 MIN: CPT | Performed by: INTERNAL MEDICINE

## 2021-06-24 RX ORDER — ANASTROZOLE 1 MG/1
1 TABLET ORAL DAILY
Qty: 30 TABLET | Refills: 5 | Status: SHIPPED | OUTPATIENT
Start: 2021-06-24 | End: 2022-01-03 | Stop reason: SDUPTHER

## 2021-07-21 ENCOUNTER — ANTI-COAG VISIT (OUTPATIENT)
Dept: CARDIOLOGY CLINIC | Age: 81
End: 2021-07-21
Payer: MEDICARE

## 2021-07-21 DIAGNOSIS — I48.0 PAF (PAROXYSMAL ATRIAL FIBRILLATION) (HCC): Primary | ICD-10-CM

## 2021-07-21 LAB
INTERNATIONAL NORMALIZATION RATIO, POC: 2.3
PROTHROMBIN TIME, POC: NORMAL

## 2021-07-21 PROCEDURE — 85610 PROTHROMBIN TIME: CPT | Performed by: NURSE PRACTITIONER

## 2021-08-18 ENCOUNTER — OFFICE VISIT (OUTPATIENT)
Dept: CARDIOLOGY CLINIC | Age: 81
End: 2021-08-18
Payer: MEDICARE

## 2021-08-18 VITALS
HEART RATE: 60 BPM | DIASTOLIC BLOOD PRESSURE: 72 MMHG | WEIGHT: 223 LBS | BODY MASS INDEX: 35.84 KG/M2 | HEIGHT: 66 IN | SYSTOLIC BLOOD PRESSURE: 114 MMHG

## 2021-08-18 DIAGNOSIS — I48.91 ATRIAL FIBRILLATION, UNSPECIFIED TYPE (HCC): ICD-10-CM

## 2021-08-18 DIAGNOSIS — R06.02 SOB (SHORTNESS OF BREATH): Primary | ICD-10-CM

## 2021-08-18 LAB
INTERNATIONAL NORMALIZATION RATIO, POC: 2.4
PROTHROMBIN TIME, POC: 30.4

## 2021-08-18 PROCEDURE — 1090F PRES/ABSN URINE INCON ASSESS: CPT | Performed by: INTERNAL MEDICINE

## 2021-08-18 PROCEDURE — G8400 PT W/DXA NO RESULTS DOC: HCPCS | Performed by: INTERNAL MEDICINE

## 2021-08-18 PROCEDURE — G8427 DOCREV CUR MEDS BY ELIG CLIN: HCPCS | Performed by: INTERNAL MEDICINE

## 2021-08-18 PROCEDURE — 85610 PROTHROMBIN TIME: CPT | Performed by: INTERNAL MEDICINE

## 2021-08-18 PROCEDURE — G8417 CALC BMI ABV UP PARAM F/U: HCPCS | Performed by: INTERNAL MEDICINE

## 2021-08-18 PROCEDURE — 1036F TOBACCO NON-USER: CPT | Performed by: INTERNAL MEDICINE

## 2021-08-18 PROCEDURE — 99214 OFFICE O/P EST MOD 30 MIN: CPT | Performed by: INTERNAL MEDICINE

## 2021-08-18 PROCEDURE — 4040F PNEUMOC VAC/ADMIN/RCVD: CPT | Performed by: INTERNAL MEDICINE

## 2021-08-18 PROCEDURE — 1123F ACP DISCUSS/DSCN MKR DOCD: CPT | Performed by: INTERNAL MEDICINE

## 2021-08-18 ASSESSMENT — ENCOUNTER SYMPTOMS
ABDOMINAL DISTENTION: 0
DIARRHEA: 0
BLOOD IN STOOL: 0
WHEEZING: 0
SHORTNESS OF BREATH: 1
BACK PAIN: 0
CONSTIPATION: 0
COUGH: 0
EYE DISCHARGE: 0
VOMITING: 0

## 2021-08-18 NOTE — PATIENT INSTRUCTIONS
[place] at the [facility] and [address] located on the first floor of [place] [address] through [facility] main entrance and turn immediately to your left. Date/Time:     Patient's contact number:  [phone] (home)     Echocardiogram -  No prep. Takes approximately 30 min. An echocardiogram uses sound waves to produce images of your heart. This commonly used test allows your doctor to see how your heart is beating and pumping blood. Your doctor can use the images from an echocardiogram to identify various abnormalities in the heart muscle and valves. This test has 2 parts:   Ø You will be asked to disrobe from the waist up and given a gown to wear. The technologist will then hook up an EKG monitor to you for the entire exam.   Ø You will then have an ultrasound of your heart (echocardiogram) to assess the heart muscle, heart valves and heart function. You may eat and take any medicines before the exam.     If you need to change your appointment, please call outpatient scheduling at [phone]. [place] at the [facility] and [address] located on the first floor of [place] [address] through [facility] main entrance and turn immediately to your left. Patient's contact number:  [phone] (home)      Lexiscan Stress Test      Lexiscan (regadenoson injection) is a prescription drug given through an IV line that increases blood flow through the arteries of the heart during a cardiac nuclear stress test.     There are two parts to a Lexiscan stress test: the rest portion and the exercise portion. For the rest portion, a radioactive tracer is injected into your arm through the IV. After 30 to 60 minutes, the process of imaging will begin. A nuclear camera will be placed on your chest area and images are taken for the next 15 to 20 minutes.   For the exercise portion, a nurse will attach EKG electrodes to your chest to monitor your heart rate.  The drug [place] is administered to simulate stress on the heart. Your heart rhythm will then be monitored for the next few minutes. Your blood pressure will also be monitored throughout the exercise portion. [place] through the exercise portion, a second round of radioactive tracer is injected into your body. Your heart rate and EKG will be monitored for another few minutes after administering the drug. Test Preparation:     Bring a list of your current medications. Do not take any of your medications the morning of the test, but bring all morning medications with you as you will take them after the stress portion of the test is completed.  Do not eat Bananas 12 hours prior to test.     No caffeine 12 hours prior to the testing. This includes: coffee, pop/soda, chocolate, cold medications, etc.  Any product that might contain caffeine.  No nicotine or alcohol 12 hours prior to your test.    Nothing to eat or drink 6-8 hours prior to appointment time. It is okay to drink small amounts of water during the four hours prior to the test.   Nitroglycerin patches must be taken off 1 hour before testing.  Wear comfortable clothing.  Please refrain from any strenuous exercise or activities the day before your test, or the day of your test.   The Nuclear Lexiscan Stress test takes about 2 ½ to 3 hours to complete. If for any reason you are unable to keep this appointment, please contact Outpatient Scheduling, [phone], as soon as possible to reschedule.

## 2021-08-18 NOTE — PROGRESS NOTES
[facility]  Cardiology Office Note  [address] [id number]  Phone: [phone]  Fax: [phone]                            Date:  8/18/2021  Patient: Nate Inman  Age:  79 y. o., 1940    Referral: Magno Zavaleta MD      PROBLEM LIST:    Patient Active Problem List    Diagnosis Date Noted    Moderate mitral regurgitation by prior echocardiogram 12/03/2020     Priority: Low    Acute frontal sinusitis 05/16/2017     Priority: Low    Chronic anticoagulation 05/16/2017     Priority: Low     Overview Note:     Coumadin managed by Dr. Shelley Quiroz Sole Endometrial thickening on ultrasound 02/16/2017     Priority: Low     Overview Note:     Replacing Deprecated Diagnoses      PMB (postmenopausal bleeding) 02/16/2017     Priority: Low    [facility] 12/14[facility]2015     Priority: Low    Hypothyroidism      Priority: Low    Mixed hyperlipidemia      Priority: Low    Hypertension      Priority: Low     1.  Paroxysmal atrial fibrillation on Multaq and Coumadin, normal LV ejection fraction. 2.  CKD stage IV, baseline creatinine 2.7.  3.  Recent left breast cancer 1/2020 status post mastectomy, on Arimidex    PRESENTATION: Nate Inman is a 79y.o. year old female presents for follow-up evaluation. She has noticed some shortness of breath over the last 6 months which has progressed. She does get atrial fibrillation episodes but they last only 30 minutes and no prolonged episodes by her account. It appears to be much more stable since her last visit. She remains on Arimidex. Some leg swelling reported also. No chest pain. Shortness of breath is exertional in nature. REVIEW OF SYSTEMS:  Review of Systems   Constitutional: Negative for activity change, fatigue and fever. HENT: Negative for ear pain, hearing loss and tinnitus. Eyes: Negative for discharge and visual disturbance.    Respiratory: Positive for shortness of breath. Negative for cough and wheezing. Cardiovascular: Positive for leg swelling. Negative for chest pain and palpitations. Gastrointestinal: Negative for abdominal distention, blood in stool, constipation, diarrhea and vomiting. Endocrine: Negative for cold intolerance, heat intolerance, polydipsia and polyuria. Genitourinary: Negative for dysuria and hematuria. Musculoskeletal: Negative for arthralgias, back pain and myalgias. Skin: Negative for pallor and rash. Neurological: Negative for seizures, syncope, weakness and headaches. Psychiatric/Behavioral: Negative for behavioral problems and dysphoric mood.        Past Medical History:      Diagnosis Date    Hyperlipidemia     Hypertension     Hypothyroidism     Kidney disease     type 4    LONG TERM ANTICOAGULENT USE        Past Surgical History:      Procedure Laterality Date    BREAST BIOPSY      CARPAL TUNNEL RELEASE      CARPAL TUNNEL RELEASE      CHOLECYSTECTOMY      COLECTOMY      COLON SURGERY      DILATION AND CURETTAGE OF UTERUS N/A 2/16/2017    DILATATION AND CURETTAGE HYSTEROSCOPY performed by Latoya Gasca MD at [address]  02/17/2017    JOINT REPLACEMENT      TONSILLECTOMY         Medications:  Current Outpatient Medications   Medication Sig Dispense Refill    MULTAQ 400 MG TABS TAKE 1 TABLET BY MOUTH TWICE DAILY WITH MEALS 180 tablet 0    metoprolol succinate (TOPROL XL) 25 MG extended release tablet Take 1 tablet by mouth twice daily 180 tablet 0    warfarin (COUMADIN) 5 MG tablet TAKE ONE TABLET BY MOUTH DAILY OR AS DIRECTED 30 tablet 5    anastrozole (ARIMIDEX) 1 MG tablet Take 1 tablet by mouth once daily      Acetaminophen (TYLENOL) 325 MG CAPS Take by mouth daily      Carboxymethylcellulose Sodium (REFRESH TEARS OP) Apply 1 drop to eye as needed      vitamin D (CHOLECALCIFEROL) 1000 UNIT TABS tablet Take 1,000 Units by mouth daily      allopurinol (ZYLOPRIM) 100 MG tablet Take 100 mg by mouth 2 times daily       calcium carbonate 600 MG TABS tablet Take 1 tablet by mouth 2 times daily Indications: SUPPLEMENT      spironolactone (ALDACTONE) 25 MG tablet Take 25 mg by mouth nightly Indications: FLUID RETENTION       levothyroxine (SYNTHROID) 50 MCG tablet Take 50 mcg by mouth Daily Indications: HYPOTHROIDISM       polyethylene glycol (MIRALAX) powder Take 17 g by mouth daily Indications: REGULARITY       losartan (COZAAR) 100 MG tablet Take 50 mg by mouth Daily Indications: HYPERTENSION       bumetanide (BUMEX) 1 MG tablet Take 0.5 mg by mouth daily       simvastatin (ZOCOR) 40 MG tablet Take 40 mg by mouth nightly Indications: CHOLESTEROL       ferrous sulfate 325 (65 FE) MG tablet Take 325 mg by mouth every other day Indications: ANEMIA  (Patient not taking: Reported on 8/18/2021)       No current facility-administered medications for this visit. Allergies:  Bactrim [sulfamethoxazole-trimethoprim] and Codeine    Past Social History:  Social History     Socioeconomic History    Marital status:      Spouse name: Not on file    Number of children: Not on file    Years of education: Not on file    Highest education level: Not on file   Occupational History    Not on file   Tobacco Use    Smoking status: Never Smoker    Smokeless tobacco: Never Used   Vaping Use    Vaping Use: Never used   Substance and Sexual Activity    Alcohol use: No     Alcohol/week: 0.0 standard drinks    Drug use: No    Sexual activity: Not on file   Other Topics Concern    Not on file   Social History Narrative    Not on file     Social Determinants of Health     Financial Resource Strain:     Difficulty of Paying Living Expenses:    Food Insecurity:     Worried About Running Out of Food in the Last Year:     [address] in the Last Year:    Transportation Needs:     Lack of Transportation (Medical):      Lack of Transportation (Non-Medical):    Physical Activity:  Days of Exercise per Week:     Minutes of Exercise per Session:    Stress:     Feeling of Stress :    Social Connections:     Frequency of Communication with Friends and Family:     Frequency of Social Gatherings with Friends and Family:     Attends Taoist Services:     Active Member of Clubs or Organizations:     Attends Club or Organization Meetings:     Marital Status:    Intimate Partner Violence:     Fear of Current or Ex-Partner:     Emotionally Abused:     Physically Abused:     Sexually Abused:        Family History:       Problem Relation Age of Onset    Heart Disease Other         Family History    Cancer Other         Family History    Hypertension Other         Family History    High Blood Pressure Mother     Colon Cancer Mother     Other Father         UNKNOWN         Physical Examination:  /72   Pulse 60   Ht 5' 5.5\" (1.664 m)   Wt 223 lb (101.2 kg)   BMI 36.54 kg/m²   Physical Exam  Constitutional:       General: She is not in acute distress. Appearance: She is not diaphoretic. Comments: Severe obesity  Blood pressure right arm sitting 140/70 mmHg, pulse 68 bpm regular   HENT:      Mouth/Throat:      Pharynx: No oropharyngeal exudate. Eyes:      General: No scleral icterus. Right eye: No discharge. Left eye: No discharge. Neck:      Thyroid: No thyromegaly. Vascular: No JVD. Cardiovascular:      Rate and Rhythm: Normal rate and regular rhythm. No extrasystoles are present. Heart sounds: Normal heart sounds, S1 normal and S2 normal. No murmur heard. No systolic murmur is present. No diastolic murmur is present. No friction rub. No gallop. No S3 or S4 sounds. Comments: No JVD  Trace pitting edema  No significant systolic or diastolic murmurs noted      Pulmonary:      Effort: Pulmonary effort is normal. No respiratory distress. Breath sounds: Normal breath sounds. No wheezing or rales.    Chest:      Chest wall: No tenderness. Abdominal:      General: Bowel sounds are normal. There is no distension. Palpations: Abdomen is soft. There is no mass. Tenderness: There is no abdominal tenderness. There is no guarding or rebound. Hernia: No hernia is present. Comments: No palpable organomegaly   Musculoskeletal:         General: Normal range of motion. Skin:     General: Skin is warm. Coloration: Skin is not pale. Findings: No rash. Neurological:      Mental Status: She is alert and oriented to person, place, and time. Cranial Nerves: No cranial nerve deficit. Deep Tendon Reflexes: Reflexes normal.           Labs:   CBC: No results for input(s): WBC, HGB, HCT, PLT in the last 72 hours. BMP:No results for input(s): NA, K, CO2, BUN, CREATININE, LABGLOM, GLUCOSE in the last 72 hours. BNP: No results for input(s): BNP in the last 72 hours. PT/INR:   Recent Labs     08/18/21  0000   PROTIME 30.4   INR 2.4     APTT:No results for input(s): APTT in the last 72 hours. CARDIAC ENZYMES:No results for input(s): CKTOTAL, CKMB, CKMBINDEX, TROPONINI in the last 72 hours. FASTING LIPID PANEL:No results found for: HDL, LDLDIRECT, LDLCALC, TRIG  LIVER PROFILE:No results for input(s): AST, ALT, LABALBU in the last 72 hours. Imaging:          ASSESSMENT and PLAN:    79-year-old female patient with past medical history of paroxysmal atrial fibrillation on anticoagulation with Coumadin and on multaq with recent bursts of atrial fibrillation, normal LV ejection fraction, CKD stage IV (baseline creatinine 2.7), recent diagnosis of left breast cancer 1/2020 status post mastectomy, started on Arimidex, here for follow-up evaluation. 1. She is reporting some increased shortness of breath over the last 6 months. Atrial fibrillation episodes appear slightly less than before. She reports her renal functions have been stable with GFR around 24.  Would obtain an echo as well as a pharmacological to rule out any significant new change. Stress nuclear study this will be scheduled. 2. Continue other medications unchanged. She has been advised on regular exercise stress diet. 3. She will follow-up with me in 10 months and with nurse practitioner in 4 months. We will be in touch with her if there is any abnormalities noted on her stress test and echo. Orders:  Orders Placed This Encounter   Procedures    NM MYOCARDIAL SPECT REST EXERCISE OR RX    POCT INR    ECHO Complete 2D W Doppler W Color     No orders of the defined types were placed in this encounter. Return in about 10 months (around 6/6/2022), or NP 4mths; me 10 mths. Electronically signed by Carli Marcos MD on 8/18/2021 at 3:19 PM    [facility]      Thisdictation was generated by voice recognition computer software. Although all attempts are made to edit the dictation for accuracy, there may be errors in the transcription that are not intended.

## 2021-08-20 DIAGNOSIS — I48.0 PAF (PAROXYSMAL ATRIAL FIBRILLATION) (HCC): ICD-10-CM

## 2021-08-20 DIAGNOSIS — I48.91 ATRIAL FIBRILLATION, UNSPECIFIED TYPE (HCC): ICD-10-CM

## 2021-08-20 RX ORDER — METOPROLOL SUCCINATE 25 MG/1
TABLET, EXTENDED RELEASE ORAL
Qty: 180 TABLET | Refills: 1 | Status: SHIPPED | OUTPATIENT
Start: 2021-08-20 | End: 2022-02-28

## 2021-08-20 RX ORDER — DRONEDARONE 400 MG/1
TABLET, FILM COATED ORAL
Qty: 180 TABLET | Refills: 1 | Status: SHIPPED | OUTPATIENT
Start: 2021-08-20 | End: 2022-03-21

## 2021-09-20 ENCOUNTER — HOSPITAL ENCOUNTER (OUTPATIENT)
Dept: NON INVASIVE DIAGNOSTICS | Age: 81
Discharge: HOME | End: 2021-09-20
Payer: MEDICARE

## 2021-09-20 ENCOUNTER — HOSPITAL ENCOUNTER (OUTPATIENT)
Dept: NUCLEAR MEDICINE | Age: 81
Discharge: HOME | End: 2021-09-22
Payer: MEDICARE

## 2021-09-20 DIAGNOSIS — R06.02 SOB (SHORTNESS OF BREATH): ICD-10-CM

## 2021-09-20 LAB
LV EF: 53 %
LV EF: 72 %
LVEF MODALITY: NORMAL
LVEF MODALITY: NORMAL

## 2021-09-20 PROCEDURE — 78452 HT MUSCLE IMAGE SPECT MULT: CPT

## 2021-09-20 PROCEDURE — A9500 TC99M SESTAMIBI: HCPCS | Performed by: INTERNAL MEDICINE

## 2021-09-20 PROCEDURE — 93306 TTE W/DOPPLER COMPLETE: CPT

## 2021-09-20 PROCEDURE — 3430000000 HC RX DIAGNOSTIC RADIOPHARMACEUTICAL: Performed by: INTERNAL MEDICINE

## 2021-09-20 PROCEDURE — 6360000002 HC RX W HCPCS: Performed by: INTERNAL MEDICINE

## 2021-09-20 RX ADMIN — TETRAKIS(2-METHOXYISOBUTYLISOCYANIDE)COPPER(I) TETRAFLUOROBORATE 10 MILLICURIE: 1 INJECTION, POWDER, LYOPHILIZED, FOR SOLUTION INTRAVENOUS at 14:46

## 2021-09-20 RX ADMIN — REGADENOSON 0.4 MG: 0.08 INJECTION, SOLUTION INTRAVENOUS at 13:10

## 2021-09-20 RX ADMIN — TETRAKIS(2-METHOXYISOBUTYLISOCYANIDE)COPPER(I) TETRAFLUOROBORATE 30 MILLICURIE: 1 INJECTION, POWDER, LYOPHILIZED, FOR SOLUTION INTRAVENOUS at 14:45

## 2021-09-21 ENCOUNTER — TELEPHONE (OUTPATIENT)
Dept: CARDIOLOGY CLINIC | Age: 81
End: 2021-09-21

## 2021-09-21 NOTE — TELEPHONE ENCOUNTER
Called and spoke with MsYenny Hermelindo Cody, gave results, verbally understood. ----- Message from Howard Jo MD sent at 9/21/2021 12:37 AM CDT -----  Please let patient know her stress test did not show any significant abnormality with normal heart function. If symptoms are stable can monitor. If symptoms are worsening to let us know.

## 2021-09-23 ENCOUNTER — ANTI-COAG VISIT (OUTPATIENT)
Dept: CARDIOLOGY CLINIC | Age: 81
End: 2021-09-23
Payer: MEDICARE

## 2021-09-23 DIAGNOSIS — I48.0 PAF (PAROXYSMAL ATRIAL FIBRILLATION) (HCC): Primary | ICD-10-CM

## 2021-09-23 LAB
INTERNATIONAL NORMALIZATION RATIO, POC: 2.4
PROTHROMBIN TIME, POC: NORMAL

## 2021-09-23 PROCEDURE — 85610 PROTHROMBIN TIME: CPT | Performed by: CLINICAL NURSE SPECIALIST

## 2021-10-21 ENCOUNTER — ANTI-COAG VISIT (OUTPATIENT)
Dept: CARDIOLOGY CLINIC | Age: 81
End: 2021-10-21
Payer: MEDICARE

## 2021-10-21 DIAGNOSIS — I48.0 PAF (PAROXYSMAL ATRIAL FIBRILLATION) (HCC): Primary | ICD-10-CM

## 2021-10-21 LAB
INTERNATIONAL NORMALIZATION RATIO, POC: 2.4
PROTHROMBIN TIME, POC: NORMAL

## 2021-10-21 PROCEDURE — 85610 PROTHROMBIN TIME: CPT | Performed by: CLINICAL NURSE SPECIALIST

## 2021-11-04 ENCOUNTER — APPOINTMENT (OUTPATIENT)
Dept: LAB | Facility: HOSPITAL | Age: 81
End: 2021-11-04

## 2021-11-22 RX ORDER — WARFARIN SODIUM 5 MG/1
TABLET ORAL
Qty: 30 TABLET | Refills: 5 | Status: SHIPPED | OUTPATIENT
Start: 2021-11-22 | End: 2022-10-11

## 2021-12-02 ENCOUNTER — TELEPHONE (OUTPATIENT)
Dept: CARDIOLOGY CLINIC | Age: 81
End: 2021-12-02

## 2021-12-03 ENCOUNTER — ANTI-COAG VISIT (OUTPATIENT)
Dept: CARDIOLOGY CLINIC | Age: 81
End: 2021-12-03
Payer: MEDICARE

## 2021-12-03 DIAGNOSIS — I48.0 PAF (PAROXYSMAL ATRIAL FIBRILLATION) (HCC): Primary | ICD-10-CM

## 2021-12-03 LAB
INTERNATIONAL NORMALIZATION RATIO, POC: 2.7
PROTHROMBIN TIME, POC: NORMAL

## 2021-12-03 PROCEDURE — 85610 PROTHROMBIN TIME: CPT | Performed by: CLINICAL NURSE SPECIALIST

## 2021-12-05 NOTE — PROGRESS NOTES
[facility]W ONC [facility] GROUP HEMATOLOGY AND ONCOLOGY  [address] [facility] [facility] [address]  [facility] [address] [phone]    Patient Name: Heidi Ansari  Encounter Date: 12/13/2021  YOB: 1940  Patient Number: [id number]      REASON FOR VISIT: Heidi Ansari is an 80-year-old female who returns in follow-up of stage IIA invasive ductal/lobular breast carcinoma.  She has been on adjuvant Arimidex since 01/22/2020 (22.5 months).  She is here alone (previously with her spouse Montana and daughter Fátima).    I have reviewed the HPI and verified with the patient the accuracy of it. No changes to interval history since the information was documented. Ender Franco MD 12/13/21     DIAGNOSTIC ABNORMALITIES:           1.   11/27/2019- palpable left breast lump x3 weeks.  Diagnostic mammogram and left breast ultrasound.  Impression: Spiculated mass approximately 2 cm upper outer quadrant of the left breast.  Oval well-circumscribed mass appears new in the lower slightly lateral left breast measuring approximately 10 mm.  Targeted left breast ultrasound at the 2 o'clock position 5 cm from nipple an irregular spiculated hypoechoic mass is seen measuring 1.7 x 1.5 x 1.6 cm.  Smaller adjacent hypoechoic irregular mass seen measuring 0.3 cm.  Third irregular hypoechoic mass at the 3 o'clock position measures 2.2 x 0.9 x 1.2 cm in size.  Ultrasound-guided biopsy recommended.  BI-RADS Category 5-highly suggestive of malignancy.  No mammographic evidence of right breast malignancy.          2.   12/06/2019- ultrasound-guided left breast biopsy.  Final diagnosis: 1.left breast mass 2 o'clock position: Core biopsy: Invasive ductal and lobular carcinoma.  Estimated grade 1, involving approximately 90% of the core biopsy specimens.  Focal ductal carcinoma in situ, nuclear grade 1.  Microcalcifications identified in invasive ductal carcinoma.  2.left breast mass 3 o'clock position,  core biopsy: Invasive lobular carcinoma, estimated grade 1, involving approximately 90% of the core biopsy specimens.  Microcalcifications not identified.          3.   12/26/2019- hemoglobin 12.3, hematocrit 37.2, MCV 93.9, platelets 90,000, WBC 6.81 with a normal differential.  CMP notable for a creatinine of 1.65 (GFR 30) otherwise normal.  Immunohistochemistries revealed: Estrogen +99 to 100%; progesterone +81 to 90%, HER-2 1-2+ (equivocal).  FISH: Negative (signal ratio: 1.2)          4.   01/17/2020-CT brain: Mild cerebral and cerebellar volume loss with chronic microvascular disease.  Focus of ill-defined hypodensity at the gray-white juncture in the right frontal lobe.  Site of previous infarction versus metastasis.  Suggest follow-up MRI with and without gadolinium.  No additional lesions present.          5.   01/17/2020- CT chest.  Groundglass nodule within the right upper lobe and left lower lobe.  Likely inflammatory in nature.  Coronary calcifications in the LAD and circumflex distribution.  Moderate cardiomegaly.  Postoperative changes left axilla from recent axillary dissection as well as mastectomy.  No enlarged mediastinal or axillary lymph nodes present.  Irregular nodule along the anterior margin of the medial left pectoralis major musculature within the mastectomy bed.  No adjacent inflammatory/postoperative stranding.  May represent postoperative seroma.  Recommend directed ultrasound over this area.          6.   01/17/2020-CT abdomen/pelvis.  Impression: Multiple hypodensities within the liver.  Favor hepatic cysts.  Recommend ultrasound for further evaluations.  Cannot be fully characterized without IV contrast.  Small cortical cyst of the right kidney suspected.  Postoperative changes of the right colon.  Multiple diverticula throughout the colon without evidence of diverticulitis or mechanical bowel obstruction.  Diastases of the abdominal musculature at the umbilicus.  Previous umbilical  hernia repair was performed with a mesh.  Residual recurrent tyra-umbilical hernia containing fat.  No evidence of herniated bowel loop.  7 mm groundglass nodule left lower lobe.          7.   01/22/2020-CMP notable for creatinine 1.6 and GFR 31 otherwise normal.  CEA 1.41, CA-27-29 22.8, ferritin 240.9, iron 88, iron saturation 23%, TIBC 390, RASHEL positive, homogenous/speckled pattern 1:80, otherwise negative reflex panel.  SPIEP negative (M spike not observed, EARLE: No monoclonality detected).  Hemoglobin 11.7, hematocrit 34.9, MCV 91.6, platelets 130,000, WBC 5.08 with a normal differential.  No schistocytes reported.          8.   02/05/2020- liver ultrasound.  Impression: Multiple hepatic cysts.  Additional tiny hepatic lesions are too small to characterize.          9.   02/05/2020- renal ultrasound.  Impression: Right renal cyst.  Otherwise negative exam.         10.  02/05/2020- chest ultrasound.  Impression: No abnormality identified in the left chest.  Area seen on recent CT exam may represent a postoperative seroma.         11.   02/20/2020- hemoglobin 12.2, hematocrit 36.4, MCV 91.9, platelets 147,000, WBC 6.11 with a normal differential.  CEA 1.37, CA-27-29 21.9.         12.   03/01/2020- Oncotype DX: Recurrence score:  0; distant recurrence risk at 9 years with AI or BARNHART alone: 3%; group average absolute chemotherapy benefit:< 1%         13.   06/02/2020- DEXA scan.  Impression: Osteopenia.  Low bone mass.  Bone density is between 1.0 and 2.5 standard deviations below the mean for young adult woman.  Increased risk for fracture in these patients.    PREVIOUS INTERVENTIONS:          1.   01/02/2020- left breast simple mastectomy with sentinel lymph node biopsy.  Final diagnosis: 1.left breast, left skin sparing mastectomy: Invasive ductal and lobular carcinoma, grade 1 (2.3 cm).  Associated low-grade ductal carcinoma in situ, solid type.  Margins of excision free of tumor.  Tumor approaches closest deep  margin and 1.6 cm.  2 axillary lymph nodes, negative for metastatic carcinoma.  2.left sentinel lymph node excision: One sentinel lymph node, negative for metastatic carcinoma (0/1).  AJCC pathologic stage: pT2, N0 (SN).    Synoptic checklist: Procedure: Total mastectomy.  Specimen laterality: Left.  Histologic type: Invasive carcinoma with ductal and lobular features (mixed type carcinoma).  Overall ndgndrndanddndend:nd nd2nd. Tumor size: Greatest dimension of largest invasive focus 23 mm.  Tumor focality: Single focus of invasive carcinoma.  DCIS: Present.  Negative for extensive intraductal component.  Grade 1.  Lobular carcinoma in situ: None in specimen.  Tumor extent: Lymphovascular invasion not identified.  Dermal lymphovascular invasion not identified.  Microcalcifications not identified.  Margins: Uninvolved by invasive carcinoma.  Lymph nodes: Number of lymph nodes examined: 3.  TNM classification: pT2, pN0 (SN).            2.   01/22/2020- adjuvant Arimidex 1 mg p.o. daily    LABS:  Lab Results - Last 18 Months   Lab Units 12/06/21  1113 06/17/21  1359 02/04/21  1328 12/28/20  1311 12/21/20  1323 09/09/20  1104   HEMOGLOBIN g/dL 11.9* 12.4 12.9 12.4 12.7 12.2   HEMATOCRIT % 37.9 37.7 38.8 35.9 40.4 37.0   MCV fL 97.9* 95.0 93.5 94.2 99.3* 93.2   WBC 10*3/mm3 5.42 6.96 6.07 6.99 7.20 6.28   RDW % 13.9 13.8 13.2 13.3 13.3 13.4   MPV fL 12.7* 13.0* 11.8 12.3*  --  12.1*   PLATELETS 10*3/mm3 139* 157 139* 120* 171 132*   IMM GRAN % %  --   --  0.3  --   --   --    NEUTROS ABS 10*3/mm3 3.01 4.04 3.51 4.80 4.07 4.14   LYMPHS ABS 10*3/mm3 1.49  --  1.90  --  2.43  --    MONOS ABS 10*3/mm3 0.42  --  0.51  --  0.53  --    EOS ABS 10*3/mm3 0.09 0.21 0.09 0.21 0.11 0.06   BASOS ABS 10*3/mm3 0.03 0.14 0.04 0.07 0.04 0.06   IMMATURE GRANS (ABS) 10*3/mm3  --   --  0.02  --   --   --    NRBC /100 WBC  --   --  0.0  --  0.1  --    NEUTROPHIL % %  --  56.0  --  68.7  --  66.0   MONOCYTES % %  --  5.0  --  7.1  --  9.0   BASOPHIL % %  --   "2.0*  --  1.0  --  1.0   ATYP LYMPH % %  --   --   --  2.0  --   --        Lab Results - Last 18 Months   Lab Units 12/06/21  1113 06/17/21  1359 02/04/21  1328 12/28/20  1311 12/21/20  1323 09/09/20  1104   GLUCOSE mg/dL 92 198* 96 92 80 94   SODIUM mmol/L 139 139 137 139 136 141   POTASSIUM mmol/L 4.8 4.0 4.3 4.3 4.2 4.4   TOTAL CO2 mmol/L  --   --   --   --  24.9  --    CO2 mmol/L 24.0 22.0 25.0 26.0  --  26.0   CHLORIDE mmol/L 105 103 102 104 100 103   ANION GAP mmol/L 10.0 14.0 10.0 9.0  --  12.0   CREATININE mg/dL 1.58* 1.79* 1.85* 1.82* 1.94* 1.97*   BUN mg/dL 21 30* 30* 26* 26* 35*   BUN / CREAT RATIO  13.3 16.8 16.2 14.3 13.4 17.8   CALCIUM mg/dL 9.7 9.7 9.8 9.5 9.3 9.7   EGFR IF NONAFRICN AM mL/min/1.73 31* 27* 26* 27* 25* 24*   ALK PHOS U/L 98 89 92 84 92 80   TOTAL PROTEIN g/dL 7.1 6.8 7.2 7.0  --  6.9   ALT (SGPT) U/L 10 14 9 9 10 11   AST (SGOT) U/L 20 20 21 16 16 20   BILIRUBIN mg/dL 0.8 0.6 0.6 0.7 0.7 0.6   ALBUMIN g/dL 4.30 4.20 4.50 4.20 4.60 4.50   GLOBULIN gm/dL 2.8 2.6 2.7 2.8  --  2.4       Lab Results - Last 18 Months   Lab Units 12/06/21  1113 06/17/21  1359 02/04/21  1328 12/21/20  1323 09/09/20  1104   URIC ACID mg/dL  --   --   --  5.4  --    CEA ng/mL 1.30 1.66 1.47  --  1.59       Lab Results - Last 18 Months   Lab Units 12/06/21  1113 06/17/21  1359 02/04/21  1328 12/21/20  1323   IRON mcg/dL 105 88 95  --    TIBC mcg/dL 450 426 425  --    IRON SATURATION % 23 21 22  --    FERRITIN ng/mL 179.10* 205.80* 253.90*  --    TSH uIU/mL  --   --   --  2.120         PAST MEDICAL HISTORY:  ALLERGIES:  Allergies   Allergen Reactions   • Bactrim [Sulfamethoxazole-Trimethoprim] Provider Review Needed     Patient has kidney disease (stage 4) shouldn't take Bactrim   • Codeine Nausea Only   • Percocet [Oxycodone-Acetaminophen] Nausea Only     Becomes, hot, nauseated, and made her head feel \"weird\".     CURRENT MEDICATIONS:  Outpatient Encounter Medications as of 12/13/2021   Medication Sig Dispense " Refill   • acetaminophen (TYLENOL) 500 MG tablet Take 500 mg by mouth Every 6 (Six) Hours As Needed for Mild Pain .     • allopurinol (ZYLOPRIM) 100 MG tablet Take 200 mg by mouth 2 (Two) Times a Day.  3   • anastrozole (ARIMIDEX) 1 MG tablet Take 1 tablet by mouth Daily. 30 tablet 5   • bumetanide (BUMEX) 0.5 MG tablet Take 1 tablet by mouth once daily 90 tablet 3   • calcium carbonate-vitamin d 600-400 MG-UNIT per tablet Take 1 tablet by mouth 2 (Two) Times a Day. 60 tablet 5   • carboxymethylcellulose (REFRESH PLUS) 0.5 % solution Administer 1 drop to both eyes 3 (Three) Times a Day As Needed for Dry Eyes.     • dronedarone (MULTAQ) 400 MG tablet Take 400 mg by mouth 2 (Two) Times a Day With Meals.     • levothyroxine (SYNTHROID, LEVOTHROID) 50 MCG tablet Take 1 tablet by mouth once daily 90 tablet 3   • losartan (COZAAR) 50 MG tablet Take 1 tablet by mouth once daily 90 tablet 3   • metoprolol succinate XL (TOPROL-XL) 25 MG 24 hr tablet Take 25 mg by mouth 2 (Two) Times a Day.  1   • polyethylene glycol (MIRALAX) powder Take 17 g by mouth Daily.     • simvastatin (ZOCOR) 40 MG tablet Take 1 tablet by mouth once daily 90 tablet 3   • spironolactone (ALDACTONE) 25 MG tablet Take 1 tablet by mouth once daily 90 tablet 3   • warfarin (COUMADIN) 5 MG tablet Take  by mouth Take As Directed. Take 1 whole tablet (5 mg) on Wednesday only. Take 1/2 tablet (2.5 mg) all other days.  5     Facility-Administered Encounter Medications as of 12/13/2021   Medication Dose Route Frequency Provider Last Rate Last Admin   • lidocaine (XYLOCAINE) 1 % injection 10 mL  10 mL Subcutaneous Once Nikki Zuniga MD       • lidocaine-EPINEPHrine (XYLOCAINE W/EPI) 1 %-1:[id number] injection 10 mL  10 mL Injection Once Nikki Zuniga MD         Adult illnesses:  Hypothyroidism  Atrial fibrillation  Hyperlipidemia  Obesity  Chronic kidney disease stage 4 - Dr. Daniels    ADULT ILLNESSES:  Patient Active Problem List   Diagnosis Code   • Hx  of adenomatous colonic polyps Z86.010   • HTN (hypertension), benign I10   • Anticoagulated on Coumadin Z79.01   • Family hx of colon cancer Z80.0   • Breast cancer, left (HCC) C50.912   • Anemia due to stage 3 chronic kidney disease (HCC) N18.30, D63.1   • Acute frontal sinusitis J01.10   • Chronic anticoagulation Z79.01   • Endometrial thickening on ultrasound R93.89   • Excessive anticoagulation [id number]   • History of hypertension Z86.79   • Hypothyroidism E03.9   • Kidney disease N28.9   • Mixed hyperlipidemia E78.2   • Atrial fibrillation, controlled ([facility]) I48.91   • PMB (postmenopausal bleeding) N95.0   • Brain lesion G93.9   • Obesity (BMI 30-39.9) E66.9   • Non-tobacco user Z78.9   • Anemia in stage 4 chronic kidney disease (HCC) N18.4, D63.1   • Atrophic vaginitis N95.2   • Cardiomegaly I51.7   • Hematuria R31.9   • 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, 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   • PVC (premature ventricular contraction) I49.3   • Rheumatoid arthritis (HCC) M06.9   • Seborrheic keratosis L82.1   • Thrombocytopenia (HCC) D69.6   • Vitamin D deficiency E55.9   • Moderate mitral regurgitation by prior echocardiogram I34.0     SURGERIES:  Past Surgical History:   Procedure Laterality Date   • APPENDECTOMY     • BREAST BIOPSY Left 2015   • BREAST BIOPSY Right 12/31/2020    Procedure: RIGHT NEEDLE DIRECTED EXCISIONAL BREAST BIOPSY;  Surgeon: Nikki Zuniga MD;  Location: [facility] OR;  Service: General;  Laterality: Right;   • BREAST EXCISIONAL BIOPSY Left 2001   • BREAST EXCISIONAL BIOPSY Left 2001   • CARPAL TUNNEL RELEASE Right    • CATARACT EXTRACTION, BILATERAL     • CHOLECYSTECTOMY     • COLON SURGERY      Partial right   • COLONOSCOPY     • COLONOSCOPY N/A 10/17/2019    Procedure: COLONOSCOPY WITH ANESTHESIA;  Surgeon: Rigoberto Shaw MD;  Location: [facility] ENDOSCOPY;  Service: Gastroenterology   • COLONOSCOPY W/ POLYPECTOMY  06/30/2016    2 Adenomatous polyps at 80 & 40 cm, Diverticulosis repeat exam in 3 years   • EYE SURGERY  Bilateral     cataract extraction   • EYE SURGERY Left     detached retina   • HERNIA REPAIR     • MASTECTOMY Left 2019   • MASTECTOMY W/ SENTINEL NODE BIOPSY Left 1/2/2020    Procedure: LEFT SIMPLE MASTECTOMY WITH SENTINEL NODE BIOPSY, INJECTION AND SCAN, RADIOLOGIST WILL INJECT;  Surgeon: Nikki Zuniga MD;  Location: [facility];  Service: General   • REPLACEMENT TOTAL KNEE Bilateral    • THROAT SURGERY      duct     HEALTH MAINTENANCE ITEMS:  Health Maintenance Due   Topic Date Due   • COVID-19 Vaccine (1) Never done       <no information>  Last Completed Colonoscopy          COLORECTAL CANCER SCREENING (COLONOSCOPY - Every 5 Years) Next due on 10/17/2024    10/17/2019  COLONOSCOPY    10/17/2019  Surgical Procedure: COLONOSCOPY    06/30/2016  SCANNED - COLONOSCOPY              Immunization History   Administered Date(s) Administered   • Flu Vaccine Quad PF >18YRS 09/27/2021   • Fluad Quad 65+ 09/22/2020   • Influenza TIV (IM) 09/22/2015, 10/14/2016   • Pneumococcal Conjugate 13-Valent (PCV13) 11/10/2016   • Pneumococcal Polysaccharide (PPSV23) 11/02/2012   • Shingrix 04/10/2018, 08/15/2018   • Tdap 12/21/2020   • Zostavax 04/23/2013     Last Completed Mammogram          MAMMOGRAM (Yearly) Next due on 12/6/2022 12/06/2021  Mammo Diagnostic Digital Tomosynthesis Right With CAD    12/03/2020  Mammo Diagnostic Digital Tomosynthesis Right With CAD    11/30/2020  Mammo Screening Modified With Tomosynthesis Right With CAD    11/27/2019  Mammo Diagnostic Digital Tomosynthesis Bilateral With CAD    11/26/2018  Mammo Screening Digital Tomosynthesis Bilateral With CAD    Only the first 5 history entries have been loaded, but more history exists.                  FAMILY HISTORY:  Family History   Problem Relation Age of Onset   • Colon cancer Mother 70   • Colon polyps Mother    • No Known Problems Father    • No Known Problems Sister    • No Known Problems Brother    • No Known Problems Daughter    • No Known  "Problems Son    • No Known Problems Maternal Grandmother    • No Known Problems Paternal Grandmother    • No Known Problems Maternal Aunt    • No Known Problems Paternal Aunt    • No Known Problems Other    • Breast cancer Neg Hx    • BRCA 1/2 Neg Hx    • Endometrial cancer Neg Hx    • Ovarian cancer Neg Hx      SOCIAL HISTORY:  Social History     Socioeconomic History   • Marital status:    Tobacco Use   • Smoking status: Never Smoker   • Smokeless tobacco: Never Used   Vaping Use   • Vaping Use: Never used   Substance and Sexual Activity   • Alcohol use: No   • Drug use: No   • Sexual activity: Defer   Homemaker    REVIEW OF SYSTEMS:  Review of Systems   Constitutional: Positive for fatigue (baseline/chronic unchanged). Negative for activity change and appetite change.        Manages her her ADLs, including chores, errands and driving. Says she is still up and about \"all the time.\"    Arimidex tolerance discussed:  No worsening hot flashes.  Right shoulder discomfort improved, \"better, comes and goes\" , otherwise no new arthralgias. Is able to take daily   HENT: Positive for postnasal drip (seasonal) and rhinorrhea (seasonal).    Eyes: Negative.    Respiratory: Negative.    Cardiovascular: Positive for palpitations (Intermittent.  Is on maintenance warfarin).   Gastrointestinal: Negative.  Negative for blood in stool.   Endocrine: Negative.    Genitourinary: Negative.    Musculoskeletal: Positive for arthralgias (intermittent of the hands, left hip, right shoulder and knees inspite of prior TKRs bilaterally).   Allergic/Immunologic: Negative.    Neurological: Positive for numbness (left foot at times, unchanged). Negative for dizziness (postural - when standing too abruptly. \"Not in awhile.\").   Hematological: Bruises/bleeds easily (coumadin).   Psychiatric/Behavioral: Negative.    Breasts:  Says she is more diligent with self exams.  Has not noticed any changes      /72   Pulse 71   Temp 96.8 °F " "(36 °C)   Resp 16   Ht 165.1 cm (65\")   Wt 97.3 kg (214 lb 9.6 oz)   SpO2 96%   Breastfeeding No   BMI 35.71 kg/m²  Body surface area is 2.04 meters squared.  Pain Score    12/13/21 0928   PainSc: 0-No pain       Physical Exam:  Physical Exam   Constitutional: She is oriented to person, place, and time. No distress.   Pleasant, cooperative, obese, modestly kept elderly female.  Ambulatory.  ECOG 0.      She has lost 7 pounds (had gained 16 pounds at her 4 prior visits) since her last visit.   HENT:   Head: Normocephalic and atraumatic.   Mouth/Throat: No oropharyngeal exudate.   She is wearing a surgical mask today   Eyes: Pupils are equal, round, and reactive to light. Conjunctivae are normal. No scleral icterus.   Neck: No JVD present. No tracheal deviation present. No thyromegaly (on synthroid) present.   Cardiovascular:   Irregular rate and rhythm   Pulmonary/Chest: Effort normal and breath sounds normal. No stridor. No respiratory distress. She has no wheezes. She has no rales.   Chaperoned exam: Yoanna Ames LPN     Right breast no masses.  No associated axillary nor supraclavicular adenopathy.    Left mastectomy site well healed. No underlying nodularity.  No lymphedema of the left upper extremity.  No associated axillary nor supraclavicular adenopathy.     Abdominal: Soft. Bowel sounds are normal. She exhibits no distension and no mass. There is no abdominal tenderness. There is no rebound and no guarding.   Musculoskeletal: Normal range of motion. Deformity (changes of osteoarthritis of the hands) present. No swelling.      Right lower leg: No edema.      Left lower leg: No edema.   Lymphadenopathy:     She has no cervical adenopathy.        Right cervical: No superficial cervical, no deep cervical and no posterior cervical adenopathy present.       Left cervical: No superficial cervical, no deep cervical and no posterior cervical adenopathy present.        Right: No supraclavicular adenopathy " present.        Left: No supraclavicular adenopathy present.   Neurological: She is alert and oriented to person, place, and time. No cranial nerve deficit.   Skin: Skin is warm and dry. No rash noted. No erythema. No pallor.   Psychiatric: Her behavior is normal. Mood, judgment and thought content normal.   Vitals reviewed.    ASSESSMENT:   1. Invasive ductal/lobular (mixed) carcinoma:   · Stage: IB (pT2, pN0 [sn], MX, G1) ER (+) %, MA (+) 81-90%, HER-2/kelby 1-2 + (IHC) - negative. HER-2 1-2+ (equivocal).  FISH: Negative (signal ratio: 1.2)  · Tumor burden: 23 mm tumor in the left breast. 0/3 axillary sentinel lymph node negative for metastatic carcinoma. Nuclear grade 1.    · Oncotype DX: Recurrence score:  0; distant recurrence risk at 9 years with AI or BARNHART alone: 3%; group average absolute chemotherapy benefit:< 1%  · Complications of tumor: None.   · Tumor status: Adjuvant Arimidex in progress.  · 11/30/2020-mammogram.  Right breast calcifications for which additional imaging recommended.  BI-RADS Category 0.  · 12/28/2020-stereotactic biopsy of right breast calcifications.  Pathology: Benign intraductal papilloma, 3 mm greatest dimension with associated microcalcifications.  · 01/04/2021-Right breast, lumpectomy with needle localization.  Final diagnosis: Extensive biopsy site changes including fat necrosis and fibrosis.  Benign fibrous breast parenchyma.  No evidence of in situ or invasive carcinoma.  · 12/06/2021-mammogram.  No mammographic evidence of malignancy within the right breast.  BI-RADS Category 2-benign.           2.    Chronic kidney disease, stage III - IV.   --GFR 31, 12/06/2021 (prior:  GFR 17 - 36 mL/min).        3.    Hypothyroidism.  On Synthroid        4.    Normocytic anemia.    --Hgb 11.9; MCV 97.9, 12/06/2021 (prior: Hgb 10.9 - 12.9; MCV 92 - 95.9).  Contribution from chronic kidney disease suspected.        5.    Abnormal CT scans of the head (see above) indicating ill-defined  hypodensity at the gray-white juncture in the right frontal lobe, hepatic and right renal cysts.  Unable to perform MRI with contrast due to chronic kidney disease.            --03/23/2020-seen by Dr. Jaramillo.  Noncontrast brain MRI from 1/17/2020 reviewed.  Possibilities include prior stroke microvascular disease, demyelinating disease.  Risks of biopsy discussed.  Recommend serial imaging with biopsy of the lesion progresses.  Follow-up with MRI in 3 months.          --07/06/2020- MRI.  Mild atrophy of the brain.  Multiple foci of T2 abnormality involving the periventricular and higher white matter tracts.  Cortical lesions also present.  Overall stable from 03/2020.  Favor small vessel ischemic changes.  No evidence of restricted diffusion to suggest acute ischemia or infarct.  No mass-effect or shift in midline.          --01/06/2021-brain MRI.  No imaging evidence of intracranial metastatic disease.  Seen by Dr. Jaramillo of neurosurgery.  Favors microvascular disease.  Okay to discontinue imaging.        6.    (+) RASHEL with joint pains (hands). Has been seen by Dr. Caputo.        7.    Osteopenia (bone density 1.0 and 2.5 standard deviations below the mean on DEXA scan, 06/02/2020).  On calcium and vitamin D. Followed by Dr. Davidson.        8.    Paroxysmal atrial fibrillation.  Remains on anticoagulation.  Dr. Salas follows       PLAN:   1.  Apprised of the labs, 12/06/2021 (above).  Note mild (stable) anemia otherwise normal CBC.  Low (stable) GFR otherwise normal CMP, normal CEA, normal CA-27-29, repleted iron levels.    2.  Apprised of mammogram, 12/06/2021 (above).  SUZE  3.  Previously noted the positive RASHEL but negative reflex panel, negative SPIEP, repleted iron levels.  The liver ultrasound, renal ultrasound and chest ultrasound findings (above) are noted.  Oncotype DX recurrence score (above) noted (RS 0; absolute chemotherapy benefit < 1%).  DEXA scan shows osteopenia with increased risk of  fracture.  Arimidex tolerance discussed.  So far so good.  4.  Breast cancer.  NCCN guidelines version 1.2020 reviewed.  4 ductal, lobular, mixed tumors, pT1, pT2, pT3 and pN0 with tumors greater than 0.5 cm- 21 gene RT-PCR assay.  Recurrence score less than 26-adjuvant endocrine therapy.  Recurrence score 26-30- adjuvant endocrine therapy or adjuvant chemotherapy followed by endocrine therapy.  Recurrence score greater than 31-adjuvant chemotherapy followed by endocrine therapy.  However limited data to make chemotherapy recommendations for those greater than 70 years of age.  5.  The rationale for adjuvant hormonal manipulation with AI's are previously discussed. The potential risks (to include but not limited to: Hot flashes, asthenia, pain, arthralgia, arthritis, nausea/vomiting, headache, pharyngitis, depression, rash, hypertension, lymphedema, insomnia, edema, weight gain, dyspnea, abdominal pain, constipation, osteoporosis, fractures, cough, bone pain, diarrhea, breast pain, paresthesias, infection, cataracts, myalgias, thromboembolism, angina, endometrial carcinoma, myocardial infarction, stroke, anemia, leukopenia, erythema multiforme, Belle-Collins syndrome) are discussed at length. Questions answered. She agrees to press on with therapy.  6.  Rx:   · Arimidex 1 mg p.o. daily, #30 x 2 RF   · Calcium 1200 mg + 800 units D3  p.o. daily - otc  · Stay off ferrous sulfate    7.   Return to the [place] office in 20 weeks with pre-office CBC and differential, iron, fe sat , ferritin, CMP, CEA and CA-27-29.         I spent ~ 39 minutes caring for Heidi on this date of service. This time includes time spent by me in the following activities: preparing for the visit, reviewing tests, performing a medically appropriate examination and/or evaluation, counseling and educating the patient/family/caregiver, ordering medications, tests, or procedures and documenting information in the medical record       Cc:   April  MD Ron Zuniga MD Christopher Phillips, MD Jonathan Wilkerson, MD Ronald Wilson, MD

## 2021-12-06 ENCOUNTER — HOSPITAL ENCOUNTER (OUTPATIENT)
Dept: MAMMOGRAPHY | Facility: HOSPITAL | Age: 81
Discharge: HOME | End: 2021-12-06

## 2021-12-06 ENCOUNTER — LAB (OUTPATIENT)
Dept: LAB | Facility: HOSPITAL | Age: 81
End: 2021-12-06

## 2021-12-06 DIAGNOSIS — Z85.3 HISTORY OF BREAST CANCER: ICD-10-CM

## 2021-12-06 DIAGNOSIS — C50.912 MALIGNANT NEOPLASM OF LEFT BREAST IN FEMALE, ESTROGEN RECEPTOR POSITIVE, UNSPECIFIED SITE OF BREAST (HCC): ICD-10-CM

## 2021-12-06 DIAGNOSIS — Z17.0 MALIGNANT NEOPLASM OF LEFT BREAST IN FEMALE, ESTROGEN RECEPTOR POSITIVE, UNSPECIFIED SITE OF BREAST (HCC): ICD-10-CM

## 2021-12-06 LAB
ALBUMIN SERPL-MCNC: 4.3 G/DL (ref 3.5–5.2)
ALBUMIN/GLOB SERPL: 1.5 G/DL
ALP SERPL-CCNC: 98 U/L (ref 39–117)
ALT SERPL W P-5'-P-CCNC: 10 U/L (ref 1–33)
ANION GAP SERPL CALCULATED.3IONS-SCNC: 10 MMOL/L (ref 5–15)
AST SERPL-CCNC: 20 U/L (ref 1–32)
BASOPHILS # BLD AUTO: 0.03 10*3/MM3 (ref 0–0.2)
BASOPHILS NFR BLD AUTO: 0.6 % (ref 0–1.5)
BILIRUB SERPL-MCNC: 0.8 MG/DL (ref 0–1.2)
BUN SERPL-MCNC: 21 MG/DL (ref 8–23)
BUN/CREAT SERPL: 13.3 (ref 7–25)
CALCIUM SPEC-SCNC: 9.7 MG/DL (ref 8.6–10.5)
CEA SERPL-MCNC: 1.3 NG/ML
CHLORIDE SERPL-SCNC: 105 MMOL/L (ref 98–107)
CO2 SERPL-SCNC: 24 MMOL/L (ref 22–29)
CREAT SERPL-MCNC: 1.58 MG/DL (ref 0.57–1)
DEPRECATED RDW RBC AUTO: 49.9 FL (ref 37–54)
EOSINOPHIL # BLD AUTO: 0.09 10*3/MM3 (ref 0–0.4)
EOSINOPHIL NFR BLD AUTO: 1.8 % (ref 0.3–6.2)
ERYTHROCYTE [DISTWIDTH] IN BLOOD BY AUTOMATED COUNT: 13.9 % (ref 12.3–15.4)
FERRITIN SERPL-MCNC: 179.1 NG/ML (ref 13–150)
GFR SERPL CREATININE-BSD FRML MDRD: 31 ML/MIN/1.73
GLOBULIN UR ELPH-MCNC: 2.8 GM/DL
GLUCOSE SERPL-MCNC: 92 MG/DL (ref 65–99)
HCT VFR BLD AUTO: 37.9 % (ref 34–46.6)
HGB BLD-MCNC: 11.9 G/DL (ref 12–15.9)
IRON 24H UR-MRATE: 105 MCG/DL (ref 37–145)
IRON SATN MFR SERPL: 23 % (ref 20–50)
LYMPHOCYTES # BLD AUTO: 1.49 10*3/MM3 (ref 0.7–3.1)
LYMPHOCYTES NFR BLD AUTO: 29.5 % (ref 19.6–45.3)
MCH RBC QN AUTO: 30.7 PG (ref 26.6–33)
MCHC RBC AUTO-ENTMCNC: 31.4 G/DL (ref 31.5–35.7)
MCV RBC AUTO: 97.9 FL (ref 79–97)
MONOCYTES # BLD AUTO: 0.42 10*3/MM3 (ref 0.1–0.9)
MONOCYTES NFR BLD AUTO: 8.3 % (ref 5–12)
NEUTROPHILS NFR BLD AUTO: 3.01 10*3/MM3 (ref 1.7–7)
NEUTROPHILS NFR BLD AUTO: 59.6 % (ref 42.7–76)
PLATELET # BLD AUTO: 139 10*3/MM3 (ref 140–450)
PMV BLD AUTO: 12.7 FL (ref 6–12)
POTASSIUM SERPL-SCNC: 4.8 MMOL/L (ref 3.5–5.2)
PROT SERPL-MCNC: 7.1 G/DL (ref 6–8.5)
RBC # BLD AUTO: 3.87 10*6/MM3 (ref 3.77–5.28)
SODIUM SERPL-SCNC: 139 MMOL/L (ref 136–145)
TIBC SERPL-MCNC: 450 MCG/DL (ref 298–536)
TRANSFERRIN SERPL-MCNC: 302 MG/DL (ref 200–360)
WBC NRBC COR # BLD: 5.42 10*3/MM3 (ref 3.4–10.8)

## 2021-12-06 PROCEDURE — 80053 COMPREHEN METABOLIC PANEL: CPT

## 2021-12-06 PROCEDURE — 36415 COLL VENOUS BLD VENIPUNCTURE: CPT

## 2021-12-06 PROCEDURE — 84466 ASSAY OF TRANSFERRIN: CPT

## 2021-12-06 PROCEDURE — 77065 DX MAMMO INCL CAD UNI: CPT

## 2021-12-06 PROCEDURE — 82378 CARCINOEMBRYONIC ANTIGEN: CPT

## 2021-12-06 PROCEDURE — 86300 IMMUNOASSAY TUMOR CA 15-3: CPT

## 2021-12-06 PROCEDURE — 83540 ASSAY OF IRON: CPT

## 2021-12-06 PROCEDURE — G0279 TOMOSYNTHESIS, MAMMO: HCPCS

## 2021-12-06 PROCEDURE — 85025 COMPLETE CBC W/AUTO DIFF WBC: CPT

## 2021-12-06 PROCEDURE — 82728 ASSAY OF FERRITIN: CPT

## 2021-12-07 LAB — CANCER AG27-29 SERPL-ACNC: 22.6 U/ML (ref 0–38.6)

## 2021-12-13 ENCOUNTER — OFFICE VISIT (OUTPATIENT)
Dept: ONCOLOGY | Facility: CLINIC | Age: 81
End: 2021-12-13

## 2021-12-13 VITALS
WEIGHT: 214.6 LBS | TEMPERATURE: 96.8 F | SYSTOLIC BLOOD PRESSURE: 140 MMHG | BODY MASS INDEX: 35.75 KG/M2 | DIASTOLIC BLOOD PRESSURE: 72 MMHG | HEART RATE: 71 BPM | HEIGHT: 65 IN | RESPIRATION RATE: 16 BRPM | OXYGEN SATURATION: 96 %

## 2021-12-13 DIAGNOSIS — Z17.0 MALIGNANT NEOPLASM OF LEFT BREAST IN FEMALE, ESTROGEN RECEPTOR POSITIVE, UNSPECIFIED SITE OF BREAST (HCC): Primary | ICD-10-CM

## 2021-12-13 DIAGNOSIS — C50.912 MALIGNANT NEOPLASM OF LEFT BREAST IN FEMALE, ESTROGEN RECEPTOR POSITIVE, UNSPECIFIED SITE OF BREAST (HCC): Primary | ICD-10-CM

## 2021-12-13 PROCEDURE — 99214 OFFICE O/P EST MOD 30 MIN: CPT | Performed by: INTERNAL MEDICINE

## 2021-12-14 ENCOUNTER — TRANSCRIBE ORDERS (OUTPATIENT)
Dept: ADMINISTRATIVE | Facility: HOSPITAL | Age: 81
End: 2021-12-14

## 2021-12-14 DIAGNOSIS — Z12.31 OTHER SCREENING MAMMOGRAM: Primary | ICD-10-CM

## 2021-12-17 ENCOUNTER — TELEPHONE (OUTPATIENT)
Dept: CARDIOLOGY CLINIC | Age: 81
End: 2021-12-17

## 2021-12-20 ENCOUNTER — OFFICE VISIT (OUTPATIENT)
Dept: CARDIOLOGY CLINIC | Age: 81
End: 2021-12-20
Payer: MEDICARE

## 2021-12-20 VITALS
HEART RATE: 59 BPM | OXYGEN SATURATION: 98 % | WEIGHT: 222 LBS | SYSTOLIC BLOOD PRESSURE: 132 MMHG | BODY MASS INDEX: 35.68 KG/M2 | HEIGHT: 66 IN | DIASTOLIC BLOOD PRESSURE: 80 MMHG

## 2021-12-20 DIAGNOSIS — I10 HYPERTENSION, UNSPECIFIED TYPE: ICD-10-CM

## 2021-12-20 DIAGNOSIS — I48.91 ON COUMADIN FOR ATRIAL FIBRILLATION (HCC): ICD-10-CM

## 2021-12-20 DIAGNOSIS — I34.0 MODERATE MITRAL REGURGITATION BY PRIOR ECHOCARDIOGRAM: ICD-10-CM

## 2021-12-20 DIAGNOSIS — Z79.01 ON COUMADIN FOR ATRIAL FIBRILLATION (HCC): ICD-10-CM

## 2021-12-20 DIAGNOSIS — E78.2 MIXED HYPERLIPIDEMIA: ICD-10-CM

## 2021-12-20 DIAGNOSIS — I48.0 PAF (PAROXYSMAL ATRIAL FIBRILLATION) (HCC): Primary | ICD-10-CM

## 2021-12-20 LAB
INTERNATIONAL NORMALIZATION RATIO, POC: 2.3
PROTHROMBIN TIME, POC: 27.2

## 2021-12-20 PROCEDURE — G8427 DOCREV CUR MEDS BY ELIG CLIN: HCPCS | Performed by: NURSE PRACTITIONER

## 2021-12-20 PROCEDURE — 1123F ACP DISCUSS/DSCN MKR DOCD: CPT | Performed by: NURSE PRACTITIONER

## 2021-12-20 PROCEDURE — G8484 FLU IMMUNIZE NO ADMIN: HCPCS | Performed by: NURSE PRACTITIONER

## 2021-12-20 PROCEDURE — 1090F PRES/ABSN URINE INCON ASSESS: CPT | Performed by: NURSE PRACTITIONER

## 2021-12-20 PROCEDURE — 4040F PNEUMOC VAC/ADMIN/RCVD: CPT | Performed by: NURSE PRACTITIONER

## 2021-12-20 PROCEDURE — G8400 PT W/DXA NO RESULTS DOC: HCPCS | Performed by: NURSE PRACTITIONER

## 2021-12-20 PROCEDURE — 85610 PROTHROMBIN TIME: CPT | Performed by: NURSE PRACTITIONER

## 2021-12-20 PROCEDURE — 1036F TOBACCO NON-USER: CPT | Performed by: NURSE PRACTITIONER

## 2021-12-20 PROCEDURE — G8417 CALC BMI ABV UP PARAM F/U: HCPCS | Performed by: NURSE PRACTITIONER

## 2021-12-20 PROCEDURE — 99214 OFFICE O/P EST MOD 30 MIN: CPT | Performed by: NURSE PRACTITIONER

## 2021-12-20 NOTE — PATIENT INSTRUCTIONS
of your best weapons for fighting cardiovascular disease. When you eat a heart healthy diet, you improve your chances for feeling good and staying healthy for life. 6)  Lose weight - when you shed extra fat an unnecessary pounds, you reduce the burden on your hear, lungs, blood vessels and skeleton. You give yourself the gift of active living, you lower your blood pressure and help yourself feel better. 7) Stop smoking - cigarette smokers have a higher risk of developing cardiovascular disease. If  You smoke, quitting is the best thing you can do for your health. Check American Heart Association on line for more information on Life's Simple 7 and tips for healthy living. A Healthy Heart: Care Instructions  Your Care Instructions     Coronary artery disease, also called heart disease, occurs when a substance called plaque builds up in the vessels that supply oxygen-rich blood to your heart muscle. This can narrow the blood vessels and reduce blood flow. A heart attack happens when blood flow is completely blocked. A high-fat diet, smoking, and other factors increase the risk of heart disease. Your doctor has found that you have a chance of having heart disease. You can do lots of things to keep your heart healthy. It may not be easy, but you can change your diet, exercise more, and quit smoking. These steps really work to lower your chance of heart disease. Follow-up care is a key part of your treatment and safety. Be sure to make and go to all appointments, and call your doctor if you are having problems. It's also a good idea to know your test results and keep a list of the medicines you take. How can you care for yourself at home? Diet  · Use less salt when you cook and eat. This helps lower your blood pressure. Taste food before salting. Add only a little salt when you think you need it. With time, your taste buds will adjust to less salt.   · Eat fewer snack items, fast foods, canned soups, and other high-salt, high-fat, processed foods. · Read food labels and try to avoid saturated and trans fats. They increase your risk of heart disease by raising cholesterol levels. · Limit the amount of solid fat-butter, margarine, and shortening-you eat. Use olive, peanut, or canola oil when you cook. Bake, broil, and steam foods instead of frying them. · Eat a variety of fruit and vegetables every day. Dark green, deep orange, red, or yellow fruits and vegetables are especially good for you. Examples include spinach, carrots, peaches, and berries. · Foods high in fiber can reduce your cholesterol and provide important vitamins and minerals. High-fiber foods include whole-grain cereals and breads, oatmeal, beans, brown rice, citrus fruits, and apples. · Eat lean proteins. Heart-healthy proteins include seafood, lean meats and poultry, eggs, beans, peas, nuts, seeds, and soy products. · Limit drinks and foods with added sugar. These include candy, desserts, and soda pop. Lifestyle changes  · If your doctor recommends it, get more exercise. Walking is a good choice. Bit by bit, increase the amount you walk every day. Try for at least 30 minutes on most days of the week. You also may want to swim, bike, or do other activities. · Do not smoke. If you need help quitting, talk to your doctor about stop-smoking programs and medicines. These can increase your chances of quitting for good. Quitting smoking may be the most important step you can take to protect your heart. It is never too late to quit. · Limit alcohol to 2 drinks a day for men and 1 drink a day for women. Too much alcohol can cause health problems. · Manage other health problems such as diabetes, high blood pressure, and high cholesterol. If you think you may have a problem with alcohol or drug use, talk to your doctor. Medicines  · Take your medicines exactly as prescribed.  Call your doctor if you think you are having a problem with your medicine. · If your doctor recommends aspirin, take the amount directed each day. Make sure you take aspirin and not another kind of pain reliever, such as acetaminophen (Tylenol). When should you call for help? [id number] if you have symptoms of a heart attack. These may include:  · Chest pain or pressure, or a strange feeling in the chest.  · Sweating. · Shortness of breath. · Pain, pressure, or a strange feeling in the back, neck, jaw, or upper belly or in one or both shoulders or arms. · Lightheadedness or sudden weakness. · A fast or irregular heartbeat. After you call 911, the  may tell you to chew 1 adult-strength or 2 to 4 low-dose aspirin. Wait for an ambulance. Do not try to drive yourself. Watch closely for changes in your health, and be sure to contact your doctor if you have any problems. Where can you learn more? Go to https://RetentionGrid.[employer]ablistic. org and sign in to your [employer] account. Enter T389 in the Cloak box to learn more about \"A Healthy Heart: Care Instructions. \"     If you do not have an account, please click on the \"Sign Up Now\" link. Current as of: December 16, 2019               Content Version: 12.5  © 1406-2815 [employer], [employer]. Care instructions adapted under license by [facility] ([facility]). If you have questions about a medical condition or this instruction, always ask your healthcare professional. [place] [address] any warranty or liability for your use of this information.

## 2022-01-03 RX ORDER — ANASTROZOLE 1 MG/1
1 TABLET ORAL DAILY
Qty: 30 TABLET | Refills: 5 | Status: SHIPPED | OUTPATIENT
Start: 2022-01-03 | End: 2022-06-27

## 2022-01-05 ENCOUNTER — TELEPHONE (OUTPATIENT)
Dept: INTERNAL MEDICINE | Facility: CLINIC | Age: 82
End: 2022-01-05

## 2022-01-05 ENCOUNTER — TELEPHONE (OUTPATIENT)
Dept: CARDIOLOGY CLINIC | Age: 82
End: 2022-01-05

## 2022-01-05 ENCOUNTER — NURSE TRIAGE (OUTPATIENT)
Dept: CALL CENTER | Facility: HOSPITAL | Age: 82
End: 2022-01-05

## 2022-01-05 NOTE — TELEPHONE ENCOUNTER
Pt called stating she fell and hit her head. Pt is on a blood thinner and states she has a \"huge\" knot that has came up on temple area. Pt advised to report to ER, pt verbally understood.

## 2022-01-05 NOTE — TELEPHONE ENCOUNTER
Patient has fallen, has knot at temporal area, no s/s of head injury does take Coumadin, transfer to officce    Reason for Disposition  • Small bruise is present    Additional Information  • Negative: Major injury from dangerous force (e.g., fall > 10 feet or 3 meters)  • Negative: [1] Major bleeding (e.g., actively dripping or spurting) AND [2] can't be stopped  • Negative: Shock suspected (e.g., cold/pale/clammy skin, too weak to stand)  • Negative: Difficult to awaken or acting confused (e.g., disoriented, slurred speech)  • Negative: [1] SEVERE weakness (i.e., unable to walk or barely able to walk, requires support) AND [2] new-onset or worsening  • Negative: [1] Can't stand (bear weight) or walk AND [2] new-onset after fall  • Negative: Sounds like a life-threatening emergency to the triager  • Negative: Fainted (passed out)  • Negative: New-onset or worsening weakness of the face, arm or leg on one side of the body  • Negative: [1] New-onset or worsening dizziness AND [2] described as spinning or off balance (i.e., vertigo)  • Negative: [1] New-onset or worsening dizziness AND [2] NO spinning sensation or trouble with balance  • Negative: New-onset or worsening pale skin (pallor)  • Negative: Pregnant and fall  • Negative: Patient has a concerning injury to a specific part of the body (e.g., chest, leg, head)  • Negative: Patient has a wound (abrasion, cut, puncture, other skin injury or tear)  • Negative: Injury (or injuries) that need emergency care  • Negative: Sounds like a serious injury to the triager  • Negative: [1] Muscle pain AND [2] dark (cola colored) or red-colored urine  • Negative: [1] Unable to get up until help arrived AND [2] on the ground 1 hour or more  • Negative: Patient sounds very sick or weak to the triager  • Negative: [1] MODERATE weakness (i.e., interferes with work, school, normal activities) AND [2] new-onset or worsening  • Negative: [1] Fever > 101 F (38.3 C) AND [2] age > 60  "years  • Negative: [1] Fever > 100.0 F (37.8 C) AND [2] bedridden (e.g., nursing home patient, CVA, chronic illness, recovering from surgery)  • Negative: [1] Fever > 100.0 F (37.8 C) AND [2] diabetes mellitus or weak immune system (e.g., HIV positive, cancer chemo, splenectomy, organ transplant, chronic steroids)  • Negative: Suspicious history for the fall  • Negative: [1] Caller has URGENT question AND [2] triager unable to answer question  • Negative: [1] No prior tetanus shots (or is not fully vaccinated) AND [2] any wound (e.g., cut or scrape)  • Negative: [1] HIV positive or severe immunodeficiency (severely weak immune system) AND [2] DIRTY cut or scrape  • Negative: [1] Caller has NON-URGENT question AND [2] triager unable to answer question  • Negative: MILD weakness (i.e., does not interfere with ability to work, go to school, normal activities) (Exception: mild weakness is a chronic symptom)  • Negative: [1] Last tetanus shot > 5 years ago AND [2] DIRTY cut or scrape  • Negative: [1] Last tetanus shot > 10 years ago AND [2] CLEAN cut or scrape (e.g., object AND skin were clean)  • Negative: [1] Fall AND [2] went to emergency department for evaluation or treatment  • Negative: [1] Fall AND [2] patient seems very anxious and fearful of falling again  • Negative: [1] Falling (two or more falls) AND [2] in past year  • Negative: Weakness is a chronic symptom (recurrent or ongoing AND present > 4 weeks)  • Negative: Dizziness is a chronic symptom (recurrent or ongoing AND present > 4 weeks)  • Negative: [1] Recent fall AND [2] no injury    Answer Assessment - Initial Assessment Questions  1. MECHANISM: \"How did the fall happen?\"      Tripped   2. DOMESTIC VIOLENCE AND ELDER ABUSE SCREENING: \"Did you fall because someone pushed you or tried to hurt you?\" If Yes, ask: \"Are you safe now?\"    none  3. ONSET: \"When did the fall happen?\" (e.g., minutes, hours, or days ago)    15 minutes  4. LOCATION: \"What part of " "the body hit the ground?\" (e.g., back, buttocks, head, hips, knees, hands, head, stomach)    head  5. INJURY: \"Did you hurt (injure) yourself when you fell?\" If Yes, ask: \"What did you injure? Tell me more about this?\" (e.g., body area; type of injury; pain severity)\"     no  6. PAIN: \"Is there any pain?\" If Yes, ask: \"How bad is the pain?\" (e.g., Scale 1-10; or mild,   moderate, severe)    - NONE (0): no pain    - MILD (1-3): doesn't interfere with normal activities     - MODERATE (4-7): interferes with normal activities or awakens from sleep     - SEVERE (8-10): excruciating pain, unable to do any normal activities    none  7. SIZE: For cuts, bruises, or swelling, ask: \"How large is it?\" (e.g., inches or centimeters)   Small  Knot on temple the size size of silver dollar obolong in shape  8. PREGNANCY: \"Is there any chance you are pregnant?\" \"When was your last menstrual period?\"  na  9. OTHER SYMPTOMS: \"Do you have any other symptoms?\" (e.g., dizziness, fever, weakness; new onset or worsening).   none  10. CAUSE: \"What do you think caused the fall (or falling)?\" (e.g., tripped, dizzy spell)     tripped    Protocols used: FALLS AND FALLING-ADULT-AH      "

## 2022-01-20 ENCOUNTER — ANTI-COAG VISIT (OUTPATIENT)
Dept: CARDIOLOGY CLINIC | Age: 82
End: 2022-01-20
Payer: MEDICARE

## 2022-01-20 DIAGNOSIS — I48.0 PAF (PAROXYSMAL ATRIAL FIBRILLATION) (HCC): Primary | ICD-10-CM

## 2022-01-20 LAB
INTERNATIONAL NORMALIZATION RATIO, POC: 1.7
PROTHROMBIN TIME, POC: NORMAL

## 2022-01-20 PROCEDURE — 85610 PROTHROMBIN TIME: CPT | Performed by: NURSE PRACTITIONER

## 2022-01-21 ENCOUNTER — LAB (OUTPATIENT)
Dept: INTERNAL MEDICINE | Facility: CLINIC | Age: 82
End: 2022-01-21

## 2022-01-21 DIAGNOSIS — E78.2 MIXED HYPERLIPIDEMIA: ICD-10-CM

## 2022-01-21 DIAGNOSIS — I10 HTN (HYPERTENSION), BENIGN: Primary | ICD-10-CM

## 2022-01-21 DIAGNOSIS — E03.9 HYPOTHYROIDISM, UNSPECIFIED TYPE: ICD-10-CM

## 2022-01-21 DIAGNOSIS — E55.9 VITAMIN D DEFICIENCY: ICD-10-CM

## 2022-01-21 DIAGNOSIS — Z79.899 ENCOUNTER FOR LONG-TERM CURRENT USE OF MEDICATION: ICD-10-CM

## 2022-01-22 LAB
25(OH)D3+25(OH)D2 SERPL-MCNC: 46.6 NG/ML (ref 30–100)
ALBUMIN SERPL-MCNC: 4.5 G/DL (ref 3.5–5.2)
ALBUMIN/GLOB SERPL: 2 G/DL
ALP SERPL-CCNC: 109 U/L (ref 39–117)
ALT SERPL-CCNC: 8 U/L (ref 1–33)
APPEARANCE UR: CLEAR
AST SERPL-CCNC: 18 U/L (ref 1–32)
BACTERIA #/AREA URNS HPF: NORMAL /HPF
BASOPHILS # BLD AUTO: 0.05 10*3/MM3 (ref 0–0.2)
BASOPHILS NFR BLD AUTO: 0.9 % (ref 0–1.5)
BILIRUB SERPL-MCNC: 0.9 MG/DL (ref 0–1.2)
BILIRUB UR QL STRIP: NEGATIVE
BUN SERPL-MCNC: 31 MG/DL (ref 8–23)
BUN/CREAT SERPL: 16.8 (ref 7–25)
CALCIUM SERPL-MCNC: 9.7 MG/DL (ref 8.6–10.5)
CASTS URNS MICRO: NORMAL
CHLORIDE SERPL-SCNC: 103 MMOL/L (ref 98–107)
CHOLEST SERPL-MCNC: 156 MG/DL (ref 0–200)
CO2 SERPL-SCNC: 24.9 MMOL/L (ref 22–29)
COLOR UR: YELLOW
CREAT SERPL-MCNC: 1.84 MG/DL (ref 0.57–1)
EOSINOPHIL # BLD AUTO: 0.11 10*3/MM3 (ref 0–0.4)
EOSINOPHIL NFR BLD AUTO: 2 % (ref 0.3–6.2)
EPI CELLS #/AREA URNS HPF: NORMAL /HPF
ERYTHROCYTE [DISTWIDTH] IN BLOOD BY AUTOMATED COUNT: 13.2 % (ref 12.3–15.4)
GLOBULIN SER CALC-MCNC: 2.2 GM/DL
GLUCOSE SERPL-MCNC: 98 MG/DL (ref 65–99)
GLUCOSE UR QL STRIP: NEGATIVE
HCT VFR BLD AUTO: 40.1 % (ref 34–46.6)
HDLC SERPL-MCNC: 54 MG/DL (ref 40–60)
HGB BLD-MCNC: 13 G/DL (ref 12–15.9)
HGB UR QL STRIP: NEGATIVE
IMM GRANULOCYTES # BLD AUTO: 0.01 10*3/MM3 (ref 0–0.05)
IMM GRANULOCYTES NFR BLD AUTO: 0.2 % (ref 0–0.5)
KETONES UR QL STRIP: NEGATIVE
LDLC SERPL CALC-MCNC: 73 MG/DL (ref 0–100)
LEUKOCYTE ESTERASE UR QL STRIP: NEGATIVE
LYMPHOCYTES # BLD AUTO: 1.64 10*3/MM3 (ref 0.7–3.1)
LYMPHOCYTES NFR BLD AUTO: 29.1 % (ref 19.6–45.3)
MCH RBC QN AUTO: 31.7 PG (ref 26.6–33)
MCHC RBC AUTO-ENTMCNC: 32.4 G/DL (ref 31.5–35.7)
MCV RBC AUTO: 97.8 FL (ref 79–97)
MONOCYTES # BLD AUTO: 0.42 10*3/MM3 (ref 0.1–0.9)
MONOCYTES NFR BLD AUTO: 7.4 % (ref 5–12)
NEUTROPHILS # BLD AUTO: 3.41 10*3/MM3 (ref 1.7–7)
NEUTROPHILS NFR BLD AUTO: 60.4 % (ref 42.7–76)
NITRITE UR QL STRIP: NEGATIVE
NRBC BLD AUTO-RTO: 0 /100 WBC (ref 0–0.2)
PH UR STRIP: 6 [PH] (ref 5–8)
PLATELET # BLD AUTO: 87 10*3/MM3 (ref 140–450)
POTASSIUM SERPL-SCNC: 4.5 MMOL/L (ref 3.5–5.2)
PROT SERPL-MCNC: 6.7 G/DL (ref 6–8.5)
PROT UR QL STRIP: NEGATIVE
RBC # BLD AUTO: 4.1 10*6/MM3 (ref 3.77–5.28)
RBC #/AREA URNS HPF: NORMAL /HPF
SODIUM SERPL-SCNC: 140 MMOL/L (ref 136–145)
SP GR UR STRIP: 1.02 (ref 1–1.03)
TRIGL SERPL-MCNC: 174 MG/DL (ref 0–150)
TSH SERPL-ACNC: 3.02 UIU/ML (ref 0.27–4.2)
URATE SERPL-MCNC: 5.9 MG/DL (ref 2.4–5.7)
UROBILINOGEN UR STRIP-MCNC: NORMAL MG/DL
VLDLC SERPL CALC-MCNC: 29 MG/DL (ref 5–40)
WBC # BLD AUTO: 5.64 10*3/MM3 (ref 3.4–10.8)
WBC #/AREA URNS HPF: NORMAL /HPF

## 2022-02-02 NOTE — TELEPHONE ENCOUNTER
Pt called stating she just fell, about 20 mins ago, while coming up the steps of her home outside and she hit the left side of her head and she has a knot there.  She denies LOC or any neurological sx's at this time.  Her  was present and is with her now.  She is calling because she is on Coumadin.  Talked with Dr Davidson and he says she should apply ice to the area, and as long as no LOC or any sx's currently, and as long as  will be there to observe her, she does not need to do anything else at this time.  However, if he notices any change in her behavior or any symptoms, he is to take her to the ER immediately to have her head scanned.  She voiced understanding.   The patient is a 48y Female complaining of shortness of breath. 4

## 2022-02-14 NOTE — DISCHARGE SUMMARY
Consults     No orders found for last 30 day(s).      Nikki Zuniga MD FACS Discharge Summary    Date of Discharge:  1/3/2020    Discharge Diagnosis: breast cancer on left    Presenting Problem/History of Present Illness  Breast cancer ([facility]) [C50.919]     Hospital Course  Patient is a 79 y.o. female presented with left sided breast cancer.  She underwent left simple mastectomy with SLNBx.      Procedures Performed  Procedure(s):  LEFT SIMPLE MASTECTOMY WITH SENTINEL NODE BIOPSY, INJECTION AND SCAN, RADIOLOGIST WILL INJECT       Consults:   Consults     No orders found for last 30 day(s).          Condition on Discharge:  good    Vital Signs  Temp:  [97.2 °F (36.2 °C)-98.7 °F (37.1 °C)] 97.8 °F (36.6 °C)  Heart Rate:  [54-69] 63  Resp:  [12-18] 18  BP: (118-143)/(30-60) 118/52    Physical Exam:   See History and Physical found in chart.    Discharge Disposition  Home or Self Care    Discharge Medications     Discharge Medications      New Medications      Instructions Start Date   oxyCODONE-acetaminophen 5-325 MG per tablet  Commonly known as:  PERCOCET   1 tablet, Oral, Every 4 Hours PRN         Continue These Medications      Instructions Start Date   acetaminophen 500 MG tablet  Commonly known as:  TYLENOL   500 mg, Oral, Every 6 Hours PRN      allopurinol 100 MG tablet  Commonly known as:  ZYLOPRIM   200 mg, Oral, Daily      bumetanide 1 MG tablet  Commonly known as:  BUMEX   0.5 mg, Oral, Daily      CALTRATE 600+D PO   1 tablet, Oral, 2 Times Daily      carboxymethylcellulose 0.5 % solution  Commonly known as:  REFRESH PLUS   1 drop, Both Eyes, 3 Times Daily PRN      ferrous sulfate 325 (65 FE) MG tablet   325 mg, Oral, 3 Times Weekly, Monday, Wednesday, and Friday.      guaiFENesin 600 MG 12 hr tablet  Commonly known as:  MUCINEX   1,200 mg, Oral, Daily PRN      levothyroxine 50 MCG tablet  Commonly known as:  SYNTHROID, LEVOTHROID   50 mcg, Oral, Daily      losartan 100 MG tablet  Commonly known as:   COZAAR   50 mg, Oral, 2 Times Daily      metoprolol succinate XL 25 MG 24 hr tablet  Commonly known as:  TOPROL-XL   25 mg, Oral, 2 Times Daily      MIRALAX powder  Generic drug:  polyethylene glycol   17 g, Oral, Daily      MULTAQ 400 MG tablet  Generic drug:  dronedarone   400 mg, Oral, 2 Times Daily With Meals      simvastatin 40 MG tablet  Commonly known as:  ZOCOR   40 mg, Oral, Daily      spironolactone 25 MG tablet  Commonly known as:  ALDACTONE   25 mg, Oral, Daily      Vitamin D 1000 units tablet   1,000 Units, Oral, Daily      warfarin 5 MG tablet  Commonly known as:  COUMADIN   Oral, Take As Directed, Take 1 whole tablet (5 mg) on Wednesday only. Take 1/2 tablet (2.5 mg) all other days.             Discharge Diet:     Activity at Discharge:     Follow-up Appointments  No future appointments.      Test Results Pending at Discharge   Order Current Status    Tissue Pathology Exam In process           Nikki Zuniga MD  01/03/20  11:37 AM             Detail Level: Detailed

## 2022-02-21 ENCOUNTER — ANTI-COAG VISIT (OUTPATIENT)
Dept: CARDIOLOGY CLINIC | Age: 82
End: 2022-02-21
Payer: MEDICARE

## 2022-02-21 DIAGNOSIS — I48.0 PAF (PAROXYSMAL ATRIAL FIBRILLATION) (HCC): Primary | ICD-10-CM

## 2022-02-21 LAB
INTERNATIONAL NORMALIZATION RATIO, POC: 2.3
PROTHROMBIN TIME, POC: NORMAL

## 2022-02-21 PROCEDURE — 85610 PROTHROMBIN TIME: CPT | Performed by: NURSE PRACTITIONER

## 2022-02-22 ENCOUNTER — TELEPHONE (OUTPATIENT)
Dept: CARDIOLOGY CLINIC | Age: 82
End: 2022-02-22

## 2022-02-22 NOTE — TELEPHONE ENCOUNTER
Hammond Juju called to reschedule a INR appt to around March 24th. Patient states they were seen by Madelyn Cam for INR 02.21.22 and is no longer needing appointment Thursday 02.24.22. Jesica Khan Please be advised that the best time to call her to accommodate their needs is Anytime. Thank you.

## 2022-02-26 DIAGNOSIS — I48.0 PAF (PAROXYSMAL ATRIAL FIBRILLATION) (HCC): ICD-10-CM

## 2022-02-28 RX ORDER — METOPROLOL SUCCINATE 25 MG/1
TABLET, EXTENDED RELEASE ORAL
Qty: 180 TABLET | Refills: 1 | Status: SHIPPED | OUTPATIENT
Start: 2022-02-28 | End: 2022-09-02

## 2022-03-15 ENCOUNTER — OFFICE VISIT (OUTPATIENT)
Dept: INTERNAL MEDICINE | Facility: CLINIC | Age: 82
End: 2022-03-15

## 2022-03-15 VITALS
TEMPERATURE: 96.9 F | WEIGHT: 225 LBS | OXYGEN SATURATION: 97 % | SYSTOLIC BLOOD PRESSURE: 132 MMHG | HEIGHT: 65 IN | HEART RATE: 63 BPM | DIASTOLIC BLOOD PRESSURE: 64 MMHG | BODY MASS INDEX: 37.49 KG/M2

## 2022-03-15 DIAGNOSIS — Z00.00 ENCOUNTER FOR SUBSEQUENT ANNUAL WELLNESS VISIT (AWV) IN MEDICARE PATIENT: Primary | ICD-10-CM

## 2022-03-15 DIAGNOSIS — Z78.0 POST-MENOPAUSAL: ICD-10-CM

## 2022-03-15 PROCEDURE — G0439 PPPS, SUBSEQ VISIT: HCPCS | Performed by: FAMILY MEDICINE

## 2022-03-15 PROCEDURE — 99214 OFFICE O/P EST MOD 30 MIN: CPT | Performed by: FAMILY MEDICINE

## 2022-03-15 PROCEDURE — 1159F MED LIST DOCD IN RCRD: CPT | Performed by: FAMILY MEDICINE

## 2022-03-15 PROCEDURE — 1170F FXNL STATUS ASSESSED: CPT | Performed by: FAMILY MEDICINE

## 2022-03-15 PROCEDURE — 1126F AMNT PAIN NOTED NONE PRSNT: CPT | Performed by: FAMILY MEDICINE

## 2022-03-15 NOTE — PROGRESS NOTES
The ABCs of the Annual Wellness Visit  Subsequent Medicare Wellness Visit    Chief Complaint   Patient presents with   • Medicare Wellness-subsequent   • Arthritis   • Shortness of Breath     Ongoing issue over the last several months had stress test end of last year and everything looked good. Does have some medication this is a side effect from       Subjective    History of Present Illness:  Heidi Ansari is a 81 y.o. female who presents for a Subsequent Medicare Wellness Visit.    The patient has consented to being recorded using ANTON.    She states that her kidney function has been stable recently. She is following with nephrology.     She is following with cardiology at [facility]. She has dyspnea, but she has passed a stress test with no issues. She does have some medications that have side effects of dyspnea. She does have a pulse oximeter at home, and her readings are usually 95 and above.     The patient is also followed by dermatology. She has a few ganglion cysts that are not bothersome.     She reports that she has decreased sensation in her left foot compared to her right foot.     The patient notes that she has arthritis in her right hip, and she has difficulty with ambulating when her pain worsens.     The following portions of the patient's history were reviewed and   updated as appropriate: allergies, current medications, past family history, past medical history, past social history, past surgical history and problem list.    Compared to one year ago, the patient feels her physical   health is the same.    Compared to one year ago, the patient feels her mental   health is the same.    Recent Hospitalizations:  She was not admitted to the hospital during the last year.       Current Medical Providers:  Patient Care Team:  Charley Wesley DO as PCP - General (Family Medicine)    Outpatient Medications Prior to Visit   Medication Sig Dispense Refill   • acetaminophen (TYLENOL) 500 MG tablet Take  500 mg by mouth Every 6 (Six) Hours As Needed for Mild Pain .     • allopurinol (ZYLOPRIM) 100 MG tablet Take 100 mg by mouth 2 (Two) Times a Day.  3   • anastrozole (ARIMIDEX) 1 MG tablet Take 1 tablet by mouth Daily. 30 tablet 5   • bumetanide (BUMEX) 0.5 MG tablet Take 1 tablet by mouth once daily 90 tablet 3   • carboxymethylcellulose (REFRESH PLUS) 0.5 % solution Administer 1 drop to both eyes 3 (Three) Times a Day As Needed for Dry Eyes.     • dronedarone (MULTAQ) 400 MG tablet Take 400 mg by mouth 2 (Two) Times a Day With Meals.     • levothyroxine (SYNTHROID, LEVOTHROID) 50 MCG tablet Take 1 tablet by mouth once daily 90 tablet 3   • losartan (COZAAR) 50 MG tablet Take 1 tablet by mouth once daily 90 tablet 3   • metoprolol succinate XL (TOPROL-XL) 25 MG 24 hr tablet Take 25 mg by mouth 2 (Two) Times a Day.  1   • polyethylene glycol (MIRALAX) 17 GM/SCOOP powder Take 17 g by mouth Daily.     • simvastatin (ZOCOR) 40 MG tablet Take 1 tablet by mouth once daily 90 tablet 3   • spironolactone (ALDACTONE) 25 MG tablet Take 1 tablet by mouth once daily 90 tablet 3   • warfarin (COUMADIN) 5 MG tablet Take  by mouth Take As Directed. Take 1 whole tablet (5 mg) on Wednesday only. Take 1/2 tablet (2.5 mg) all other days.  5   • calcium carbonate-vitamin d 600-400 MG-UNIT per tablet Take 1 tablet by mouth 2 (Two) Times a Day. 60 tablet 5     Facility-Administered Medications Prior to Visit   Medication Dose Route Frequency Provider Last Rate Last Admin   • lidocaine (XYLOCAINE) 1 % injection 10 mL  10 mL Subcutaneous Once Nikki Zuniga MD       • lidocaine-EPINEPHrine (XYLOCAINE W/EPI) 1 %-1:216600 injection 10 mL  10 mL Injection Once Nikki Zuniga MD           No opioid medication identified on active medication list. I have reviewed chart for other potential  high risk medication/s and harmful drug interactions in the elderly.          Aspirin is not on active medication list.  Aspirin use is not indicated  "based on review of current medical condition/s. Risk of harm outweighs potential benefits.  .    Patient Active Problem List   Diagnosis   • Hx of adenomatous colonic polyps   • HTN (hypertension), benign   • Anticoagulated on Coumadin   • Family hx of colon cancer   • Breast cancer, left (HCC)   • Anemia due to stage 3 chronic kidney disease (HCC)   • Acute frontal sinusitis   • Chronic anticoagulation   • Endometrial thickening on ultrasound   • Excessive anticoagulation   • History of hypertension   • Hypothyroidism   • Kidney disease   • Mixed hyperlipidemia   • Atrial fibrillation, controlled (HCC)   • PMB (postmenopausal bleeding)   • Brain lesion   • Obesity (BMI 30-39.9)   • Non-tobacco user   • Anemia in stage 4 chronic kidney disease (HCC)   • Atrophic vaginitis   • Cardiomegaly   • Hematuria   • Myxedema heart disease   • PVC (premature ventricular contraction)   • Rheumatoid arthritis (HCC)   • Seborrheic keratosis   • Thrombocytopenia (HCC)   • Vitamin D deficiency   • Moderate mitral regurgitation by prior echocardiogram     Advance Care Planning  Advance Directive is not on file.  ACP discussion was held with the patient during this visit. Patient has an advance directive (not in EMR), copy requested.          Objective    Vitals:    03/15/22 1420   BP: 132/64   BP Location: Right arm   Patient Position: Sitting   Cuff Size: Large Adult   Pulse: 63   Temp: 96.9 °F (36.1 °C)   TempSrc: Temporal   SpO2: 97%   Weight: 102 kg (225 lb)   Height: 165.1 cm (65\")   PainSc: 0-No pain     BMI Readings from Last 1 Encounters:   03/15/22 37.44 kg/m²   BMI is above normal parameters. Recommendations include: exercise counseling and nutrition counseling    Does the patient have evidence of cognitive impairment? No    Physical Exam  Vitals and nursing note reviewed.   Constitutional:       Appearance: Normal appearance. She is well-developed.   HENT:      Head: Normocephalic and atraumatic.      Right Ear: External " ear normal.      Left Ear: External ear normal.      Nose: Nose normal.      Mouth/Throat:      Mouth: Mucous membranes are moist.   Eyes:      Extraocular Movements: Extraocular movements intact.      Conjunctiva/sclera: Conjunctivae normal.      Pupils: Pupils are equal, round, and reactive to light.   Neck:      Thyroid: No thyromegaly.      Vascular: No JVD.      Trachea: No tracheal deviation.   Cardiovascular:      Rate and Rhythm: Normal rate and regular rhythm.      Pulses: Normal pulses.      Heart sounds: Normal heart sounds. No murmur heard.    No friction rub. No gallop.      Comments: Systolic heart murmur grade 4 out of 6.   Pulmonary:      Effort: Pulmonary effort is normal.      Breath sounds: Normal breath sounds.   Abdominal:      General: Bowel sounds are normal. There is no distension.      Palpations: Abdomen is soft.      Tenderness: There is no abdominal tenderness.   Musculoskeletal:         General: Normal range of motion.      Cervical back: Normal range of motion and neck supple.   Lymphadenopathy:      Cervical: No cervical adenopathy.   Skin:     General: Skin is warm and dry.      Capillary Refill: Capillary refill takes less than 2 seconds.   Neurological:      Mental Status: She is alert and oriented to person, place, and time.      Cranial Nerves: No cranial nerve deficit.      Coordination: Coordination normal.   Psychiatric:         Mood and Affect: Mood normal.         Behavior: Behavior normal.       Lab Results   Component Value Date    CHLPL 156 01/21/2022    TRIG 174 (H) 01/21/2022    HDL 54 01/21/2022    LDL 73 01/21/2022    VLDL 29 01/21/2022            HEALTH RISK ASSESSMENT    Smoking Status:  Social History     Tobacco Use   Smoking Status Never Smoker   Smokeless Tobacco Never Used     Alcohol Consumption:  Social History     Substance and Sexual Activity   Alcohol Use No     Fall Risk Screen:    STEADI Fall Risk Assessment was completed, and patient is at MODERATE risk  for falls. Assessment completed on:3/15/2022    Depression Screening:  PHQ-2/PHQ-9 Depression Screening 3/15/2022   Retired PHQ-9 Total Score -   Retired Total Score -   Little Interest or Pleasure in Doing Things 0-->not at all   Feeling Down, Depressed or Hopeless 0-->not at all   PHQ-9: Brief Depression Severity Measure Score 0       Health Habits and Functional and Cognitive Screening:  Functional & Cognitive Status 3/15/2022   Do you have difficulty preparing food and eating? No   Do you have difficulty bathing yourself, getting dressed or grooming yourself? No   Do you have difficulty using the toilet? No   Do you have difficulty moving around from place to place? No   Do you have trouble with steps or getting out of a bed or a chair? No   Current Diet Well Balanced Diet   Dental Exam Up to date   Eye Exam Up to date   Exercise (times per week) 3 times per week   Current Exercises Include Stationary Bicycling/Spin Class   Current Exercise Activities Include -   Do you need help using the phone?  No   Are you deaf or do you have serious difficulty hearing?  No   Do you need help with transportation? No   Do you need help shopping? No   Do you need help preparing meals?  No   Do you need help with housework?  No   Do you need help with laundry? No   Do you need help taking your medications? No   Do you need help managing money? No   Do you ever drive or ride in a car without wearing a seat belt? No   Have you felt unusual stress, anger or loneliness in the last month? No   Who do you live with? Spouse   If you need help, do you have trouble finding someone available to you? No   Have you been bothered in the last four weeks by sexual problems? No   Do you have difficulty concentrating, remembering or making decisions? No       Age-appropriate Screening Schedule:  Refer to the list below for future screening recommendations based on patient's age, sex and/or medical conditions. Orders for these recommended tests  are listed in the plan section. The patient has been provided with a written plan.    Health Maintenance   Topic Date Due   • DXA SCAN  06/02/2022   • MAMMOGRAM  12/06/2022   • LIPID PANEL  01/21/2023   • TDAP/TD VACCINES (2 - Td or Tdap) 12/21/2030   • INFLUENZA VACCINE  Completed   • ZOSTER VACCINE  Completed              Assessment/Plan   CMS Preventative Services Quick Reference  Risk Factors Identified During Encounter  Cardiovascular Disease  Obesity/Overweight   The above risks/problems have been discussed with the patient.  Follow up actions/plans if indicated are seen below in the Assessment/Plan Section.  Pertinent information has been shared with the patient in the After Visit Summary.    Diagnoses and all orders for this visit:    1. Encounter for subsequent annual wellness visit (AWV) in Medicare patient (Primary)    2. Post-menopausal  -     DEXA Bone Density Axial; Future       - Continue current medications. She is to follow-up with her oncologist.      Benign hypertension  - Monitor blood pressure and pulse oximetry at home. The patient is aware of the goals of 130 over 80 or less.      History of controlled atrial fibrillation  - The patient will continue to follow-up with her cardiologist.     Chronic anticoagulation on Coumadin  - Continue current medication.      Vitamin D deficiency  - Continue current medication.      Morbid obesity  - We discussed portion control and walking for exercise.      Hypothyroidism  - Continue current medication.      Chronic kidney disease stage IV  - The patient will continue to follow-up with her nephrologist.     Follow Up:   Return in about 6 months (around 9/15/2022).     An After Visit Summary and PPPS were made available to the patient.                     Transcribed from ambient dictation for Charley Wesley DO by Brittney Olguin.  03/15/22   16:12 CDT    Patient verbalized consent to the visit recording.

## 2022-03-19 DIAGNOSIS — I48.91 ATRIAL FIBRILLATION, UNSPECIFIED TYPE (HCC): ICD-10-CM

## 2022-03-21 RX ORDER — DRONEDARONE 400 MG/1
TABLET, FILM COATED ORAL
Qty: 180 TABLET | Refills: 1 | Status: SHIPPED | OUTPATIENT
Start: 2022-03-21 | End: 2022-09-16

## 2022-03-23 ENCOUNTER — HOSPITAL ENCOUNTER (OUTPATIENT)
Dept: BONE DENSITY | Facility: HOSPITAL | Age: 82
Discharge: HOME | End: 2022-03-23

## 2022-03-23 DIAGNOSIS — Z78.0 POST-MENOPAUSAL: ICD-10-CM

## 2022-03-24 RX ORDER — SPIRONOLACTONE 25 MG/1
TABLET ORAL
Qty: 90 TABLET | Refills: 3 | Status: SHIPPED | OUTPATIENT
Start: 2022-03-24 | End: 2023-03-15 | Stop reason: SDUPTHER

## 2022-03-28 ENCOUNTER — ANTI-COAG VISIT (OUTPATIENT)
Dept: CARDIOLOGY CLINIC | Age: 82
End: 2022-03-28
Payer: MEDICARE

## 2022-03-28 DIAGNOSIS — I48.0 PAF (PAROXYSMAL ATRIAL FIBRILLATION) (HCC): Primary | ICD-10-CM

## 2022-03-28 LAB
INTERNATIONAL NORMALIZATION RATIO, POC: 2
PROTHROMBIN TIME, POC: NORMAL

## 2022-03-28 PROCEDURE — 85610 PROTHROMBIN TIME: CPT | Performed by: NURSE PRACTITIONER

## 2022-04-07 RX ORDER — LEVOTHYROXINE SODIUM 0.05 MG/1
TABLET ORAL
Qty: 90 TABLET | Refills: 3 | Status: SHIPPED | OUTPATIENT
Start: 2022-04-07 | End: 2023-03-15 | Stop reason: SDUPTHER

## 2022-04-22 ENCOUNTER — TELEPHONE (OUTPATIENT)
Dept: CARDIOLOGY CLINIC | Age: 82
End: 2022-04-22

## 2022-04-25 ENCOUNTER — TELEPHONE (OUTPATIENT)
Dept: ONCOLOGY | Facility: CLINIC | Age: 82
End: 2022-04-25

## 2022-04-25 ENCOUNTER — ANTI-COAG VISIT (OUTPATIENT)
Dept: CARDIOLOGY CLINIC | Age: 82
End: 2022-04-25
Payer: MEDICARE

## 2022-04-25 ENCOUNTER — LAB (OUTPATIENT)
Dept: LAB | Facility: HOSPITAL | Age: 82
End: 2022-04-25

## 2022-04-25 DIAGNOSIS — Z17.0 MALIGNANT NEOPLASM OF LEFT BREAST IN FEMALE, ESTROGEN RECEPTOR POSITIVE, UNSPECIFIED SITE OF BREAST: ICD-10-CM

## 2022-04-25 DIAGNOSIS — C50.912 MALIGNANT NEOPLASM OF LEFT BREAST IN FEMALE, ESTROGEN RECEPTOR POSITIVE, UNSPECIFIED SITE OF BREAST: ICD-10-CM

## 2022-04-25 DIAGNOSIS — I48.0 PAF (PAROXYSMAL ATRIAL FIBRILLATION) (HCC): Primary | ICD-10-CM

## 2022-04-25 DIAGNOSIS — D63.1 ANEMIA DUE TO STAGE 3 CHRONIC KIDNEY DISEASE, UNSPECIFIED WHETHER STAGE 3A OR 3B CKD: Primary | ICD-10-CM

## 2022-04-25 DIAGNOSIS — N18.30 ANEMIA DUE TO STAGE 3 CHRONIC KIDNEY DISEASE, UNSPECIFIED WHETHER STAGE 3A OR 3B CKD: Primary | ICD-10-CM

## 2022-04-25 LAB
ALBUMIN SERPL-MCNC: 4.3 G/DL (ref 3.5–5.2)
ALBUMIN/GLOB SERPL: 1.8 G/DL
ALP SERPL-CCNC: 103 U/L (ref 39–117)
ALT SERPL W P-5'-P-CCNC: 10 U/L (ref 1–33)
ANION GAP SERPL CALCULATED.3IONS-SCNC: 11 MMOL/L (ref 5–15)
AST SERPL-CCNC: 18 U/L (ref 1–32)
BASOPHILS # BLD AUTO: 0.03 10*3/MM3 (ref 0–0.2)
BASOPHILS NFR BLD AUTO: 0.5 % (ref 0–1.5)
BILIRUB SERPL-MCNC: 0.6 MG/DL (ref 0–1.2)
BUN SERPL-MCNC: 29 MG/DL (ref 8–23)
BUN/CREAT SERPL: 16.2 (ref 7–25)
CALCIUM SPEC-SCNC: 9.4 MG/DL (ref 8.6–10.5)
CEA SERPL-MCNC: 1.5 NG/ML
CHLORIDE SERPL-SCNC: 106 MMOL/L (ref 98–107)
CO2 SERPL-SCNC: 24 MMOL/L (ref 22–29)
CREAT SERPL-MCNC: 1.79 MG/DL (ref 0.57–1)
DEPRECATED RDW RBC AUTO: 49.1 FL (ref 37–54)
EGFRCR SERPLBLD CKD-EPI 2021: 28 ML/MIN/1.73
EOSINOPHIL # BLD AUTO: 0.07 10*3/MM3 (ref 0–0.4)
EOSINOPHIL NFR BLD AUTO: 1.3 % (ref 0.3–6.2)
ERYTHROCYTE [DISTWIDTH] IN BLOOD BY AUTOMATED COUNT: 14.1 % (ref 12.3–15.4)
FERRITIN SERPL-MCNC: 175.8 NG/ML (ref 13–150)
GLOBULIN UR ELPH-MCNC: 2.4 GM/DL
GLUCOSE SERPL-MCNC: 107 MG/DL (ref 65–99)
HCT VFR BLD AUTO: 38.2 % (ref 34–46.6)
HGB BLD-MCNC: 12.6 G/DL (ref 12–15.9)
INTERNATIONAL NORMALIZATION RATIO, POC: 2.6
IRON 24H UR-MRATE: 94 MCG/DL (ref 37–145)
IRON SATN MFR SERPL: 22 % (ref 20–50)
LYMPHOCYTES # BLD AUTO: 1.51 10*3/MM3 (ref 0.7–3.1)
LYMPHOCYTES NFR BLD AUTO: 27.5 % (ref 19.6–45.3)
MCH RBC QN AUTO: 31.8 PG (ref 26.6–33)
MCHC RBC AUTO-ENTMCNC: 33 G/DL (ref 31.5–35.7)
MCV RBC AUTO: 96.5 FL (ref 79–97)
MONOCYTES # BLD AUTO: 0.43 10*3/MM3 (ref 0.1–0.9)
MONOCYTES NFR BLD AUTO: 7.8 % (ref 5–12)
NEUTROPHILS NFR BLD AUTO: 3.45 10*3/MM3 (ref 1.7–7)
NEUTROPHILS NFR BLD AUTO: 62.7 % (ref 42.7–76)
PLATELET # BLD AUTO: 119 10*3/MM3 (ref 140–450)
PMV BLD AUTO: 11.5 FL (ref 6–12)
POTASSIUM SERPL-SCNC: 4.3 MMOL/L (ref 3.5–5.2)
PROT SERPL-MCNC: 6.7 G/DL (ref 6–8.5)
PROTHROMBIN TIME, POC: 30.2
RBC # BLD AUTO: 3.96 10*6/MM3 (ref 3.77–5.28)
SODIUM SERPL-SCNC: 141 MMOL/L (ref 136–145)
TIBC SERPL-MCNC: 428 MCG/DL (ref 298–536)
TRANSFERRIN SERPL-MCNC: 287 MG/DL (ref 200–360)
WBC NRBC COR # BLD: 5.5 10*3/MM3 (ref 3.4–10.8)

## 2022-04-25 PROCEDURE — 85610 PROTHROMBIN TIME: CPT | Performed by: NURSE PRACTITIONER

## 2022-04-25 PROCEDURE — 36415 COLL VENOUS BLD VENIPUNCTURE: CPT

## 2022-04-25 PROCEDURE — 83540 ASSAY OF IRON: CPT

## 2022-04-25 PROCEDURE — 86300 IMMUNOASSAY TUMOR CA 15-3: CPT

## 2022-04-25 PROCEDURE — 82378 CARCINOEMBRYONIC ANTIGEN: CPT

## 2022-04-25 PROCEDURE — 93793 ANTICOAG MGMT PT WARFARIN: CPT | Performed by: NURSE PRACTITIONER

## 2022-04-25 PROCEDURE — 84466 ASSAY OF TRANSFERRIN: CPT

## 2022-04-25 PROCEDURE — 80053 COMPREHEN METABOLIC PANEL: CPT

## 2022-04-25 PROCEDURE — 82728 ASSAY OF FERRITIN: CPT

## 2022-04-25 PROCEDURE — 85025 COMPLETE CBC W/AUTO DIFF WBC: CPT

## 2022-04-25 NOTE — TELEPHONE ENCOUNTER
Notified [place] that Dr. Franco wants to recheck some of Heidi's lab work next week.  CBC.  Appointment at 9:30 for lab on 5/2/22.

## 2022-04-25 NOTE — TELEPHONE ENCOUNTER
----- Message from Ender Franco MD sent at 4/25/2022 12:27 PM CDT -----  Repeat CBC with differential in 1 week

## 2022-04-26 LAB — CANCER AG27-29 SERPL-ACNC: 19.1 U/ML (ref 0–38.6)

## 2022-05-02 ENCOUNTER — OFFICE VISIT (OUTPATIENT)
Dept: ONCOLOGY | Facility: CLINIC | Age: 82
End: 2022-05-02

## 2022-05-02 ENCOUNTER — LAB (OUTPATIENT)
Dept: LAB | Facility: HOSPITAL | Age: 82
End: 2022-05-02

## 2022-05-02 VITALS
HEART RATE: 73 BPM | DIASTOLIC BLOOD PRESSURE: 72 MMHG | RESPIRATION RATE: 16 BRPM | TEMPERATURE: 97.1 F | SYSTOLIC BLOOD PRESSURE: 138 MMHG | OXYGEN SATURATION: 96 % | BODY MASS INDEX: 37.15 KG/M2 | WEIGHT: 223 LBS | HEIGHT: 65 IN

## 2022-05-02 DIAGNOSIS — Z12.31 ENCOUNTER FOR SCREENING MAMMOGRAM FOR MALIGNANT NEOPLASM OF BREAST: ICD-10-CM

## 2022-05-02 DIAGNOSIS — C50.912 MALIGNANT NEOPLASM OF LEFT BREAST IN FEMALE, ESTROGEN RECEPTOR POSITIVE, UNSPECIFIED SITE OF BREAST: Primary | ICD-10-CM

## 2022-05-02 DIAGNOSIS — Z17.0 MALIGNANT NEOPLASM OF LEFT BREAST IN FEMALE, ESTROGEN RECEPTOR POSITIVE, UNSPECIFIED SITE OF BREAST: Primary | ICD-10-CM

## 2022-05-02 DIAGNOSIS — D63.1 ANEMIA DUE TO STAGE 3 CHRONIC KIDNEY DISEASE, UNSPECIFIED WHETHER STAGE 3A OR 3B CKD: Primary | ICD-10-CM

## 2022-05-02 DIAGNOSIS — N18.30 ANEMIA DUE TO STAGE 3 CHRONIC KIDNEY DISEASE, UNSPECIFIED WHETHER STAGE 3A OR 3B CKD: Primary | ICD-10-CM

## 2022-05-02 LAB
BASOPHILS # BLD AUTO: 0.03 10*3/MM3 (ref 0–0.2)
BASOPHILS NFR BLD AUTO: 0.6 % (ref 0–1.5)
DEPRECATED RDW RBC AUTO: 50.6 FL (ref 37–54)
EOSINOPHIL # BLD AUTO: 0.11 10*3/MM3 (ref 0–0.4)
EOSINOPHIL NFR BLD AUTO: 2.1 % (ref 0.3–6.2)
ERYTHROCYTE [DISTWIDTH] IN BLOOD BY AUTOMATED COUNT: 14.2 % (ref 12.3–15.4)
HCT VFR BLD AUTO: 39 % (ref 34–46.6)
HGB BLD-MCNC: 12.6 G/DL (ref 12–15.9)
HOLD SPECIMEN: NORMAL
IMM GRANULOCYTES # BLD AUTO: 0.02 10*3/MM3 (ref 0–0.05)
IMM GRANULOCYTES NFR BLD AUTO: 0.4 % (ref 0–0.5)
LYMPHOCYTES # BLD AUTO: 1.28 10*3/MM3 (ref 0.7–3.1)
LYMPHOCYTES NFR BLD AUTO: 24.8 % (ref 19.6–45.3)
MCH RBC QN AUTO: 31.5 PG (ref 26.6–33)
MCHC RBC AUTO-ENTMCNC: 32.3 G/DL (ref 31.5–35.7)
MCV RBC AUTO: 97.5 FL (ref 79–97)
MONOCYTES # BLD AUTO: 0.35 10*3/MM3 (ref 0.1–0.9)
MONOCYTES NFR BLD AUTO: 6.8 % (ref 5–12)
NEUTROPHILS NFR BLD AUTO: 3.37 10*3/MM3 (ref 1.7–7)
NEUTROPHILS NFR BLD AUTO: 65.3 % (ref 42.7–76)
NRBC BLD AUTO-RTO: 0 /100 WBC (ref 0–0.2)
PLATELET # BLD AUTO: 154 10*3/MM3 (ref 140–450)
PMV BLD AUTO: 12.3 FL (ref 6–12)
RBC # BLD AUTO: 4 10*6/MM3 (ref 3.77–5.28)
RBC MORPH BLD: NORMAL
SMALL PLATELETS BLD QL SMEAR: ADEQUATE
WBC MORPH BLD: NORMAL
WBC NRBC COR # BLD: 5.16 10*3/MM3 (ref 3.4–10.8)

## 2022-05-02 PROCEDURE — 36415 COLL VENOUS BLD VENIPUNCTURE: CPT

## 2022-05-02 PROCEDURE — 85007 BL SMEAR W/DIFF WBC COUNT: CPT

## 2022-05-02 PROCEDURE — 99214 OFFICE O/P EST MOD 30 MIN: CPT | Performed by: INTERNAL MEDICINE

## 2022-05-02 PROCEDURE — 85025 COMPLETE CBC W/AUTO DIFF WBC: CPT

## 2022-05-16 DIAGNOSIS — E78.2 MIXED HYPERLIPIDEMIA: Primary | ICD-10-CM

## 2022-05-16 RX ORDER — SIMVASTATIN 40 MG
40 TABLET ORAL DAILY
Qty: 90 TABLET | Refills: 3 | Status: SHIPPED | OUTPATIENT
Start: 2022-05-16

## 2022-05-23 ENCOUNTER — ANTI-COAG VISIT (OUTPATIENT)
Dept: CARDIOLOGY CLINIC | Age: 82
End: 2022-05-23
Payer: MEDICARE

## 2022-05-23 DIAGNOSIS — I48.0 PAF (PAROXYSMAL ATRIAL FIBRILLATION) (HCC): Primary | ICD-10-CM

## 2022-05-23 LAB
INTERNATIONAL NORMALIZATION RATIO, POC: 2.6
PROTHROMBIN TIME, POC: NORMAL

## 2022-05-23 PROCEDURE — 93793 ANTICOAG MGMT PT WARFARIN: CPT | Performed by: CLINICAL NURSE SPECIALIST

## 2022-05-23 PROCEDURE — 85610 PROTHROMBIN TIME: CPT | Performed by: CLINICAL NURSE SPECIALIST

## 2022-06-14 RX ORDER — LOSARTAN POTASSIUM 50 MG/1
50 TABLET ORAL DAILY
Qty: 90 TABLET | Refills: 3 | Status: SHIPPED | OUTPATIENT
Start: 2022-06-14

## 2022-06-22 ENCOUNTER — OFFICE VISIT (OUTPATIENT)
Dept: CARDIOLOGY CLINIC | Age: 82
End: 2022-06-22
Payer: MEDICARE

## 2022-06-22 VITALS
SYSTOLIC BLOOD PRESSURE: 136 MMHG | OXYGEN SATURATION: 96 % | HEIGHT: 65 IN | WEIGHT: 223 LBS | BODY MASS INDEX: 37.15 KG/M2 | DIASTOLIC BLOOD PRESSURE: 70 MMHG | HEART RATE: 60 BPM

## 2022-06-22 DIAGNOSIS — I51.89 GRADE I DIASTOLIC DYSFUNCTION: ICD-10-CM

## 2022-06-22 DIAGNOSIS — I10 HYPERTENSION, UNSPECIFIED TYPE: ICD-10-CM

## 2022-06-22 DIAGNOSIS — E78.2 MIXED HYPERLIPIDEMIA: ICD-10-CM

## 2022-06-22 DIAGNOSIS — I48.0 PAF (PAROXYSMAL ATRIAL FIBRILLATION) (HCC): Primary | ICD-10-CM

## 2022-06-22 DIAGNOSIS — Z79.01 CHRONIC ANTICOAGULATION: ICD-10-CM

## 2022-06-22 LAB
INTERNATIONAL NORMALIZATION RATIO, POC: 1.8
PROTHROMBIN TIME, POC: 22

## 2022-06-22 PROCEDURE — G8400 PT W/DXA NO RESULTS DOC: HCPCS | Performed by: NURSE PRACTITIONER

## 2022-06-22 PROCEDURE — G8427 DOCREV CUR MEDS BY ELIG CLIN: HCPCS | Performed by: NURSE PRACTITIONER

## 2022-06-22 PROCEDURE — 85610 PROTHROMBIN TIME: CPT | Performed by: NURSE PRACTITIONER

## 2022-06-22 PROCEDURE — 1090F PRES/ABSN URINE INCON ASSESS: CPT | Performed by: NURSE PRACTITIONER

## 2022-06-22 PROCEDURE — G8417 CALC BMI ABV UP PARAM F/U: HCPCS | Performed by: NURSE PRACTITIONER

## 2022-06-22 PROCEDURE — 99214 OFFICE O/P EST MOD 30 MIN: CPT | Performed by: NURSE PRACTITIONER

## 2022-06-22 PROCEDURE — 1123F ACP DISCUSS/DSCN MKR DOCD: CPT | Performed by: NURSE PRACTITIONER

## 2022-06-22 PROCEDURE — 1036F TOBACCO NON-USER: CPT | Performed by: NURSE PRACTITIONER

## 2022-06-22 NOTE — PATIENT INSTRUCTIONS
New instructions for today:  Coumadin can increase your risk of bleeding. If you notice blood in urine or stool, bleeding gums, excessive bruising or cough productive of bloody sputum, notify the office. Information on this blood thinner has been included in your after visit summary. Patient Instructions:  Continue current medications as prescribed. Always keep a current medication list. Bring your medications to every office visit. Continue to follow up with primary care provider for non cardiac medical problems. Call the office with any problems, questions or concerns at [phone]. If you have been asked to keep a blood pressure log, do so for 2 weeks. Call the office to report readings to the triage nurse at [phone]. Follow up with cardiologist as scheduled. The following educational material has been included in this after visit summary for your review: Life simple 7. Heart health. Life simple 7  1) Manage blood pressure - high blood pressure is a major risk factor for heart disease and stroke. Keeping blood pressure in health range reduces strain on your heart, arteries and kidneys. Blood pressure goal is less than 130/80. 2) Control cholesterol - contributes to plaque, which can clog arteries and lead to heart disease and stroke. When you control your cholesterol you are giving your arteries their best chance to remain clear. It is recommended that you get cholesterol lab work done once a year. 3) Reduce blood sugar - most of the food we eat is turning into glucose or blood sugar that our body uses for energy. Over time, high levels of blood sugar can damage your heart, kidneys, eyes and nerves. 4) Get active - living an active life is one of the most rewarding gifts you can give yourself and those you love. Simply put, daily physical activity increases your length and quality of life. Strive to exercise 15 minutes most days of the week.   5)  Eat better - A healthy diet is one of your best weapons for fighting cardiovascular disease. When you eat a heart healthy diet, you improve your chances for feeling good and staying healthy for life. 6)  Lose weight - when you shed extra fat an unnecessary pounds, you reduce the burden on your hear, lungs, blood vessels and skeleton. You give yourself the gift of active living, you lower your blood pressure and help yourself feel better. 7) Stop smoking - cigarette smokers have a higher risk of developing cardiovascular disease. If  You smoke, quitting is the best thing you can do for your health. Check American Heart Association on line for more information on Life's Simple 7 and tips for healthy living. A Healthy Heart: Care Instructions  Your Care Instructions     Coronary artery disease, also called heart disease, occurs when a substance called plaque builds up in the vessels that supply oxygen-rich blood to your heart muscle. This can narrow the blood vessels and reduce blood flow. A heart attack happens when blood flow is completely blocked. A high-fat diet, smoking, and other factors increase the risk of heart disease. Your doctor has found that you have a chance of having heart disease. You can do lots of things to keep your heart healthy. It may not be easy, but you can change your diet, exercise more, and quit smoking. These steps really work to lower your chance of heart disease. Follow-up care is a key part of your treatment and safety. Be sure to make and go to all appointments, and call your doctor if you are having problems. It's also a good idea to know your test results and keep a list of the medicines you take. How can you care for yourself at home? Diet  · Use less salt when you cook and eat. This helps lower your blood pressure. Taste food before salting. Add only a little salt when you think you need it. With time, your taste buds will adjust to less salt.   · Eat fewer snack items, fast foods, canned soups, and other high-salt, high-fat, processed foods. · Read food labels and try to avoid saturated and trans fats. They increase your risk of heart disease by raising cholesterol levels. · Limit the amount of solid fat-butter, margarine, and shortening-you eat. Use olive, peanut, or canola oil when you cook. Bake, broil, and steam foods instead of frying them. · Eat a variety of fruit and vegetables every day. Dark green, deep orange, red, or yellow fruits and vegetables are especially good for you. Examples include spinach, carrots, peaches, and berries. · Foods high in fiber can reduce your cholesterol and provide important vitamins and minerals. High-fiber foods include whole-grain cereals and breads, oatmeal, beans, brown rice, citrus fruits, and apples. · Eat lean proteins. Heart-healthy proteins include seafood, lean meats and poultry, eggs, beans, peas, nuts, seeds, and soy products. · Limit drinks and foods with added sugar. These include candy, desserts, and soda pop. Lifestyle changes  · If your doctor recommends it, get more exercise. Walking is a good choice. Bit by bit, increase the amount you walk every day. Try for at least 30 minutes on most days of the week. You also may want to swim, bike, or do other activities. · Do not smoke. If you need help quitting, talk to your doctor about stop-smoking programs and medicines. These can increase your chances of quitting for good. Quitting smoking may be the most important step you can take to protect your heart. It is never too late to quit. · Limit alcohol to 2 drinks a day for men and 1 drink a day for women. Too much alcohol can cause health problems. · Manage other health problems such as diabetes, high blood pressure, and high cholesterol. If you think you may have a problem with alcohol or drug use, talk to your doctor. Medicines  · Take your medicines exactly as prescribed.  Call your doctor if you think you are having a problem with your medicine. · If your doctor recommends aspirin, take the amount directed each day. Make sure you take aspirin and not another kind of pain reliever, such as acetaminophen (Tylenol). When should you call for help? [id number] if you have symptoms of a heart attack. These may include:  · Chest pain or pressure, or a strange feeling in the chest.  · Sweating. · Shortness of breath. · Pain, pressure, or a strange feeling in the back, neck, jaw, or upper belly or in one or both shoulders or arms. · Lightheadedness or sudden weakness. · A fast or irregular heartbeat. After you call 911, the  may tell you to chew 1 adult-strength or 2 to 4 low-dose aspirin. Wait for an ambulance. Do not try to drive yourself. Watch closely for changes in your health, and be sure to contact your doctor if you have any problems. Where can you learn more? Go to https://Aloompa.Friend.ly. org and sign in to your [employer] account. Enter Z792 in the ConsiderC box to learn more about \"A Healthy Heart: Care Instructions. \"     If you do not have an account, please click on the \"Sign Up Now\" link. Current as of: December 16, 2019               Content Version: 12.5  © 9691-0336 [employer]. Care instructions adapted under license by [facility] ([facility]). If you have questions about a medical condition or this instruction, always ask your healthcare professional. [place] [address] any warranty or liability for your use of this information. Nahid santana

## 2022-06-22 NOTE — PROGRESS NOTES
[facility], [place]. [address], [facility] [address] [facility]  [phone] office  [phone] fax      OFFICE VISIT:  2022    Hussein Obando - : 1940  Reason For Visit:  Yadira Murphy is a 79 y.o. female who is here for Follow-up (and INR, Patient denies any cardiac symptoms. ) and Atrial Fibrillation    History:   Diagnosis Orders   1. PAF (paroxysmal atrial fibrillation) (HCC)  POCT INR   2. Hypertension, unspecified type     3. Mixed hyperlipidemia     4. Chronic anticoagulation     5. Grade I diastolic dysfunction       The patient presents today for cardiology follow up and INR. No bleeding issues reported on Coumadin. The patient denies symptoms to suggest myocardial ischemia, heart failure or arrhythmia. BP is well controlled on current regimen. The patient's PCP monitors cholesterol. The patient follows with Dr. Oralia Edgar for CKD with most recent creatinine was 1.79. Subjective  Aniya denies exertional chest pain,  orthopnea, paroxysmal nocturnal dyspnea, syncope, presyncope, sustained arrhythmia and fatigue. The patient denies numbness or weakness to suggest cerebrovascular accident or transient ischemic attack. + mild CARVALHO.  + mild ankle edema.     Hussein Obando has the following history as recorded in [facility]:  Patient Active Problem List   Diagnosis Code    Hypothyroidism E03.9    Mixed hyperlipidemia E78.2    Hypertension I10    PAF (paroxysmal atrial fibrillation) (HCC) I48.0    Endometrial thickening on ultrasound R93.89    PMB (postmenopausal bleeding) N95.0    Acute frontal sinusitis J01.10    Chronic anticoagulation Z79.01    Moderate mitral regurgitation by prior echocardiogram I34.0     Past Medical History:   Diagnosis Date    Hyperlipidemia     Hypertension     Hypothyroidism     Kidney disease     type 4    LONG TERM ANTICOAGULENT USE      Past Surgical History:   Procedure Laterality Date    BREAST BIOPSY      CARPAL TUNNEL RELEASE      CARPAL TUNNEL RELEASE      CHOLECYSTECTOMY      COLECTOMY      COLON SURGERY      DILATION AND CURETTAGE OF UTERUS N/A 2/16/2017    DILATATION AND CURETTAGE HYSTEROSCOPY performed by Sarah Jung MD at [address] [facility] [address] OF UTERUS  02/17/2017    JOINT REPLACEMENT      TONSILLECTOMY       Family History   Problem Relation Age of Onset    Heart Disease Other         Family History    Cancer Other         Family History    Hypertension Other         Family History    High Blood Pressure Mother     Colon Cancer Mother     Other Father         UNKNOWN     Social History     Tobacco Use    Smoking status: Never Smoker    Smokeless tobacco: Never Used   Substance Use Topics    Alcohol use: No     Alcohol/week: 0.0 standard drinks      Current Outpatient Medications   Medication Sig Dispense Refill    MULTAQ 400 MG TABS TAKE 1 TABLET BY MOUTH TWICE DAILY WITH MEALS 180 tablet 1    metoprolol succinate (TOPROL XL) 25 MG extended release tablet Take 1 tablet by mouth twice daily 180 tablet 1    warfarin (COUMADIN) 5 MG tablet TAKE 1 TABLET BY MOUTH ONCE DAILY OR AS DIRECTED 30 tablet 5    anastrozole (ARIMIDEX) 1 MG tablet Take 1 tablet by mouth once daily      Acetaminophen (TYLENOL) 325 MG CAPS Take by mouth daily      Carboxymethylcellulose Sodium (REFRESH TEARS OP) Apply 1 drop to eye as needed      vitamin D (CHOLECALCIFEROL) 1000 UNIT TABS tablet Take 1,000 Units by mouth daily      allopurinol (ZYLOPRIM) 100 MG tablet Take 100 mg by mouth 2 times daily       calcium carbonate 600 MG TABS tablet Take 1 tablet by mouth 2 times daily Indications: SUPPLEMENT      spironolactone (ALDACTONE) 25 MG tablet Take 25 mg by mouth nightly Indications: FLUID RETENTION       levothyroxine (SYNTHROID) 50 MCG tablet Take 50 mcg by mouth Daily Indications: HYPOTHROIDISM       polyethylene glycol (MIRALAX) powder Take 17 g by mouth daily Indications: REGULARITY       losartan (COZAAR) 100 MG tablet Take 50 mg by mouth Daily Indications: HYPERTENSION       ferrous sulfate 325 (65 FE) MG tablet Take 325 mg by mouth every other day Indications: ANEMIA  (Patient not taking: Reported on 8/18/2021)      bumetanide (BUMEX) 1 MG tablet Take 0.5 mg by mouth daily       simvastatin (ZOCOR) 40 MG tablet Take 40 mg by mouth nightly Indications: CHOLESTEROL        No current facility-administered medications for this visit. Allergies: Bactrim [sulfamethoxazole-trimethoprim] and Codeine    Review of Systems  Constitutional  no appetite change, or unexpected weight change. No fever, chills or diaphoresis. No significant change in activity level or new onset of fatigue. HEENT  no significant rhinorrhea or epistaxis. No tinnitus or significant hearing loss. Eyes  no sudden vision change or amaurosis. No corneal arcus, xantholasma, subconjunctival hemorrhage or discharge. Respiratory  no significant wheezing, stridor, apnea or cough. + mild CARVALHO. Cardiovascular  no exertional chest pain to suggest myocardial ischemia. No orthopnea or PND. No sensation of sustained arrythmia. No occurrence of slow heart rate. No palpitations. No claudication. Gastrointestinal  no abdominal swelling or pain. No blood in stool. No severe constipation, diarrhea, nausea, or vomiting. Genitourinary  no dysuria, frequency, or urgency. No flank pain or hematuria. Musculoskeletal  no back pain or myalgia. No problems with gait. Extremities - no clubbing or cyanosis. + mild bilateral ankle edema. Skin  no color change or rash. No pallor. No new surgical incision. Neurologic  no speech difficulty, facial asymmetry or lateralizing weakness. No seizures, presyncope or syncope. No significant dizziness. Hematologic  no easy bruising or excessive bleeding. Psychiatric  no severe anxiety or insomnia. No confusion.    All other review of systems are negative. Objective  Vital Signs - /70   Pulse 60   Ht 5' 5\" (1.651 m)   Wt 223 lb (101.2 kg)   SpO2 96%   BMI 37.11 kg/m²   General - Aniya is alert, cooperative, and pleasant. Well groomed. No acute distress. Body habitus - Body mass index is 37.11 kg/m². HEENT  Head is normocephalic. No circumoral cyanosis. Dentition is normal.  EYES -   Lids normal without ptosis. No discharge, edema or subconjunctival hemorrhage. Neck - Symmetrical without apparent mass or lymphadenopathy. Respiratory - Normal respiratory effort without use of accessory muscles. Ausculatation reveals vesicular breath sounds without crackles, wheezes, rub or rhonchi. Cardiovascular  No jugular venous distention. Auscultation reveals regular rate and rhythm. No audible clicks, gallop or rub. No murmur. No lower extremity varicosities. No carotid bruits. Abdominal -  No visible distention, mass or pulsations. Extremities - No clubbing or cyanosis. No statis dermatitis or ulcers. Mild non pitting bilateral ankle edema. Musculoskeletal -   No Osler's nodes. No kyphosis or scoliosis. Gait is even and regular without limp or shuffle. Ambulates without assistance. Skin -  Warm and dry; no rash or pallor. No new surgical wound. Neurological - No focal neurological deficits. Thought processes coherent. No apparent tremor. Oriented to person, place and time. Psychiatric -  Appropriate affect and mood. Data reviewed:  9/20/21 Lexiscan  Impression:   There is no obvious infarct or ischemia, with a calculated ejection   fraction of 72 %. Suggest: Clinical correlation with consideration for medical   management. Signed by Dr Brit Vivas     9/20/21 echo   LV is normal in size with preserved LV systolic function. LV ejection   fraction estimated at 50 to 55%. Probable grade 1 diastolic dysfunction. RV is normal in size with normal systolic function.    Mild left atrial enlargement. Normal right atrial size. Aortic valve is trileaflet with moderate leaflet thickening and mildly   reduced leaflet mobility. No significant stenosis with aortic sclerosis. No regurgitation noted. Mitral valve is structurally normal with normal leaflet mobility. Trace to   mild mitral regurgitation. No stenosis. Mild tricuspid regurgitation. Trace pulmonic regurgitation. No significant pericardial effusion. Electronically signed by Venice Schafer MD(Interpreting physician)   on 09/20/2021 08:22 PM      Lab Results   Component Value Date    WBC 10.5 08/15/2018    HGB 12.9 08/15/2018    HCT 40.2 08/15/2018    MCV 95.9 08/15/2018     08/15/2018     Lab Results   Component Value Date     01/30/2020    K 4.2 01/30/2020     01/30/2020    CO2 26 01/30/2020    BUN 16 01/30/2020    CREATININE 1.9 (H) 01/30/2020    GLUCOSE 94 01/30/2020    CALCIUM 10.0 01/30/2020    PROT 7.1 08/15/2018    LABALBU 4.3 08/15/2018    BILITOT 0.4 08/15/2018    ALKPHOS 78 08/15/2018    AST 20 08/15/2018    ALT 16 08/15/2018    LABGLOM 25 (A) 01/30/2020       BP Readings from Last 3 Encounters:   06/22/22 136/70   12/20/21 132/80   08/18/21 114/72    Pulse Readings from Last 3 Encounters:   06/22/22 60   12/20/21 59   08/18/21 60        Wt Readings from Last 3 Encounters:   06/22/22 223 lb (101.2 kg)   12/20/21 222 lb (100.7 kg)   08/18/21 223 lb (101.2 kg)     Recent Results (from the past 1008 hour(s))   POCT INR    Collection Time: 05/23/22 10:43 AM   Result Value Ref Range    INR,(POC) 2.6     Prothrombin time,(POC)     POCT INR    Collection Time: 06/22/22 11:30 AM   Result Value Ref Range    INR,(POC) 1.8     Prothrombin time,(POC) 22.0      Assessment/Plan:   Diagnosis Orders   1. PAF (paroxysmal atrial fibrillation) (HCC)  POCT INR   2. Hypertension, unspecified type     3. Mixed hyperlipidemia     4. Chronic anticoagulation     5.  Grade I diastolic dysfunction       [id number] Score: 4  Disclaimer: Risk Score calculation is dependent on accuracy of patient problem list and past encounter diagnosis. PAF - continues on Multaq without of sustained arrhythmia. Continue same. Chronic anticoag - INR 1.8. See anti-coag encounter. HTN - normotensive on current regimen. /70. Continue same. Hyperlipidemia - monitored and managed by PCP. Continues on Zocor 40 mg daily. Patient is compliant with medication regimen. Previous cardiac history and records reviewed. Continue current medical management for cardiac related condition. Continue other current medications as directed. Continue to follow up with primary care provider for non cardiac medical problems. If your primary care provider is outside of the [facility], please request that your labs be faxed to this office at [phone]. BP goal 130/80 or less. Call the office with any problems, questions or concerns at [phone]. Cardiology follow up as scheduled in [address] appointments. Educational included in patient instructions. Heart health.       KAYLA Walden

## 2022-06-25 DIAGNOSIS — Z17.0 MALIGNANT NEOPLASM OF LEFT BREAST IN FEMALE, ESTROGEN RECEPTOR POSITIVE, UNSPECIFIED SITE OF BREAST: Primary | ICD-10-CM

## 2022-06-25 DIAGNOSIS — C50.912 MALIGNANT NEOPLASM OF LEFT BREAST IN FEMALE, ESTROGEN RECEPTOR POSITIVE, UNSPECIFIED SITE OF BREAST: Primary | ICD-10-CM

## 2022-06-27 DIAGNOSIS — I87.303 STASIS EDEMA OF BOTH LOWER EXTREMITIES: Primary | ICD-10-CM

## 2022-06-27 RX ORDER — ANASTROZOLE 1 MG/1
TABLET ORAL
Qty: 30 TABLET | Refills: 0 | Status: SHIPPED | OUTPATIENT
Start: 2022-06-27 | End: 2022-08-01

## 2022-06-28 RX ORDER — BUMETANIDE 0.5 MG/1
0.5 TABLET ORAL DAILY
Qty: 90 TABLET | Refills: 3 | Status: SHIPPED | OUTPATIENT
Start: 2022-06-28

## 2022-07-21 ENCOUNTER — ANTI-COAG VISIT (OUTPATIENT)
Dept: CARDIOLOGY CLINIC | Age: 82
End: 2022-07-21
Payer: MEDICARE

## 2022-07-21 DIAGNOSIS — I48.0 PAF (PAROXYSMAL ATRIAL FIBRILLATION) (HCC): Primary | ICD-10-CM

## 2022-07-21 LAB
INTERNATIONAL NORMALIZATION RATIO, POC: 2.6
PROTHROMBIN TIME, POC: NORMAL

## 2022-07-21 PROCEDURE — 85610 PROTHROMBIN TIME: CPT | Performed by: CLINICAL NURSE SPECIALIST

## 2022-08-01 DIAGNOSIS — C50.912 MALIGNANT NEOPLASM OF LEFT BREAST IN FEMALE, ESTROGEN RECEPTOR POSITIVE, UNSPECIFIED SITE OF BREAST: ICD-10-CM

## 2022-08-01 DIAGNOSIS — Z17.0 MALIGNANT NEOPLASM OF LEFT BREAST IN FEMALE, ESTROGEN RECEPTOR POSITIVE, UNSPECIFIED SITE OF BREAST: ICD-10-CM

## 2022-08-01 RX ORDER — ANASTROZOLE 1 MG/1
TABLET ORAL
Qty: 91 TABLET | Refills: 0 | Status: SHIPPED | OUTPATIENT
Start: 2022-08-01 | End: 2022-10-14 | Stop reason: SDUPTHER

## 2022-08-24 ENCOUNTER — ANTI-COAG VISIT (OUTPATIENT)
Dept: CARDIOLOGY CLINIC | Age: 82
End: 2022-08-24
Payer: MEDICARE

## 2022-08-24 DIAGNOSIS — I48.0 PAF (PAROXYSMAL ATRIAL FIBRILLATION) (HCC): Primary | ICD-10-CM

## 2022-08-24 LAB
INTERNATIONAL NORMALIZATION RATIO, POC: 2.6
PROTHROMBIN TIME, POC: 27.7

## 2022-08-24 PROCEDURE — 85610 PROTHROMBIN TIME: CPT | Performed by: NURSE PRACTITIONER

## 2022-09-02 DIAGNOSIS — I48.0 PAF (PAROXYSMAL ATRIAL FIBRILLATION) (HCC): ICD-10-CM

## 2022-09-02 RX ORDER — METOPROLOL SUCCINATE 25 MG/1
TABLET, EXTENDED RELEASE ORAL
Qty: 180 TABLET | Refills: 0 | Status: SHIPPED | OUTPATIENT
Start: 2022-09-02

## 2022-09-15 ENCOUNTER — OFFICE VISIT (OUTPATIENT)
Dept: INTERNAL MEDICINE | Facility: CLINIC | Age: 82
End: 2022-09-15

## 2022-09-15 VITALS
TEMPERATURE: 96.9 F | DIASTOLIC BLOOD PRESSURE: 80 MMHG | WEIGHT: 229 LBS | OXYGEN SATURATION: 98 % | HEART RATE: 76 BPM | SYSTOLIC BLOOD PRESSURE: 132 MMHG | BODY MASS INDEX: 38.15 KG/M2 | HEIGHT: 65 IN

## 2022-09-15 DIAGNOSIS — Z79.01 CHRONIC ANTICOAGULATION: ICD-10-CM

## 2022-09-15 DIAGNOSIS — I48.0 PAROXYSMAL A-FIB: ICD-10-CM

## 2022-09-15 DIAGNOSIS — E66.9 OBESITY (BMI 30-39.9): ICD-10-CM

## 2022-09-15 DIAGNOSIS — I10 HTN (HYPERTENSION), BENIGN: ICD-10-CM

## 2022-09-15 DIAGNOSIS — E78.2 MIXED HYPERLIPIDEMIA: Primary | ICD-10-CM

## 2022-09-15 DIAGNOSIS — E03.9 HYPOTHYROIDISM, UNSPECIFIED TYPE: ICD-10-CM

## 2022-09-15 PROCEDURE — 99214 OFFICE O/P EST MOD 30 MIN: CPT | Performed by: FAMILY MEDICINE

## 2022-09-15 NOTE — PROGRESS NOTES
"        Subjective     Chief Complaint   Patient presents with   • Hypertension     6 month follow up    • Atrial Fibrillation     Acting up    • Leg Swelling     Ongoing issue    • loss of feeling in left foot     Still has some feeling but not as much as right        History of Present Illness    The patient presents to the clinic for follow-up. She is accompanied by an adult male.    Her blood pressure has been stable. She does not monitor her blood pressure regularly at home.     The patient has been experiencing intermittent atrial fibrillation. She has not been sleeping well at night due to the atrial fibrillation. She is still on anticoagulation therapy.     She continues to have lower extremity edema in her ankles and feet, more on the left. Swelling improves overnight. Denies any genitourinary issues. The patient also noted decreased sensation on her left foot, which started after her knee replacement surgery. It started as a \"funny feeling\" in her heel and was told that it could be a nerve that got \" clicked or nicked. \"  She wears support hose in the winter and does not walk barefoot.     Her arthritis has been stable.     The patient had lab work done on 09/01/2022 with Dr. Daniels. She has an eye exam scheduled for 10/2022 and a DEXA scan in 12/2022. The patient receive her COVID-19 vaccines and boosters.     Patient's PMR from outside medical facility reviewed and noted.    Review of Systems     Otherwise complete ROS reviewed and negative except as mentioned in the HPI.    Past Medical History:   Past Medical History:   Diagnosis Date   • A-fib (HCC)    • Allergic codiene, percocet, bactrim   • Arthritis    • Arthritis     generalized   • Asthma some shortness of breath   • Atrial fibrillation (HCC)     INTERMITTENT   • Basal cell carcinoma     between eyes    • Breast cancer (HCC)    • Cancer (HCC)    • Cataract 2007,2011   • Cholelithiasis 8-03-06 surgery   • Colon polyp removed   • Disease of " thyroid gland    • Diverticulosis    • Heart murmur a fib   • Hx of colonic polyp    • Hyperlipidemia    • Hypertension    • Kidney disease     STAGE 4   • Kidney disease    • Renal insufficiency some years ago   • Urinary tract infection occasionally     Past Surgical History:  Past Surgical History:   Procedure Laterality Date   • APPENDECTOMY     • BREAST BIOPSY Left 2015   • BREAST BIOPSY Right 12/31/2020    Procedure: RIGHT NEEDLE DIRECTED EXCISIONAL BREAST BIOPSY;  Surgeon: Nikki Zuniga MD;  Location: [facility] OR;  Service: General;  Laterality: Right;   • BREAST EXCISIONAL BIOPSY Left 2001   • BREAST EXCISIONAL BIOPSY Left 2001   • CARPAL TUNNEL RELEASE Right    • CATARACT EXTRACTION, BILATERAL     • CHOLECYSTECTOMY     • COLON SURGERY      Partial right   • COLONOSCOPY     • COLONOSCOPY N/A 10/17/2019    Procedure: COLONOSCOPY WITH ANESTHESIA;  Surgeon: Rigoberto Shaw MD;  Location: [facility] ENDOSCOPY;  Service: Gastroenterology   • COLONOSCOPY W/ POLYPECTOMY  06/30/2016    2 Adenomatous polyps at 80 & 40 cm, Diverticulosis repeat exam in 3 years   • EYE SURGERY Bilateral     cataract extraction   • EYE SURGERY Left     detached retina   • HERNIA REPAIR     • JOINT REPLACEMENT  both knees 2-14-12 &10-30-12   • MASTECTOMY Left 2019   • MASTECTOMY W/ SENTINEL NODE BIOPSY Left 01/02/2020    Procedure: LEFT SIMPLE MASTECTOMY WITH SENTINEL NODE BIOPSY, INJECTION AND SCAN, RADIOLOGIST WILL INJECT;  Surgeon: Nikki Zuniga MD;  Location: [facility] OR;  Service: General   • REPLACEMENT TOTAL KNEE Bilateral    • THROAT SURGERY      duct   • TONSILLECTOMY  8 or 9 yrs old   • UMBILICAL HERNIA REPAIR  years ago     Social History:  reports that she has never smoked. She has never used smokeless tobacco. She reports that she does not drink alcohol and does not use drugs.    Family History: family history includes Cancer in her mother; Colon cancer (age of onset: 70) in her mother; Colon polyps in her mother; Hearing  "loss in her maternal grandmother; Hypertension in her mother; No Known Problems in her father; Vision loss in her maternal grandmother.      Allergies:  Allergies   Allergen Reactions   • Bactrim [Sulfamethoxazole-Trimethoprim] Provider Review Needed     Patient has kidney disease (stage 4) shouldn't take Bactrim   • Codeine Nausea Only   • Percocet [Oxycodone-Acetaminophen] Nausea Only     Becomes, hot, nauseated, and made her head feel \"weird\".     Medications:  Prior to Admission medications    Medication Sig Start Date End Date Taking? Authorizing Provider   acetaminophen (TYLENOL) 500 MG tablet Take 500 mg by mouth Every 6 (Six) Hours As Needed for Mild Pain .   Yes Ceci Alcala MD   allopurinol (ZYLOPRIM) 100 MG tablet Take 100 mg by mouth 2 (Two) Times a Day. 5/29/19  Yes Ceci Alcala MD   anastrozole (ARIMIDEX) 1 MG tablet Take 1 tablet by mouth once daily 8/1/22  Yes Ender Franco MD   bumetanide (BUMEX) 0.5 MG tablet Take 1 tablet by mouth Daily. 6/28/22  Yes Charley Wesley DO   carboxymethylcellulose (REFRESH PLUS) 0.5 % solution Administer 1 drop to both eyes 3 (Three) Times a Day As Needed for Dry Eyes.   Yes Ceci Alcala MD   dronedarone (MULTAQ) 400 MG tablet Take 400 mg by mouth 2 (Two) Times a Day With Meals. 2/26/19  Yes Ceci Alcala MD   levothyroxine (SYNTHROID, LEVOTHROID) 50 MCG tablet Take 1 tablet by mouth once daily 4/7/22  Yes Lara Plaza APRN   losartan (COZAAR) 50 MG tablet Take 1 tablet by mouth Daily. 6/14/22  Yes Charley Wesley DO   metoprolol succinate XL (TOPROL-XL) 25 MG 24 hr tablet Take 25 mg by mouth 2 (Two) Times a Day. 5/28/19  Yes Ceci Alcala MD   polyethylene glycol (MIRALAX) 17 GM/SCOOP powder Take 17 g by mouth Daily.   Yes Ceci Alcala MD   simvastatin (ZOCOR) 40 MG tablet Take 1 tablet by mouth Daily. 5/16/22  Yes Charley Wesley DO   spironolactone (ALDACTONE) 25 MG tablet Take 1 tablet by " "mouth once daily 3/24/22  Yes Mela Lara, APRJESSICA   warfarin (COUMADIN) 5 MG tablet Take  by mouth Take As Directed. Take 1 whole tablet (5 mg) on Wednesday only. Take 1/2 tablet (2.5 mg) all other days. 6/17/19  Yes Provider, MD Ceci       Objective     Vital Signs: /80 (BP Location: Left arm, Patient Position: Sitting, Cuff Size: Adult)   Pulse 76   Temp 96.9 °F (36.1 °C) (Temporal)   Ht 165.1 cm (65\")   Wt 104 kg (229 lb)   SpO2 98%   Breastfeeding No   BMI 38.11 kg/m²   Physical Exam  Vitals and nursing note reviewed.   Constitutional:       Appearance: Normal appearance. She is well-developed.   HENT:      Head: Normocephalic and atraumatic.      Right Ear: External ear normal.      Left Ear: External ear normal.      Nose: Nose normal.      Mouth/Throat:      Mouth: Mucous membranes are moist.   Eyes:      Extraocular Movements: Extraocular movements intact.      Conjunctiva/sclera: Conjunctivae normal.      Pupils: Pupils are equal, round, and reactive to light.   Neck:      Thyroid: No thyromegaly.      Vascular: No JVD.      Trachea: No tracheal deviation.   Cardiovascular:      Rate and Rhythm: Normal rate and regular rhythm.      Pulses: Normal pulses.      Heart sounds: Normal heart sounds. No murmur heard.    No friction rub. No gallop.   Pulmonary:      Effort: Pulmonary effort is normal.      Breath sounds: Normal breath sounds.   Abdominal:      General: Bowel sounds are normal. There is no distension.      Palpations: Abdomen is soft.      Tenderness: There is no abdominal tenderness.   Musculoskeletal:         General: Normal range of motion.      Cervical back: Normal range of motion and neck supple.   Lymphadenopathy:      Cervical: No cervical adenopathy.   Skin:     General: Skin is warm and dry.      Capillary Refill: Capillary refill takes less than 2 seconds.   Neurological:      Mental Status: She is alert and oriented to person, place, and time.      Cranial Nerves: No " cranial nerve deficit.      Coordination: Coordination normal.   Psychiatric:         Mood and Affect: Mood normal.         Behavior: Behavior normal.         Class 2 Severe Obesity (BMI >=35 and <=39.9). Obesity-related health conditions include the following: hypertension. Obesity is worsening. BMI is is above average; BMI management plan is completed. We discussed portion control and increasing exercise.      Results Reviewed:  Glucose   Date Value Ref Range Status   04/25/2022 107 (H) 65 - 99 mg/dL Final   01/30/2020 94 74 - 109 mg/dL Final     BUN   Date Value Ref Range Status   04/25/2022 29 (H) 8 - 23 mg/dL Final   01/30/2020 16 8 - 23 mg/dL Final     Creatinine   Date Value Ref Range Status   04/25/2022 1.79 (H) 0.57 - 1.00 mg/dL Final   01/30/2020 1.9 (H) 0.5 - 0.9 mg/dL Final     Sodium   Date Value Ref Range Status   04/25/2022 141 136 - 145 mmol/L Final   01/30/2020 141 136 - 145 mmol/L Final     Potassium   Date Value Ref Range Status   04/25/2022 4.3 3.5 - 5.2 mmol/L Final   01/30/2020 4.2 3.5 - 5.0 mmol/L Final     Chloride   Date Value Ref Range Status   04/25/2022 106 98 - 107 mmol/L Final   01/30/2020 100 98 - 111 mmol/L Final     CO2   Date Value Ref Range Status   04/25/2022 24.0 22.0 - 29.0 mmol/L Final   01/30/2020 26 22 - 29 mmol/L Final     Calcium   Date Value Ref Range Status   04/25/2022 9.4 8.6 - 10.5 mg/dL Final   01/30/2020 10.0 8.8 - 10.2 mg/dL Final     ALT (SGPT)   Date Value Ref Range Status   04/25/2022 10 1 - 33 U/L Final   08/15/2018 16 5 - 33 U/L Final     AST (SGOT)   Date Value Ref Range Status   04/25/2022 18 1 - 32 U/L Final   08/15/2018 20 5 - 32 U/L Final     WBC   Date Value Ref Range Status   05/02/2022 5.16 3.40 - 10.80 10*3/mm3 Final   01/21/2022 5.64 3.40 - 10.80 10*3/mm3 Final   08/15/2018 10.5 4.8 - 10.8 K/uL Final     Hematocrit   Date Value Ref Range Status   05/02/2022 39.0 34.0 - 46.6 % Final   08/15/2018 40.2 37.0 - 47.0 % Final     Platelets   Date Value Ref  Range Status   05/02/2022 154 140 - 450 10*3/mm3 Final   08/15/2018 174 130 - 400 K/uL Final     Triglycerides   Date Value Ref Range Status   09/15/2022 178 (H) 0 - 149 mg/dL Final     HDL Cholesterol   Date Value Ref Range Status   09/15/2022 55 >39 mg/dL Final     LDL Chol Calc (NIH)   Date Value Ref Range Status   09/15/2022 78 0 - 99 mg/dL Final         Assessment / Plan     Assessment/Plan:  1. Mixed hyperlipidemia    - Lipid panel  Low-cholesterol diet  Continue simvastatin    2. Hypothyroidism, unspecified type  Continue levothyroxine  - TSH Rfx On Abnormal To Free T4    3. HTN (hypertension), benign  Monitor blood pressure.  Goal is less than 130/80.  Continue current medications.    4. Chronic anticoagulation  Continue current anticoagulation.  Monitor PT/INR routinely.    5. Obesity (BMI 30-39.9)  Portion control and walking exercise are recommended    6. Paroxysmal A-fib (HCC)  Monitor for worsening symptoms.           Plan:  The patient is going to continue current medications. She does have paresthesia in bilateral feet. We do recommend that she wear shoes at all times when she is up on her feet. We are going to do a TSH and a lipid today. Patient has had a recent chemistry urine and blood count at the nephrologist, so we have a copy of that in the chart from that office. We will have her follow back in 6 months unless she has a problem. We have recommended the flu vaccine to her and also discussed the COVID vaccine potential for an omicron variant update.    Return in about 6 months (around 3/15/2023). unless patient needs to be seen sooner or acute issues arise.      I have discussed the patient results/orders and and plan/recommendation with them at today's visit.      Charley Wesley DO   09/15/2022    Transcribed from ambient dictation for Charley Wesley DO by Dana Mora.  09/15/22   12:26 CDT    Patient verbalized consent to the visit recording.

## 2022-09-16 DIAGNOSIS — I48.91 ATRIAL FIBRILLATION, UNSPECIFIED TYPE (HCC): ICD-10-CM

## 2022-09-16 LAB
CHOLEST SERPL-MCNC: 163 MG/DL (ref 100–199)
HDLC SERPL-MCNC: 55 MG/DL
LDLC SERPL CALC-MCNC: 78 MG/DL (ref 0–99)
TRIGL SERPL-MCNC: 178 MG/DL (ref 0–149)
TSH SERPL DL<=0.005 MIU/L-ACNC: 2.82 UIU/ML (ref 0.45–4.5)
VLDLC SERPL CALC-MCNC: 30 MG/DL (ref 5–40)

## 2022-09-16 RX ORDER — DRONEDARONE 400 MG/1
TABLET, FILM COATED ORAL
Qty: 180 TABLET | Refills: 0 | Status: SHIPPED | OUTPATIENT
Start: 2022-09-16

## 2022-09-29 ENCOUNTER — ANTI-COAG VISIT (OUTPATIENT)
Dept: CARDIOLOGY CLINIC | Age: 82
End: 2022-09-29
Payer: MEDICARE

## 2022-09-29 DIAGNOSIS — I48.91 ATRIAL FIBRILLATION, UNSPECIFIED TYPE (HCC): Primary | ICD-10-CM

## 2022-09-29 LAB
INTERNATIONAL NORMALIZATION RATIO, POC: 2.8
PROTHROMBIN TIME, POC: 30

## 2022-09-29 PROCEDURE — 85610 PROTHROMBIN TIME: CPT | Performed by: NURSE PRACTITIONER

## 2022-10-11 RX ORDER — WARFARIN SODIUM 5 MG/1
TABLET ORAL
Qty: 30 TABLET | Refills: 5 | Status: SHIPPED | OUTPATIENT
Start: 2022-10-11

## 2022-10-14 DIAGNOSIS — C50.912 MALIGNANT NEOPLASM OF LEFT BREAST IN FEMALE, ESTROGEN RECEPTOR POSITIVE, UNSPECIFIED SITE OF BREAST: ICD-10-CM

## 2022-10-14 DIAGNOSIS — Z17.0 MALIGNANT NEOPLASM OF LEFT BREAST IN FEMALE, ESTROGEN RECEPTOR POSITIVE, UNSPECIFIED SITE OF BREAST: ICD-10-CM

## 2022-10-18 RX ORDER — ANASTROZOLE 1 MG/1
1 TABLET ORAL DAILY
Qty: 91 TABLET | Refills: 0 | Status: SHIPPED | OUTPATIENT
Start: 2022-10-18 | End: 2023-01-23 | Stop reason: SDUPTHER

## 2022-10-26 ENCOUNTER — ANTI-COAG VISIT (OUTPATIENT)
Dept: CARDIOLOGY CLINIC | Age: 82
End: 2022-10-26
Payer: MEDICARE

## 2022-10-26 ENCOUNTER — APPOINTMENT (OUTPATIENT)
Dept: LAB | Facility: HOSPITAL | Age: 82
End: 2022-10-26

## 2022-10-26 DIAGNOSIS — I48.91 ATRIAL FIBRILLATION, UNSPECIFIED TYPE (HCC): Primary | ICD-10-CM

## 2022-10-26 LAB
INTERNATIONAL NORMALIZATION RATIO, POC: 2.6
PROTHROMBIN TIME, POC: 27.9

## 2022-10-26 PROCEDURE — 85610 PROTHROMBIN TIME: CPT | Performed by: NURSE PRACTITIONER

## 2022-12-05 DIAGNOSIS — I48.0 PAF (PAROXYSMAL ATRIAL FIBRILLATION) (HCC): ICD-10-CM

## 2022-12-05 RX ORDER — METOPROLOL SUCCINATE 25 MG/1
TABLET, EXTENDED RELEASE ORAL
Qty: 180 TABLET | Refills: 2 | Status: SHIPPED | OUTPATIENT
Start: 2022-12-05

## 2022-12-05 NOTE — PROGRESS NOTES
[facility] ONC [facility] GROUP HEMATOLOGY AND ONCOLOGY  [address]  [facility] [address] [phone]    Patient Name: Heidi Ansari  Encounter Date: 12/12/2022  YOB: 1940  Patient Number: [id number]       REASON FOR VISIT: Heidi Ansari is an 82-year-old female who returns in follow-up of stage IIA invasive ductal/lobular breast carcinoma.  She has been on adjuvant Arimidex since 01/22/2020 (39.5 months).  She is here alone (previously with her spouse Montana and daughter Fátima).    I have reviewed the HPI and verified with the patient the accuracy of it. No changes to interval history since the information was documented. Ender Franco MD 12/12/22     DIAGNOSTIC ABNORMALITIES:           1.   11/27/2019- palpable left breast lump x3 weeks.  Diagnostic mammogram and left breast ultrasound.  Impression: Spiculated mass approximately 2 cm upper outer quadrant of the left breast.  Oval well-circumscribed mass appears new in the lower slightly lateral left breast measuring approximately 10 mm.  Targeted left breast ultrasound at the 2 o'clock position 5 cm from nipple an irregular spiculated hypoechoic mass is seen measuring 1.7 x 1.5 x 1.6 cm.  Smaller adjacent hypoechoic irregular mass seen measuring 0.3 cm.  Third irregular hypoechoic mass at the 3 o'clock position measures 2.2 x 0.9 x 1.2 cm in size.  Ultrasound-guided biopsy recommended.  BI-RADS Category 5-highly suggestive of malignancy.  No mammographic evidence of right breast malignancy.          2.   12/06/2019- ultrasound-guided left breast biopsy.  Final diagnosis: 1.left breast mass 2 o'clock position: Core biopsy: Invasive ductal and lobular carcinoma.  Estimated grade 1, involving approximately 90% of the core biopsy specimens.  Focal ductal carcinoma in situ, nuclear grade 1.  Microcalcifications identified in invasive ductal carcinoma.  2.left breast mass 3 o'clock position,  core biopsy: Invasive lobular carcinoma, estimated grade 1, involving approximately 90% of the core biopsy specimens.  Microcalcifications not identified.          3.   12/26/2019- hemoglobin 12.3, hematocrit 37.2, MCV 93.9, platelets 90,000, WBC 6.81 with a normal differential.  CMP notable for a creatinine of 1.65 (GFR 30) otherwise normal.  Immunohistochemistries revealed: Estrogen +99 to 100%; progesterone +81 to 90%, HER-2 1-2+ (equivocal).  FISH: Negative (signal ratio: 1.2)          4.   01/17/2020-CT brain: Mild cerebral and cerebellar volume loss with chronic microvascular disease.  Focus of ill-defined hypodensity at the gray-white juncture in the right frontal lobe.  Site of previous infarction versus metastasis.  Suggest follow-up MRI with and without gadolinium.  No additional lesions present.          5.   01/17/2020- CT chest.  Groundglass nodule within the right upper lobe and left lower lobe.  Likely inflammatory in nature.  Coronary calcifications in the LAD and circumflex distribution.  Moderate cardiomegaly.  Postoperative changes left axilla from recent axillary dissection as well as mastectomy.  No enlarged mediastinal or axillary lymph nodes present.  Irregular nodule along the anterior margin of the medial left pectoralis major musculature within the mastectomy bed.  No adjacent inflammatory/postoperative stranding.  May represent postoperative seroma.  Recommend directed ultrasound over this area.          6.   01/17/2020-CT abdomen/pelvis.  Impression: Multiple hypodensities within the liver.  Favor hepatic cysts.  Recommend ultrasound for further evaluations.  Cannot be fully characterized without IV contrast.  Small cortical cyst of the right kidney suspected.  Postoperative changes of the right colon.  Multiple diverticula throughout the colon without evidence of diverticulitis or mechanical bowel obstruction.  Diastases of the abdominal musculature at the umbilicus.  Previous umbilical  hernia repair was performed with a mesh.  Residual recurrent tyra-umbilical hernia containing fat.  No evidence of herniated bowel loop.  7 mm groundglass nodule left lower lobe.          7.   01/22/2020-CMP notable for creatinine 1.6 and GFR 31 otherwise normal.  CEA 1.41, CA-27-29 22.8, ferritin 240.9, iron 88, iron saturation 23%, TIBC 390, RASHEL positive, homogenous/speckled pattern 1:80, otherwise negative reflex panel.  SPIEP negative (M spike not observed, EARLE: No monoclonality detected).  Hemoglobin 11.7, hematocrit 34.9, MCV 91.6, platelets 130,000, WBC 5.08 with a normal differential.  No schistocytes reported.          8.   02/05/2020- liver ultrasound.  Impression: Multiple hepatic cysts.  Additional tiny hepatic lesions are too small to characterize.          9.   02/05/2020- renal ultrasound.  Impression: Right renal cyst.  Otherwise negative exam.         10.  02/05/2020- chest ultrasound.  Impression: No abnormality identified in the left chest.  Area seen on recent CT exam may represent a postoperative seroma.         11.   02/20/2020- hemoglobin 12.2, hematocrit 36.4, MCV 91.9, platelets 147,000, WBC 6.11 with a normal differential.  CEA 1.37, CA-27-29 21.9.         12.   03/01/2020- Oncotype DX: Recurrence score:  0; distant recurrence risk at 9 years with AI or BARNHART alone: 3%; group average absolute chemotherapy benefit:< 1%         13.   06/02/2020- DEXA scan.  Impression: Osteopenia.  Low bone mass.  Bone density is between 1.0 and 2.5 standard deviations below the mean for young adult woman.  Increased risk for fracture in these patients.    PREVIOUS INTERVENTIONS:          1.   01/02/2020- left breast simple mastectomy with sentinel lymph node biopsy.  Final diagnosis: 1.left breast, left skin sparing mastectomy: Invasive ductal and lobular carcinoma, grade 1 (2.3 cm).  Associated low-grade ductal carcinoma in situ, solid type.  Margins of excision free of tumor.  Tumor approaches closest deep  margin and 1.6 cm.  2 axillary lymph nodes, negative for metastatic carcinoma.  2.left sentinel lymph node excision: One sentinel lymph node, negative for metastatic carcinoma (0/1).  AJCC pathologic stage: pT2, N0 (SN).    Synoptic checklist: Procedure: Total mastectomy.  Specimen laterality: Left.  Histologic type: Invasive carcinoma with ductal and lobular features (mixed type carcinoma).  Overall ndgndrndanddndend:nd nd2nd. Tumor size: Greatest dimension of largest invasive focus 23 mm.  Tumor focality: Single focus of invasive carcinoma.  DCIS: Present.  Negative for extensive intraductal component.  Grade 1.  Lobular carcinoma in situ: None in specimen.  Tumor extent: Lymphovascular invasion not identified.  Dermal lymphovascular invasion not identified.  Microcalcifications not identified.  Margins: Uninvolved by invasive carcinoma.  Lymph nodes: Number of lymph nodes examined: 3.  TNM classification: pT2, pN0 (SN).            2.   01/22/2020- adjuvant Arimidex 1 mg p.o. daily    LABS:  Lab Results - Last 18 Months   Lab Units 12/07/22  1307 05/02/22  0924 04/25/22  1116 01/21/22  0953 12/06/21  1113 06/17/21  1359   HEMOGLOBIN g/dL 12.1 12.6 12.6 13.0 11.9* 12.4   HEMATOCRIT % 38.9 39.0 38.2 40.1 37.9 37.7   MCV fL 99.5* 97.5* 96.5 97.8* 97.9* 95.0   WBC 10*3/mm3 6.20 5.16 5.50 5.64 5.42 6.96   RDW % 14.3 14.2 14.1 13.2 13.9 13.8   MPV fL 12.1* 12.3* 11.5  --  12.7* 13.0*   PLATELETS 10*3/mm3 127* 154 119* 87* 139* 157   IMM GRAN % % 0.5 0.4  --   --   --   --    NEUTROS ABS 10*3/mm3 3.72 3.37 3.45 3.41 3.01 4.04   LYMPHS ABS 10*3/mm3 1.93 1.28 1.51 1.64 1.49  --    MONOS ABS 10*3/mm3 0.38 0.35 0.43 0.42 0.42  --    EOS ABS 10*3/mm3 0.12 0.11 0.07 0.11 0.09 0.21   BASOS ABS 10*3/mm3 0.02 0.03 0.03 0.05 0.03 0.14   IMMATURE GRANS (ABS) 10*3/mm3 0.03 0.02  --   --   --   --    NRBC /100 WBC 0.0 0.0  --  0.0  --   --    NEUTROPHIL % %  --   --   --   --   --  56.0   MONOCYTES % %  --   --   --   --   --  5.0   BASOPHIL % %  --   " --   --   --   --  2.0*       Lab Results - Last 18 Months   Lab Units 12/07/22  1307 04/25/22  1116 01/21/22  0953 12/06/21  1113 06/17/21  1359   GLUCOSE mg/dL 149* 107* 98 92 198*   SODIUM mmol/L 141 141 140 139 139   POTASSIUM mmol/L 4.2 4.3 4.5 4.8 4.0   TOTAL CO2 mmol/L  --   --  24.9  --   --    CO2 mmol/L 22.0 24.0  --  24.0 22.0   CHLORIDE mmol/L 108* 106 103 105 103   ANION GAP mmol/L 11.0 11.0  --  10.0 14.0   CREATININE mg/dL 1.60* 1.79* 1.84* 1.58* 1.79*   BUN mg/dL 23 29* 31* 21 30*   BUN / CREAT RATIO  14.4 16.2 16.8 13.3 16.8   CALCIUM mg/dL 9.4 9.4 9.7 9.7 9.7   EGFR IF NONAFRICN AM mL/min/1.73  --   --  26* 31* 27*   ALK PHOS U/L 106 103 109 98 89   TOTAL PROTEIN g/dL 6.6 6.7  --  7.1 6.8   ALT (SGPT) U/L 11 10 8 10 14   AST (SGOT) U/L 17 18 18 20 20   BILIRUBIN mg/dL 0.6 0.6 0.9 0.8 0.6   ALBUMIN g/dL 4.10 4.30 4.50 4.30 4.20   GLOBULIN gm/dL 2.5 2.4  --  2.8 2.6       Lab Results - Last 18 Months   Lab Units 12/07/22  1307 04/25/22  1116 01/21/22  0953 12/06/21  1113 06/17/21  1359   URIC ACID mg/dL  --   --  5.9*  --   --    CEA ng/mL 1.93 1.50  --  1.30 1.66       Lab Results - Last 18 Months   Lab Units 12/07/22  1307 09/15/22  0947 04/25/22  1116 01/21/22  0953 12/06/21  1113 06/17/21  1359   IRON mcg/dL 88  --  94  --  105 88   TIBC mcg/dL 432  --  428  --  450 426   IRON SATURATION % 20  --  22  --  23 21   FERRITIN ng/mL 125.30  --  175.80*  --  179.10* 205.80*   TSH uIU/mL  --  2.820  --  3.020  --   --          PAST MEDICAL HISTORY:  ALLERGIES:  Allergies   Allergen Reactions   • Bactrim [Sulfamethoxazole-Trimethoprim] Provider Review Needed     Patient has kidney disease (stage 4) shouldn't take Bactrim   • Codeine Nausea Only   • Percocet [Oxycodone-Acetaminophen] Nausea Only     Becomes, hot, nauseated, and made her head feel \"weird\".     CURRENT MEDICATIONS:  Outpatient Encounter Medications as of 12/12/2022   Medication Sig Dispense Refill   • acetaminophen (TYLENOL) 500 MG tablet " Take 500 mg by mouth Every 6 (Six) Hours As Needed for Mild Pain .     • allopurinol (ZYLOPRIM) 100 MG tablet Take 100 mg by mouth 2 (Two) Times a Day.  3   • anastrozole (ARIMIDEX) 1 MG tablet Take 1 tablet by mouth Daily. 91 tablet 0   • bumetanide (BUMEX) 0.5 MG tablet Take 1 tablet by mouth Daily. 90 tablet 3   • carboxymethylcellulose (REFRESH PLUS) 0.5 % solution Administer 1 drop to both eyes 3 (Three) Times a Day As Needed for Dry Eyes.     • dronedarone (MULTAQ) 400 MG tablet Take 400 mg by mouth 2 (Two) Times a Day With Meals.     • levothyroxine (SYNTHROID, LEVOTHROID) 50 MCG tablet Take 1 tablet by mouth once daily 90 tablet 3   • losartan (COZAAR) 50 MG tablet Take 1 tablet by mouth Daily. 90 tablet 3   • metoprolol succinate XL (TOPROL-XL) 25 MG 24 hr tablet Take 25 mg by mouth 2 (Two) Times a Day.  1   • polyethylene glycol (MIRALAX) 17 GM/SCOOP powder Take 17 g by mouth Daily.     • simvastatin (ZOCOR) 40 MG tablet Take 1 tablet by mouth Daily. 90 tablet 3   • spironolactone (ALDACTONE) 25 MG tablet Take 1 tablet by mouth once daily 90 tablet 3   • warfarin (COUMADIN) 5 MG tablet Take  by mouth Take As Directed. Take 1 whole tablet (5 mg) on Wednesday only. Take 1/2 tablet (2.5 mg) all other days.  5     Facility-Administered Encounter Medications as of 12/12/2022   Medication Dose Route Frequency Provider Last Rate Last Admin   • lidocaine (XYLOCAINE) 1 % injection 10 mL  10 mL Subcutaneous Once Nikki Zuniga MD       • lidocaine-EPINEPHrine (XYLOCAINE W/EPI) 1 %-1:[id number] injection 10 mL  10 mL Injection Once Nikki Zuniga MD         Adult illnesses:  Hypothyroidism  Atrial fibrillation  Hyperlipidemia  Obesity  Chronic kidney disease stage 4 - Dr. Daniels    ADULT ILLNESSES:  Patient Active Problem List   Diagnosis Code   • Hx of adenomatous colonic polyps Z86.010   • HTN (hypertension), benign I10   • Anticoagulated on Coumadin Z79.01   • Family hx of colon cancer Z80.0   • Breast  cancer, left (HCC) C50.912   • Anemia due to stage 3 chronic kidney disease (HCC) N18.30, D63.1   • Acute frontal sinusitis J01.10   • Chronic anticoagulation Z79.01   • Endometrial thickening on ultrasound R93.89   • Excessive anticoagulation [id number]   • History of hypertension Z86.79   • Hypothyroidism E03.9   • Kidney disease N28.9   • Mixed hyperlipidemia E78.2   • Atrial fibrillation, controlled ([facility]ly [facility]) I48.91   • PMB (postmenopausal bleeding) N95.0   • Brain lesion G93.9   • Obesity (BMI 30-39.9) E66.9   • Non-tobacco user Z78.9   • Anemia in stage 4 chronic kidney disease (HCC) N18.4, D63.1   • Atrophic vaginitis N95.2   • Cardiomegaly I51.7   • Hematuria R31.9   • 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, 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   • PVC (premature ventricular contraction) I49.3   • Rheumatoid arthritis (Formerly [facility]) M06.9   • Seborrheic keratosis L82.1   • Thrombocytopenia ([facility]ly [facility]) D69.6   • Vitamin D deficiency E55.9   • Moderate mitral regurgitation by prior echocardiogram I34.0     SURGERIES:  Past Surgical History:   Procedure Laterality Date   • APPENDECTOMY     • BREAST BIOPSY Left 2015   • BREAST BIOPSY Right 12/31/2020    Procedure: RIGHT NEEDLE DIRECTED EXCISIONAL BREAST BIOPSY;  Surgeon: Nikki Zuniga MD;  Location: [facility] OR;  Service: General;  Laterality: Right;   • BREAST EXCISIONAL BIOPSY Left 2001   • BREAST EXCISIONAL BIOPSY Left 2001   • CARPAL TUNNEL RELEASE Right    • CATARACT EXTRACTION, BILATERAL     • CHOLECYSTECTOMY     • COLON SURGERY      Partial right   • COLONOSCOPY     • COLONOSCOPY N/A 10/17/2019    Procedure: COLONOSCOPY WITH ANESTHESIA;  Surgeon: Rigoberto Shaw MD;  Location: [facility] ENDOSCOPY;  Service: Gastroenterology   • COLONOSCOPY W/ POLYPECTOMY  06/30/2016    2 Adenomatous polyps at 80 & 40 cm, Diverticulosis repeat exam in 3 years   • EYE SURGERY Bilateral     cataract extraction   • EYE SURGERY Left     detached retina   • HERNIA REPAIR     • JOINT REPLACEMENT  both knees 2-14-12 &10-30-12   • MASTECTOMY  Left 2019   • MASTECTOMY W/ SENTINEL NODE BIOPSY Left 01/02/2020    Procedure: LEFT SIMPLE MASTECTOMY WITH SENTINEL NODE BIOPSY, INJECTION AND SCAN, RADIOLOGIST WILL INJECT;  Surgeon: Nikki Zuniga MD;  Location: [facility] OR;  Service: General   • REPLACEMENT TOTAL KNEE Bilateral    • THROAT SURGERY      duct   • TONSILLECTOMY  8 or 9 yrs old   • UMBILICAL HERNIA REPAIR  years ago     HEALTH MAINTENANCE ITEMS:  Health Maintenance Due   Topic Date Due   • COVID-19 Vaccine (5 - Booster for Moderna series) 09/28/2022       <no information>  Last Completed Colonoscopy          COLORECTAL CANCER SCREENING (COLONOSCOPY - Every 5 Years) Next due on 10/17/2024    10/17/2019  Surgical Procedure: COLONOSCOPY    10/17/2019  COLONOSCOPY    06/30/2016  SCANNED - COLONOSCOPY              Immunization History   Administered Date(s) Administered   • COVID-19 (MODERNA) 1st, 2nd, 3rd Dose Only 02/25/2021, 03/25/2021, 10/26/2021   • COVID-19 (MODERNA) BOOSTER 08/03/2022   • Fluad Quad 65+ 09/22/2020, 09/27/2021   • Fluzone High Dose =>65 Years ([employer] ONLY) 10/19/2022   • Fluzone Quad >6mos (Multi-dose) 09/27/2021   • Influenza TIV (IM) 09/22/2015, 10/14/2016   • Pneumococcal Conjugate 13-Valent (PCV13) 11/10/2016   • Pneumococcal Polysaccharide (PPSV23) 11/02/2012   • Shingrix 04/10/2018, 08/15/2018   • Tdap 12/21/2020   • Zostavax 04/23/2013     Last Completed Mammogram          Ordered - MAMMOGRAM (Yearly) Ordered on 12/12/2022 12/07/2022  Mammo Screening Modified With Tomosynthesis Right With CAD    12/06/2021  Mammo Diagnostic Digital Tomosynthesis Right With CAD    12/03/2020  Mammo Diagnostic Digital Tomosynthesis Right With CAD    11/30/2020  Mammo Screening Modified With Tomosynthesis Right With CAD    11/27/2019  Mammo Diagnostic Digital Tomosynthesis Bilateral With CAD    Only the first 5 history entries have been loaded, but more history exists.                  FAMILY HISTORY:  Family History   Problem Relation  "Age of Onset   • Cancer Mother         colon   • Hypertension Mother    • Colon cancer Mother 70   • Colon polyps Mother    • No Known Problems Father    • Hearing loss Maternal Grandmother    • Vision loss Maternal Grandmother    • Breast cancer Neg Hx    • BRCA 1/2 Neg Hx    • Endometrial cancer Neg Hx    • Ovarian cancer Neg Hx      SOCIAL HISTORY:  Social History     Socioeconomic History   • Marital status:    Tobacco Use   • Smoking status: Never   • Smokeless tobacco: Never   Vaping Use   • Vaping Use: Never used   Substance and Sexual Activity   • Alcohol use: No   • Drug use: No   • Sexual activity: Not Currently     Partners: Male   Homemaker    REVIEW OF SYSTEMS:  Review of Systems   Constitutional: Positive for fatigue (baseline/chronic unchanged). Negative for activity change and appetite change.        Manages her ADLs, including chores, errands and driving. Again says she is still up and about \"all the time.\"    Arimidex tolerance:  No worsening hot flashes, \"not in awhile,\" no new arthralgias. Is taking daily   HENT: Positive for postnasal drip (seasonal) and rhinorrhea (seasonal).    Eyes: Negative.    Respiratory: Positive for shortness of breath (Baseline exertional dyspnea and occasional sob with routine activities.  \"I still need to pace it.\").    Cardiovascular: Positive for palpitations (Intermittent.  Is on maintenance warfarin and Multaq).   Gastrointestinal: Negative.  Negative for blood in stool.   Endocrine: Negative.    Genitourinary: Negative.    Musculoskeletal: Positive for arthralgias (intermittent of the hands, left hip, right shoulder and knees inspite of prior TKRs bilaterally) and back pain (lower back \"soreness.\").   Allergic/Immunologic: Negative.    Neurological: Positive for numbness (left foot at times, unchanged). Negative for dizziness (postural - when standing too abruptly. \"Not in awhile.\").   Hematological: Bruises/bleeds easily (coumadin). " "  Psychiatric/Behavioral: Negative.    Breasts:  Says she is more diligent with self exams.  Has not noticed any changes    /80   Pulse 72   Temp 97.5 °F (36.4 °C)   Resp 18   Ht 165.1 cm (65\")   Wt 101 kg (221 lb 11.2 oz)   SpO2 95%   BMI 36.89 kg/m²  Body surface area is 2.07 meters squared.  Pain Score    12/12/22 1316   PainSc: 0-No pain       Physical Exam:  Physical Exam   Constitutional: She is oriented to person, place, and time. No distress.   Pleasant, cooperative, obese, modestly kept elderly female.  Ambulatory.  ECOG 0.      She has lost 2 lb (had gained 9 lb at her prior visit) since her last visit.   HENT:   Head: Normocephalic and atraumatic.   Mouth/Throat: No oropharyngeal exudate.   She is wearing a surgical mask today   Eyes: Pupils are equal, round, and reactive to light. Conjunctivae are normal. No scleral icterus.   Neck: No JVD present. No tracheal deviation present. No thyromegaly (on synthroid) present.   Cardiovascular:   Irregular rate and rhythm   Pulmonary/Chest: Effort normal and breath sounds normal. No stridor. No respiratory distress. She has no wheezes. She has no rales.   Chaperoned exam: Janelle Zuniga MA (again)    Right breast no masses.  No associated axillary nor supraclavicular adenopathy.    Left mastectomy site well healed. No underlying nodularity.  No lymphedema of the left upper extremity.      No associated axillary nor supraclavicular adenopathy bilaterally.     Abdominal: Soft. Bowel sounds are normal. She exhibits no distension and no mass. There is no abdominal tenderness. There is no rebound and no guarding.   Musculoskeletal: Normal range of motion. Deformity (changes of osteoarthritis of the hands) present. No swelling.      Right lower leg: No edema.      Left lower leg: No edema.   Lymphadenopathy:     She has no cervical adenopathy.        Right cervical: No superficial cervical, no deep cervical and no posterior cervical adenopathy present.     "   Left cervical: No superficial cervical, no deep cervical and no posterior cervical adenopathy present.        Right: No supraclavicular adenopathy present.        Left: No supraclavicular adenopathy present.   Neurological: She is alert and oriented to person, place, and time. No cranial nerve deficit.   Skin: Skin is warm and dry. No rash noted. No erythema. No pallor.   Psychiatric: Her behavior is normal. Mood, judgment and thought content normal.   Vitals reviewed.    ASSESSMENT:   1. Invasive ductal/lobular (mixed) carcinoma:   · Stage: IB (pT2, pN0 [sn], MX, G1) ER (+) %, MN (+) 81-90%, HER-2/kelby 1-2 + (IHC) - negative. HER-2 1-2+ (equivocal).  FISH: Negative (signal ratio: 1.2)  · Tumor burden: 23 mm tumor in the left breast. 0/3 axillary sentinel lymph node negative for metastatic carcinoma. Nuclear grade 1.    · Oncotype DX: Recurrence score:  0; distant recurrence risk at 9 years with AI or BARNHART alone: 3%; group average absolute chemotherapy benefit:< 1%  · Complications of tumor: None.   · Tumor status: Adjuvant Arimidex in progress.  · 11/30/2020-mammogram.  Right breast calcifications for which additional imaging recommended.  BI-RADS Category 0.  · 12/28/2020-stereotactic biopsy of right breast calcifications.  Pathology: Benign intraductal papilloma, 3 mm greatest dimension with associated microcalcifications.  · 01/04/2021-Right breast, lumpectomy with needle localization.  Final diagnosis: Extensive biopsy site changes including fat necrosis and fibrosis.  Benign fibrous breast parenchyma.  No evidence of in situ or invasive carcinoma.  · 12/07/2022-mammogram.  No suspicious features identified.  Annual screening recommended.  BI-RADS Category 2-benign.           2.    Chronic kidney disease, stage III - IV.   --GFR 32, 12/07/2022 (prior:  GFR 17 - 36 mL/min).        3.    Hypothyroidism.  On Synthroid        4.    Normocytic anemia.    --Hgb 12.1; MCV 99.5, 12/7/2022 (prior: Hgb 10.9 - 12.9;  MCV 92 - 97.5).  Contribution from chronic kidney disease suspected.        5.    Abnormal CT scans of the head (see above) indicating ill-defined hypodensity at the gray-white juncture in the right frontal lobe, hepatic and right renal cysts.  Unable to perform MRI with contrast due to chronic kidney disease.            --03/23/2020-seen by Dr. Jaramillo.  Noncontrast brain MRI from 1/17/2020 reviewed.  Possibilities include prior stroke microvascular disease, demyelinating disease.  Risks of biopsy discussed.  Recommend serial imaging with biopsy of the lesion progresses.  Follow-up with MRI in 3 months.          --07/06/2020- MRI.  Mild atrophy of the brain.  Multiple foci of T2 abnormality involving the periventricular and higher white matter tracts.  Cortical lesions also present.  Overall stable from 03/2020.  Favor small vessel ischemic changes.  No evidence of restricted diffusion to suggest acute ischemia or infarct.  No mass-effect or shift in midline.          --01/06/2021-brain MRI.  No imaging evidence of intracranial metastatic disease.  Seen by Dr. Jaramillo of neurosurgery.  Favors microvascular disease.  Okay to discontinue imaging.        6.    (+) RASHEL with joint pains (hands). Has been seen by Dr. Caputo.        7.    Osteopenia (bone density 1.0 and 2.5 standard deviations below the mean on DEXA scan, 06/02/2020).  On calcium and vitamin D. Followed by pcp. Addition of Fosamax being discussed.  She wants to ask Dr. Daniels about it first.        8.    Paroxysmal atrial fibrillation.  Remains on anticoagulation.  Dr. Salas follows       PLAN:   1.  Apprised of the labs, 12/07/2022.  Note resolution of anemia otherwise normal CBC with normal platelets.  Glucose 149, GFR 32 (stable) otherwise normal CMP, normal (1.93) CEA, normal (17.6) CA-27-29, repleted iron levels.  2.  Apprised of mammogram, 12/7/2022 (above).  SUZE  3.  Previously noted the positive RASHEL but negative reflex panel, negative  SPIEP, repleted iron levels.  The liver ultrasound, renal ultrasound and chest ultrasound findings (above) are noted.  Oncotype DX recurrence score (above) noted (RS 0; absolute chemotherapy benefit < 1%).  DEXA scan shows osteopenia with increased risk of fracture.  Arimidex tolerance discussed.  So far so good.  4.  Breast cancer.  NCCN guidelines version 1.2020 reviewed.  4 ductal, lobular, mixed tumors, pT1, pT2, pT3 and pN0 with tumors greater than 0.5 cm- 21 gene RT-PCR assay.  Recurrence score less than 26-adjuvant endocrine therapy.  Recurrence score 26-30- adjuvant endocrine therapy or adjuvant chemotherapy followed by endocrine therapy.  Recurrence score greater than 31-adjuvant chemotherapy followed by endocrine therapy.  However limited data to make chemotherapy recommendations for those greater than 70 years of age.  5.  The rationale for adjuvant hormonal manipulation with AI's are previously discussed. The potential risks (to include but not limited to: Hot flashes, asthenia, pain, arthralgia, arthritis, nausea/vomiting, headache, pharyngitis, depression, rash, hypertension, lymphedema, insomnia, edema, weight gain, dyspnea, abdominal pain, constipation, osteoporosis, fractures, cough, bone pain, diarrhea, breast pain, paresthesias, infection, cataracts, myalgias, thromboembolism, angina, endometrial carcinoma, myocardial infarction, stroke, anemia, leukopenia, erythema multiforme, Belle-Collins syndrome) are discussed at length. Questions answered. She agrees to press on with therapy.    6.  Rx:   · Arimidex 1 mg p.o. daily, #30 x 2 RF   · Calcium 1200 mg + 800 units D3  p.o. daily - otc  · Stay off ferrous sulfate    7.   Return to the [place] office in 24 weeks with pre-office CBC and differential, iron, fe sat , ferritin, CMP, CEA and CA-27-29.       I spent ~ 31 minutes caring for Heidi on this date of service. This time includes time spent by me in the following activities: preparing for the  visit, reviewing tests, performing a medically appropriate examination and/or evaluation, counseling and educating the patient/family/caregiver, ordering medications, tests, or procedures and documenting information in the medical record

## 2022-12-07 ENCOUNTER — HOSPITAL ENCOUNTER (OUTPATIENT)
Dept: MAMMOGRAPHY | Facility: HOSPITAL | Age: 82
Discharge: HOME | End: 2022-12-07

## 2022-12-07 ENCOUNTER — HOSPITAL ENCOUNTER (OUTPATIENT)
Dept: BONE DENSITY | Facility: HOSPITAL | Age: 82
Discharge: HOME | End: 2022-12-07

## 2022-12-07 ENCOUNTER — LAB (OUTPATIENT)
Dept: LAB | Facility: HOSPITAL | Age: 82
End: 2022-12-07

## 2022-12-07 DIAGNOSIS — Z13.820 OSTEOPOROSIS SCREENING: ICD-10-CM

## 2022-12-07 DIAGNOSIS — Z78.0 POST-MENOPAUSAL: ICD-10-CM

## 2022-12-07 DIAGNOSIS — Z17.0 MALIGNANT NEOPLASM OF LEFT BREAST IN FEMALE, ESTROGEN RECEPTOR POSITIVE, UNSPECIFIED SITE OF BREAST: ICD-10-CM

## 2022-12-07 DIAGNOSIS — Z12.31 OTHER SCREENING MAMMOGRAM: ICD-10-CM

## 2022-12-07 DIAGNOSIS — C50.912 MALIGNANT NEOPLASM OF LEFT BREAST IN FEMALE, ESTROGEN RECEPTOR POSITIVE, UNSPECIFIED SITE OF BREAST: ICD-10-CM

## 2022-12-07 LAB
ALBUMIN SERPL-MCNC: 4.1 G/DL (ref 3.5–5.2)
ALBUMIN/GLOB SERPL: 1.6 G/DL
ALP SERPL-CCNC: 106 U/L (ref 39–117)
ALT SERPL W P-5'-P-CCNC: 11 U/L (ref 1–33)
ANION GAP SERPL CALCULATED.3IONS-SCNC: 11 MMOL/L (ref 5–15)
AST SERPL-CCNC: 17 U/L (ref 1–32)
BASOPHILS # BLD AUTO: 0.02 10*3/MM3 (ref 0–0.2)
BASOPHILS NFR BLD AUTO: 0.3 % (ref 0–1.5)
BILIRUB SERPL-MCNC: 0.6 MG/DL (ref 0–1.2)
BUN SERPL-MCNC: 23 MG/DL (ref 8–23)
BUN/CREAT SERPL: 14.4 (ref 7–25)
CALCIUM SPEC-SCNC: 9.4 MG/DL (ref 8.6–10.5)
CEA SERPL-MCNC: 1.93 NG/ML
CHLORIDE SERPL-SCNC: 108 MMOL/L (ref 98–107)
CO2 SERPL-SCNC: 22 MMOL/L (ref 22–29)
CREAT SERPL-MCNC: 1.6 MG/DL (ref 0.57–1)
DEPRECATED RDW RBC AUTO: 52.1 FL (ref 37–54)
EGFRCR SERPLBLD CKD-EPI 2021: 32.1 ML/MIN/1.73
EOSINOPHIL # BLD AUTO: 0.12 10*3/MM3 (ref 0–0.4)
EOSINOPHIL NFR BLD AUTO: 1.9 % (ref 0.3–6.2)
ERYTHROCYTE [DISTWIDTH] IN BLOOD BY AUTOMATED COUNT: 14.3 % (ref 12.3–15.4)
FERRITIN SERPL-MCNC: 125.3 NG/ML (ref 13–150)
GLOBULIN UR ELPH-MCNC: 2.5 GM/DL
GLUCOSE SERPL-MCNC: 149 MG/DL (ref 65–99)
HCT VFR BLD AUTO: 38.9 % (ref 34–46.6)
HGB BLD-MCNC: 12.1 G/DL (ref 12–15.9)
IMM GRANULOCYTES # BLD AUTO: 0.03 10*3/MM3 (ref 0–0.05)
IMM GRANULOCYTES NFR BLD AUTO: 0.5 % (ref 0–0.5)
IRON 24H UR-MRATE: 88 MCG/DL (ref 37–145)
IRON SATN MFR SERPL: 20 % (ref 20–50)
LYMPHOCYTES # BLD AUTO: 1.93 10*3/MM3 (ref 0.7–3.1)
LYMPHOCYTES NFR BLD AUTO: 31.1 % (ref 19.6–45.3)
MCH RBC QN AUTO: 30.9 PG (ref 26.6–33)
MCHC RBC AUTO-ENTMCNC: 31.1 G/DL (ref 31.5–35.7)
MCV RBC AUTO: 99.5 FL (ref 79–97)
MONOCYTES # BLD AUTO: 0.38 10*3/MM3 (ref 0.1–0.9)
MONOCYTES NFR BLD AUTO: 6.1 % (ref 5–12)
NEUTROPHILS NFR BLD AUTO: 3.72 10*3/MM3 (ref 1.7–7)
NEUTROPHILS NFR BLD AUTO: 60.1 % (ref 42.7–76)
NRBC BLD AUTO-RTO: 0 /100 WBC (ref 0–0.2)
PLATELET # BLD AUTO: 127 10*3/MM3 (ref 140–450)
PMV BLD AUTO: 12.1 FL (ref 6–12)
POTASSIUM SERPL-SCNC: 4.2 MMOL/L (ref 3.5–5.2)
PROT SERPL-MCNC: 6.6 G/DL (ref 6–8.5)
RBC # BLD AUTO: 3.91 10*6/MM3 (ref 3.77–5.28)
SODIUM SERPL-SCNC: 141 MMOL/L (ref 136–145)
TIBC SERPL-MCNC: 432 MCG/DL (ref 298–536)
TRANSFERRIN SERPL-MCNC: 290 MG/DL (ref 200–360)
WBC NRBC COR # BLD: 6.2 10*3/MM3 (ref 3.4–10.8)
WHOLE BLOOD HOLD COAG: NORMAL

## 2022-12-07 PROCEDURE — 77067 SCR MAMMO BI INCL CAD: CPT

## 2022-12-07 PROCEDURE — 82728 ASSAY OF FERRITIN: CPT

## 2022-12-07 PROCEDURE — 85025 COMPLETE CBC W/AUTO DIFF WBC: CPT

## 2022-12-07 PROCEDURE — 86300 IMMUNOASSAY TUMOR CA 15-3: CPT

## 2022-12-07 PROCEDURE — 82378 CARCINOEMBRYONIC ANTIGEN: CPT

## 2022-12-07 PROCEDURE — 80053 COMPREHEN METABOLIC PANEL: CPT

## 2022-12-07 PROCEDURE — 36415 COLL VENOUS BLD VENIPUNCTURE: CPT

## 2022-12-07 PROCEDURE — 77063 BREAST TOMOSYNTHESIS BI: CPT

## 2022-12-07 PROCEDURE — 84466 ASSAY OF TRANSFERRIN: CPT

## 2022-12-07 PROCEDURE — 83540 ASSAY OF IRON: CPT

## 2022-12-07 PROCEDURE — 77080 DXA BONE DENSITY AXIAL: CPT

## 2022-12-08 LAB — CANCER AG27-29 SERPL-ACNC: 17.6 U/ML (ref 0–38.6)

## 2022-12-08 NOTE — PROGRESS NOTES
"Nikki Zuniga MD [facility] Breast follow up note    Referring Provider: No ref. provider found      Chief complaint   Chief Complaint   Patient presents with   • Abnormal Breast Imaging        Subjective .     History of present illness:  Heidi Ansari  is a 82 y.o. female who presents for yearly breast follow up.  She denies any breast, nipple, or skin changes.      History    The following portions of the patient's history were reviewed and updated as appropriate: allergies, current medications, past family history, past medical history, past social history, past surgical history, and problem list.    Objective     Vital Signs   /78 (BP Location: Left arm, Patient Position: Sitting, Cuff Size: Adult)   Pulse 64   Ht 165.1 cm (65\")   Wt 104 kg (229 lb 4.5 oz)   SpO2 95%   BMI 38.15 kg/m²      Physical Exam:    General The patient is well-developed, well-nourished, and in no acute distress.  Breast  Nipples everted without expressible discharge.  No erythema, edema, induration, or dimpling.  No palpable masses.  No axillary adenopathy.      Imaging:  [facility] R screening mammogram 12/07/22:  benign    Class 2 Severe Obesity (BMI >=35 and <=39.9). Obesity-related health conditions include the following: none. Obesity is newly identified. BMI is is above average; BMI management plan is completed. We discussed portion control and increasing exercise.    Assessment & Plan       Diagnoses and all orders for this visit:    1. Screening mammogram for high-risk patient (Primary)  -     Mammo Screening Bilateral With CAD; Future    2. Dense breast tissue on mammogram  -     Mammo Screening Bilateral With CAD; Future    3. History of left breast cancer  -     Mammo Screening Bilateral With CAD; Future    4. History of benign breast biopsy  -     Mammo Screening Bilateral With CAD; Future    5. Intraductal papilloma of right breast  -     Mammo Screening Bilateral With CAD; Future           An extensive review of " patient intake forms, referring physician documents, laboratories, and imaging was performed in the medical decision making and surgical planning of this patient.     The patient will be scheduled for right screening mammogram in one year followed by clinical examination.  She will return sooner with breast, nipple, or skin changes.      Nikki Zuniga MD  12/12/22  12:00 CST

## 2022-12-12 ENCOUNTER — OFFICE VISIT (OUTPATIENT)
Dept: ONCOLOGY | Facility: CLINIC | Age: 82
End: 2022-12-12

## 2022-12-12 ENCOUNTER — OFFICE VISIT (OUTPATIENT)
Dept: CARDIOLOGY CLINIC | Age: 82
End: 2022-12-12
Payer: MEDICARE

## 2022-12-12 ENCOUNTER — OFFICE VISIT (OUTPATIENT)
Dept: SURGERY | Facility: CLINIC | Age: 82
End: 2022-12-12

## 2022-12-12 VITALS
HEIGHT: 65 IN | TEMPERATURE: 97.5 F | SYSTOLIC BLOOD PRESSURE: 138 MMHG | HEART RATE: 72 BPM | RESPIRATION RATE: 18 BRPM | BODY MASS INDEX: 36.94 KG/M2 | OXYGEN SATURATION: 95 % | WEIGHT: 221.7 LBS | DIASTOLIC BLOOD PRESSURE: 80 MMHG

## 2022-12-12 VITALS
HEIGHT: 65 IN | BODY MASS INDEX: 38.2 KG/M2 | OXYGEN SATURATION: 95 % | WEIGHT: 229.28 LBS | DIASTOLIC BLOOD PRESSURE: 78 MMHG | SYSTOLIC BLOOD PRESSURE: 130 MMHG | HEART RATE: 64 BPM

## 2022-12-12 VITALS
OXYGEN SATURATION: 96 % | WEIGHT: 222 LBS | DIASTOLIC BLOOD PRESSURE: 76 MMHG | HEIGHT: 66 IN | BODY MASS INDEX: 35.68 KG/M2 | SYSTOLIC BLOOD PRESSURE: 128 MMHG | HEART RATE: 57 BPM

## 2022-12-12 DIAGNOSIS — Z12.31 ENCOUNTER FOR SCREENING MAMMOGRAM FOR MALIGNANT NEOPLASM OF BREAST: ICD-10-CM

## 2022-12-12 DIAGNOSIS — I10 HYPERTENSION, UNSPECIFIED TYPE: ICD-10-CM

## 2022-12-12 DIAGNOSIS — I48.0 PAF (PAROXYSMAL ATRIAL FIBRILLATION) (HCC): Primary | ICD-10-CM

## 2022-12-12 DIAGNOSIS — Z79.01 ON COUMADIN FOR ATRIAL FIBRILLATION (HCC): ICD-10-CM

## 2022-12-12 DIAGNOSIS — Z17.0 MALIGNANT NEOPLASM OF LEFT BREAST IN FEMALE, ESTROGEN RECEPTOR POSITIVE, UNSPECIFIED SITE OF BREAST: Primary | ICD-10-CM

## 2022-12-12 DIAGNOSIS — D24.1 INTRADUCTAL PAPILLOMA OF RIGHT BREAST: ICD-10-CM

## 2022-12-12 DIAGNOSIS — E78.2 MIXED HYPERLIPIDEMIA: ICD-10-CM

## 2022-12-12 DIAGNOSIS — C50.912 MALIGNANT NEOPLASM OF LEFT BREAST IN FEMALE, ESTROGEN RECEPTOR POSITIVE, UNSPECIFIED SITE OF BREAST: Primary | ICD-10-CM

## 2022-12-12 DIAGNOSIS — Z85.3 HISTORY OF LEFT BREAST CANCER: ICD-10-CM

## 2022-12-12 DIAGNOSIS — I48.91 ON COUMADIN FOR ATRIAL FIBRILLATION (HCC): ICD-10-CM

## 2022-12-12 DIAGNOSIS — Z12.31 SCREENING MAMMOGRAM FOR HIGH-RISK PATIENT: Primary | ICD-10-CM

## 2022-12-12 DIAGNOSIS — R92.2 DENSE BREAST TISSUE ON MAMMOGRAM: ICD-10-CM

## 2022-12-12 DIAGNOSIS — Z98.890 HISTORY OF BENIGN BREAST BIOPSY: ICD-10-CM

## 2022-12-12 LAB
INTERNATIONAL NORMALIZATION RATIO, POC: 2.8
PROTHROMBIN TIME, POC: 30.2

## 2022-12-12 PROCEDURE — 1123F ACP DISCUSS/DSCN MKR DOCD: CPT | Performed by: NURSE PRACTITIONER

## 2022-12-12 PROCEDURE — 3074F SYST BP LT 130 MM HG: CPT | Performed by: NURSE PRACTITIONER

## 2022-12-12 PROCEDURE — 99213 OFFICE O/P EST LOW 20 MIN: CPT | Performed by: SPECIALIST

## 2022-12-12 PROCEDURE — G8417 CALC BMI ABV UP PARAM F/U: HCPCS | Performed by: NURSE PRACTITIONER

## 2022-12-12 PROCEDURE — 99214 OFFICE O/P EST MOD 30 MIN: CPT | Performed by: NURSE PRACTITIONER

## 2022-12-12 PROCEDURE — 3078F DIAST BP <80 MM HG: CPT | Performed by: NURSE PRACTITIONER

## 2022-12-12 PROCEDURE — G8427 DOCREV CUR MEDS BY ELIG CLIN: HCPCS | Performed by: NURSE PRACTITIONER

## 2022-12-12 PROCEDURE — 93000 ELECTROCARDIOGRAM COMPLETE: CPT | Performed by: NURSE PRACTITIONER

## 2022-12-12 PROCEDURE — 85610 PROTHROMBIN TIME: CPT | Performed by: NURSE PRACTITIONER

## 2022-12-12 PROCEDURE — G8400 PT W/DXA NO RESULTS DOC: HCPCS | Performed by: NURSE PRACTITIONER

## 2022-12-12 PROCEDURE — 99214 OFFICE O/P EST MOD 30 MIN: CPT | Performed by: INTERNAL MEDICINE

## 2022-12-12 PROCEDURE — G8484 FLU IMMUNIZE NO ADMIN: HCPCS | Performed by: NURSE PRACTITIONER

## 2022-12-12 PROCEDURE — 1036F TOBACCO NON-USER: CPT | Performed by: NURSE PRACTITIONER

## 2022-12-12 PROCEDURE — 1090F PRES/ABSN URINE INCON ASSESS: CPT | Performed by: NURSE PRACTITIONER

## 2022-12-12 NOTE — PROGRESS NOTES
[facility], [place]. [address], [facility] [address] [facility]  [phone] office  [phone] fax      OFFICE VISIT:  2022    Haris Porter - : 1940  Reason For Visit:  Heloise Cheadle is a 79 y.o. female who is here for Follow-up and Atrial Fibrillation    History:   Diagnosis Orders   1. PAF (paroxysmal atrial fibrillation) ([facility])  EKG 12 lead    POCT INR      2. Hypertension, unspecified type        3. On Coumadin for atrial fibrillation ([facility] [address]        4. Mixed hyperlipidemia          The patient presents today for cardiology follow up and INR. The patient reports doing very well without cardiac related issues. The patient reports occasional non sustained sensed AF on Multaq, overall does well. She reports no bleeding issues on Coumadin. The patient denies symptoms to suggest myocardial ischemia, heart failure or arrhythmia. BP is well controlled on current regimen. The patient's PCP monitors cholesterol. Nissa Hernandez denies exertional chest pain, shortness of breath, orthopnea, paroxysmal nocturnal dyspnea, syncope, presyncope, sustained arrhythmia, edema and fatigue. The patient denies numbness or weakness to suggest cerebrovascular accident or transient ischemic attack.       Haris Porter has the following history as recorded in [facility]:  Patient Active Problem List   Diagnosis Code    Hypothyroidism E03.9    Mixed hyperlipidemia E78.2    Hypertension I10    PAF (paroxysmal atrial fibrillation) (HCC) I48.0    Endometrial thickening on ultrasound R93.89    PMB (postmenopausal bleeding) N95.0    Acute frontal sinusitis J01.10    Chronic anticoagulation Z79.01    Moderate mitral regurgitation by prior echocardiogram I34.0     Past Medical History:   Diagnosis Date    Hyperlipidemia     Hypertension     Hypothyroidism     Kidney disease     type 4    LONG TERM ANTICOAGULENT USE      Past Surgical History:   Procedure Laterality Date    BREAST BIOPSY      CARPAL TUNNEL RELEASE      CARPAL TUNNEL RELEASE      CHOLECYSTECTOMY      COLECTOMY      COLON SURGERY      DILATION AND CURETTAGE OF UTERUS N/A 2/16/2017    DILATATION AND CURETTAGE HYSTEROSCOPY performed by Li Huff MD at [address] [place] [address]  02/17/2017    JOINT REPLACEMENT      TONSILLECTOMY       Family History   Problem Relation Age of Onset    Heart Disease Other         Family History    Cancer Other         Family History    Hypertension Other         Family History    High Blood Pressure Mother     Colon Cancer Mother     Other Father         UNKNOWN     Social History     Tobacco Use    Smoking status: Never    Smokeless tobacco: Never   Substance Use Topics    Alcohol use: No     Alcohol/week: 0.0 standard drinks      Current Outpatient Medications   Medication Sig Dispense Refill    metoprolol succinate (TOPROL XL) 25 MG extended release tablet Take 1 tablet by mouth twice daily 180 tablet 2    warfarin (COUMADIN) 5 MG tablet TAKE 1 TABLET BY MOUTH ONCE DAILY OR AS DIRECTED 30 tablet 5    MULTAQ 400 MG TABS TAKE 1 TABLET BY MOUTH TWICE DAILY WITH MEALS 180 tablet 0    anastrozole (ARIMIDEX) 1 MG tablet Take 1 tablet by mouth once daily      Acetaminophen 325 MG CAPS Take by mouth daily      Carboxymethylcellulose Sodium (REFRESH TEARS OP) Apply 1 drop to eye as needed      vitamin D (CHOLECALCIFEROL) 1000 UNIT TABS tablet Take 1,000 Units by mouth daily      allopurinol (ZYLOPRIM) 100 MG tablet Take 100 mg by mouth 2 times daily       calcium carbonate 600 MG TABS tablet Take 1 tablet by mouth 2 times daily Indications: SUPPLEMENT      spironolactone (ALDACTONE) 25 MG tablet Take 25 mg by mouth nightly Indications: FLUID RETENTION       levothyroxine (SYNTHROID) 50 MCG tablet Take 50 mcg by mouth Daily Indications: HYPOTHROIDISM       polyethylene glycol (GLYCOLAX) 17 GM/SCOOP powder Take 17 g by mouth daily Indications: REGULARITY losartan (COZAAR) 100 MG tablet Take 50 mg by mouth Daily Indications: HYPERTENSION       bumetanide (BUMEX) 1 MG tablet Take 0.5 mg by mouth daily       simvastatin (ZOCOR) 40 MG tablet Take 40 mg by mouth nightly Indications: CHOLESTEROL        No current facility-administered medications for this visit. Allergies: Bactrim [sulfamethoxazole-trimethoprim] and Codeine    Review of Systems  Constitutional - no appetite change, or unexpected weight change. No fever, chills or diaphoresis. No significant change in activity level or new onset of fatigue. HEENT - no significant rhinorrhea or epistaxis. No tinnitus or significant hearing loss. Eyes - no sudden vision change or amaurosis. No corneal arcus, xantholasma, subconjunctival hemorrhage or discharge. Respiratory - no significant wheezing, stridor, apnea or cough. No dyspnea on exertion or shortness of air. Cardiovascular - no exertional chest pain to suggest myocardial ischemia. No orthopnea or PND. No sensation of sustained arrythmia. No occurrence of slow heart rate. No palpitations. No claudication. Gastrointestinal - no abdominal swelling or pain. No blood in stool. No severe constipation, diarrhea, nausea, or vomiting. Genitourinary - no dysuria, frequency, or urgency. No flank pain or hematuria. Musculoskeletal - no back pain or myalgia. No problems with gait. Extremities - no clubbing, cyanosis or extremity edema. Skin - no color change or rash. No pallor. No new surgical incision. Neurologic - no speech difficulty, facial asymmetry or lateralizing weakness. No seizures, presyncope or syncope. No significant dizziness. Hematologic - no easy bruising or excessive bleeding. Psychiatric - no severe anxiety or insomnia. No confusion. All other review of systems are negative.     Objective  Vital Signs - /76   Pulse 57   Ht 5' 5.5\" (1.664 m)   Wt 222 lb (100.7 kg)   SpO2 96%   BMI 36.38 kg/m²   General - Yasmin Guerrero is alert, cooperative, and pleasant. Well groomed. No acute distress. Body habitus - Body mass index is 36.38 kg/m². HEENT - Head is normocephalic. No circumoral cyanosis. Dentition is normal.  EYES -   Lids normal without ptosis. No discharge, edema or subconjunctival hemorrhage. Neck - Symmetrical without apparent mass or lymphadenopathy. Respiratory - Normal respiratory effort without use of accessory muscles. Ausculatation reveals vesicular breath sounds without crackles, wheezes, rub or rhonchi. Cardiovascular - No jugular venous distention. Auscultation reveals regular rate and rhythm. No audible clicks, gallop or rub. No murmur. No lower extremity varicosities. No carotid bruits. Abdominal -  No visible distention, mass or pulsations. Extremities - No clubbing or cyanosis. No statis dermatitis or ulcers. No edema. Musculoskeletal -   No Osler's nodes. No kyphosis or scoliosis. Gait is even and regular without limp or shuffle. Ambulates without assistance. Skin -  Warm and dry; no rash or pallor. No new surgical wound. Neurological - No focal neurological deficits. Thought processes coherent. No apparent tremor. Oriented to person, place and time. Psychiatric -  Appropriate affect and mood. Data reviewed:  9/20/21 Lexiscan  Impression:   There is no obvious infarct or ischemia, with a calculated ejection   fraction of 72 %. Suggest: Clinical correlation with consideration for medical   management. Signed by Dr Fausto Lorenz     9/20/21 echo   LV is normal in size with preserved LV systolic function. LV ejection   fraction estimated at 50 to 55%. Probable grade 1 diastolic dysfunction. RV is normal in size with normal systolic function. Mild left atrial enlargement. Normal right atrial size. Aortic valve is trileaflet with moderate leaflet thickening and mildly   reduced leaflet mobility. No significant stenosis with aortic sclerosis.    No regurgitation noted. Mitral valve is structurally normal with normal leaflet mobility. Trace to mild mitral regurgitation. No stenosis. Mild tricuspid regurgitation. Trace pulmonic regurgitation. No significant pericardial effusion. Electronically signed by Brenda Schafer MD(Interpreting physician)   on 09/20/2021 08:22 PM    Lab Results   Component Value Date    WBC 10.5 08/15/2018    HGB 12.9 08/15/2018    HCT 40.2 08/15/2018    MCV 95.9 08/15/2018     08/15/2018     Lab Results   Component Value Date     01/30/2020    K 4.2 01/30/2020     01/30/2020    CO2 26 01/30/2020    BUN 16 01/30/2020    CREATININE 1.9 (H) 01/30/2020    GLUCOSE 94 01/30/2020    CALCIUM 10.0 01/30/2020    PROT 7.1 08/15/2018    LABALBU 4.3 08/15/2018    BILITOT 0.4 08/15/2018    ALKPHOS 78 08/15/2018    AST 20 08/15/2018    ALT 16 08/15/2018    LABGLOM 25 (A) 01/30/2020     EKG today reviewed: NSR 57 bpm; QTc .435; no acute ischemic changes or ectopy. EKG stable. BP Readings from Last 3 Encounters:   12/12/22 128/76   06/22/22 136/70   12/20/21 132/80    Pulse Readings from Last 3 Encounters:   12/12/22 57   06/22/22 60   12/20/21 59        Wt Readings from Last 3 Encounters:   12/12/22 222 lb (100.7 kg)   06/22/22 223 lb (101.2 kg)   12/20/21 222 lb (100.7 kg)       Recent Results (from the past 1008 hour(s))   POCT INR    Collection Time: 12/12/22  9:32 AM   Result Value Ref Range    INR,(POC) 2.8     Prothrombin time,(POC) 30.2      Assessment/Plan:   Diagnosis Orders   1. PAF (paroxysmal atrial fibrillation) (HCC)  EKG 12 lead    POCT INR      2. Hypertension, unspecified type        3. On Coumadin for atrial fibrillation ([address]        4. Mixed hyperlipidemia          [id number] Score: 4  Disclaimer: Risk Score calculation is dependent on accuracy of patient problem list and past encounter diagnosis. Stable CV status without overt heart failure, sensed arrhythmia or angina.       PAF - continues on Multaq without report of sustained AF. No bleeding issue on Coumadin. INR today 2.8. See anticoag encounter for dosing and follow up. HTN - normotensive on current regimen. BP today 128/76. Continue same. Hyperlipidemia - monitored and managed by PCP. Continues on Zocor 40 mg daily. Patient is compliant with medication regimen. Previous cardiac history and records reviewed. Continue current medical management for cardiac related condition. Continue other current medications as directed. Continue to follow up with primary care provider for non cardiac medical problems. If your primary care provider is outside of the [facility], please request that your labs be faxed to this office at [phone]. BP goal 130/80 or less. Call the office with any problems, questions or concerns at [phone]. Cardiology follow up as scheduled in [address] appointments. Educational included in patient instructions. Heart health.       KAYLA Rubio

## 2022-12-12 NOTE — PATIENT INSTRUCTIONS
New instructions for today:  Follow up as scheduled. Patient Instructions:  Continue current medications as prescribed. Always keep a current medication list. Bring your medications to every office visit. Continue to follow up with primary care provider for non cardiac medical problems. Call the office with any problems, questions or concerns at [phone]. If you have been asked to keep a blood pressure log, do so for 2 weeks. Call the office to report readings to the triage nurse at [phone]. Follow up with cardiologist as scheduled. The following educational material has been included in this after visit summary for your review: Life simple 7. Heart health. Life simple 7  1) Manage blood pressure - high blood pressure is a major risk factor for heart disease and stroke. Keeping blood pressure in health range reduces strain on your heart, arteries and kidneys. Blood pressure goal is less than 130/80. 2) Control cholesterol - contributes to plaque, which can clog arteries and lead to heart disease and stroke. When you control your cholesterol you are giving your arteries their best chance to remain clear. It is recommended that you get cholesterol lab work done once a year. 3) Reduce blood sugar - most of the food we eat is turning into glucose or blood sugar that our body uses for energy. Over time, high levels of blood sugar can damage your heart, kidneys, eyes and nerves. 4) Get active - living an active life is one of the most rewarding gifts you can give yourself and those you love. Simply put, daily physical activity increases your length and quality of life. Strive to exercise 15 minutes most days of the week. 5)  Eat better - A healthy diet is one of your best weapons for fighting cardiovascular disease. When you eat a heart healthy diet, you improve your chances for feeling good and staying healthy for life.   6)  Lose weight - when you shed extra fat an unnecessary pounds, you reduce the burden on your hear, lungs, blood vessels and skeleton. You give yourself the gift of active living, you lower your blood pressure and help yourself feel better. 7) Stop smoking - cigarette smokers have a higher risk of developing cardiovascular disease. If  You smoke, quitting is the best thing you can do for your health. Check American Heart Association on line for more information on Life's Simple 7 and tips for healthy living. A Healthy Heart: Care Instructions  Your Care Instructions     Coronary artery disease, also called heart disease, occurs when a substance called plaque builds up in the vessels that supply oxygen-rich blood to your heart muscle. This can narrow the blood vessels and reduce blood flow. A heart attack happens when blood flow is completely blocked. A high-fat diet, smoking, and other factors increase the risk of heart disease. Your doctor has found that you have a chance of having heart disease. You can do lots of things to keep your heart healthy. It may not be easy, but you can change your diet, exercise more, and quit smoking. These steps really work to lower your chance of heart disease. Follow-up care is a key part of your treatment and safety. Be sure to make and go to all appointments, and call your doctor if you are having problems. It's also a good idea to know your test results and keep a list of the medicines you take. How can you care for yourself at home? Diet  Use less salt when you cook and eat. This helps lower your blood pressure. Taste food before salting. Add only a little salt when you think you need it. With time, your taste buds will adjust to less salt. Eat fewer snack items, fast foods, canned soups, and other high-salt, high-fat, processed foods. Read food labels and try to avoid saturated and trans fats. They increase your risk of heart disease by raising cholesterol levels.   Limit the amount of solid fat-butter, margarine, and shortening-you eat. Use olive, peanut, or canola oil when you cook. Bake, broil, and steam foods instead of frying them. Eat a variety of fruit and vegetables every day. Dark green, deep orange, red, or yellow fruits and vegetables are especially good for you. Examples include spinach, carrots, peaches, and berries. Foods high in fiber can reduce your cholesterol and provide important vitamins and minerals. High-fiber foods include whole-grain cereals and breads, oatmeal, beans, brown rice, citrus fruits, and apples. Eat lean proteins. Heart-healthy proteins include seafood, lean meats and poultry, eggs, beans, peas, nuts, seeds, and soy products. Limit drinks and foods with added sugar. These include candy, desserts, and soda pop. Lifestyle changes  If your doctor recommends it, get more exercise. Walking is a good choice. Bit by bit, increase the amount you walk every day. Try for at least 30 minutes on most days of the week. You also may want to swim, bike, or do other activities. Do not smoke. If you need help quitting, talk to your doctor about stop-smoking programs and medicines. These can increase your chances of quitting for good. Quitting smoking may be the most important step you can take to protect your heart. It is never too late to quit. Limit alcohol to 2 drinks a day for men and 1 drink a day for women. Too much alcohol can cause health problems. Manage other health problems such as diabetes, high blood pressure, and high cholesterol. If you think you may have a problem with alcohol or drug use, talk to your doctor. Medicines  Take your medicines exactly as prescribed. Call your doctor if you think you are having a problem with your medicine. If your doctor recommends aspirin, take the amount directed each day. Make sure you take aspirin and not another kind of pain reliever, such as acetaminophen (Tylenol). When should you call for help? [id number] if you have symptoms of a heart attack. These may include:  Chest pain or pressure, or a strange feeling in the chest.  Sweating. Shortness of breath. Pain, pressure, or a strange feeling in the back, neck, jaw, or upper belly or in one or both shoulders or arms. Lightheadedness or sudden weakness. A fast or irregular heartbeat. After you call 911, the  may tell you to chew 1 adult-strength or 2 to 4 low-dose aspirin. Wait for an ambulance. Do not try to drive yourself. Watch closely for changes in your health, and be sure to contact your doctor if you have any problems. Where can you learn more? Go to https://[employer]. org and sign in to your [employer] account. Enter B078 in the [employer] box to learn more about \"A Healthy Heart: Care Instructions. \"     If you do not have an account, please click on the \"Sign Up Now\" link. Current as of: December 16, 2019               Content Version: 12.5  © 5846-9457 Healthwise, Incorporated. Care instructions adapted under license by [facility] ([facility]). If you have questions about a medical condition or this instruction, always ask your healthcare professional. [place] [address] any warranty or liability for your use of this information.

## 2022-12-13 DIAGNOSIS — I48.91 ATRIAL FIBRILLATION, UNSPECIFIED TYPE (HCC): ICD-10-CM

## 2022-12-14 RX ORDER — DRONEDARONE 400 MG/1
TABLET, FILM COATED ORAL
Qty: 180 TABLET | Refills: 3 | Status: SHIPPED | OUTPATIENT
Start: 2022-12-14

## 2023-01-10 ENCOUNTER — TELEPHONE (OUTPATIENT)
Dept: INTERNAL MEDICINE | Facility: CLINIC | Age: 83
End: 2023-01-10
Payer: MEDICARE

## 2023-01-10 NOTE — TELEPHONE ENCOUNTER
Dr Odalys Reynolds, new ophthalmologist, called stating she just seen this patient and the over Fri/Sat she had some issues and is diagnosed with photopsia-bright light. She has no history of Afib or migraines. She would like us to call and check on pt and possibly order Ct or MRI to check for possible Brain tumors etc.

## 2023-01-10 NOTE — TELEPHONE ENCOUNTER
Please call her and see if she can come in to see Dr. Anderson tomorrow - you can put her on his schedule at 7:30, 9:45, or 11:30.

## 2023-01-11 ENCOUNTER — OFFICE VISIT (OUTPATIENT)
Dept: INTERNAL MEDICINE | Facility: CLINIC | Age: 83
End: 2023-01-11
Payer: MEDICARE

## 2023-01-11 VITALS
BODY MASS INDEX: 36.65 KG/M2 | TEMPERATURE: 97.8 F | HEIGHT: 65 IN | HEART RATE: 56 BPM | DIASTOLIC BLOOD PRESSURE: 80 MMHG | SYSTOLIC BLOOD PRESSURE: 130 MMHG | WEIGHT: 220 LBS | OXYGEN SATURATION: 98 %

## 2023-01-11 DIAGNOSIS — C50.912 MALIGNANT NEOPLASM OF LEFT BREAST IN FEMALE, ESTROGEN RECEPTOR POSITIVE, UNSPECIFIED SITE OF BREAST: Primary | ICD-10-CM

## 2023-01-11 DIAGNOSIS — G93.9 BRAIN LESION: ICD-10-CM

## 2023-01-11 DIAGNOSIS — R42 DIZZINESS: ICD-10-CM

## 2023-01-11 DIAGNOSIS — Z17.0 MALIGNANT NEOPLASM OF LEFT BREAST IN FEMALE, ESTROGEN RECEPTOR POSITIVE, UNSPECIFIED SITE OF BREAST: Primary | ICD-10-CM

## 2023-01-11 DIAGNOSIS — I48.91 ATRIAL FIBRILLATION, CONTROLLED: ICD-10-CM

## 2023-01-11 DIAGNOSIS — R09.89 SUSPECTED CEREBROVASCULAR ACCIDENT (CVA): ICD-10-CM

## 2023-01-11 PROBLEM — Z79.01 ANTICOAGULATED ON COUMADIN: Status: RESOLVED | Noted: 2019-06-25 | Resolved: 2023-01-11

## 2023-01-11 PROBLEM — Z86.79 HISTORY OF HYPERTENSION: Status: RESOLVED | Noted: 2019-10-31 | Resolved: 2023-01-11

## 2023-01-11 PROBLEM — J01.10 ACUTE FRONTAL SINUSITIS: Status: RESOLVED | Noted: 2017-05-16 | Resolved: 2023-01-11

## 2023-01-11 PROBLEM — I10 HTN (HYPERTENSION), BENIGN: Chronic | Status: ACTIVE | Noted: 2019-06-25

## 2023-01-11 PROBLEM — IMO0002 EXCESSIVE ANTICOAGULATION: Status: RESOLVED | Noted: 2020-01-22 | Resolved: 2023-01-11

## 2023-01-11 PROBLEM — I49.3 PVC (PREMATURE VENTRICULAR CONTRACTION): Status: RESOLVED | Noted: 2020-06-22 | Resolved: 2023-01-11

## 2023-01-11 PROCEDURE — 91312 COVID-19 (PFIZER) BIVALENT BOOSTER 12+YRS: CPT | Performed by: INTERNAL MEDICINE

## 2023-01-11 PROCEDURE — 99214 OFFICE O/P EST MOD 30 MIN: CPT | Performed by: INTERNAL MEDICINE

## 2023-01-11 PROCEDURE — 0124A COVID-19 (PFIZER) BIVALENT BOOSTER 12+YRS: CPT | Performed by: INTERNAL MEDICINE

## 2023-01-11 NOTE — ASSESSMENT & PLAN NOTE
Based on her previous MRI, there is some concern that the patient has had a previous stroke.  Patient had no symptoms of these.  She saw Dr. Jaramillo in neurosurgery after her MRI for her breast cancer.  From review of this note, does not appear as though they think these were masses.    We will get a repeat MRI, as the patient symptoms were not found to be related to her eyes.  She had a full eye examination at the ophthalmology group.

## 2023-01-11 NOTE — PROGRESS NOTES
"      Chief Complaint  eye issues (Pt was having issues with what she thought were floaters in her eyes.  Went to the eye Dr but they think it does not have anything to do with her eyes. See note from provider /They suggested additional imaging )    Subjective        Heidi Ansari presents to [facility]    HPI    Patient was seen for the above problems.  Patient went to the eye doctor as she was having what she thought to be floaters as well as issues with dizziness.  Eye exam was reportedly normal, eye doctor thought that the patient should have a carotid work-up.  Based on the history, think the patient probably needs preoperative work-up as well as repeat MRI.  Patient has a history of breast cancer, and was treated with lumpectomy.    Review of Systems  Answers for HPI/ROS submitted by the patient on 1/10/2023  Please describe your symptoms.: Dr. Odalys Reynolds sent request to see Dr. Anderson for carotid workup  Have you had these symptoms before?: No  How long have you been having these symptoms?: 1-4 days  What is the primary reason for your visit?: Other      Objective   Vital Signs:  /80 (BP Location: Right arm, Patient Position: Sitting, Cuff Size: Adult)   Pulse 56   Temp 97.8 °F (36.6 °C) (Temporal)   Ht 165.1 cm (65\")   Wt 99.8 kg (220 lb)   SpO2 98%   BMI 36.61 kg/m²   Estimated body mass index is 36.61 kg/m² as calculated from the following:    Height as of this encounter: 165.1 cm (65\").    Weight as of this encounter: 99.8 kg (220 lb).      Physical Exam  Vitals and nursing note reviewed.   Constitutional:       Appearance: She is not ill-appearing.   HENT:      Head: Normocephalic and atraumatic.      Mouth/Throat:      Mouth: Mucous membranes are dry.      Pharynx: Oropharynx is clear.   Eyes:      General: No scleral icterus.     Conjunctiva/sclera: Conjunctivae normal.   Cardiovascular:      Rate and Rhythm: Normal rate and regular rhythm.      Heart " "sounds: Murmur heard.      Comments: No carotid bruit bilateral  Pulmonary:      Effort: Pulmonary effort is normal. No respiratory distress.   Skin:     General: Skin is warm and dry.      Coloration: Skin is pale.   Neurological:      Mental Status: She is alert.   Psychiatric:         Mood and Affect: Mood normal.         Behavior: Behavior normal.                     Assessment and Plan   Diagnoses and all orders for this visit:    1. 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  Patient stable.  Patient had a partial mastectomy.    Orders:  -     MRI Brain Without Contrast; Future  -     US Carotid Bilateral; Future    2. Dizziness  Assessment & Plan:  This has resolved, patient was having what she thought to be floaters as well as dizziness.  She was diagnosed with having photopsia.    Orders:  -     MRI Brain Without Contrast; Future  -     US Carotid Bilateral; Future    3. Brain lesion  Assessment & Plan:  Previous MRI showed some \"brain lesions\".  I reviewed the MRI as well as the records from Dr. Jaramillo office    Orders:  -     MRI Brain Without Contrast; Future    4. Suspected cerebrovascular accident (CVA)  Assessment & Plan:  Based on her previous MRI, there is some concern that the patient has had a previous stroke.  Patient had no symptoms of these.  She saw Dr. Jaramillo in neurosurgery after her MRI for her breast cancer.  From review of this note, does not appear as though they think these were masses.    We will get a repeat MRI, as the patient symptoms were not found to be related to her eyes.  She had a full eye examination at the ophthalmology group.    Orders:  -     MRI Brain Without Contrast; Future  -     US Carotid Bilateral; Future    5. Atrial fibrillation, controlled (HCC)  Assessment & Plan:  Therapeutic anticoagulation  Increase risk of CVA related to afib.  " Currently feels like rates controlled and somewhat regular on examination      Other orders  -     COVID-19 Bivalent Booster ([employer]) 12+yrs      We will get an MRI and an ultrasound of the carotids.  Ideally I would get a CT angiogram of her head and neck, but given her chronic kidney disease unable to do this.      Result Review :  The following data was reviewed by: José Miguel Anderson MD on 01/11/2023:  CMP    CMP 1/21/22 4/25/22 12/7/22   Glucose 98 107 (A) 149 (A)   BUN 31 (A) 29 (A) 23   Creatinine 1.84 (A) 1.79 (A) 1.60 (A)   eGFR Non  Am 26 (A)     eGFR  Am 32 (A)     eGFR  28.0 (A) 32.1 (A)   Sodium 140 141 141   Potassium 4.5 4.3 4.2   Chloride 103 106 108 (A)   Calcium 9.7 9.4 9.4   Total Protein 6.7     Total Protein  6.7 6.6   Albumin 4.50 4.30 4.10   Globulin 2.2     Globulin  2.4 2.5   Total Bilirubin 0.9 0.6 0.6   Alkaline Phosphatase 109 103 106   AST (SGOT) 18 18 17   ALT (SGPT) 8 10 11   Albumin/Globulin Ratio  1.8 1.6   BUN/Creatinine Ratio 16.8 16.2 14.4   Anion Gap  11.0 11.0   (A) Abnormal value       Comments are available for some flowsheets but are not being displayed.           CBC    CBC 4/25/22 5/2/22 12/7/22   WBC 5.50 5.16 6.20   RBC 3.96 4.00 3.91   Hemoglobin 12.6 12.6 12.1   Hematocrit 38.2 39.0 38.9   MCV 96.5 97.5 (A) 99.5 (A)   MCH 31.8 31.5 30.9   MCHC 33.0 32.3 31.1 (A)   RDW 14.1 14.2 14.3   Platelets 119 (A) 154 127 (A)   (A) Abnormal value            Lipid Panel    Lipid Panel 1/21/22 9/15/22   Total Cholesterol 156 163   Triglycerides 174 (A) 178 (A)   HDL Cholesterol 54 55   VLDL Cholesterol 29 30   LDL Cholesterol  73 78   (A) Abnormal value       Comments are available for some flowsheets but are not being displayed.           TSH    TSH 1/21/22 9/15/22   TSH 3.020 2.820           BMP    BMP 1/21/22 4/25/22 12/7/22   BUN 31 (A) 29 (A) 23   Creatinine 1.84 (A) 1.79 (A) 1.60 (A)   Sodium 140 141 141   Potassium 4.5 4.3 4.2   Chloride 103 106 108 (A)   CO2  24.9 24.0 22.0   Calcium 9.7 9.4 9.4   (A) Abnormal value            Data reviewed: Radiologic studies Reviewed previous MRI.  Patient noted to have some brain lesions. and Consultant notes Reviewed the ophthalmology group note that was provided to the patient, this will be scanned into media.  I also reviewed Dr. Jaramillo's note from 2021 where he felt as though the patient brain lesions were not masses but instead prior stroke, small vessel disease, or demyelination.                Follow Up   Return if symptoms worsen or fail to improve.  Patient was given instructions and counseling regarding her condition or for health maintenance advice. Please see specific information pulled into the AVS if appropriate.       CRISTIANO Anderson MD, FH, FACP      Electronically signed by José Miguel Anderson MD, 01/11/23, 12:49 PM CST.

## 2023-01-11 NOTE — ASSESSMENT & PLAN NOTE
This has resolved, patient was having what she thought to be floaters as well as dizziness.  She was diagnosed with having photopsia.

## 2023-01-11 NOTE — ASSESSMENT & PLAN NOTE
Therapeutic anticoagulation  Increase risk of CVA related to afib.  Currently feels like rates controlled and somewhat regular on examination

## 2023-01-11 NOTE — ASSESSMENT & PLAN NOTE
"Previous MRI showed some \"brain lesions\".  I reviewed the MRI as well as the records from Dr. Jaramillo office  "

## 2023-01-19 ENCOUNTER — HOSPITAL ENCOUNTER (OUTPATIENT)
Dept: ULTRASOUND IMAGING | Facility: HOSPITAL | Age: 83
Discharge: HOME | End: 2023-01-19
Admitting: INTERNAL MEDICINE
Payer: MEDICARE

## 2023-01-19 DIAGNOSIS — R42 DIZZINESS: ICD-10-CM

## 2023-01-19 DIAGNOSIS — Z17.0 MALIGNANT NEOPLASM OF LEFT BREAST IN FEMALE, ESTROGEN RECEPTOR POSITIVE, UNSPECIFIED SITE OF BREAST: ICD-10-CM

## 2023-01-19 DIAGNOSIS — C50.912 MALIGNANT NEOPLASM OF LEFT BREAST IN FEMALE, ESTROGEN RECEPTOR POSITIVE, UNSPECIFIED SITE OF BREAST: ICD-10-CM

## 2023-01-19 DIAGNOSIS — R09.89 SUSPECTED CEREBROVASCULAR ACCIDENT (CVA): ICD-10-CM

## 2023-01-19 PROCEDURE — 93880 EXTRACRANIAL BILAT STUDY: CPT | Performed by: SURGERY

## 2023-01-19 PROCEDURE — 93880 EXTRACRANIAL BILAT STUDY: CPT

## 2023-01-20 RX ORDER — ANASTROZOLE 1 MG/1
1 TABLET ORAL DAILY
Refills: 0 | OUTPATIENT
Start: 2023-01-20

## 2023-01-23 ENCOUNTER — ANTI-COAG VISIT (OUTPATIENT)
Dept: CARDIOLOGY CLINIC | Age: 83
End: 2023-01-23
Payer: MEDICARE

## 2023-01-23 DIAGNOSIS — I48.91 ATRIAL FIBRILLATION, UNSPECIFIED TYPE (HCC): Primary | ICD-10-CM

## 2023-01-23 DIAGNOSIS — C50.912 MALIGNANT NEOPLASM OF LEFT BREAST IN FEMALE, ESTROGEN RECEPTOR POSITIVE, UNSPECIFIED SITE OF BREAST: ICD-10-CM

## 2023-01-23 DIAGNOSIS — Z17.0 MALIGNANT NEOPLASM OF LEFT BREAST IN FEMALE, ESTROGEN RECEPTOR POSITIVE, UNSPECIFIED SITE OF BREAST: ICD-10-CM

## 2023-01-23 LAB
INTERNATIONAL NORMALIZATION RATIO, POC: 2.8
PROTHROMBIN TIME, POC: 29.8

## 2023-01-23 PROCEDURE — 85610 PROTHROMBIN TIME: CPT | Performed by: NURSE PRACTITIONER

## 2023-01-23 RX ORDER — ANASTROZOLE 1 MG/1
1 TABLET ORAL DAILY
Qty: 30 TABLET | Refills: 2 | Status: SHIPPED | OUTPATIENT
Start: 2023-01-23

## 2023-01-23 NOTE — TELEPHONE ENCOUNTER
Caller: Heidi Ansari    Relationship: Self    Best call back number: [phone]    Requested Prescriptions:   Requested Prescriptions     Pending Prescriptions Disp Refills   • anastrozole (ARIMIDEX) 1 MG tablet 91 tablet 0     Sig: Take 1 tablet by mouth Daily.        Pharmacy where request should be sent: [facility] [address] [place] [address] LINDA ROJAS [facility] [phone] [facility] [phone] FX         Does the patient have less than a 3 day supply:  [] Yes  [x] No      Hilary Egan Rep   01/23/23 10:38 CST

## 2023-01-24 ENCOUNTER — HOSPITAL ENCOUNTER (OUTPATIENT)
Dept: MRI IMAGING | Facility: HOSPITAL | Age: 83
Discharge: HOME | End: 2023-01-24
Admitting: INTERNAL MEDICINE
Payer: MEDICARE

## 2023-01-24 DIAGNOSIS — R42 DIZZINESS: ICD-10-CM

## 2023-01-24 DIAGNOSIS — G93.9 BRAIN LESION: ICD-10-CM

## 2023-01-24 DIAGNOSIS — Z17.0 MALIGNANT NEOPLASM OF LEFT BREAST IN FEMALE, ESTROGEN RECEPTOR POSITIVE, UNSPECIFIED SITE OF BREAST: ICD-10-CM

## 2023-01-24 DIAGNOSIS — C50.912 MALIGNANT NEOPLASM OF LEFT BREAST IN FEMALE, ESTROGEN RECEPTOR POSITIVE, UNSPECIFIED SITE OF BREAST: ICD-10-CM

## 2023-01-24 DIAGNOSIS — R09.89 SUSPECTED CEREBROVASCULAR ACCIDENT (CVA): ICD-10-CM

## 2023-01-24 PROCEDURE — 70551 MRI BRAIN STEM W/O DYE: CPT

## 2023-03-06 ENCOUNTER — ANTI-COAG VISIT (OUTPATIENT)
Dept: CARDIOLOGY CLINIC | Age: 83
End: 2023-03-06
Payer: MEDICARE

## 2023-03-06 DIAGNOSIS — I48.91 ATRIAL FIBRILLATION, UNSPECIFIED TYPE (HCC): Primary | ICD-10-CM

## 2023-03-06 LAB
INTERNATIONAL NORMALIZATION RATIO, POC: 2.4
PROTHROMBIN TIME, POC: 26.2

## 2023-03-06 PROCEDURE — 85610 PROTHROMBIN TIME: CPT | Performed by: CLINICAL NURSE SPECIALIST

## 2023-03-06 PROCEDURE — 93793 ANTICOAG MGMT PT WARFARIN: CPT | Performed by: CLINICAL NURSE SPECIALIST

## 2023-03-15 ENCOUNTER — LAB (OUTPATIENT)
Dept: INTERNAL MEDICINE | Facility: CLINIC | Age: 83
End: 2023-03-15
Payer: MEDICARE

## 2023-03-15 DIAGNOSIS — E55.9 VITAMIN D DEFICIENCY: ICD-10-CM

## 2023-03-15 DIAGNOSIS — E78.2 MIXED HYPERLIPIDEMIA: ICD-10-CM

## 2023-03-15 DIAGNOSIS — D63.1 ANEMIA IN STAGE 4 CHRONIC KIDNEY DISEASE: ICD-10-CM

## 2023-03-15 DIAGNOSIS — E03.9 HYPOTHYROIDISM, UNSPECIFIED TYPE: ICD-10-CM

## 2023-03-15 DIAGNOSIS — I10 HTN (HYPERTENSION), BENIGN: Primary | Chronic | ICD-10-CM

## 2023-03-15 DIAGNOSIS — Z79.899 ENCOUNTER FOR LONG-TERM CURRENT USE OF MEDICATION: ICD-10-CM

## 2023-03-15 DIAGNOSIS — N18.4 ANEMIA IN STAGE 4 CHRONIC KIDNEY DISEASE: ICD-10-CM

## 2023-03-15 RX ORDER — LEVOTHYROXINE SODIUM 0.05 MG/1
50 TABLET ORAL DAILY
Qty: 90 TABLET | Refills: 3 | Status: SHIPPED | OUTPATIENT
Start: 2023-03-15

## 2023-03-15 RX ORDER — SPIRONOLACTONE 25 MG/1
25 TABLET ORAL DAILY
Qty: 90 TABLET | Refills: 3 | Status: SHIPPED | OUTPATIENT
Start: 2023-03-15

## 2023-03-16 LAB
25(OH)D3+25(OH)D2 SERPL-MCNC: 55 NG/ML (ref 30–100)
ALBUMIN SERPL-MCNC: 4.4 G/DL (ref 3.5–5.2)
ALBUMIN/GLOB SERPL: 1.8 G/DL
ALP SERPL-CCNC: 96 U/L (ref 39–117)
ALT SERPL-CCNC: 10 U/L (ref 1–33)
APPEARANCE UR: CLEAR
AST SERPL-CCNC: 18 U/L (ref 1–32)
BACTERIA #/AREA URNS HPF: ABNORMAL /HPF
BASOPHILS # BLD AUTO: ABNORMAL 10*3/UL
BILIRUB SERPL-MCNC: 0.8 MG/DL (ref 0–1.2)
BILIRUB UR QL STRIP: NEGATIVE
BUN SERPL-MCNC: 20 MG/DL (ref 8–23)
BUN/CREAT SERPL: 11.9 (ref 7–25)
CALCIUM SERPL-MCNC: 10.3 MG/DL (ref 8.6–10.5)
CASTS URNS MICRO: ABNORMAL
CHLORIDE SERPL-SCNC: 104 MMOL/L (ref 98–107)
CHOLEST SERPL-MCNC: 151 MG/DL (ref 0–200)
CO2 SERPL-SCNC: 24.2 MMOL/L (ref 22–29)
COLOR UR: YELLOW
CREAT SERPL-MCNC: 1.68 MG/DL (ref 0.57–1)
DIFFERENTIAL COMMENT: NORMAL
EGFRCR SERPLBLD CKD-EPI 2021: 30.1 ML/MIN/1.73
EOSINOPHIL # BLD AUTO: ABNORMAL 10*3/UL
EOSINOPHIL # BLD MANUAL: 0.14 10*3/MM3 (ref 0–0.4)
EOSINOPHIL NFR BLD AUTO: ABNORMAL %
EOSINOPHIL NFR BLD MANUAL: 2.1 % (ref 0.3–6.2)
EPI CELLS #/AREA URNS HPF: ABNORMAL /HPF
ERYTHROCYTE [DISTWIDTH] IN BLOOD BY AUTOMATED COUNT: 13.5 % (ref 12.3–15.4)
GLOBULIN SER CALC-MCNC: 2.5 GM/DL
GLUCOSE SERPL-MCNC: 95 MG/DL (ref 65–99)
GLUCOSE UR QL STRIP: NEGATIVE
HCT VFR BLD AUTO: 40.1 % (ref 34–46.6)
HDLC SERPL-MCNC: 55 MG/DL (ref 40–60)
HGB BLD-MCNC: 13 G/DL (ref 12–15.9)
HGB UR QL STRIP: NEGATIVE
KETONES UR QL STRIP: NEGATIVE
LDLC SERPL CALC-MCNC: 67 MG/DL (ref 0–100)
LEUKOCYTE ESTERASE UR QL STRIP: ABNORMAL
LYMPHOCYTES # BLD AUTO: ABNORMAL 10*3/UL
LYMPHOCYTES # BLD MANUAL: 2.01 10*3/MM3 (ref 0.7–3.1)
LYMPHOCYTES NFR BLD AUTO: ABNORMAL %
LYMPHOCYTES NFR BLD MANUAL: 29.5 % (ref 19.6–45.3)
MCH RBC QN AUTO: 30.4 PG (ref 26.6–33)
MCHC RBC AUTO-ENTMCNC: 32.4 G/DL (ref 31.5–35.7)
MCV RBC AUTO: 93.7 FL (ref 79–97)
MONOCYTES # BLD MANUAL: 0.57 10*3/MM3 (ref 0.1–0.9)
MONOCYTES NFR BLD AUTO: ABNORMAL %
MONOCYTES NFR BLD MANUAL: 8.4 % (ref 5–12)
NEUTROPHILS # BLD MANUAL: 4.09 10*3/MM3 (ref 1.7–7)
NEUTROPHILS NFR BLD AUTO: ABNORMAL %
NEUTROPHILS NFR BLD MANUAL: 60 % (ref 42.7–76)
NITRITE UR QL STRIP: POSITIVE
PH UR STRIP: 6.5 [PH] (ref 5–8)
PLATELET # BLD AUTO: 105 10*3/MM3 (ref 140–450)
PLATELET BLD QL SMEAR: NORMAL
POTASSIUM SERPL-SCNC: 4.5 MMOL/L (ref 3.5–5.2)
PROT SERPL-MCNC: 6.9 G/DL (ref 6–8.5)
PROT UR QL STRIP: ABNORMAL
RBC # BLD AUTO: 4.28 10*6/MM3 (ref 3.77–5.28)
RBC #/AREA URNS HPF: ABNORMAL /HPF
RBC MORPH BLD: NORMAL
SODIUM SERPL-SCNC: 142 MMOL/L (ref 136–145)
SP GR UR STRIP: 1.02 (ref 1–1.03)
TRIGL SERPL-MCNC: 175 MG/DL (ref 0–150)
TSH SERPL DL<=0.005 MIU/L-ACNC: 3.22 UIU/ML (ref 0.27–4.2)
UROBILINOGEN UR STRIP-MCNC: ABNORMAL MG/DL
VLDLC SERPL CALC-MCNC: 29 MG/DL (ref 5–40)
WBC # BLD AUTO: 6.82 10*3/MM3 (ref 3.4–10.8)
WBC #/AREA URNS HPF: ABNORMAL /HPF

## 2023-03-22 ENCOUNTER — OFFICE VISIT (OUTPATIENT)
Dept: INTERNAL MEDICINE | Facility: CLINIC | Age: 83
End: 2023-03-22
Payer: MEDICARE

## 2023-03-22 VITALS
HEIGHT: 65 IN | DIASTOLIC BLOOD PRESSURE: 72 MMHG | OXYGEN SATURATION: 99 % | SYSTOLIC BLOOD PRESSURE: 120 MMHG | WEIGHT: 217 LBS | HEART RATE: 60 BPM | TEMPERATURE: 97.9 F | BODY MASS INDEX: 36.15 KG/M2

## 2023-03-22 DIAGNOSIS — D63.1 ANEMIA IN STAGE 4 CHRONIC KIDNEY DISEASE: ICD-10-CM

## 2023-03-22 DIAGNOSIS — Z79.01 CHRONIC ANTICOAGULATION: ICD-10-CM

## 2023-03-22 DIAGNOSIS — E78.2 MIXED HYPERLIPIDEMIA: ICD-10-CM

## 2023-03-22 DIAGNOSIS — E03.9 HYPOTHYROIDISM, UNSPECIFIED TYPE: ICD-10-CM

## 2023-03-22 DIAGNOSIS — N18.4 ANEMIA IN STAGE 4 CHRONIC KIDNEY DISEASE: ICD-10-CM

## 2023-03-22 DIAGNOSIS — I48.91 ATRIAL FIBRILLATION, CONTROLLED: ICD-10-CM

## 2023-03-22 DIAGNOSIS — I10 HTN (HYPERTENSION), BENIGN: Primary | Chronic | ICD-10-CM

## 2023-03-22 PROBLEM — N18.30 ANEMIA DUE TO STAGE 3 CHRONIC KIDNEY DISEASE: Status: RESOLVED | Noted: 2020-01-20 | Resolved: 2023-03-22

## 2023-03-22 RX ORDER — PHENOL 1.4 %
1200 AEROSOL, SPRAY (ML) MUCOUS MEMBRANE DAILY
COMMUNITY

## 2023-03-22 RX ORDER — MULTIVIT-MIN/IRON/FOLIC ACID/K 18-600-40
CAPSULE ORAL
COMMUNITY

## 2023-03-22 NOTE — PROGRESS NOTES
The ABCs of the Annual Wellness Visit  Subsequent Medicare Wellness Visit    Chief Complaint   Patient presents with   • Medicare Wellness-Initial Visit      Subjective    History of Present Illness:  Heidi Ansari is a 82 y.o. female who presents for a Subsequent Medicare Wellness Visit.    The following portions of the patient's history were reviewed and   updated as appropriate: allergies, current medications, past family history, past medical history, past social history, past surgical history and problem list.    Compared to one year ago, the patient feels her physical   health is the same.    Compared to one year ago, the patient feels her mental   health is the same.    Recent Hospitalizations:  She was not admitted to the hospital during the last year.       Current Medical Providers:  Patient Care Team:  José Miguel Anderson MD as PCP - General (Internal Medicine)  Nikki Zuniga MD as Surgeon (General Surgery)    Outpatient Medications Prior to Visit   Medication Sig Dispense Refill   • acetaminophen (TYLENOL) 500 MG tablet Take 1 tablet by mouth Every 6 (Six) Hours As Needed for Mild Pain.     • allopurinol (ZYLOPRIM) 100 MG tablet Take 1 tablet by mouth 2 (Two) Times a Day.  3   • anastrozole (ARIMIDEX) 1 MG tablet Take 1 tablet by mouth Daily. 30 tablet 2   • bumetanide (BUMEX) 0.5 MG tablet Take 1 tablet by mouth Daily. 90 tablet 3   • calcium carbonate (OS-LAURA) 600 MG tablet Take 2 tablets by mouth Daily.     • carboxymethylcellulose (REFRESH PLUS) 0.5 % solution Administer 1 drop to both eyes 3 (Three) Times a Day As Needed for Dry Eyes.     • dronedarone (MULTAQ) 400 MG tablet Take 1 tablet by mouth 2 (Two) Times a Day With Meals.     • levothyroxine (SYNTHROID, LEVOTHROID) 50 MCG tablet Take 1 tablet by mouth Daily. 90 tablet 3   • losartan (COZAAR) 50 MG tablet Take 1 tablet by mouth Daily. 90 tablet 3   • metoprolol succinate XL (TOPROL-XL) 25 MG 24 hr tablet Take 1 tablet by mouth 2  (Two) Times a Day.  1   • polyethylene glycol (MIRALAX) 17 GM/SCOOP powder Take 17 g by mouth Daily.     • simvastatin (ZOCOR) 40 MG tablet Take 1 tablet by mouth Daily. 90 tablet 3   • spironolactone (ALDACTONE) 25 MG tablet Take 1 tablet by mouth Daily. 90 tablet 3   • Vitamin D, Cholecalciferol, 50 MCG (2000 UT) capsule Take  by mouth.     • warfarin (COUMADIN) 5 MG tablet Take  by mouth Take As Directed. Take 1 whole tablet (5 mg) on Wednesday only. Take 1/2 tablet (2.5 mg) all other days.  5     Facility-Administered Medications Prior to Visit   Medication Dose Route Frequency Provider Last Rate Last Admin   • lidocaine (XYLOCAINE) 1 % injection 10 mL  10 mL Subcutaneous Once Nikki Zuniga MD       • lidocaine-EPINEPHrine (XYLOCAINE W/EPI) 1 %-1:853438 injection 10 mL  10 mL Injection Once Nikki Zuniga MD           No opioid medication identified on active medication list. I have reviewed chart for other potential  high risk medication/s and harmful drug interactions in the elderly.          Aspirin is not on active medication list.  Aspirin use is not indicated based on review of current medical condition/s. Risk of harm outweighs potential benefits.  .    Patient Active Problem List   Diagnosis   • Hx of adenomatous colonic polyps   • HTN (hypertension), benign   • Family hx of colon cancer   • Breast cancer, left (HCC)   • Chronic anticoagulation   • Endometrial thickening on ultrasound   • Hypothyroidism   • Kidney disease   • Mixed hyperlipidemia   • Atrial fibrillation, controlled (HCC)   • PMB (postmenopausal bleeding)   • Brain lesion   • Obesity (BMI 30-39.9)   • Non-tobacco user   • Anemia in stage 4 chronic kidney disease (HCC)   • Atrophic vaginitis   • Cardiomegaly   • Hematuria   • Myxedema heart disease   • Rheumatoid arthritis (HCC)   • Seborrheic keratosis   • Thrombocytopenia (HCC)   • Vitamin D deficiency   • Moderate mitral regurgitation by prior echocardiogram   • Dizziness   •  "Suspected cerebrovascular accident (CVA)     Advance Care Planning  Advance Directive is not on file.  ACP discussion was held with the patient during this visit. Patient has an advance directive (not in EMR), copy requested.       Objective    Vitals:    03/22/23 1022   BP: 120/72   BP Location: Left arm   Patient Position: Sitting   Cuff Size: Adult   Pulse: 60   Temp: 97.9 °F (36.6 °C)   TempSrc: Temporal   SpO2: 99%   Weight: 98.4 kg (217 lb)   Height: 165.1 cm (65\")   PainSc: 0-No pain     Estimated body mass index is 36.11 kg/m² as calculated from the following:    Height as of this encounter: 165.1 cm (65\").    Weight as of this encounter: 98.4 kg (217 lb).    Class 2 Severe Obesity (BMI >=35 and <=39.9). Obesity-related health conditions include the following: hypertension, dyslipidemias and osteoarthritis. Obesity is unchanged. BMI is is above average; BMI management plan is completed. We discussed portion control and increasing exercise.      Does the patient have evidence of cognitive impairment? No      Lab Results   Component Value Date    CHLPL 151 03/15/2023    TRIG 175 (H) 03/15/2023    HDL 55 03/15/2023    LDL 67 03/15/2023    VLDL 29 03/15/2023            HEALTH RISK ASSESSMENT    Smoking Status:  Social History     Tobacco Use   Smoking Status Never   Smokeless Tobacco Never     Alcohol Consumption:  Social History     Substance and Sexual Activity   Alcohol Use No     Fall Risk Screen:    STEADI Fall Risk Assessment was completed, and patient is at LOW risk for falls.Assessment completed on:3/22/2023    Depression Screening:  PHQ-2/PHQ-9 Depression Screening 3/22/2023   Retired PHQ-9 Total Score -   Retired Total Score -   Little Interest or Pleasure in Doing Things 0-->not at all   Feeling Down, Depressed or Hopeless 0-->not at all   PHQ-9: Brief Depression Severity Measure Score 0       Health Habits and Functional and Cognitive Screening:  Functional & Cognitive Status 3/22/2023   Do you have " difficulty preparing food and eating? No   Do you have difficulty bathing yourself, getting dressed or grooming yourself? No   Do you have difficulty using the toilet? No   Do you have difficulty moving around from place to place? No   Do you have trouble with steps or getting out of a bed or a chair? No   Current Diet Well Balanced Diet   Dental Exam Up to date   Eye Exam Up to date   Exercise (times per week) 0 times per week   Current Exercises Include No Regular Exercise   Current Exercise Activities Include -   Do you need help using the phone?  No   Are you deaf or do you have serious difficulty hearing?  No   Do you need help with transportation? No   Do you need help shopping? No   Do you need help preparing meals?  No   Do you need help with housework?  No   Do you need help with laundry? No   Do you need help taking your medications? No   Do you need help managing money? No   Do you ever drive or ride in a car without wearing a seat belt? No   Have you felt unusual stress, anger or loneliness in the last month? No   Who do you live with? Spouse   If you need help, do you have trouble finding someone available to you? No   Have you been bothered in the last four weeks by sexual problems? No   Do you have difficulty concentrating, remembering or making decisions? No       Age-appropriate Screening Schedule:  Refer to the list below for future screening recommendations based on patient's age, sex and/or medical conditions. Orders for these recommended tests are listed in the plan section. The patient has been provided with a written plan.    Health Maintenance   Topic Date Due   • MAMMOGRAM  12/07/2023   • LIPID PANEL  03/15/2024   • ANNUAL WELLNESS VISIT  03/22/2024   • COLORECTAL CANCER SCREENING  10/17/2024   • DXA SCAN  12/07/2024   • TDAP/TD VACCINES (2 - Td or Tdap) 12/21/2030   • COVID-19 Vaccine  Completed   • INFLUENZA VACCINE  Completed   • Pneumococcal Vaccine 65+  Completed   • ZOSTER VACCINE   Completed              Assessment & Plan   CMS Preventative Services Quick Reference  Risk Factors Identified During Encounter  Polypharmacy: Medication List reviewed and Medications are appropriate for patient  The above risks/problems have been discussed with the patient.  Follow up actions/plans if indicated are seen below in the Assessment/Plan Section.  Pertinent information has been shared with the patient in the After Visit Summary.    Diagnoses and all orders for this visit:    1. HTN (hypertension), benign (Primary)    2. Atrial fibrillation, controlled (HCC)    3. Mixed hyperlipidemia    4. Chronic anticoagulation    5. Hypothyroidism, unspecified type    6. Anemia in stage 4 chronic kidney disease (HCC)      BP controlled.   Patient has CKD stage 4, today GFR is 30 which is better than previous.     Continue simvastatin.  Take it at night.    Will monitor renal function closely.          Result Review :  The following data was reviewed by: José Miguel Anderson MD on 03/22/2023:  CMP    CMP 4/25/22 12/7/22 3/15/23   Glucose 107 (A) 149 (A) 95   BUN 29 (A) 23 20   Creatinine 1.79 (A) 1.60 (A) 1.68 (A)   eGFR 28.0 (A) 32.1 (A)    Sodium 141 141 142   Potassium 4.3 4.2 4.5   Chloride 106 108 (A) 104   Calcium 9.4 9.4 10.3   Total Protein   6.9   Total Protein 6.7 6.6    Albumin 4.30 4.10 4.4   Globulin   2.5   Globulin 2.4 2.5    Total Bilirubin 0.6 0.6 0.8   Alkaline Phosphatase 103 106 96   AST (SGOT) 18 17 18   ALT (SGPT) 10 11 10   Albumin/Globulin Ratio 1.8 1.6    BUN/Creatinine Ratio 16.2 14.4 11.9   Anion Gap 11.0 11.0    (A) Abnormal value       Comments are available for some flowsheets but are not being displayed.           CBC w/diff    CBC w/Diff 5/2/22 12/7/22 3/15/23   WBC 5.16 6.20 6.82   RBC 4.00 3.91 4.28   Hemoglobin 12.6 12.1 13.0   Hematocrit 39.0 38.9 40.1   MCV 97.5 (A) 99.5 (A) 93.7   MCH 31.5 30.9 30.4   MCHC 32.3 31.1 (A) 32.4   RDW 14.2 14.3 13.5   Platelets 154 127 (A) 105 (A)    Neutrophil Rel % 65.3 60.1 CANCELED   Immature Granulocyte Rel % 0.4 0.5    Lymphocyte Rel % 24.8 31.1 CANCELED   Monocyte Rel % 6.8 6.1 CANCELED   Eosinophil Rel % 2.1 1.9 CANCELED   Basophil Rel % 0.6 0.3    (A) Abnormal value       Comments are available for some flowsheets but are not being displayed.           Lipid Panel    Lipid Panel 9/15/22 3/15/23   Total Cholesterol 163 151   Triglycerides 178 (A) 175 (A)   HDL Cholesterol 55 55   VLDL Cholesterol 30 29   LDL Cholesterol  78 67   (A) Abnormal value       Comments are available for some flowsheets but are not being displayed.           TSH    TSH 9/15/22 3/15/23   TSH 2.820 3.220                    Follow Up:   Return in about 6 months (around 9/22/2023), or if symptoms worsen or fail to improve, for Recheck.     An After Visit Summary and PPPS were made available to the patient.                         CRISTIANO Anderson MD, FACP, FHM      Electronically signed by José Miguel Anderson MD, 03/22/23, 10:33 AM CDT.

## 2023-04-17 ENCOUNTER — ANTI-COAG VISIT (OUTPATIENT)
Dept: CARDIOLOGY CLINIC | Age: 83
End: 2023-04-17
Payer: MEDICARE

## 2023-04-17 DIAGNOSIS — I48.91 ATRIAL FIBRILLATION, UNSPECIFIED TYPE (HCC): Primary | ICD-10-CM

## 2023-04-17 LAB
INTERNATIONAL NORMALIZATION RATIO, POC: 2.4
PROTHROMBIN TIME, POC: 26.1

## 2023-04-17 PROCEDURE — 85610 PROTHROMBIN TIME: CPT | Performed by: NURSE PRACTITIONER

## 2023-04-20 DIAGNOSIS — Z17.0 MALIGNANT NEOPLASM OF LEFT BREAST IN FEMALE, ESTROGEN RECEPTOR POSITIVE, UNSPECIFIED SITE OF BREAST: ICD-10-CM

## 2023-04-20 DIAGNOSIS — C50.912 MALIGNANT NEOPLASM OF LEFT BREAST IN FEMALE, ESTROGEN RECEPTOR POSITIVE, UNSPECIFIED SITE OF BREAST: ICD-10-CM

## 2023-04-21 RX ORDER — ANASTROZOLE 1 MG/1
TABLET ORAL
Qty: 30 TABLET | Refills: 3 | Status: SHIPPED | OUTPATIENT
Start: 2023-04-21

## 2023-04-21 NOTE — PROGRESS NOTES
"Nikki Zuniga MD [facility] Breast follow up note    Referring Provider: No ref. provider found      Chief complaint   Chief Complaint   Patient presents with   • Cyst     Mrs. Ansari is here for a new problem lipoma/cyst on her hand.         Subjective .     History of present illness:  Heidi Ansari  is a 82 y.o. female who presents complaining of a fatty mass at the left hand and right arm.      History    The following portions of the patient's history were reviewed and updated as appropriate: allergies, current medications, past family history, past medical history, past social history, past surgical history, and problem list.    Objective     Vital Signs   /84 (BP Location: Left arm, Patient Position: Sitting, Cuff Size: Adult)   Pulse 70   Temp 97 °F (36.1 °C)   Ht 165.1 cm (65\")   Wt 98.4 kg (216 lb 14.9 oz)   SpO2 96%   BMI 36.10 kg/m²      Physical Exam:    General The patient is well-developed, well-nourished, and in no acute distress.  Right arm Fatty mass 4cm   Left hand Dorsum 3cm, no overlying skin changes      Imaging:      Class 2 Severe Obesity (BMI >=35 and <=39.9). Obesity-related health conditions include the following: none. Obesity is newly identified. BMI is is above average; BMI management plan is completed. We discussed portion control and increasing exercise.    Assessment & Plan       Diagnoses and all orders for this visit:    1. Lipoma of upper extremity, unspecified laterality (Primary)           The patient will be scheduled for excision of lipoma of the left hand and right arm.    The patient will be scheduled for prework testing including:  COVID; cbc, cmp, EKG, and CXR will be performed dependent upon anesthesia requirements.    An extensive review of patient intake forms, referring physician documents, laboratories, and imaging was performed in the medical decision making and surgical planning of this patient.      The risks including:  bleeding, infection, injury to " surrounding structures, and seroma formation were discussed as well as the benefits, complications, and possible alternatives of the above procedures and the patient agreed to proceed.        Nikki Zuniga MD  04/24/23  10:34 CDT

## 2023-04-21 NOTE — H&P (VIEW-ONLY)
"Nikki Zuniga MD [facility] Breast follow up note    Referring Provider: No ref. provider found      Chief complaint   Chief Complaint   Patient presents with   • Cyst     Mrs. Ansari is here for a new problem lipoma/cyst on her hand.         Subjective .     History of present illness:  Heidi Ansari  is a 82 y.o. female who presents complaining of a fatty mass at the left hand and right arm.      History    The following portions of the patient's history were reviewed and updated as appropriate: allergies, current medications, past family history, past medical history, past social history, past surgical history, and problem list.    Objective     Vital Signs   /84 (BP Location: Left arm, Patient Position: Sitting, Cuff Size: Adult)   Pulse 70   Temp 97 °F (36.1 °C)   Ht 165.1 cm (65\")   Wt 98.4 kg (216 lb 14.9 oz)   SpO2 96%   BMI 36.10 kg/m²      Physical Exam:    General The patient is well-developed, well-nourished, and in no acute distress.  Right arm Fatty mass 4cm   Left hand Dorsum 3cm, no overlying skin changes      Imaging:      Class 2 Severe Obesity (BMI >=35 and <=39.9). Obesity-related health conditions include the following: none. Obesity is newly identified. BMI is is above average; BMI management plan is completed. We discussed portion control and increasing exercise.    Assessment & Plan       Diagnoses and all orders for this visit:    1. Lipoma of upper extremity, unspecified laterality (Primary)           The patient will be scheduled for excision of lipoma of the left hand and right arm.    The patient will be scheduled for prework testing including:  COVID; cbc, cmp, EKG, and CXR will be performed dependent upon anesthesia requirements.    An extensive review of patient intake forms, referring physician documents, laboratories, and imaging was performed in the medical decision making and surgical planning of this patient.      The risks including:  bleeding, infection, injury to " surrounding structures, and seroma formation were discussed as well as the benefits, complications, and possible alternatives of the above procedures and the patient agreed to proceed.        Nikki Zuniga MD  04/24/23  10:34 CDT

## 2023-04-24 ENCOUNTER — PREP FOR SURGERY (OUTPATIENT)
Dept: OTHER | Facility: HOSPITAL | Age: 83
End: 2023-04-24
Payer: MEDICARE

## 2023-04-24 ENCOUNTER — OFFICE VISIT (OUTPATIENT)
Dept: SURGERY | Facility: CLINIC | Age: 83
End: 2023-04-24
Payer: MEDICARE

## 2023-04-24 VITALS
HEART RATE: 70 BPM | OXYGEN SATURATION: 96 % | WEIGHT: 216.93 LBS | TEMPERATURE: 97 F | SYSTOLIC BLOOD PRESSURE: 149 MMHG | BODY MASS INDEX: 36.14 KG/M2 | DIASTOLIC BLOOD PRESSURE: 84 MMHG | HEIGHT: 65 IN

## 2023-04-24 DIAGNOSIS — D17.20 LIPOMA OF UPPER EXTREMITY, UNSPECIFIED LATERALITY: Primary | ICD-10-CM

## 2023-04-24 NOTE — PATIENT INSTRUCTIONS
Surgery is scheduled for 04/28/2023 at 6am.  Prework is scheduled for 04/26/2023 at 1115.  Nothing to eat or drink after midnight before surgery.  No Aspirin, Vitamins or Blood Thinners 5 days prior to surgery.  Please report to the hospital main registration for check in on both days.

## 2023-04-26 ENCOUNTER — PRE-ADMISSION TESTING (OUTPATIENT)
Dept: PREADMISSION TESTING | Facility: HOSPITAL | Age: 83
End: 2023-04-26
Payer: MEDICARE

## 2023-04-26 VITALS
HEART RATE: 77 BPM | BODY MASS INDEX: 36.33 KG/M2 | SYSTOLIC BLOOD PRESSURE: 146 MMHG | OXYGEN SATURATION: 93 % | WEIGHT: 218.03 LBS | DIASTOLIC BLOOD PRESSURE: 61 MMHG | HEIGHT: 65 IN | RESPIRATION RATE: 18 BRPM

## 2023-04-26 DIAGNOSIS — E78.2 MIXED HYPERLIPIDEMIA: ICD-10-CM

## 2023-04-26 LAB
ANION GAP SERPL CALCULATED.3IONS-SCNC: 10 MMOL/L (ref 5–15)
BUN SERPL-MCNC: 28 MG/DL (ref 8–23)
BUN/CREAT SERPL: 15.3 (ref 7–25)
CALCIUM SPEC-SCNC: 9.5 MG/DL (ref 8.6–10.5)
CHLORIDE SERPL-SCNC: 106 MMOL/L (ref 98–107)
CO2 SERPL-SCNC: 24 MMOL/L (ref 22–29)
CREAT SERPL-MCNC: 1.83 MG/DL (ref 0.57–1)
EGFRCR SERPLBLD CKD-EPI 2021: 27.1 ML/MIN/1.73
GLUCOSE SERPL-MCNC: 86 MG/DL (ref 65–99)
POTASSIUM SERPL-SCNC: 4.5 MMOL/L (ref 3.5–5.2)
SODIUM SERPL-SCNC: 140 MMOL/L (ref 136–145)

## 2023-04-26 PROCEDURE — 93005 ELECTROCARDIOGRAM TRACING: CPT

## 2023-04-26 PROCEDURE — 36415 COLL VENOUS BLD VENIPUNCTURE: CPT

## 2023-04-26 PROCEDURE — 80048 BASIC METABOLIC PNL TOTAL CA: CPT

## 2023-04-26 RX ORDER — SIMVASTATIN 40 MG
TABLET ORAL
Qty: 90 TABLET | Refills: 0 | Status: SHIPPED | OUTPATIENT
Start: 2023-04-26

## 2023-04-26 NOTE — DISCHARGE INSTRUCTIONS
Before you come to the hospital        Arrival time: AS DIRECTED BY OFFICE     YOU MAY TAKE THE FOLLOWING MEDICATION(S) THE MORNING OF SURGERY WITH A SIP OF WATER: METOPROLOL    DO NOT TAKE LOSARTAN 24 HOURS PRIOR TO SURGERY           ALL OTHER HOME MEDICATION CHECK WITH YOUR PHYSICIAN (especially if   you are taking diabetes medicines or blood thinners)      If you were given and instructed to use a germ- killing soap, use as directed the night before surgery and again the morning of surgery or as directed by your surgeon. (Use one-half of the bottle with each shower.)   See attached information for How to Use Chlorhexidine for Bathing if applicable.            Eating and drinking restrictions prior to scheduled arrival time    2 Hours before arrival time STOP   Drinking Clear liquids (water, apple juice-no pulp)     6 Hours before arrival time STOP   Milk or drinks that contain milk, full liquids    6 Hours before arrival time STOP   Light meals or foods, such as toast or cereal    8 Hours before arrival time STOP   Heavy foods, such as meat, fried foods, or fatty foods    (It is extremely important that you follow these guidelines to prevent delay or cancelation of your procedure)     Clear Liquids  Water and flavored water                                                                      Clear Fruit juices, such as cranberry juice and apple juice.  Black coffee (NO cream of any kind, including powdered).  Plain tea  Clear bouillon or broth.  Flavored gelatin.  Soda.  Gatorade or Powerade.  Full liquid examples  Juices that have pulp.  Frozen ice pops that contain fruit pieces.  Coffee with creamer  Milk.  Yogurt.                MANAGING PAIN AFTER SURGERY    We know you are probably wondering what your pain will be like after surgery.  Following surgery it is unrealistic to expect you will not have pain.   Pain is how our bodies let us know that something is wrong or cautions us to be careful.  That said, our  goal is to make your pain tolerable.    Methods we may use to treat your pain include (oral or IV medications, PCAs, epidurals, nerve blocks, etc.)   While some procedures require IV pain medications for a short time after surgery, transitioning to pain medications by mouth allows for better management of pain.   Your nurse will encourage you to take oral pain medications whenever possible.  IV medications work almost immediately, but only last a short while.  Taking medications by mouth allows for a more constant level of medication in your blood stream for a longer period of time.      Once your pain is out of control it is harder to get back under control.  It is important you are aware when your next dose of pain medication is due.  If you are admitted, your nurse may write the time of your next dose on the white board in your room to help you remember.      We are interested in your pain and encourage you to inform us about aggravating factors during your visit.   Many times a simple repositioning every few hours can make a big difference.    If your physician says it is okay, do not let your pain prevent you from getting out of bed. Be sure to call your nurse for assistance prior to getting up so you do not fall.      Before surgery, please decide your tolerable pain goal.  These faces help describe the pain ratings we use on a 0-10 scale.   Be prepared to tell us your goal and whether or not you take pain or anxiety medications at home.          Preparing for Surgery  Preparing for surgery is an important part of your care. It can make things go more smoothly and help you avoid complications. The steps leading up to surgery may vary among hospitals. Follow all instructions given to you by your health care providers. Ask questions if you do not understand something. Talk about any concerns that you have.  Here are some questions to consider asking before your surgery:  If my surgery is not an emergency (is  elective), when would be the best time to have the surgery?  What arrangements do I need to make for work, home, or school?  What will my recovery be like? How long will it be before I can return to normal activities?  Will I need to prepare my home? Will I need to arrange care for me or my children?  Should I expect to have pain after surgery? What are my pain management options? Are there nonmedical options that I can try for pain?  Tell a health care provider about:  Any allergies you have.  All medicines you are taking, including vitamins, herbs, eye drops, creams, and over-the-counter medicines.  Any problems you or family members have had with anesthetic medicines.  Any blood disorders you have.  Any surgeries you have had.  Any medical conditions you have.  Whether you are pregnant or may be pregnant.  What are the risks?  The risks and complications of surgery depend on the specific procedure that you have. Discuss all the risks with your health care providers before your surgery. Ask about common surgical complications, which may include:  Infection.  Bleeding or a need for blood replacement (transfusion).  Allergic reactions to medicines.  Damage to surrounding nerves, tissues, or structures.  A blood clot.  Scarring.  Failure of the surgery to correct the problem.  Follow these instructions before the procedure:  Several days or weeks before your procedure  You may have a physical exam by your primary health care provider to make sure it is safe for you to have surgery.  You may have testing. This may include a chest X-ray, blood and urine tests, electrocardiogram (ECG), or other testing.  Ask your health care provider about:  Changing or stopping your regular medicines. This is especially important if you are taking diabetes medicines or blood thinners.  Taking medicines such as aspirin and ibuprofen. These medicines can thin your blood. Do not take these medicines unless your health care provider tells  you to take them.  Taking over-the-counter medicines, vitamins, herbs, and supplements.  Do not use any products that contain nicotine or tobacco, such as cigarettes and e-cigarettes. If you need help quitting, ask your health care provider.  Avoid alcohol.  Ask your health care provider if there are exercises you can do to prepare for surgery.  Eat a healthy diet.   Plan to have someone 18 years of age or older to take you home from the hospital. We will need to verify your ride on the morning of surgery if you are being discharged home on the same day. Tell your ride to be expecting a call from the hospital prior to your procedure.   Plan to have a responsible adult care for you for at least 24 hours after you leave the hospital or clinic. This is important.  The day before your procedure  You may be given antibiotic medicine to take by mouth to help prevent infection. Take it as told by your health care provider.  You may be asked to shower with a germ-killing soap.  Follow instructions from your health care provider about eating and drinking restrictions. This includes gum, mints and hard candy.  Pack comfortable clothes according to your procedure.   The day of your procedure  You may need to take another shower with a germ-killing soap before you leave home in the morning.  With a small sip of water, take only the medicines that you are told to take.  Remove all jewelry including rings.   Leave anything you consider valuable at home except hearing aids if needed.  You do not need to bring your home medications into the hospital.   Do not wear any makeup, nail polish, powder, deodorant, lotion, hair accessories, or anything on your skin or body except your clothes.  If you will be staying in the hospital, bring a case to hold your glasses, contacts, or dentures. You may also want to bring your robe and non-skid footwear.       (Do not use denture adhesives since you will be asked to remove them during   surgery).   If you wear oxygen at home, bring it with you the day of surgery.  If instructed by your health care provider, bring your sleep apnea device with you on the day of your surgery (if this applies to you).  You may want to leave your suitcase and sleep apnea device in the car until after surgery.   Arrive at the hospital as scheduled.  Bring a friend or family member with you who can help to answer questions and be present while you meet with your health care provider.  At the hospital  When you arrive at the hospital:  Go to registration located at the main entrance of the hospital. You will be registered and given a beeper and a sticker sheet. Take the stickers to the Outpatient nurses desk and place in the black tray. This is to notify staff that you have arrived. Then return to the lobby to wait.   When your beeper lights up and vibrates proceed through the double doors, under the stairs, and a member of the Outpatient Surgery staff will escort you to your preoperative room.  You may have to wear compression sleeves. These help to prevent blood clots and reduce swelling in your legs.  An IV may be inserted into one of your veins.              In the operating room, you may be given one or more of the following:        A medicine to help you relax (sedative).        A medicine to numb the area (local anesthetic).        A medicine to make you fall asleep (general anesthetic).        A medicine that is injected into an area of your body to numb everything below the                      injection site (regional anesthetic).  You may be given an antibiotic through your IV to help prevent infection.  Your surgical site will be marked or identified.    Contact a health care provider if you:  Develop a fever of more than 100.4°F (38°C) or other feelings of illness during the 48 hours before your surgery.  Have symptoms that get worse.  Have questions or concerns about your surgery.  Summary  Preparing for  surgery can make the procedure go more smoothly and lower your risk of complications.  Before surgery, make a list of questions and concerns to discuss with your surgeon. Ask about the risks and possible complications.  In the days or weeks before your surgery, follow all instructions from your health care provider. You may need to stop smoking, avoid alcohol, follow eating restrictions, and change or stop your regular medicines.  Contact your surgeon if you develop a fever or other signs of illness during the few days before your surgery.  This information is not intended to replace advice given to you by your health care provider. Make sure you discuss any questions you have with your health care provider.  Document Revised: 12/21/2018 Document Reviewed: 10/23/2018  Elsevier Patient Education © 2021 [employer]

## 2023-04-28 ENCOUNTER — HOSPITAL ENCOUNTER (OUTPATIENT)
Facility: HOSPITAL | Age: 83
Setting detail: HOSPITAL OUTPATIENT SURGERY
Discharge: HOME | End: 2023-04-28
Attending: SPECIALIST | Admitting: SPECIALIST
Payer: MEDICARE

## 2023-04-28 ENCOUNTER — ANESTHESIA EVENT (OUTPATIENT)
Dept: PERIOP | Facility: HOSPITAL | Age: 83
End: 2023-04-28
Payer: MEDICARE

## 2023-04-28 ENCOUNTER — ANESTHESIA (OUTPATIENT)
Dept: PERIOP | Facility: HOSPITAL | Age: 83
End: 2023-04-28
Payer: MEDICARE

## 2023-04-28 VITALS
OXYGEN SATURATION: 95 % | TEMPERATURE: 97.8 F | DIASTOLIC BLOOD PRESSURE: 64 MMHG | SYSTOLIC BLOOD PRESSURE: 162 MMHG | RESPIRATION RATE: 16 BRPM | HEART RATE: 58 BPM

## 2023-04-28 DIAGNOSIS — D17.21 LIPOMA OF RIGHT UPPER EXTREMITY: Primary | ICD-10-CM

## 2023-04-28 DIAGNOSIS — D17.20 LIPOMA OF UPPER EXTREMITY, UNSPECIFIED LATERALITY: ICD-10-CM

## 2023-04-28 LAB
DEPRECATED RDW RBC AUTO: 50.3 FL (ref 37–54)
ERYTHROCYTE [DISTWIDTH] IN BLOOD BY AUTOMATED COUNT: 14.2 % (ref 12.3–15.4)
HCT VFR BLD AUTO: 36.9 % (ref 34–46.6)
HGB BLD-MCNC: 11.6 G/DL (ref 12–15.9)
INR PPP: 1.53 (ref 0.91–1.09)
MCH RBC QN AUTO: 30.6 PG (ref 26.6–33)
MCHC RBC AUTO-ENTMCNC: 31.4 G/DL (ref 31.5–35.7)
MCV RBC AUTO: 97.4 FL (ref 79–97)
PLATELET # BLD AUTO: 143 10*3/MM3 (ref 140–450)
PMV BLD AUTO: 12.8 FL (ref 6–12)
PROTHROMBIN TIME: 18.6 SECONDS (ref 11.8–14.8)
QT INTERVAL: 498 MS
QTC INTERVAL: 476 MS
RBC # BLD AUTO: 3.79 10*6/MM3 (ref 3.77–5.28)
WBC NRBC COR # BLD: 6.51 10*3/MM3 (ref 3.4–10.8)

## 2023-04-28 PROCEDURE — 85610 PROTHROMBIN TIME: CPT | Performed by: SPECIALIST

## 2023-04-28 PROCEDURE — 85027 COMPLETE CBC AUTOMATED: CPT

## 2023-04-28 PROCEDURE — 26160 REMOVE TENDON SHEATH LESION: CPT | Performed by: SPECIALIST

## 2023-04-28 PROCEDURE — 36415 COLL VENOUS BLD VENIPUNCTURE: CPT

## 2023-04-28 PROCEDURE — 25010000002 CEFAZOLIN PER 500 MG: Performed by: SPECIALIST

## 2023-04-28 PROCEDURE — 25010000002 FENTANYL CITRATE (PF) 100 MCG/2ML SOLUTION: Performed by: NURSE ANESTHETIST, CERTIFIED REGISTERED

## 2023-04-28 PROCEDURE — 25010000002 PROPOFOL 1000 MG/100ML EMULSION: Performed by: NURSE ANESTHETIST, CERTIFIED REGISTERED

## 2023-04-28 PROCEDURE — 11403 EXC TR-EXT B9+MARG 2.1-3CM: CPT | Performed by: SPECIALIST

## 2023-04-28 RX ORDER — SODIUM CHLORIDE, SODIUM LACTATE, POTASSIUM CHLORIDE, CALCIUM CHLORIDE 600; 310; 30; 20 MG/100ML; MG/100ML; MG/100ML; MG/100ML
100 INJECTION, SOLUTION INTRAVENOUS CONTINUOUS
Status: DISCONTINUED | OUTPATIENT
Start: 2023-04-28 | End: 2023-04-28 | Stop reason: HOSPADM

## 2023-04-28 RX ORDER — OXYCODONE HYDROCHLORIDE AND ACETAMINOPHEN 5; 325 MG/1; MG/1
1 TABLET ORAL EVERY 4 HOURS PRN
Qty: 10 TABLET | Refills: 0 | Status: SHIPPED | OUTPATIENT
Start: 2023-04-28

## 2023-04-28 RX ORDER — OXYCODONE AND ACETAMINOPHEN 10; 325 MG/1; MG/1
1 TABLET ORAL ONCE AS NEEDED
Status: DISCONTINUED | OUTPATIENT
Start: 2023-04-28 | End: 2023-04-28

## 2023-04-28 RX ORDER — LIDOCAINE HYDROCHLORIDE 10 MG/ML
0.5 INJECTION, SOLUTION EPIDURAL; INFILTRATION; INTRACAUDAL; PERINEURAL ONCE AS NEEDED
Status: DISCONTINUED | OUTPATIENT
Start: 2023-04-28 | End: 2023-04-28 | Stop reason: HOSPADM

## 2023-04-28 RX ORDER — SODIUM CHLORIDE 0.9 % (FLUSH) 0.9 %
3-10 SYRINGE (ML) INJECTION AS NEEDED
Status: DISCONTINUED | OUTPATIENT
Start: 2023-04-28 | End: 2023-04-28 | Stop reason: HOSPADM

## 2023-04-28 RX ORDER — OXYCODONE AND ACETAMINOPHEN 7.5; 325 MG/1; MG/1
2 TABLET ORAL EVERY 4 HOURS PRN
Status: DISCONTINUED | OUTPATIENT
Start: 2023-04-28 | End: 2023-04-28

## 2023-04-28 RX ORDER — HYDROCODONE BITARTRATE AND ACETAMINOPHEN 7.5; 325 MG/1; MG/1
2 TABLET ORAL ONCE AS NEEDED
Status: DISCONTINUED | OUTPATIENT
Start: 2023-04-28 | End: 2023-04-28 | Stop reason: HOSPADM

## 2023-04-28 RX ORDER — FLUMAZENIL 0.1 MG/ML
0.2 INJECTION INTRAVENOUS AS NEEDED
Status: DISCONTINUED | OUTPATIENT
Start: 2023-04-28 | End: 2023-04-28 | Stop reason: HOSPADM

## 2023-04-28 RX ORDER — LABETALOL HYDROCHLORIDE 5 MG/ML
5 INJECTION, SOLUTION INTRAVENOUS
Status: DISCONTINUED | OUTPATIENT
Start: 2023-04-28 | End: 2023-04-28 | Stop reason: HOSPADM

## 2023-04-28 RX ORDER — DROPERIDOL 2.5 MG/ML
0.62 INJECTION, SOLUTION INTRAMUSCULAR; INTRAVENOUS ONCE AS NEEDED
Status: DISCONTINUED | OUTPATIENT
Start: 2023-04-28 | End: 2023-04-28 | Stop reason: HOSPADM

## 2023-04-28 RX ORDER — MAGNESIUM HYDROXIDE 1200 MG/15ML
LIQUID ORAL AS NEEDED
Status: DISCONTINUED | OUTPATIENT
Start: 2023-04-28 | End: 2023-04-28 | Stop reason: HOSPADM

## 2023-04-28 RX ORDER — LIDOCAINE HYDROCHLORIDE 20 MG/ML
INJECTION, SOLUTION EPIDURAL; INFILTRATION; INTRACAUDAL; PERINEURAL AS NEEDED
Status: DISCONTINUED | OUTPATIENT
Start: 2023-04-28 | End: 2023-04-28 | Stop reason: SURG

## 2023-04-28 RX ORDER — NALOXONE HCL 0.4 MG/ML
0.4 VIAL (ML) INJECTION AS NEEDED
Status: DISCONTINUED | OUTPATIENT
Start: 2023-04-28 | End: 2023-04-28 | Stop reason: HOSPADM

## 2023-04-28 RX ORDER — SODIUM CHLORIDE, SODIUM LACTATE, POTASSIUM CHLORIDE, CALCIUM CHLORIDE 600; 310; 30; 20 MG/100ML; MG/100ML; MG/100ML; MG/100ML
1000 INJECTION, SOLUTION INTRAVENOUS CONTINUOUS
Status: DISCONTINUED | OUTPATIENT
Start: 2023-04-28 | End: 2023-04-28 | Stop reason: HOSPADM

## 2023-04-28 RX ORDER — SODIUM CHLORIDE 0.9 % (FLUSH) 0.9 %
3 SYRINGE (ML) INJECTION AS NEEDED
Status: DISCONTINUED | OUTPATIENT
Start: 2023-04-28 | End: 2023-04-28 | Stop reason: HOSPADM

## 2023-04-28 RX ORDER — ONDANSETRON 2 MG/ML
4 INJECTION INTRAMUSCULAR; INTRAVENOUS ONCE AS NEEDED
Status: DISCONTINUED | OUTPATIENT
Start: 2023-04-28 | End: 2023-04-28 | Stop reason: HOSPADM

## 2023-04-28 RX ORDER — FENTANYL CITRATE 50 UG/ML
INJECTION, SOLUTION INTRAMUSCULAR; INTRAVENOUS AS NEEDED
Status: DISCONTINUED | OUTPATIENT
Start: 2023-04-28 | End: 2023-04-28 | Stop reason: SURG

## 2023-04-28 RX ORDER — OXYCODONE HYDROCHLORIDE AND ACETAMINOPHEN 5; 325 MG/1; MG/1
1 TABLET ORAL ONCE AS NEEDED
Status: DISCONTINUED | OUTPATIENT
Start: 2023-04-28 | End: 2023-04-28 | Stop reason: HOSPADM

## 2023-04-28 RX ORDER — FENTANYL CITRATE 50 UG/ML
25 INJECTION, SOLUTION INTRAMUSCULAR; INTRAVENOUS
Status: DISCONTINUED | OUTPATIENT
Start: 2023-04-28 | End: 2023-04-28 | Stop reason: HOSPADM

## 2023-04-28 RX ORDER — PROPOFOL 10 MG/ML
INJECTION, EMULSION INTRAVENOUS AS NEEDED
Status: DISCONTINUED | OUTPATIENT
Start: 2023-04-28 | End: 2023-04-28 | Stop reason: SURG

## 2023-04-28 RX ORDER — SODIUM CHLORIDE 0.9 % (FLUSH) 0.9 %
3 SYRINGE (ML) INJECTION EVERY 12 HOURS SCHEDULED
Status: DISCONTINUED | OUTPATIENT
Start: 2023-04-28 | End: 2023-04-28 | Stop reason: HOSPADM

## 2023-04-28 RX ORDER — BACITRACIN ZINC 500 [USP'U]/G
OINTMENT TOPICAL AS NEEDED
Status: DISCONTINUED | OUTPATIENT
Start: 2023-04-28 | End: 2023-04-28 | Stop reason: HOSPADM

## 2023-04-28 RX ORDER — SODIUM CHLORIDE 9 MG/ML
40 INJECTION, SOLUTION INTRAVENOUS AS NEEDED
Status: DISCONTINUED | OUTPATIENT
Start: 2023-04-28 | End: 2023-04-28 | Stop reason: HOSPADM

## 2023-04-28 RX ORDER — BUPIVACAINE HYDROCHLORIDE AND EPINEPHRINE 2.5; 5 MG/ML; UG/ML
INJECTION, SOLUTION INFILTRATION; PERINEURAL AS NEEDED
Status: DISCONTINUED | OUTPATIENT
Start: 2023-04-28 | End: 2023-04-28 | Stop reason: HOSPADM

## 2023-04-28 RX ORDER — HYDROCODONE BITARTRATE AND ACETAMINOPHEN 10; 325 MG/1; MG/1
1 TABLET ORAL ONCE AS NEEDED
Status: DISCONTINUED | OUTPATIENT
Start: 2023-04-28 | End: 2023-04-28 | Stop reason: HOSPADM

## 2023-04-28 RX ADMIN — FENTANYL CITRATE 50 MCG: 50 INJECTION, SOLUTION INTRAMUSCULAR; INTRAVENOUS at 08:10

## 2023-04-28 RX ADMIN — SODIUM CHLORIDE, POTASSIUM CHLORIDE, SODIUM LACTATE AND CALCIUM CHLORIDE 1000 ML: 600; 310; 30; 20 INJECTION, SOLUTION INTRAVENOUS at 06:39

## 2023-04-28 RX ADMIN — CEFAZOLIN 2 G: 2 INJECTION, POWDER, FOR SOLUTION INTRAMUSCULAR; INTRAVENOUS at 08:12

## 2023-04-28 RX ADMIN — FENTANYL CITRATE 50 MCG: 50 INJECTION, SOLUTION INTRAMUSCULAR; INTRAVENOUS at 08:22

## 2023-04-28 RX ADMIN — PROPOFOL 100 MCG/KG/MIN: 10 INJECTION, EMULSION INTRAVENOUS at 08:11

## 2023-04-28 RX ADMIN — LIDOCAINE HYDROCHLORIDE 60 MG: 20 INJECTION, SOLUTION EPIDURAL; INFILTRATION; INTRACAUDAL; PERINEURAL at 08:10

## 2023-04-28 RX ADMIN — PROPOFOL 50 MG: 10 INJECTION, EMULSION INTRAVENOUS at 08:10

## 2023-04-28 NOTE — ANESTHESIA POSTPROCEDURE EVALUATION
Patient: Heidi Ansari    Procedure Summary     Date: 04/28/23 Room / Location: [facility] OR  /  [facility] OR    Anesthesia Start: 0805 Anesthesia Stop: 0845    Procedure: lipoma excision left hand and right arm (Bilateral) Diagnosis:       Lipoma of upper extremity, unspecified laterality      (Lipoma of upper extremity, unspecified laterality [D17.20])    Surgeons: Nikki Zuniga MD Provider: Olayinka Cortes CRNA    Anesthesia Type: MAC ASA Status: 3          Anesthesia Type: MAC    Vitals  Vitals Value Taken Time   /61 04/28/23 0900   Temp 97.8 °F (36.6 °C) 04/28/23 0905   Pulse 62 04/28/23 0906   Resp 15 04/28/23 0905   SpO2 93 % 04/28/23 0906   Vitals shown include unvalidated device data.        Post Anesthesia Care and Evaluation    Patient location during evaluation: PACU  Patient participation: complete - patient participated  Level of consciousness: awake and alert  Pain management: adequate    Airway patency: patent  Anesthetic complications: No anesthetic complications  PONV Status: none  Cardiovascular status: acceptable and hemodynamically stable  Respiratory status: acceptable  Hydration status: acceptable    Comments: Blood pressure 162/64, pulse 58, temperature 97.8 °F (36.6 °C), temperature source Temporal, resp. rate 16, SpO2 95 %, not currently breastfeeding.    Patient discharged from PACU based upon Earnest score. Please see RN notes for further details

## 2023-04-28 NOTE — ANESTHESIA PREPROCEDURE EVALUATION
Anesthesia Evaluation     Patient summary reviewed   no history of anesthetic complications:  NPO Solid Status: > 8 hours             Airway   Mallampati: II  TM distance: >3 FB  Neck ROM: full  Dental      Pulmonary    (-) COPD, asthma, sleep apnea, not a smoker  Cardiovascular   Exercise tolerance: good (4-7 METS)    ECG reviewed    (+) hypertension, dysrhythmias Atrial Fib, hyperlipidemia,   (-) pacemaker, past MI, angina, cardiac stents      Neuro/Psych  (-) seizures, TIA, CVA  GI/Hepatic/Renal/Endo    (+) obesity,   renal disease CRI, thyroid problem hypothyroidism  (-) GERD, liver disease, diabetes    Musculoskeletal     Abdominal    Substance History      OB/GYN          Other                          Anesthesia Plan    ASA 3     MAC     (Posted as MAC, discussed possible need for conversion to general with patient)  intravenous induction     Anesthetic plan, risks, benefits, and alternatives have been provided, discussed and informed consent has been obtained with: patient.

## 2023-04-28 NOTE — OP NOTE
Preoperative diagnosis:     Mass left hand and right upper arm  Postoperative diagnosis:    same  Surgeon:     Nikki Zuniga MD FACS  Procedure:    Excision lipoma right upper arm, 3cm; evacuation synovial cyst left dorsum of hand  Anesthesia:    Mac loc   EBL:     minimal  IVF:     See anesthesia notes  Indications:     The patient is a 82 y.o. -year-old female who presents for excision of a mass at the right upper arm and left hand.  The risks, benefits, complications, and possible alternatives of the procedure were discussed with the patient who agreed to proceed.  Description of procedure:  The patient was laid supine.  The right arm and left hand were prepped and draped in the usual sterile fashion.  An incision was created at the right inferior anterior upper arm.  bovie and scalpel were used to dissect a 3cm lipoma from the surrounding subcutaneous tissues.  An incision was then created at the dorsum of the left hand.  Synovial fluid was evacuated.  The cyst was not removed.  The skin edges were reapproximated with 4-0 nylon in simple interrupted fashion.  Bacitracin, 4 x 4's, and Koban were applied.   The sponge, needle, and instrument counts were correct at the end of the case.  Findings:    3cm lipoma right arm, synovial cyst left hand  Complications:   None  Disposition:    Good to PACU

## 2023-05-08 ENCOUNTER — OFFICE VISIT (OUTPATIENT)
Dept: SURGERY | Facility: CLINIC | Age: 83
End: 2023-05-08
Payer: MEDICARE

## 2023-05-08 VITALS — HEIGHT: 65 IN | BODY MASS INDEX: 35.82 KG/M2 | WEIGHT: 215 LBS

## 2023-05-08 DIAGNOSIS — Z98.890 S/P EXCISION OF LIPOMA: ICD-10-CM

## 2023-05-08 DIAGNOSIS — Z86.018 S/P EXCISION OF LIPOMA: ICD-10-CM

## 2023-05-08 DIAGNOSIS — M71.349 SYNOVIAL CYST OF HAND: ICD-10-CM

## 2023-05-08 DIAGNOSIS — Z48.89 AFTERCARE FOLLOWING SURGERY: ICD-10-CM

## 2023-05-08 DIAGNOSIS — Z48.89 POSTOPERATIVE VISIT: Primary | ICD-10-CM

## 2023-05-08 NOTE — PROGRESS NOTES
"Nikki Zuniga MD FACS Progress Note    Referring Provider: No ref. provider found      Chief complaint   Chief Complaint   Patient presents with   • Follow-up     Ms. Ansari is here for a follow up after surgery.        Subjective .     History of present illness:  Heidi Ansari  is a 82 y.o. female who presents status post Excision lipoma right upper arm, 3cm; evacuation synovial cyst left dorsum of hand.    History    The following portions of the patient's history were reviewed and updated as appropriate: allergies, current medications, past family history, past medical history, past social history, past surgical history, and problem list.    Objective     Vital Signs   Ht 164 cm (64.57\")   Wt 97.5 kg (215 lb)   BMI 36.26 kg/m²      Physical Exam:    General The patient is well-developed, well-nourished, and in no acute distress.     Right arm The incision is well-approximated and healing without signs of infection.    Left hand The incision is well-approximated and healing without signs of infection.  The nylon sutures were removed and steri strips were applied.    Class 2 Severe Obesity (BMI >=35 and <=39.9). Obesity-related health conditions include the following: none. Obesity is newly identified. BMI is is above average; BMI management plan is completed. We discussed portion control and increasing exercise.    Assessment & Plan       Diagnoses and all orders for this visit:    1. Postoperative visit (Primary)    2. Aftercare following surgery    3. S/P excision of lipoma    4. Synovial cyst of hand           The patient will return prn.      Nikki Zuniga MD              "

## 2023-05-12 ENCOUNTER — OFFICE VISIT (OUTPATIENT)
Dept: CARDIOLOGY CLINIC | Age: 83
End: 2023-05-12

## 2023-05-12 VITALS
HEART RATE: 60 BPM | DIASTOLIC BLOOD PRESSURE: 78 MMHG | WEIGHT: 220 LBS | SYSTOLIC BLOOD PRESSURE: 148 MMHG | BODY MASS INDEX: 35.36 KG/M2 | HEIGHT: 66 IN

## 2023-05-12 DIAGNOSIS — I48.0 PAF (PAROXYSMAL ATRIAL FIBRILLATION) (HCC): Primary | ICD-10-CM

## 2023-05-12 DIAGNOSIS — I35.0 NONRHEUMATIC AORTIC VALVE STENOSIS: ICD-10-CM

## 2023-05-12 LAB
INTERNATIONAL NORMALIZATION RATIO, POC: 2.6
PROTHROMBIN TIME, POC: 27.8

## 2023-05-12 ASSESSMENT — ENCOUNTER SYMPTOMS
EYE DISCHARGE: 0
SHORTNESS OF BREATH: 1
WHEEZING: 0
CONSTIPATION: 0
ABDOMINAL DISTENTION: 0
COUGH: 0
BACK PAIN: 0
DIARRHEA: 0
VOMITING: 0
BLOOD IN STOOL: 0

## 2023-05-12 NOTE — PROGRESS NOTES
%. IMPRESSION:  Impression:  There is no obvious infarct or ischemia, with a calculated ejection  fraction of 72 %. Suggest: Clinical correlation with consideration for medical  management. Signed by Dr Eboni Early is normal in size with preserved LV systolic function. LV ejection   fraction estimated at 50 to 55%. Probable grade 1 diastolic dysfunction. RV is normal in size with normal systolic function. Mild left atrial enlargement. Normal right atrial size. Aortic valve is trileaflet with moderate leaflet thickening and mildly   reduced leaflet mobility. No significant stenosis with aortic sclerosis. No regurgitation noted. Mitral valve is structurally normal with normal leaflet mobility. Trace to   mild mitral regurgitation. No stenosis. Mild tricuspid regurgitation. Trace pulmonic regurgitation. No significant pericardial effusion. Signature      ----------------------------------------------------------------   Electronically signed by Aniya Schafer MD(Interpreting physician)   on 09/20/2021 08:22 PM   ----------------------------------------------------------------    ASSESSMENT and PLAN:    50-year-old female patient with past medical history of paroxysmal atrial fibrillation on anticoagulation with Coumadin and on multaq with recent bursts of atrial fibrillation, normal LV ejection fraction, CKD stage IV (baseline creatinine 2.7), recent diagnosis of left breast cancer 1/2020 status post mastectomy, started on Arimidex, here for follow-up evaluation. 1.  Slightly more increased frequency of A-fib episodes. She is on anticoagulation with Coumadin. On Multaq which likely is not controlling these episodes. No significant symptoms though she does report some exertional shortness of breath. Aortic valve murmur appears more prominent. Did have aortic sclerosis previously in 2021 echo.   We will repeat an echocardiogram.  Have asked her to monitor her

## 2023-05-19 ENCOUNTER — HOSPITAL ENCOUNTER (OUTPATIENT)
Dept: NON INVASIVE DIAGNOSTICS | Age: 83
Discharge: HOME | End: 2023-05-19
Payer: MEDICARE

## 2023-05-19 DIAGNOSIS — I35.0 NONRHEUMATIC AORTIC VALVE STENOSIS: ICD-10-CM

## 2023-05-19 DIAGNOSIS — I48.0 PAF (PAROXYSMAL ATRIAL FIBRILLATION) (HCC): ICD-10-CM

## 2023-05-19 LAB
LV EF: 58 %
LVEF MODALITY: NORMAL

## 2023-05-19 PROCEDURE — 93306 TTE W/DOPPLER COMPLETE: CPT

## 2023-05-22 ENCOUNTER — TELEPHONE (OUTPATIENT)
Dept: CARDIOLOGY CLINIC | Age: 83
End: 2023-05-22

## 2023-05-22 NOTE — TELEPHONE ENCOUNTER
----- Message from Brigitte Rasheed MD sent at 5/21/2023  9:37 AM CDT -----  Please let patient know her echo shows progression in her aortic valve with moderate aortic stenosis. This will need yearly echo f/u.

## 2023-06-02 RX ORDER — LOSARTAN POTASSIUM 50 MG/1
50 TABLET ORAL DAILY
Qty: 90 TABLET | Refills: 3 | Status: SHIPPED | OUTPATIENT
Start: 2023-06-02

## 2023-06-05 ENCOUNTER — LAB (OUTPATIENT)
Dept: LAB | Facility: HOSPITAL | Age: 83
End: 2023-06-05
Payer: MEDICARE

## 2023-06-05 ENCOUNTER — TELEPHONE (OUTPATIENT)
Dept: ONCOLOGY | Facility: CLINIC | Age: 83
End: 2023-06-05
Payer: MEDICARE

## 2023-06-05 DIAGNOSIS — Z17.0 MALIGNANT NEOPLASM OF LEFT BREAST IN FEMALE, ESTROGEN RECEPTOR POSITIVE, UNSPECIFIED SITE OF BREAST: ICD-10-CM

## 2023-06-05 DIAGNOSIS — C50.912 MALIGNANT NEOPLASM OF LEFT BREAST IN FEMALE, ESTROGEN RECEPTOR POSITIVE, UNSPECIFIED SITE OF BREAST: ICD-10-CM

## 2023-06-05 LAB
ALBUMIN SERPL-MCNC: 4.3 G/DL (ref 3.5–5.2)
ALBUMIN/GLOB SERPL: 1.5 G/DL
ALP SERPL-CCNC: 84 U/L (ref 39–117)
ALT SERPL W P-5'-P-CCNC: 10 U/L (ref 1–33)
ANION GAP SERPL CALCULATED.3IONS-SCNC: 11 MMOL/L (ref 5–15)
ANISOCYTOSIS BLD QL: ABNORMAL
AST SERPL-CCNC: 18 U/L (ref 1–32)
BILIRUB SERPL-MCNC: 0.7 MG/DL (ref 0–1.2)
BUN SERPL-MCNC: 31 MG/DL (ref 8–23)
BUN/CREAT SERPL: 15.3 (ref 7–25)
CALCIUM SPEC-SCNC: 10 MG/DL (ref 8.6–10.5)
CEA SERPL-MCNC: 1.76 NG/ML
CHLORIDE SERPL-SCNC: 103 MMOL/L (ref 98–107)
CO2 SERPL-SCNC: 26 MMOL/L (ref 22–29)
CREAT SERPL-MCNC: 2.03 MG/DL (ref 0.57–1)
DEPRECATED RDW RBC AUTO: 51.6 FL (ref 37–54)
EGFRCR SERPLBLD CKD-EPI 2021: 23.9 ML/MIN/1.73
ERYTHROCYTE [DISTWIDTH] IN BLOOD BY AUTOMATED COUNT: 14 % (ref 12.3–15.4)
FERRITIN SERPL-MCNC: 178.1 NG/ML (ref 13–150)
GLOBULIN UR ELPH-MCNC: 2.9 GM/DL
GLUCOSE SERPL-MCNC: 92 MG/DL (ref 65–99)
HCT VFR BLD AUTO: 39.4 % (ref 34–46.6)
HGB BLD-MCNC: 12.2 G/DL (ref 12–15.9)
IRON 24H UR-MRATE: 102 MCG/DL (ref 37–145)
IRON SATN MFR SERPL: 23 % (ref 20–50)
LYMPHOCYTES # BLD MANUAL: 1.41 10*3/MM3 (ref 0.7–3.1)
LYMPHOCYTES NFR BLD MANUAL: 4 % (ref 5–12)
MACROCYTES BLD QL SMEAR: ABNORMAL
MCH RBC QN AUTO: 31 PG (ref 26.6–33)
MCHC RBC AUTO-ENTMCNC: 31 G/DL (ref 31.5–35.7)
MCV RBC AUTO: 100 FL (ref 79–97)
MONOCYTES # BLD: 0.25 10*3/MM3 (ref 0.1–0.9)
NEUTROPHILS # BLD AUTO: 4.69 10*3/MM3 (ref 1.7–7)
NEUTROPHILS NFR BLD MANUAL: 73.7 % (ref 42.7–76)
PLAT MORPH BLD: NORMAL
PLATELET # BLD AUTO: 162 10*3/MM3 (ref 140–450)
PMV BLD AUTO: 12.2 FL (ref 6–12)
POTASSIUM SERPL-SCNC: 5.1 MMOL/L (ref 3.5–5.2)
PROT SERPL-MCNC: 7.2 G/DL (ref 6–8.5)
RBC # BLD AUTO: 3.94 10*6/MM3 (ref 3.77–5.28)
SODIUM SERPL-SCNC: 140 MMOL/L (ref 136–145)
TIBC SERPL-MCNC: 451 MCG/DL (ref 298–536)
TRANSFERRIN SERPL-MCNC: 303 MG/DL (ref 200–360)
VARIANT LYMPHS NFR BLD MANUAL: 1 % (ref 0–5)
VARIANT LYMPHS NFR BLD MANUAL: 21.2 % (ref 19.6–45.3)
WBC MORPH BLD: NORMAL
WBC NRBC COR # BLD: 6.36 10*3/MM3 (ref 3.4–10.8)

## 2023-06-05 PROCEDURE — 82728 ASSAY OF FERRITIN: CPT

## 2023-06-05 PROCEDURE — 36415 COLL VENOUS BLD VENIPUNCTURE: CPT

## 2023-06-05 PROCEDURE — 84466 ASSAY OF TRANSFERRIN: CPT

## 2023-06-05 PROCEDURE — 83540 ASSAY OF IRON: CPT

## 2023-06-05 PROCEDURE — 80053 COMPREHEN METABOLIC PANEL: CPT

## 2023-06-05 PROCEDURE — 82378 CARCINOEMBRYONIC ANTIGEN: CPT

## 2023-06-05 PROCEDURE — 86300 IMMUNOASSAY TUMOR CA 15-3: CPT

## 2023-06-05 PROCEDURE — 85007 BL SMEAR W/DIFF WBC COUNT: CPT

## 2023-06-05 PROCEDURE — 85025 COMPLETE CBC W/AUTO DIFF WBC: CPT

## 2023-06-05 NOTE — TELEPHONE ENCOUNTER
----- Message from Ender Franco MD sent at 6/5/2023 12:49 PM CDT -----  GFR 23(worse)-pls fax labs to her pcp and nephrologist and make her appointment to be see asap re: MIKE  ----- Message -----  From: Lab, Background User  Sent: 6/5/2023  11:44 AM CDT  To: Ender Franco MD

## 2023-06-06 LAB — CANCER AG27-29 SERPL-ACNC: 25.1 U/ML (ref 0–38.6)

## 2023-06-06 NOTE — PROGRESS NOTES
[facility] GROUP HEMATOLOGY AND ONCOLOGY  [address]  [facility] [address] [phone]    Patient Name: Heidi Ansari  Encounter Date: 12/12/2022  YOB: 1940  Patient Number: [id number]         REASON FOR VISIT: Heidi Ansari is an 82-year-old female who returns in follow-up of stage IIA invasive ductal/lobular breast carcinoma.  She has been on adjuvant Arimidex since 01/22/2020 (39.5 months).  She is here alone with her spouse Montana (previously with her daughter Fátima).    I have reviewed the HPI and verified with the patient the accuracy of it. No changes to interval history since the information was documented. Ender Franco MD 06/12/23      DIAGNOSTIC ABNORMALITIES:           1.   11/27/2019- palpable left breast lump x3 weeks.  Diagnostic mammogram and left breast ultrasound.  Impression: Spiculated mass approximately 2 cm upper outer quadrant of the left breast.  Oval well-circumscribed mass appears new in the lower slightly lateral left breast measuring approximately 10 mm.  Targeted left breast ultrasound at the 2 o'clock position 5 cm from nipple an irregular spiculated hypoechoic mass is seen measuring 1.7 x 1.5 x 1.6 cm.  Smaller adjacent hypoechoic irregular mass seen measuring 0.3 cm.  Third irregular hypoechoic mass at the 3 o'clock position measures 2.2 x 0.9 x 1.2 cm in size.  Ultrasound-guided biopsy recommended.  BI-RADS Category 5-highly suggestive of malignancy.  No mammographic evidence of right breast malignancy.          2.   12/06/2019- ultrasound-guided left breast biopsy.  Final diagnosis: 1.left breast mass 2 o'clock position: Core biopsy: Invasive ductal and lobular carcinoma.  Estimated grade 1, involving approximately 90% of the core biopsy specimens.  Focal ductal carcinoma in situ, nuclear grade 1.  Microcalcifications identified in invasive ductal carcinoma.  2.left breast mass 3 o'clock  position, core biopsy: Invasive lobular carcinoma, estimated grade 1, involving approximately 90% of the core biopsy specimens.  Microcalcifications not identified.          3.   12/26/2019- hemoglobin 12.3, hematocrit 37.2, MCV 93.9, platelets 90,000, WBC 6.81 with a normal differential.  CMP notable for a creatinine of 1.65 (GFR 30) otherwise normal.  Immunohistochemistries revealed: Estrogen +99 to 100%; progesterone +81 to 90%, HER-2 1-2+ (equivocal).  FISH: Negative (signal ratio: 1.2)          4.   01/17/2020-CT brain: Mild cerebral and cerebellar volume loss with chronic microvascular disease.  Focus of ill-defined hypodensity at the gray-white juncture in the right frontal lobe.  Site of previous infarction versus metastasis.  Suggest follow-up MRI with and without gadolinium.  No additional lesions present.          5.   01/17/2020- CT chest.  Groundglass nodule within the right upper lobe and left lower lobe.  Likely inflammatory in nature.  Coronary calcifications in the LAD and circumflex distribution.  Moderate cardiomegaly.  Postoperative changes left axilla from recent axillary dissection as well as mastectomy.  No enlarged mediastinal or axillary lymph nodes present.  Irregular nodule along the anterior margin of the medial left pectoralis major musculature within the mastectomy bed.  No adjacent inflammatory/postoperative stranding.  May represent postoperative seroma.  Recommend directed ultrasound over this area.          6.   01/17/2020-CT abdomen/pelvis.  Impression: Multiple hypodensities within the liver.  Favor hepatic cysts.  Recommend ultrasound for further evaluations.  Cannot be fully characterized without IV contrast.  Small cortical cyst of the right kidney suspected.  Postoperative changes of the right colon.  Multiple diverticula throughout the colon without evidence of diverticulitis or mechanical bowel obstruction.  Diastases of the abdominal musculature at the umbilicus.  Previous  umbilical hernia repair was performed with a mesh.  Residual recurrent tyra-umbilical hernia containing fat.  No evidence of herniated bowel loop.  7 mm groundglass nodule left lower lobe.          7.   01/22/2020-CMP notable for creatinine 1.6 and GFR 31 otherwise normal.  CEA 1.41, CA-27-29 22.8, ferritin 240.9, iron 88, iron saturation 23%, TIBC 390, RASHEL positive, homogenous/speckled pattern 1:80, otherwise negative reflex panel.  SPIEP negative (M spike not observed, EARLE: No monoclonality detected).  Hemoglobin 11.7, hematocrit 34.9, MCV 91.6, platelets 130,000, WBC 5.08 with a normal differential.  No schistocytes reported.          8.   02/05/2020- liver ultrasound.  Impression: Multiple hepatic cysts.  Additional tiny hepatic lesions are too small to characterize.          9.   02/05/2020- renal ultrasound.  Impression: Right renal cyst.  Otherwise negative exam.         10.  02/05/2020- chest ultrasound.  Impression: No abnormality identified in the left chest.  Area seen on recent CT exam may represent a postoperative seroma.         11.   02/20/2020- hemoglobin 12.2, hematocrit 36.4, MCV 91.9, platelets 147,000, WBC 6.11 with a normal differential.  CEA 1.37, CA-27-29 21.9.         12.   03/01/2020- Oncotype DX: Recurrence score:  0; distant recurrence risk at 9 years with AI or BARNHART alone: 3%; group average absolute chemotherapy benefit:< 1%         13.   06/02/2020- DEXA scan.  Impression: Osteopenia.  Low bone mass.  Bone density is between 1.0 and 2.5 standard deviations below the mean for young adult woman.  Increased risk for fracture in these patients.    PREVIOUS INTERVENTIONS:          1.   01/02/2020- left breast simple mastectomy with sentinel lymph node biopsy.  Final diagnosis: 1.left breast, left skin sparing mastectomy: Invasive ductal and lobular carcinoma, grade 1 (2.3 cm).  Associated low-grade ductal carcinoma in situ, solid type.  Margins of excision free of tumor.  Tumor approaches  closest deep margin and 1.6 cm.  2 axillary lymph nodes, negative for metastatic carcinoma.  2.left sentinel lymph node excision: One sentinel lymph node, negative for metastatic carcinoma (0/1).  AJCC pathologic stage: pT2, N0 (SN).    Synoptic checklist: Procedure: Total mastectomy.  Specimen laterality: Left.  Histologic type: Invasive carcinoma with ductal and lobular features (mixed type carcinoma).  Overall ndgndrndanddndend:nd nd2nd. Tumor size: Greatest dimension of largest invasive focus 23 mm.  Tumor focality: Single focus of invasive carcinoma.  DCIS: Present.  Negative for extensive intraductal component.  Grade 1.  Lobular carcinoma in situ: None in specimen.  Tumor extent: Lymphovascular invasion not identified.  Dermal lymphovascular invasion not identified.  Microcalcifications not identified.  Margins: Uninvolved by invasive carcinoma.  Lymph nodes: Number of lymph nodes examined: 3.  TNM classification: pT2, pN0 (SN).            2.   01/22/2020- adjuvant Arimidex 1 mg p.o. daily    LABS:  Lab Results - Last 18 Months   Lab Units 06/05/23  1106 04/28/23  0631 03/15/23  0822 12/07/22  1307 05/02/22  0924 04/25/22  1116 01/21/22  0953   HEMOGLOBIN g/dL 12.2 11.6* 13.0 12.1 12.6 12.6 13.0   HEMATOCRIT % 39.4 36.9 40.1 38.9 39.0 38.2 40.1   MCV fL 100.0* 97.4* 93.7 99.5* 97.5* 96.5 97.8*   WBC 10*3/mm3 6.36 6.51 6.82 6.20 5.16 5.50 5.64   RDW % 14.0 14.2 13.5 14.3 14.2 14.1 13.2   MPV fL 12.2* 12.8*  --  12.1* 12.3* 11.5  --    PLATELETS 10*3/mm3 162 143 105* 127* 154 119* 87*   IMM GRAN % %  --   --   --  0.5 0.4  --   --    NEUTROS ABS 10*3/mm3 4.69  --  4.09 3.72 3.37 3.45 3.41   LYMPHS ABS 10*3/mm3  --   --  CANCELED  2.01 1.93 1.28 1.51 1.64   MONOS ABS 10*3/mm3  --   --  0.57 0.38 0.35 0.43 0.42   EOS ABS   --   --  CANCELED 0.12 0.11 0.07 0.11   EOSINOPHIL ABS 10*3/mm3  --   --  0.14  --   --   --   --    EOSINOPHIL % %  --   --  2.1  --   --   --   --    BASOS ABS   --   --  CANCELED 0.02 0.03 0.03 0.05    IMMATURE GRANS (ABS) 10*3/mm3  --   --   --  0.03 0.02  --   --    NRBC /100 WBC  --   --   --  0.0 0.0  --  0.0   NEUTROPHIL % % 73.7  --  60.0  --   --   --   --    MONOCYTES % % 4.0*  --  8.4  --   --   --   --    ATYP LYMPH % % 1.0  --   --   --   --   --   --    ANISOCYTOSIS  Slight/1+  --   --   --   --   --   --        Lab Results - Last 18 Months   Lab Units 06/05/23  1106 04/26/23  1155 03/15/23  0822 12/07/22  1307 04/25/22  1116 01/21/22  0953   GLUCOSE mg/dL 92 86 95 149* 107* 98   SODIUM mmol/L 140 140 142 141 141 140   POTASSIUM mmol/L 5.1 4.5 4.5 4.2 4.3 4.5   CO2 mmol/L 26.0 24.0 24.2 22.0 24.0 24.9   CHLORIDE mmol/L 103 106 104 108* 106 103   ANION GAP mmol/L 11.0 10.0  --  11.0 11.0  --    CREATININE mg/dL 2.03* 1.83* 1.68* 1.60* 1.79* 1.84*   BUN mg/dL 31* 28* 20 23 29* 31*   BUN / CREAT RATIO  15.3 15.3 11.9 14.4 16.2 16.8   CALCIUM mg/dL 10.0 9.5 10.3 9.4 9.4 9.7   EGFR IF NONAFRICN AM mL/min/1.73  --   --   --   --   --  26*   ALK PHOS U/L 84  --  96 106 103 109   TOTAL PROTEIN g/dL 7.2  --   --  6.6 6.7  --    ALT (SGPT) U/L 10  --  10 11 10 8   AST (SGOT) U/L 18  --  18 17 18 18   BILIRUBIN mg/dL 0.7  --  0.8 0.6 0.6 0.9   ALBUMIN g/dL 4.3  --  4.4 4.10 4.30 4.50   GLOBULIN gm/dL 2.9  --   --  2.5 2.4  --        Lab Results - Last 18 Months   Lab Units 06/05/23  1106 12/07/22  1307 04/25/22  1116 01/21/22  0953   URIC ACID mg/dL  --   --   --  5.9*   CEA ng/mL 1.76 1.93 1.50  --        Lab Results - Last 18 Months   Lab Units 06/05/23  1106 03/15/23  0822 12/07/22  1307 09/15/22  0947 04/25/22  1116 01/21/22  0953   IRON mcg/dL 102  --  88  --  94  --    TIBC mcg/dL 451  --  432  --  428  --    IRON SATURATION (TSAT) % 23  --  20  --  22  --    FERRITIN ng/mL 178.10*  --  125.30  --  175.80*  --    TSH uIU/mL  --  3.220  --  2.820  --  3.020         PAST MEDICAL HISTORY:  ALLERGIES:  Allergies   Allergen Reactions    Bactrim [Sulfamethoxazole-Trimethoprim] Provider Review Needed     Patient  "has kidney disease (stage 4) shouldn't take Bactrim    Codeine Nausea Only    Percocet [Oxycodone-Acetaminophen] Nausea Only     Becomes, hot, nauseated, and made her head feel \"weird\".     CURRENT MEDICATIONS:  Outpatient Encounter Medications as of 6/12/2023   Medication Sig Dispense Refill    acetaminophen (TYLENOL) 500 MG tablet Take 1 tablet by mouth Every 6 (Six) Hours As Needed for Mild Pain.      allopurinol (ZYLOPRIM) 100 MG tablet Take 1 tablet by mouth 2 (Two) Times a Day.  3    anastrozole (ARIMIDEX) 1 MG tablet Take 1 tablet by mouth once daily 30 tablet 3    bumetanide (BUMEX) 0.5 MG tablet Take 1 tablet by mouth Daily. 90 tablet 3    calcium carbonate (OS-LAURA) 600 MG tablet Take 2 tablets by mouth Daily.      carboxymethylcellulose (REFRESH PLUS) 0.5 % solution Administer 1 drop to both eyes 3 (Three) Times a Day As Needed for Dry Eyes.      dronedarone (MULTAQ) 400 MG tablet Take 1 tablet by mouth 2 (Two) Times a Day With Meals.      levothyroxine (SYNTHROID, LEVOTHROID) 50 MCG tablet Take 1 tablet by mouth Daily. 90 tablet 3    losartan (COZAAR) 50 MG tablet Take 1 tablet by mouth daily 90 tablet 3    metoprolol succinate XL (TOPROL-XL) 25 MG 24 hr tablet Take 1 tablet by mouth 2 (Two) Times a Day.  1    oxyCODONE-acetaminophen (PERCOCET) 5-325 MG per tablet Take 1 tablet by mouth Every 4 (Four) Hours As Needed (Pain). 10 tablet 0    polyethylene glycol (MIRALAX) 17 GM/SCOOP powder Take 17 g by mouth Daily.      simvastatin (ZOCOR) 40 MG tablet Take 1 tablet by mouth once daily 90 tablet 0    spironolactone (ALDACTONE) 25 MG tablet Take 1 tablet by mouth Daily. 90 tablet 3    Vitamin D, Cholecalciferol, 50 MCG (2000 UT) capsule Take  by mouth.      warfarin (COUMADIN) 5 MG tablet Take  by mouth Take As Directed. Take 1 whole tablet (5 mg) on Wednesday only. Take 1/2 tablet (2.5 mg) all other days.  5     Facility-Administered Encounter Medications as of 6/12/2023   Medication Dose Route Frequency " Provider Last Rate Last Admin    lidocaine (XYLOCAINE) 1 % injection 10 mL  10 mL Subcutaneous Once Nikki Zuniga MD        lidocaine-EPINEPHrine (XYLOCAINE W/EPI) 1 %-1:[id number] injection 10 mL  10 mL Injection Once Nikki Zuniga MD         Adult illnesses:  Hypothyroidism  Atrial fibrillation  Hyperlipidemia  Obesity  Chronic kidney disease stage 4 - Dr. Daniels    ADULT ILLNESSES:  Patient Active Problem List   Diagnosis Code    Hx of adenomatous colonic polyps Z86.010    HTN (hypertension), benign I10    Family hx of colon cancer Z80.0    Breast cancer, left C50.912    Chronic anticoagulation Z79.01    Endometrial thickening on ultrasound R93.89    Hypothyroidism E03.9    Kidney disease N28.9    Mixed hyperlipidemia E78.2    Atrial fibrillation, controlled I48.91    PMB (postmenopausal bleeding) N95.0    Brain lesion G93.9    Obesity (BMI 30-39.9) E66.9    Non-tobacco user Z78.9    Anemia in stage 4 chronic kidney disease N18.4, D63.1    Atrophic vaginitis N95.2    Cardiomegaly I51.7    Hematuria R31.9    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, 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    Rheumatoid arthritis M06.9    Seborrheic keratosis L82.1    Thrombocytopenia D69.6    Vitamin D deficiency E55.9    Moderate mitral regurgitation by prior echocardiogram I34.0    Dizziness R42    Suspected cerebrovascular accident (CVA) R09.89    Lipoma of upper extremity D17.20     SURGERIES:  Past Surgical History:   Procedure Laterality Date    APPENDECTOMY      BREAST BIOPSY Left 2015    BREAST BIOPSY Right 12/31/2020    Procedure: RIGHT NEEDLE DIRECTED EXCISIONAL BREAST BIOPSY;  Surgeon: Nikki Zuniga MD;  [facility];  Service: General;  Laterality: Right;    BREAST EXCISIONAL BIOPSY Left 2001    BREAST EXCISIONAL BIOPSY Left 2001    CARPAL TUNNEL RELEASE Right     CATARACT EXTRACTION, BILATERAL      CHOLECYSTECTOMY      COLON SURGERY      Partial right    COLONOSCOPY      COLONOSCOPY N/A 10/17/2019    Procedure: COLONOSCOPY WITH ANESTHESIA;   Surgeon: Rigoberto Shaw MD;  Location:  [facility];  Service: Gastroenterology    COLONOSCOPY W/ POLYPECTOMY  06/30/2016    2 Adenomatous polyps at 80 & 40 cm, Diverticulosis repeat exam in 3 years    EXCISION MASS ARM Bilateral 4/28/2023    Procedure: lipoma excision left hand and right arm;  Surgeon: Nikki Zuniga MD;  Location:  [facility];  Service: General;  Laterality: Bilateral;    EYE SURGERY Bilateral     cataract extraction    EYE SURGERY Left     detached retina    HERNIA REPAIR      JOINT REPLACEMENT  both knees 2-14-12 &10-30-12    MASTECTOMY Left 2019    MASTECTOMY W/ SENTINEL NODE BIOPSY Left 01/02/2020    Procedure: LEFT SIMPLE MASTECTOMY WITH SENTINEL NODE BIOPSY, INJECTION AND SCAN, RADIOLOGIST WILL INJECT;  Surgeon: Nikki Zuniga MD;  Location:  [facility];  Service: General    REPLACEMENT TOTAL KNEE Bilateral     THROAT SURGERY      duct    TONSILLECTOMY  8 or 9 yrs old    UMBILICAL HERNIA REPAIR  years ago     HEALTH MAINTENANCE ITEMS:  Health Maintenance Due   Topic Date Due    COVID-19 Vaccine (6 - Moderna series) 05/11/2023       <no information>  Last Completed Colonoscopy            COLORECTAL CANCER SCREENING (COLONOSCOPY - Every 5 Years) Next due on 10/17/2024      10/17/2019  Surgical Procedure: COLONOSCOPY    10/17/2019  COLONOSCOPY    06/30/2016  SCANNED - COLONOSCOPY                  Immunization History   Administered Date(s) Administered    COVID-19 (MODERNA) 1st,2nd,3rd Dose Monovalent 02/25/2021, 03/25/2021, 10/26/2021    COVID-19 (MODERNA) Monovalent Original Booster 08/03/2022    COVID-19 (PFIZER) BIVALENT 12+YRS 01/11/2023    Fluad Quad 65+ 09/22/2020, 09/27/2021, 10/19/2022    Fluzone High Dose =>65 Years ([employer] ONLY) 10/19/2022    Fluzone Quad >6mos (Multi-dose) 09/27/2021    Influenza TIV (IM) 09/22/2015, 10/14/2016    Pneumococcal Conjugate 13-Valent (PCV13) 11/10/2016    Pneumococcal Polysaccharide (PPSV23) 11/02/2012    Shingrix 04/10/2018, 08/15/2018     "Tdap 12/21/2020    Zostavax 04/23/2013     Last Completed Mammogram            Scheduled - MAMMOGRAM (Yearly) Scheduled for 12/12/2023 12/07/2022  Mammo Screening Modified With Tomosynthesis Right With CAD    12/06/2021  Mammo Diagnostic Digital Tomosynthesis Right With CAD    12/03/2020  Mammo Diagnostic Digital Tomosynthesis Right With CAD    11/30/2020  Mammo Screening Modified With Tomosynthesis Right With CAD    11/27/2019  Mammo Diagnostic Digital Tomosynthesis Bilateral With CAD    Only the first 5 history entries have been loaded, but more history exists.                      FAMILY HISTORY:  Family History   Problem Relation Age of Onset    Cancer Mother         colon    Hypertension Mother     Colon cancer Mother 70    Colon polyps Mother     No Known Problems Father     Hearing loss Maternal Grandmother     Vision loss Maternal Grandmother     Arthritis Maternal Grandmother     Breast cancer Neg Hx     BRCA 1/2 Neg Hx     Endometrial cancer Neg Hx     Ovarian cancer Neg Hx      SOCIAL HISTORY:  Social History     Socioeconomic History    Marital status:    Tobacco Use    Smoking status: Never    Smokeless tobacco: Never   Vaping Use    Vaping Use: Never used   Substance and Sexual Activity    Alcohol use: No    Drug use: No    Sexual activity: Not Currently     Partners: Male   Homemaker    REVIEW OF SYSTEMS:  Review of Systems   Constitutional:  Positive for fatigue (baseline/chronic unchanged). Negative for activity change and appetite change.        Manages her ADLs, including chores, errands and driving. Again says she is still up and about \"all the time I want to be.\"    Arimidex tolerance:  No worsening hot flashes, \"not any different,\" no new arthralgias. Is taking daily   HENT:  Positive for postnasal drip (seasonal) and rhinorrhea (seasonal).    Eyes: Negative.    Respiratory:  Positive for shortness of breath (Baseline exertional dyspnea and occasional sob with routine activities.  " "\"I just pace it.\").    Cardiovascular:  Positive for palpitations (Intermittent.  Is on maintenance warfarin and Multaq).   Gastrointestinal: Negative.  Negative for blood in stool.   Endocrine: Negative.    Genitourinary: Negative.    Musculoskeletal:  Positive for arthralgias (intermittent of the hands, left hip, right shoulder and knees inspite of prior TKRs bilaterally) and back pain (lower back \"soreness.\").   Allergic/Immunologic: Negative.    Neurological:  Positive for numbness (left foot at times, unchanged). Negative for dizziness (postural - when standing too abruptly. \"Not in awhile.\").   Hematological:  Bruises/bleeds easily (coumadin).   Psychiatric/Behavioral: Negative.   Breasts:  Says she is more diligent with self exams.  Has not noticed any changes      /60   Pulse 77   Temp 96.7 °F (35.9 °C) (Temporal)   Resp 18   Ht 165.1 cm (65\")   Wt 99.8 kg (220 lb 1.6 oz)   SpO2 96%   BMI 36.63 kg/m²  Body surface area is 2.06 meters squared.  Pain Score    06/12/23 1343   PainSc: 0-No pain         Physical Exam:  Physical Exam   Constitutional: She is oriented to person, place, and time. No distress.   Pleasant, cooperative, obese, modestly kept elderly female.  Ambulatory.  ECOG 0.      She has lost 1 lb (in addition to 2 lb at her prior visit-had gained 9 lb at her visit prior to that) since her last visit.   HENT:   Head: Normocephalic and atraumatic.   Mouth/Throat: No oropharyngeal exudate.   She is wearing a surgical mask today   Eyes: Pupils are equal, round, and reactive to light. Conjunctivae are normal. No scleral icterus.   Neck: No JVD present. No tracheal deviation present. No thyromegaly (on synthroid) present.   Cardiovascular:   Irregular rate and rhythm   Pulmonary/Chest: Effort normal and breath sounds normal. No stridor. No respiratory distress. She has no wheezes. She has no rales.   Chaperoned exam: Janelle Zuniga MA (again)    Right breast no masses.  No associated " axillary nor supraclavicular adenopathy.    Left mastectomy site well healed. No underlying nodularity.  No lymphedema of the left upper extremity.      No associated axillary nor supraclavicular adenopathy bilaterally.     Abdominal: Soft. Bowel sounds are normal. She exhibits no distension and no mass. There is no abdominal tenderness. There is no rebound and no guarding.   Musculoskeletal: Normal range of motion. Deformity (changes of osteoarthritis of the hands) present. No swelling.      Right lower leg: Edema (1+ ankle) present.      Left lower leg: Edema (1+ ankle) present.   Lymphadenopathy:     She has no cervical adenopathy.        Right cervical: No superficial cervical, no deep cervical and no posterior cervical adenopathy present.       Left cervical: No superficial cervical, no deep cervical and no posterior cervical adenopathy present.        Right: No supraclavicular adenopathy present.        Left: No supraclavicular adenopathy present.   Neurological: She is alert and oriented to person, place, and time. No cranial nerve deficit.   Skin: Skin is warm and dry. No rash noted. No erythema. No pallor.   Psychiatric: Her behavior is normal. Mood, judgment and thought content normal.   Vitals reviewed.  ASSESSMENT:   1. Invasive ductal/lobular (mixed) carcinoma:   Stage: IB (pT2, pN0 [sn], MX, G1) ER (+) %, MA (+) 81-90%, HER-2/kelby 1-2 + (IHC) - negative. HER-2 1-2+ (equivocal).  FISH: Negative (signal ratio: 1.2)  Tumor burden: 23 mm tumor in the left breast. 0/3 axillary sentinel lymph node negative for metastatic carcinoma. Nuclear grade 1.    Oncotype DX: Recurrence score:  0; distant recurrence risk at 9 years with AI or BARNHART alone: 3%; group average absolute chemotherapy benefit:< 1%  Complications of tumor: None.   Tumor status: Adjuvant Arimidex in progress.  11/30/2020-mammogram.  Right breast calcifications for which additional imaging recommended.  BI-RADS Category  0.  12/28/2020-stereotactic biopsy of right breast calcifications.  Pathology: Benign intraductal papilloma, 3 mm greatest dimension with associated microcalcifications.  01/04/2021-Right breast, lumpectomy with needle localization.  Final diagnosis: Extensive biopsy site changes including fat necrosis and fibrosis.  Benign fibrous breast parenchyma.  No evidence of in situ or invasive carcinoma.  12/07/2022-mammogram.  No suspicious features identified.  Annual screening recommended.  BI-RADS Category 2-benign.           2.    Chronic kidney disease, stage III - IV. Followed by nephrology  --GFR 23, 6/5/23 (prior:  GFR 17 - 36 mL/min).        3.    Hypothyroidism.  On Synthroid        4.    Normocytic anemia.    --Hgb 12.2; , 6/5/23 (prior: Hgb 10.9 - 12.9; MCV 92 - 99.5).  Contribution from chronic kidney disease suspected.        5.    Abnormal CT scans of the head (see above) indicating ill-defined hypodensity at the gray-white juncture in the right frontal lobe, hepatic and right renal cysts.  Unable to perform MRI with contrast due to chronic kidney disease.            --03/23/2020-seen by Dr. Jaramillo.  Noncontrast brain MRI from 1/17/2020 reviewed.  Possibilities include prior stroke microvascular disease, demyelinating disease.  Risks of biopsy discussed.  Recommend serial imaging with biopsy of the lesion progresses.  Follow-up with MRI in 3 months.          --07/06/2020- MRI.  Mild atrophy of the brain.  Multiple foci of T2 abnormality involving the periventricular and higher white matter tracts.  Cortical lesions also present.  Overall stable from 03/2020.  Favor small vessel ischemic changes.  No evidence of restricted diffusion to suggest acute ischemia or infarct.  No mass-effect or shift in midline.          --01/06/2021-brain MRI.  No imaging evidence of intracranial metastatic disease.  Seen by Dr. Jaramillo of neurosurgery.  Favors microvascular disease.  Okay to discontinue imaging.         6.    (+) RASHEL with joint pains (hands). Has been seen by Dr. Caputo.        7.    Osteopenia (bone density 1.0 and 2.5 standard deviations below the mean on DEXA scan, 06/02/2020).  On calcium and vitamin D. Followed by pcp. Addition of Fosamax being discussed.  She wants to ask Dr. Daniels about it first.        8.    Paroxysmal atrial fibrillation.  Remains on anticoagulation.  Dr. Salas follows       PLAN:   1.  Apprised of the labs, 6/5/23.  Note resolution of anemia otherwise normal CBC with normal platelets.  GFR 23 (stable) otherwise normal CMP, normal (1.76) CEA, normal (25-prior: 17.6) CA-27-29, repleted iron levels.  2.  Fax labs, 6/5/23 to pcp and nephrology Re:  Current GFR  3.  Keep appointment (per Dr. Zuniga) mammogram, 12/7/2023  4.  Previously noted the positive RASHEL but negative reflex panel, negative SPIEP, repleted iron levels.  The liver ultrasound, renal ultrasound and chest ultrasound findings (above) are noted.  Oncotype DX recurrence score (above) noted (RS 0; absolute chemotherapy benefit < 1%).  DEXA scan shows osteopenia with increased risk of fracture.  Arimidex tolerance discussed.  So far so good.  5.  Breast cancer.  NCCN guidelines version 1.2020 reviewed.  4 ductal, lobular, mixed tumors, pT1, pT2, pT3 and pN0 with tumors greater than 0.5 cm- 21 gene RT-PCR assay.  Recurrence score less than 26-adjuvant endocrine therapy.  Recurrence score 26-30- adjuvant endocrine therapy or adjuvant chemotherapy followed by endocrine therapy.  Recurrence score greater than 31-adjuvant chemotherapy followed by endocrine therapy.  However limited data to make chemotherapy recommendations for those greater than 70 years of age.  6.  The rationale for adjuvant hormonal manipulation with AI's are previously discussed. The potential risks (to include but not limited to: Hot flashes, asthenia, pain, arthralgia, arthritis, nausea/vomiting, headache, pharyngitis, depression, rash, hypertension,  lymphedema, insomnia, edema, weight gain, dyspnea, abdominal pain, constipation, osteoporosis, fractures, cough, bone pain, diarrhea, breast pain, paresthesias, infection, cataracts, myalgias, thromboembolism, angina, endometrial carcinoma, myocardial infarction, stroke, anemia, leukopenia, erythema multiforme, Belle-Collins syndrome) are discussed at length. Questions answered. She agrees to press on with therapy.    7.  Rx:   Arimidex 1 mg p.o. daily, #30 x 2 RF   Calcium 1200 mg + 800 units D3  p.o. daily - otc  Stay off ferrous sulfate    8.   Return to the [place] office in 24 weeks with pre-office CBC and differential, iron, fe sat , ferritin, CMP, CEA and CA-27-29.       I spent ~ 32 minutes caring for Heidi on this date of service. This time includes time spent by me in the following activities: preparing for the visit, reviewing tests, performing a medically appropriate examination and/or evaluation, counseling and educating the patient/family/caregiver, ordering medications, tests, or procedures and documenting information in the medical record

## 2023-06-12 ENCOUNTER — OFFICE VISIT (OUTPATIENT)
Dept: ONCOLOGY | Facility: CLINIC | Age: 83
End: 2023-06-12
Payer: MEDICARE

## 2023-06-12 VITALS
BODY MASS INDEX: 36.67 KG/M2 | HEIGHT: 65 IN | HEART RATE: 77 BPM | WEIGHT: 220.1 LBS | RESPIRATION RATE: 18 BRPM | SYSTOLIC BLOOD PRESSURE: 132 MMHG | OXYGEN SATURATION: 96 % | DIASTOLIC BLOOD PRESSURE: 60 MMHG | TEMPERATURE: 96.7 F

## 2023-06-12 DIAGNOSIS — Z17.0 MALIGNANT NEOPLASM OF LEFT BREAST IN FEMALE, ESTROGEN RECEPTOR POSITIVE, UNSPECIFIED SITE OF BREAST: Primary | ICD-10-CM

## 2023-06-12 DIAGNOSIS — C50.912 MALIGNANT NEOPLASM OF LEFT BREAST IN FEMALE, ESTROGEN RECEPTOR POSITIVE, UNSPECIFIED SITE OF BREAST: Primary | ICD-10-CM

## 2023-06-12 PROCEDURE — 3078F DIAST BP <80 MM HG: CPT | Performed by: INTERNAL MEDICINE

## 2023-06-12 PROCEDURE — 99214 OFFICE O/P EST MOD 30 MIN: CPT | Performed by: INTERNAL MEDICINE

## 2023-06-12 PROCEDURE — 3075F SYST BP GE 130 - 139MM HG: CPT | Performed by: INTERNAL MEDICINE

## 2023-06-12 PROCEDURE — 1126F AMNT PAIN NOTED NONE PRSNT: CPT | Performed by: INTERNAL MEDICINE

## 2023-06-12 PROCEDURE — 1159F MED LIST DOCD IN RCRD: CPT | Performed by: INTERNAL MEDICINE

## 2023-06-12 RX ORDER — ANASTROZOLE 1 MG/1
1 TABLET ORAL DAILY
Qty: 30 TABLET | Refills: 2 | Status: SHIPPED | OUTPATIENT
Start: 2023-06-12

## 2023-06-12 RX ORDER — VALSARTAN 160 MG/1
1 TABLET ORAL DAILY
COMMUNITY
Start: 2023-06-06

## 2023-06-26 ENCOUNTER — ANTI-COAG VISIT (OUTPATIENT)
Dept: CARDIOLOGY CLINIC | Age: 83
End: 2023-06-26
Payer: MEDICARE

## 2023-06-26 DIAGNOSIS — I48.0 PAF (PAROXYSMAL ATRIAL FIBRILLATION) (HCC): Primary | ICD-10-CM

## 2023-06-26 LAB
INTERNATIONAL NORMALIZATION RATIO, POC: 2.1
PROTHROMBIN TIME, POC: 23.5

## 2023-06-26 PROCEDURE — 93793 ANTICOAG MGMT PT WARFARIN: CPT | Performed by: NURSE PRACTITIONER

## 2023-08-03 DIAGNOSIS — E78.2 MIXED HYPERLIPIDEMIA: ICD-10-CM

## 2023-08-03 RX ORDER — SIMVASTATIN 40 MG
40 TABLET ORAL DAILY
Qty: 90 TABLET | Refills: 3 | Status: SHIPPED | OUTPATIENT
Start: 2023-08-03

## 2023-08-14 ENCOUNTER — ANTI-COAG VISIT (OUTPATIENT)
Dept: CARDIOLOGY CLINIC | Age: 83
End: 2023-08-14

## 2023-08-14 DIAGNOSIS — I48.0 PAF (PAROXYSMAL ATRIAL FIBRILLATION) (HCC): Primary | ICD-10-CM

## 2023-08-14 LAB
INTERNATIONAL NORMALIZATION RATIO, POC: 2.6
PROTHROMBIN TIME, POC: 27.8

## 2023-08-21 RX ORDER — WARFARIN SODIUM 5 MG/1
TABLET ORAL
Qty: 30 TABLET | Refills: 5 | Status: SHIPPED | OUTPATIENT
Start: 2023-08-21

## 2023-08-30 ENCOUNTER — CLINICAL SUPPORT (OUTPATIENT)
Dept: INTERNAL MEDICINE | Facility: CLINIC | Age: 83
End: 2023-08-30
Payer: MEDICARE

## 2023-08-30 VITALS — HEART RATE: 74 BPM | SYSTOLIC BLOOD PRESSURE: 98 MMHG | DIASTOLIC BLOOD PRESSURE: 52 MMHG | OXYGEN SATURATION: 95 %

## 2023-08-30 DIAGNOSIS — N28.9 KIDNEY DISEASE: Primary | ICD-10-CM

## 2023-08-30 PROCEDURE — 99212 OFFICE O/P EST SF 10 MIN: CPT | Performed by: INTERNAL MEDICINE

## 2023-08-30 NOTE — PROGRESS NOTES
Patient came in the office today 2023 for BP check.     Patient stated last 2023 had an episode where she was experiencing blurred vision, glittery spots and her BP dropped to 98/52 O2 94, pulse 64.  Today before her nurse visit, patient had another episode with the same symptoms.   BP was 98/52.    Per Dr. Anderson:  Orthostatics was done   Sittin/56 O2 97, pulse 69.    Standin/68 O2 95, pulse 64.    Lyin/70 O2 95, pulse 74.    Patient was instructed to hold the Spirolactone, check BP (while sitting) and note on her BP logs that were given at the office today.   Patient was scheduled for a 2 week appointment () with Dr. Anderson.   Patient will also be getting a Metabolic panel and Magnesium labs done today.   Patient will be calling us if she experiences more episodes and will be bringing her BP log back Friday or Monday.

## 2023-08-31 LAB
BUN SERPL-MCNC: 42 MG/DL (ref 8–23)
BUN/CREAT SERPL: 17.1 (ref 7–25)
CALCIUM SERPL-MCNC: 9.6 MG/DL (ref 8.6–10.5)
CHLORIDE SERPL-SCNC: 105 MMOL/L (ref 98–107)
CO2 SERPL-SCNC: 23.7 MMOL/L (ref 22–29)
CREAT SERPL-MCNC: 2.46 MG/DL (ref 0.57–1)
EGFRCR SERPLBLD CKD-EPI 2021: 19 ML/MIN/1.73
GLUCOSE SERPL-MCNC: 101 MG/DL (ref 65–99)
MAGNESIUM SERPL-MCNC: 2.2 MG/DL (ref 1.6–2.4)
POTASSIUM SERPL-SCNC: 4.7 MMOL/L (ref 3.5–5.2)
SODIUM SERPL-SCNC: 141 MMOL/L (ref 136–145)

## 2023-09-05 DIAGNOSIS — I48.0 PAF (PAROXYSMAL ATRIAL FIBRILLATION) (HCC): ICD-10-CM

## 2023-09-05 RX ORDER — METOPROLOL SUCCINATE 25 MG/1
TABLET, EXTENDED RELEASE ORAL
Qty: 180 TABLET | Refills: 2 | Status: SHIPPED | OUTPATIENT
Start: 2023-09-05

## 2023-09-06 ENCOUNTER — TELEPHONE (OUTPATIENT)
Dept: CARDIOLOGY CLINIC | Age: 83
End: 2023-09-06

## 2023-09-06 NOTE — TELEPHONE ENCOUNTER
Pt called stating she is in A-Fib, no symptoms pt is on blood thinner. Pt states she will give it a few days and see if she goes back in rhythm and if she don't she will call to get an apt.

## 2023-09-13 ENCOUNTER — OFFICE VISIT (OUTPATIENT)
Dept: INTERNAL MEDICINE | Facility: CLINIC | Age: 83
End: 2023-09-13
Payer: MEDICARE

## 2023-09-13 ENCOUNTER — HOSPITAL ENCOUNTER (OUTPATIENT)
Dept: GENERAL RADIOLOGY | Facility: HOSPITAL | Age: 83
Discharge: HOME | End: 2023-09-13
Admitting: NURSE PRACTITIONER
Payer: MEDICARE

## 2023-09-13 VITALS
HEIGHT: 65 IN | HEART RATE: 73 BPM | BODY MASS INDEX: 36.99 KG/M2 | DIASTOLIC BLOOD PRESSURE: 72 MMHG | OXYGEN SATURATION: 98 % | TEMPERATURE: 97.8 F | WEIGHT: 222 LBS | SYSTOLIC BLOOD PRESSURE: 144 MMHG

## 2023-09-13 DIAGNOSIS — N18.4 ANEMIA IN STAGE 4 CHRONIC KIDNEY DISEASE: ICD-10-CM

## 2023-09-13 DIAGNOSIS — M25.552 LEFT HIP PAIN: ICD-10-CM

## 2023-09-13 DIAGNOSIS — N18.4 STAGE 4 CHRONIC KIDNEY DISEASE: ICD-10-CM

## 2023-09-13 DIAGNOSIS — R10.2 VAGINAL PAIN: ICD-10-CM

## 2023-09-13 DIAGNOSIS — I10 HTN (HYPERTENSION), BENIGN: Chronic | ICD-10-CM

## 2023-09-13 DIAGNOSIS — Z23 FLU VACCINE NEED: Primary | ICD-10-CM

## 2023-09-13 DIAGNOSIS — Z12.4 CERVICAL CANCER SCREENING: ICD-10-CM

## 2023-09-13 DIAGNOSIS — D63.1 ANEMIA IN STAGE 4 CHRONIC KIDNEY DISEASE: ICD-10-CM

## 2023-09-13 PROCEDURE — 73502 X-RAY EXAM HIP UNI 2-3 VIEWS: CPT

## 2023-09-13 PROCEDURE — 72100 X-RAY EXAM L-S SPINE 2/3 VWS: CPT

## 2023-09-16 PROBLEM — N18.4 STAGE 4 CHRONIC KIDNEY DISEASE: Status: ACTIVE | Noted: 2023-09-16

## 2023-09-16 LAB
CONV .: NORMAL
CYTOLOGIST CVX/VAG CYTO: NORMAL
CYTOLOGY CVX/VAG DOC CYTO: NORMAL
CYTOLOGY CVX/VAG DOC THIN PREP: NORMAL
DX ICD CODE: NORMAL
HIV 1 & 2 AB SER-IMP: NORMAL
OTHER STN SPEC: NORMAL
STAT OF ADQ CVX/VAG CYTO-IMP: NORMAL

## 2023-09-16 NOTE — PROGRESS NOTES
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  See Assessment and Plan for further HPI components.        Review of Systems    Objective   Vital Signs:  /72 (BP Location: Left arm, Patient Position: Sitting, Cuff Size: Adult)   Pulse 73   Temp 97.8 °F (36.6 °C) (Temporal)   Ht 165.1 cm (65\")   Wt 101 kg (222 lb)   SpO2 98%   BMI 36.94 kg/m²   Estimated body mass index is 36.94 kg/m² as calculated from the following:    Height as of this encounter: 165.1 cm (65\").    Weight as of this encounter: 101 kg (222 lb).      Physical Exam  Constitutional:       Appearance: She is obese.   Cardiovascular:      Rate and Rhythm: Normal rate and regular rhythm.      Heart sounds: Murmur heard.   Pulmonary:      Effort: Pulmonary effort is normal. No respiratory distress.   Musculoskeletal:      Left hip: Normal. No tenderness or bony tenderness. Normal range of motion. Normal strength.   Skin:     General: Skin is warm.      Coloration: Skin is not pale.   Neurological:      General: No focal deficit present.      Mental Status: She is alert and oriented to person, place, and time.              PER MAYRA MCKENNA APRN:  Chaperone present for exam.   Physical Exam   Genitourinary:    Vagina normal.   There is no tenderness or lesion on the right labia. There is no tenderness or lesion on the left labia. Cervix exhibits friability.    Genitourinary Comments: Excoriation around rectum    Bimanual palpation demonstrated no abnormalities, patient denies any pain or tenderness.            Assessment and Plan   Diagnoses and all orders for this visit:    1. Flu vaccine need (Primary)  -     Fluzone High-Dose " 65+yrs    2. HTN (hypertension), benign    3. Vaginal pain  -     IGP,rfx Aptima HPV All Pth    4. Cervical cancer screening  -     IGP,rfx Aptima HPV All Pth    5. Left hip pain  -     XR Hip With or Without Pelvis 2 - 3 View Left; Future  -     XR Spine Lumbar 2 or 3 View; Future    6. Anemia in stage 4 chronic kidney disease    7. Stage 4 chronic kidney disease        Patient is having left hip pain that is rating down towards her vaginal area.  Based on her description, it is when she is standing up for long time.  I will to make sure that there is no fullness in the pelvic area.  She is also due for Pap smear, and would like to go ahead and get this done, despite being over 65, she would like to go ahead and get a Pap smear, which likely be her last 1 based on guidelines.  Linda did not feel any fullness on bimanual exam.  She kindly assisted with performing the vaginal and pelvic exam for me.  I also think getting images of the lumbar spine and her left hip/pelvis would be beneficial.    For the patient's hypertension, the patient's blood pressure is above target at the present time.  However on home monitoring it has been better.  We have backed off some of her medicines as she was having low blood pressures at the last visit including orthostasis.  I would rather her run a little bit higher than to run low.  Patient does demonstrate adequate blood pressure technique.  Continue to monitor.    For the patient's chronic kidney disease, the patient follows with Dr. Daniels.  The patient recently saw him on 8/30/2023.  Labs were reviewed from this visit that were collected on 8/22, creatinine at that time was noted to be 2.52, with a GFR of 18.  This is consistent with the results that we have seen on ours recently as well.  Patient has been relatively stable, no changes at the present time.  Continue to monitor very closely.    Flu shot today.    Result Review :  The following data was reviewed by: José Miguel Morillo  MD Monica on 09/13/2023:  CBC w/diff          3/15/2023    08:22 4/28/2023    06:31 6/5/2023    11:06   CBC w/Diff   WBC 6.82  6.51  6.36    RBC 4.28  3.79  3.94    Hemoglobin 13.0  11.6  12.2    Hematocrit 40.1  36.9  39.4    MCV 93.7  97.4  100.0    MCH 30.4  30.6  31.0    MCHC 32.4  31.4  31.0    RDW 13.5  14.2  14.0    Platelets 105  143  162    Neutrophil Rel % CANCELED      Lymphocyte Rel % CANCELED      Monocyte Rel % CANCELED      Eosinophil Rel % CANCELED        BMP          4/26/2023    11:55 6/5/2023    11:06 8/30/2023    14:00   BMP   BUN 28  31  42    Creatinine 1.83  2.03  2.46    Sodium 140  140  141    Potassium 4.5  5.1  4.7    Chloride 106  103  105    CO2 24.0  26.0  23.7    Calcium 9.5  10.0  9.6                               Follow Up   Return in about 6 months (around 3/13/2024), or if symptoms worsen or fail to improve, for Recheck.  Patient was given instructions and counseling regarding her condition or for health maintenance advice. Please see specific information pulled into the AVS if appropriate.       CRISTIANO Anderson MD, FACP, [facility]      Electronically signed by José Miguel Anderson MD, 09/16/23, 10:43 AM CDT.

## 2023-09-25 RX ORDER — ANASTROZOLE 1 MG/1
1 TABLET ORAL DAILY
Qty: 30 TABLET | Refills: 3 | Status: SHIPPED | OUTPATIENT
Start: 2023-09-25

## 2023-09-29 ENCOUNTER — TELEPHONE (OUTPATIENT)
Dept: INTERNAL MEDICINE | Facility: CLINIC | Age: 83
End: 2023-09-29
Payer: MEDICARE

## 2023-09-29 NOTE — TELEPHONE ENCOUNTER
"Pt. Stated that her heart has been out of rhythm and was unsure if that would have anything to do with her BP readings. She has already talked to her cardiologist and she states that they did not seem too concerned about this, but if she keeps having these problems then she could come in to get her heart \"shocked\" back into rhythm   "

## 2023-10-04 NOTE — TELEPHONE ENCOUNTER
Spoke with pt and discussed instructions per Dr. Anderson  Pt. Stated her understanding and had no further questions

## 2023-10-06 DIAGNOSIS — I87.303 STASIS EDEMA OF BOTH LOWER EXTREMITIES: ICD-10-CM

## 2023-10-06 RX ORDER — BUMETANIDE 0.5 MG/1
TABLET ORAL
Qty: 90 TABLET | Refills: 0 | Status: SHIPPED | OUTPATIENT
Start: 2023-10-06

## 2023-10-12 ENCOUNTER — OFFICE VISIT (OUTPATIENT)
Dept: CARDIOLOGY CLINIC | Age: 83
End: 2023-10-12
Payer: MEDICARE

## 2023-10-12 VITALS
DIASTOLIC BLOOD PRESSURE: 70 MMHG | OXYGEN SATURATION: 95 % | HEIGHT: 65 IN | BODY MASS INDEX: 37.99 KG/M2 | HEART RATE: 60 BPM | WEIGHT: 228 LBS | SYSTOLIC BLOOD PRESSURE: 130 MMHG

## 2023-10-12 DIAGNOSIS — Z79.01 CHRONIC ANTICOAGULATION: ICD-10-CM

## 2023-10-12 DIAGNOSIS — I51.89 GRADE I DIASTOLIC DYSFUNCTION: ICD-10-CM

## 2023-10-12 DIAGNOSIS — I10 HYPERTENSION, UNSPECIFIED TYPE: ICD-10-CM

## 2023-10-12 DIAGNOSIS — I48.0 PAF (PAROXYSMAL ATRIAL FIBRILLATION) (HCC): Primary | ICD-10-CM

## 2023-10-12 DIAGNOSIS — I35.0 MODERATE AORTIC STENOSIS BY PRIOR ECHOCARDIOGRAM: ICD-10-CM

## 2023-10-12 DIAGNOSIS — E78.2 MIXED HYPERLIPIDEMIA: ICD-10-CM

## 2023-10-12 LAB
INTERNATIONAL NORMALIZATION RATIO, POC: 2
PROTHROMBIN TIME, POC: 21.4

## 2023-10-12 PROCEDURE — 3078F DIAST BP <80 MM HG: CPT | Performed by: NURSE PRACTITIONER

## 2023-10-12 PROCEDURE — 1123F ACP DISCUSS/DSCN MKR DOCD: CPT | Performed by: NURSE PRACTITIONER

## 2023-10-12 PROCEDURE — 3075F SYST BP GE 130 - 139MM HG: CPT | Performed by: NURSE PRACTITIONER

## 2023-10-12 PROCEDURE — 99214 OFFICE O/P EST MOD 30 MIN: CPT | Performed by: NURSE PRACTITIONER

## 2023-10-12 RX ORDER — VALSARTAN 160 MG/1
160 TABLET ORAL DAILY
COMMUNITY
Start: 2023-08-17

## 2023-10-12 NOTE — PROGRESS NOTES
[facility]. [facility]  [address] [place] [address], [address] [place] [address]  [phone] office  [phone] fax      OFFICE VISIT:  10/12/2023    Mari Domingo - : 1940  Reason For Visit:  Eileen Coppola is a 79 y.o. female who is here for Atrial Fibrillation (Patient is having quite a bit of AFib, patient states she is in rhythm now, but doesn't want to have cardioversion. )    History:   Diagnosis Orders   1. PAF (paroxysmal atrial fibrillation) (HCC)  POCT INR      2. Hypertension, unspecified type        3. Grade I diastolic dysfunction        4. Mixed hyperlipidemia        5. Chronic anticoagulation        6. Moderate aortic stenosis by prior echocardiogram          The patient presents today for cardiology follow up and INR. She is an 79year old with following medical history:  1. Paroxysmal atrial fibrillation on Multaq and Coumadin, normal LV ejection fraction. 2.  CKD stage IV, baseline creatinine 1.8.  3.  Recent left breast cancer 2020 status post mastectomy, on Arimidex    The patient is currently taking Multaq. She reports increased AF episodes in September. The patient tolerates the arrhythmia well. She reports trying 2 other anti arrhythmics in the past.  The patient continues on coumadin with no bleeding issues. BP is well controlled on current regimen. The patient's PCP monitors cholesterol. Subjective  Aniya denies exertional chest pain, shortness of breath, orthopnea, paroxysmal nocturnal dyspnea, syncope, presyncope, edema and fatigue. The patient denies numbness or weakness to suggest cerebrovascular accident or transient ischemic attack. + intermittent AF.     Mari Domingo has the following history as recorded in [facility]:  Patient Active Problem List   Diagnosis Code    Hypothyroidism E03.9    Mixed hyperlipidemia E78.2    Hypertension I10    PAF (paroxysmal atrial fibrillation) (HCC) I48.0    Endometrial

## 2023-11-27 ENCOUNTER — TELEPHONE (OUTPATIENT)
Dept: SURGERY | Facility: CLINIC | Age: 83
End: 2023-11-27
Payer: MEDICARE

## 2023-11-27 ENCOUNTER — ANTI-COAG VISIT (OUTPATIENT)
Dept: CARDIOLOGY CLINIC | Age: 83
End: 2023-11-27
Payer: MEDICARE

## 2023-11-27 DIAGNOSIS — I48.0 PAF (PAROXYSMAL ATRIAL FIBRILLATION) (HCC): Primary | ICD-10-CM

## 2023-11-27 LAB
INTERNATIONAL NORMALIZATION RATIO, POC: 2.5
PROTHROMBIN TIME, POC: 25.9

## 2023-11-27 PROCEDURE — 93793 ANTICOAG MGMT PT WARFARIN: CPT | Performed by: CLINICAL NURSE SPECIALIST

## 2023-11-27 NOTE — TELEPHONE ENCOUNTER
Hub staff attempted to follow warm transfer process and was unsuccessful     Caller: Heidi Ansari    Relationship to patient: Self    Best call back number: [phone]     Patient is needing: PT NEEDS ORDERS PLACED FOR MAMMO. PREV PT OF DR BANKS. PT ALREADY HAS APPT SCHEDULE 12/12/23 10:30AM

## 2023-11-28 DIAGNOSIS — Z12.31 SCREENING MAMMOGRAM FOR HIGH-RISK PATIENT: Primary | ICD-10-CM

## 2023-12-04 DIAGNOSIS — I48.91 ATRIAL FIBRILLATION, UNSPECIFIED TYPE (HCC): ICD-10-CM

## 2023-12-05 RX ORDER — DRONEDARONE 400 MG/1
TABLET, FILM COATED ORAL
Qty: 180 TABLET | Refills: 3 | Status: SHIPPED | OUTPATIENT
Start: 2023-12-05

## 2023-12-06 ENCOUNTER — TELEPHONE (OUTPATIENT)
Dept: ONCOLOGY | Facility: CLINIC | Age: 83
End: 2023-12-06
Payer: MEDICARE

## 2023-12-06 DIAGNOSIS — Z86.010 HX OF ADENOMATOUS COLONIC POLYPS: Primary | ICD-10-CM

## 2023-12-06 DIAGNOSIS — R92.8 OTHER ABNORMAL AND INCONCLUSIVE FINDINGS ON DIAGNOSTIC IMAGING OF BREAST: ICD-10-CM

## 2023-12-06 NOTE — TELEPHONE ENCOUNTER
Caller: Heidi Ansari    Relationship: Self    Best call back number: [phone]    What orders are you requesting (i.e. lab or imaging): DIAGNOSTIC MAMMOGRAM     In what timeframe would the patient need to come in: BEFORE 01/03/24    Where will you receive your lab/imaging services: [facility]    Additional notes:     NEEDING DR MONTENEGRO TO ORDER AND SCHEDULE DIAGNOSTIC MAMMOGRAM  BEFORE NEXT APPOINTMENT 01/03/24     PREVIOUSLY MATILDE BANKS MD WOULD PUT THIS IN HOWEVER SHE IN NO LONGER PRACTICING ANYMORE.     PLEASE ADVISE ONCE THIS IS PUT IN AND SCHEDULED.

## 2023-12-08 DIAGNOSIS — J06.9 UPPER RESPIRATORY TRACT INFECTION, UNSPECIFIED TYPE: Primary | ICD-10-CM

## 2023-12-08 RX ORDER — AMOXICILLIN 500 MG/1
CAPSULE ORAL
Qty: 14 CAPSULE | Refills: 0 | Status: SHIPPED | OUTPATIENT
Start: 2023-12-08

## 2023-12-14 ENCOUNTER — OFFICE VISIT (OUTPATIENT)
Dept: INTERNAL MEDICINE | Facility: CLINIC | Age: 83
End: 2023-12-14
Payer: MEDICARE

## 2023-12-14 VITALS
HEART RATE: 82 BPM | SYSTOLIC BLOOD PRESSURE: 132 MMHG | WEIGHT: 219.2 LBS | TEMPERATURE: 97.9 F | OXYGEN SATURATION: 98 % | BODY MASS INDEX: 36.52 KG/M2 | HEIGHT: 65 IN | DIASTOLIC BLOOD PRESSURE: 68 MMHG

## 2023-12-14 DIAGNOSIS — J06.9 UPPER RESPIRATORY TRACT INFECTION, UNSPECIFIED TYPE: Primary | ICD-10-CM

## 2023-12-14 PROCEDURE — 3075F SYST BP GE 130 - 139MM HG: CPT

## 2023-12-14 PROCEDURE — 1159F MED LIST DOCD IN RCRD: CPT

## 2023-12-14 PROCEDURE — 1160F RVW MEDS BY RX/DR IN RCRD: CPT

## 2023-12-14 PROCEDURE — 3078F DIAST BP <80 MM HG: CPT

## 2023-12-14 PROCEDURE — 99213 OFFICE O/P EST LOW 20 MIN: CPT

## 2023-12-14 RX ORDER — BENZONATATE 100 MG/1
100 CAPSULE ORAL 3 TIMES DAILY PRN
Qty: 30 CAPSULE | Refills: 0 | Status: SHIPPED | OUTPATIENT
Start: 2023-12-14

## 2023-12-14 RX ORDER — ALBUTEROL SULFATE 90 UG/1
2 AEROSOL, METERED RESPIRATORY (INHALATION) EVERY 6 HOURS PRN
Qty: 18 G | Refills: 0 | Status: SHIPPED | OUTPATIENT
Start: 2023-12-14

## 2023-12-14 NOTE — PROGRESS NOTES
Subjective     Chief Complaint:  Cough    HPI:  Patient presents today with complaints of cough.  She reports that this cough has been present for approximately 2 weeks.  SUELLEN Soares sent in amoxicillin on 12/8/2023 for treatment of upper respiratory infection.  The patient reports that she started the antibiotic on Saturday.  She thinks that she is slowly beginning to feel better and just wanted to be checked out today since she was already here with her  for his appointment.  She reports that her cough is now productive.  She reports that her sputum is yellow in color.  She has also been taking Mucinex along with the antibiotic.  She denies shortness of breath worse than baseline or fever.    Past Medical History:   Past Medical History:   Diagnosis Date    A-fib     Allergic codiene, percocet, bactrim    Arthritis     Arthritis     generalized    Asthma some shortness of breath    Atrial fibrillation     INTERMITTENT    Basal cell carcinoma     between eyes     Breast cancer     Cancer     Cataract 2007,2011    Cholelithiasis 8-03-06 surgery    Colon polyp removed    Disease of thyroid gland     Diverticulosis     Elevated cholesterol     Heart murmur a fib    Hx of colonic polyp     Hyperlipidemia     Hypertension     Kidney disease     STAGE 4    Kidney disease     Osteopenia left hip    Renal insufficiency some years ago    Urinary tract infection occasionally    Visual impairment blurry vision     Past Surgical History:  Past Surgical History:   Procedure Laterality Date    APPENDECTOMY      BREAST BIOPSY Left 2015    BREAST BIOPSY Right 12/31/2020    Procedure: RIGHT NEEDLE DIRECTED EXCISIONAL BREAST BIOPSY;  Surgeon: Nikki Zuniga MD;  Location: [facility];  Service: General;  Laterality: Right;    BREAST EXCISIONAL BIOPSY Left 2001    BREAST EXCISIONAL BIOPSY Left 2001    CARPAL TUNNEL RELEASE Right     CATARACT EXTRACTION, BILATERAL      CHOLECYSTECTOMY      COLON SURGERY       "Partial right    COLONOSCOPY      COLONOSCOPY N/A 10/17/2019    Procedure: COLONOSCOPY WITH ANESTHESIA;  Surgeon: Rigoberto Shaw MD;  Location: [facility] ENDOSCOPY;  Service: Gastroenterology    COLONOSCOPY W/ POLYPECTOMY  06/30/2016    2 Adenomatous polyps at 80 & 40 cm, Diverticulosis repeat exam in 3 years    EXCISION MASS ARM Bilateral 4/28/2023    Procedure: lipoma excision left hand and right arm;  Surgeon: Nikki Zuniga MD;  Location:  [facility] OR;  Service: General;  Laterality: Bilateral;    EYE SURGERY Bilateral     cataract extraction    EYE SURGERY Left     detached retina    HERNIA REPAIR      JOINT REPLACEMENT  both knees 2-14-12 &10-30-12    MASTECTOMY Left 2019    MASTECTOMY W/ SENTINEL NODE BIOPSY Left 01/02/2020    Procedure: LEFT SIMPLE MASTECTOMY WITH SENTINEL NODE BIOPSY, INJECTION AND SCAN, RADIOLOGIST WILL INJECT;  Surgeon: Nikki Zuniga MD;  Location: [facility] OR;  Service: General    REPLACEMENT TOTAL KNEE Bilateral     THROAT SURGERY      duct    TONSILLECTOMY  8 or 9 yrs old    UMBILICAL HERNIA REPAIR  years ago       Allergies:  Allergies   Allergen Reactions    Bactrim [Sulfamethoxazole-Trimethoprim] Other (See Comments)     Patient has kidney disease (stage 4) shouldn't take Bactrim    Codeine Nausea Only    Percocet [Oxycodone-Acetaminophen] Nausea Only     Becomes, hot, nauseated, and made her head feel \"weird\".     Medications:  Prior to Admission medications    Medication Sig Start Date End Date Taking? Authorizing Provider   acetaminophen (TYLENOL) 500 MG tablet Take 1 tablet by mouth Every 6 (Six) Hours As Needed for Mild Pain.   Yes Ceci Alcala MD   allopurinol (ZYLOPRIM) 100 MG tablet Take 1 tablet by mouth 2 (Two) Times a Day. 5/29/19  Yes ProviderCeci MD   amoxicillin (AMOXIL) 500 MG capsule Take 1,000 mg one time dose first day, then after take 500 mg every 12 hours for 6 days 12/8/23  Yes Linda Samuel APRN   anastrozole (ARIMIDEX) 1 MG tablet " "Take 1 tablet by mouth daily 9/25/23  Yes Ender Franco MD   bumetanide (BUMEX) 0.5 MG tablet Take 1 tablet by mouth once daily 10/6/23  Yes Linda Samuel APRN   calcium carbonate (OS-LAURA) 600 MG tablet Take 2 tablets by mouth Daily.   Yes Ceci Alcala MD   carboxymethylcellulose (REFRESH PLUS) 0.5 % solution Administer 1 drop to both eyes 3 (Three) Times a Day As Needed for Dry Eyes.   Yes Ceci Alcala MD   dronedarone (MULTAQ) 400 MG tablet Take 1 tablet by mouth 2 (Two) Times a Day With Meals. 2/26/19  Yes Ceci Alcala MD   levothyroxine (SYNTHROID, LEVOTHROID) 50 MCG tablet Take 1 tablet by mouth Daily. 3/15/23  Yes José Miguel Anderson MD   metoprolol succinate XL (TOPROL-XL) 25 MG 24 hr tablet Take 1 tablet by mouth 2 (Two) Times a Day. 5/28/19  Yes Ceci Alcala MD   polyethylene glycol (MIRALAX) 17 GM/SCOOP powder Take 17 g by mouth Daily.   Yes Ceci Alcala MD   simvastatin (ZOCOR) 40 MG tablet Take 1 tablet by mouth Daily. 8/3/23  Yes José Miguel Anderson MD   valsartan (DIOVAN) 160 MG tablet Take 1 tablet by mouth Daily. 6/6/23  Yes Ceci Alcala MD   Vitamin D, Cholecalciferol, 50 MCG (2000 UT) capsule Take  by mouth.   Yes Ceci Alcala MD   warfarin (COUMADIN) 5 MG tablet Take  by mouth Take As Directed. Take 1 whole tablet (5 mg) on Wednesday only. Take 1/2 tablet (2.5 mg) all other days. 6/17/19  Yes Ceci Alcala MD       Objective     Vital Signs: /68 (BP Location: Right arm, Patient Position: Sitting, Cuff Size: Adult)   Pulse 82   Temp 97.9 °F (36.6 °C) (Temporal)   Ht 165 cm (64.97\")   Wt 99.4 kg (219 lb 3.2 oz)   SpO2 98%   BMI 36.52 kg/m²   Physical Exam  Vitals and nursing note reviewed.   Constitutional:       General: She is not in acute distress.     Appearance: Normal appearance.   Cardiovascular:      Rate and Rhythm: Normal rate. Rhythm irregular.      Pulses: Normal pulses.      Heart sounds: " Normal heart sounds. No murmur heard.  Pulmonary:      Effort: Pulmonary effort is normal. No respiratory distress.      Breath sounds: No wheezing.      Comments: Productive, congested cough. Breath sounds diminished.  Abdominal:      General: Bowel sounds are normal. There is no distension.      Palpations: Abdomen is soft.      Tenderness: There is no abdominal tenderness.   Musculoskeletal:         General: Normal range of motion.      Cervical back: Normal range of motion.   Skin:     General: Skin is warm and dry.      Capillary Refill: Capillary refill takes less than 2 seconds.      Findings: No bruising, erythema or rash.   Neurological:      Mental Status: She is alert and oriented to person, place, and time. Mental status is at baseline.      Motor: No weakness.       Results Reviewed:  Reviewed renal function on BMP obtained on 8/30/2023.    Assessment / Plan     Assessment/Plan:  Diagnoses and all orders for this visit:    1. Upper respiratory tract infection, unspecified type (Primary)  -     benzonatate (Tessalon Perles) 100 MG capsule; Take 1 capsule by mouth 3 (Three) Times a Day As Needed for Cough.  Dispense: 30 capsule; Refill: 0  -     albuterol sulfate  (90 Base) MCG/ACT inhaler; Inhale 2 puffs Every 6 (Six) Hours As Needed for Wheezing.  Dispense: 18 g; Refill: 0       Initially heard expiratory wheezing on exam but after the patient had a deep cough her breath sounds were diminished.  She states that sometimes she can feel herself wheezing.  We discussed that her increase in sputum production is likely because she has been taking Mucinex which is helping to thin her sputum.  Since she is starting to feel better I do feel that the antibiotic sent in by [facility] is likely helping her.  She will be given Tessalon Perles to be taken 3 times daily as needed for cough.  She will also be given an albuterol inhaler to use if she experiences shortness of breath or wheezing.  We discussed avoiding  excessive albuterol use due to her atrial fibrillation.  Will also avoid steroids for the same reason.  Do not feel that there is any indication for imaging today.  However, encouraged the patient to call if her symptoms worsen or fail to improve.  Would likely need to consider obtaining chest x-ray at that time if needed.    Return for Next scheduled follow up. unless patient needs to be seen sooner or acute issues arise.    I have discussed the patient results/orders and and plan/recommendation with them at today's visit.      Lizzy Conley, APRN   12/14/2023

## 2023-12-21 ENCOUNTER — OFFICE VISIT (OUTPATIENT)
Dept: INTERNAL MEDICINE | Facility: CLINIC | Age: 83
End: 2023-12-21
Payer: MEDICARE

## 2023-12-21 VITALS
DIASTOLIC BLOOD PRESSURE: 78 MMHG | WEIGHT: 223.4 LBS | TEMPERATURE: 97.5 F | OXYGEN SATURATION: 98 % | BODY MASS INDEX: 37.22 KG/M2 | SYSTOLIC BLOOD PRESSURE: 142 MMHG | HEIGHT: 65 IN | HEART RATE: 68 BPM

## 2023-12-21 DIAGNOSIS — R30.0 BURNING WITH URINATION: ICD-10-CM

## 2023-12-21 DIAGNOSIS — Z79.01 ON WARFARIN THERAPY: ICD-10-CM

## 2023-12-21 DIAGNOSIS — R31.0 GROSS HEMATURIA: ICD-10-CM

## 2023-12-21 DIAGNOSIS — N30.90 CYSTITIS: Primary | ICD-10-CM

## 2023-12-21 LAB
BILIRUB BLD-MCNC: ABNORMAL MG/DL
CLARITY, POC: ABNORMAL
COLOR UR: ABNORMAL
GLUCOSE UR STRIP-MCNC: NEGATIVE MG/DL
KETONES UR QL: NEGATIVE
LEUKOCYTE EST, POC: ABNORMAL
NITRITE UR-MCNC: POSITIVE MG/ML
PH UR: 6 [PH] (ref 5–8)
PROT UR STRIP-MCNC: ABNORMAL MG/DL
RBC # UR STRIP: ABNORMAL /UL
SP GR UR: 1.01 (ref 1–1.03)
UROBILINOGEN UR QL: ABNORMAL

## 2023-12-21 RX ORDER — CEFDINIR 300 MG/1
300 CAPSULE ORAL DAILY
Qty: 10 CAPSULE | Refills: 0 | Status: SHIPPED | OUTPATIENT
Start: 2023-12-21

## 2023-12-21 NOTE — PROGRESS NOTES
Subjective   Heidi Ansari is a 83 y.o. female.   Chief Complaint   Patient presents with    Urinary Tract Infection     Patient complains of blood being present in urine x 3 days.  Experiencing burning with urination, foul smelling odor.  Denies pelvic discomfort, abdominal or back pain.     Blood in Urine     Patient complains of blood being present in urine x this morning.        History of Present Illness   Mrs. Ansari presents to the office today with concerns of UTI.  She explains that about 3 days ago she noticed a pain/discomfort towards the end of urination, symptoms have progressed and yesterday she noticed tiffany hematuria, she recalls in the past when this occurred that she had a urinary tract infection.  She has also noted a stronger odor of her urine.  She has not been experiencing any pelvic discomfort, no abdominal or flank pain.  Denies fevers, chills.  She states that she has had a slight increase in frequency but not excessively so.  No diarrhea.  She states she is due for an INR check on 8 January, on warfarin therapy for management of clot prevention given atrial fibrillation.  She feels like the quantity of her urine has and remained the same.  The following portions of the patient's history were reviewed and updated as appropriate: allergies, current medications, past family history, past medical history, past social history, past surgical history and problem list.    Review of Systems    Objective   Past Medical History:   Diagnosis Date    A-fib     Allergic codiene, percocet, bactrim    Arthritis     Arthritis     generalized    Asthma some shortness of breath    Atrial fibrillation     INTERMITTENT    Basal cell carcinoma     between eyes     Breast cancer     Cancer     Cataract 2007,2011    Cholelithiasis 8-03-06 surgery    Colon polyp removed    Disease of thyroid gland     Diverticulosis     Elevated cholesterol     Heart murmur a fib    Hx of colonic polyp     Hyperlipidemia      Hypertension     Kidney disease     STAGE 4    Kidney disease     Osteopenia left hip    Renal insufficiency some years ago    Urinary tract infection occasionally    Visual impairment blurry vision      Past Surgical History:   Procedure Laterality Date    APPENDECTOMY      BREAST BIOPSY Left 2015    BREAST BIOPSY Right 12/31/2020    Procedure: RIGHT NEEDLE DIRECTED EXCISIONAL BREAST BIOPSY;  Surgeon: Nikki Zuniga MD;  Location: [facility] OR;  Service: General;  Laterality: Right;    BREAST EXCISIONAL BIOPSY Left 2001    BREAST EXCISIONAL BIOPSY Left 2001    CARPAL TUNNEL RELEASE Right     CATARACT EXTRACTION, BILATERAL      CHOLECYSTECTOMY      COLON SURGERY      Partial right    COLONOSCOPY      COLONOSCOPY N/A 10/17/2019    Procedure: COLONOSCOPY WITH ANESTHESIA;  Surgeon: Rigoberto Shaw MD;  Location: [facility] ENDOSCOPY;  Service: Gastroenterology    COLONOSCOPY W/ POLYPECTOMY  06/30/2016    2 Adenomatous polyps at 80 & 40 cm, Diverticulosis repeat exam in 3 years    EXCISION MASS ARM Bilateral 4/28/2023    Procedure: lipoma excision left hand and right arm;  Surgeon: Nikki Zuniga MD;  Location: [facility] OR;  Service: General;  Laterality: Bilateral;    EYE SURGERY Bilateral     cataract extraction    EYE SURGERY Left     detached retina    HERNIA REPAIR      JOINT REPLACEMENT  both knees 2-14-12 &10-30-12    MASTECTOMY Left 2019    MASTECTOMY W/ SENTINEL NODE BIOPSY Left 01/02/2020    Procedure: LEFT SIMPLE MASTECTOMY WITH SENTINEL NODE BIOPSY, INJECTION AND SCAN, RADIOLOGIST WILL INJECT;  Surgeon: Nikki Zuniga MD;  Location: [facility] OR;  Service: General    REPLACEMENT TOTAL KNEE Bilateral     THROAT SURGERY      duct    TONSILLECTOMY  8 or 9 yrs old    UMBILICAL HERNIA REPAIR  years ago        Current Outpatient Medications:     acetaminophen (TYLENOL) 500 MG tablet, Take 1 tablet by mouth Every 6 (Six) Hours As Needed for Mild Pain., Disp: , Rfl:     albuterol sulfate  (90 Base) MCG/ACT  "inhaler, Inhale 2 puffs Every 6 (Six) Hours As Needed for Wheezing., Disp: 18 g, Rfl: 0    allopurinol (ZYLOPRIM) 100 MG tablet, Take 1 tablet by mouth 2 (Two) Times a Day., Disp: , Rfl: 3    anastrozole (ARIMIDEX) 1 MG tablet, Take 1 tablet by mouth daily, Disp: 30 tablet, Rfl: 3    benzonatate (Tessalon Perles) 100 MG capsule, Take 1 capsule by mouth 3 (Three) Times a Day As Needed for Cough., Disp: 30 capsule, Rfl: 0    bumetanide (BUMEX) 0.5 MG tablet, Take 1 tablet by mouth once daily, Disp: 90 tablet, Rfl: 0    calcium carbonate (OS-LAURA) 600 MG tablet, Take 2 tablets by mouth Daily., Disp: , Rfl:     carboxymethylcellulose (REFRESH PLUS) 0.5 % solution, Administer 1 drop to both eyes 3 (Three) Times a Day As Needed for Dry Eyes., Disp: , Rfl:     dronedarone (MULTAQ) 400 MG tablet, Take 1 tablet by mouth 2 (Two) Times a Day With Meals., Disp: , Rfl:     levothyroxine (SYNTHROID, LEVOTHROID) 50 MCG tablet, Take 1 tablet by mouth Daily., Disp: 90 tablet, Rfl: 3    metoprolol succinate XL (TOPROL-XL) 25 MG 24 hr tablet, Take 1 tablet by mouth 2 (Two) Times a Day., Disp: , Rfl: 1    polyethylene glycol (MIRALAX) 17 GM/SCOOP powder, Take 17 g by mouth Daily., Disp: , Rfl:     simvastatin (ZOCOR) 40 MG tablet, Take 1 tablet by mouth Daily., Disp: 90 tablet, Rfl: 3    valsartan (DIOVAN) 160 MG tablet, Take 1 tablet by mouth Daily., Disp: , Rfl:     Vitamin D, Cholecalciferol, 50 MCG (2000 UT) capsule, Take  by mouth., Disp: , Rfl:     warfarin (COUMADIN) 5 MG tablet, Take  by mouth Take As Directed. Take 1 whole tablet (5 mg) on Wednesday only. Take 1/2 tablet (2.5 mg) all other days., Disp: , Rfl: 5    cefdinir (OMNICEF) 300 MG capsule, Take 1 capsule by mouth Daily., Disp: 10 capsule, Rfl: 0      /78 (BP Location: Right arm, Patient Position: Sitting, Cuff Size: Adult)   Pulse 68   Temp 97.5 °F (36.4 °C) (Temporal)   Ht 165 cm (64.97\")   Wt 101 kg (223 lb 6.4 oz)   SpO2 98%   BMI 37.21 kg/m²      Body " mass index is 37.21 kg/m².          Physical Exam  Vitals and nursing note reviewed.   Constitutional:       General: She is not in acute distress.     Appearance: Normal appearance. She is not ill-appearing or toxic-appearing.   HENT:      Head: Normocephalic and atraumatic.   Eyes:      Extraocular Movements: Extraocular movements intact.      Conjunctiva/sclera: Conjunctivae normal.      Pupils: Pupils are equal, round, and reactive to light.   Cardiovascular:      Rate and Rhythm: Normal rate. Rhythm irregular.      Pulses: Normal pulses.      Heart sounds: Normal heart sounds.   Pulmonary:      Effort: Pulmonary effort is normal.      Breath sounds: Rales (slight, upper lobes) present.   Abdominal:      Tenderness: There is no abdominal tenderness. There is no right CVA tenderness, left CVA tenderness or guarding.   Musculoskeletal:      Cervical back: Normal range of motion and neck supple.      Right lower leg: No edema.      Left lower leg: No edema.   Skin:     General: Skin is warm and dry.   Neurological:      General: No focal deficit present.      Mental Status: She is alert and oriented to person, place, and time. Mental status is at baseline.   Psychiatric:         Mood and Affect: Mood normal.         Behavior: Behavior normal.         Thought Content: Thought content normal.         Judgment: Judgment normal.               Assessment & Plan   Diagnoses and all orders for this visit:    1. Cystitis (Primary)  -     Urine Culture - Urine, Urine, Clean Catch  -     cefdinir (OMNICEF) 300 MG capsule; Take 1 capsule by mouth Daily.  Dispense: 10 capsule; Refill: 0    2. Gross hematuria  -     Basic metabolic panel  -     Urine Culture - Urine, Urine, Clean Catch  -     cefdinir (OMNICEF) 300 MG capsule; Take 1 capsule by mouth Daily.  Dispense: 10 capsule; Refill: 0  -     CBC w AUTO Differential    3. On warfarin therapy    4. Burning with urination  -     POC Urinalysis Dipstick, Multipro                Plan of care reviewed with Mrs. Ansari  Urine assessment along with symptoms correlate with active cystitis on assessment in the office today.  Antibiotics will proceed with being started, send urine for culture.  Will start with Omnicef, advised if symptoms do not improve over the weekend or if they worsen she needs to be seen - we are heading into a longer weekend than usual with [place] -urine culture results may not be reviewed until next Tuesday.  Given the tiffany hematuria that she is experiencing we will proceed with reassessing BMP as well as CBC, we went ahead and checked an INR given warfarin therapy with that cardiology at [facility] manages.  Her INR was 3.2, I feel like it safe to say that she needs to go ahead and hold tonight's dose of warfarin at least but she has been advised to please call her cardiologist office for further recommendations on management.  She was advised to inform them of hematuria as well as the fact that we started her on antibiotics.  She expresses understanding of this.  Also stressed the importance of increasing water intake, trying to avoid bladder irritants such as carbonation, caffeine.  Her lungs still sound a little coarse, she reports she is continue to have some cough but not excessively so, has been more short of breath which she associates with her rhythm being more in atrial fibrillation than usual here lately.  Proceed with as needed visits, otherwise keep next scheduled appointment.      Please note that portions of this note were completed with a voice recognition program.     Electronically signed by SUELLEN Munroe, 12/21/23, 14:31 CST.

## 2023-12-24 LAB
BACTERIA UR CULT: ABNORMAL
BACTERIA UR CULT: ABNORMAL
BASOPHILS # BLD AUTO: 0.04 10*3/MM3 (ref 0–0.2)
BASOPHILS NFR BLD AUTO: 0.4 % (ref 0–1.5)
BUN SERPL-MCNC: 21 MG/DL (ref 8–23)
BUN/CREAT SERPL: 13.5 (ref 7–25)
CALCIUM SERPL-MCNC: 9.2 MG/DL (ref 8.6–10.5)
CHLORIDE SERPL-SCNC: 104 MMOL/L (ref 98–107)
CO2 SERPL-SCNC: 25.4 MMOL/L (ref 22–29)
CREAT SERPL-MCNC: 1.56 MG/DL (ref 0.57–1)
EGFRCR SERPLBLD CKD-EPI 2021: 32.6 ML/MIN/1.73
EOSINOPHIL # BLD AUTO: 0.06 10*3/MM3 (ref 0–0.4)
EOSINOPHIL NFR BLD AUTO: 0.6 % (ref 0.3–6.2)
ERYTHROCYTE [DISTWIDTH] IN BLOOD BY AUTOMATED COUNT: 12.9 % (ref 12.3–15.4)
GLUCOSE SERPL-MCNC: 114 MG/DL (ref 65–99)
HCT VFR BLD AUTO: 38.1 % (ref 34–46.6)
HGB BLD-MCNC: 12.6 G/DL (ref 12–15.9)
IMM GRANULOCYTES # BLD AUTO: 0.03 10*3/MM3 (ref 0–0.05)
IMM GRANULOCYTES NFR BLD AUTO: 0.3 % (ref 0–0.5)
LYMPHOCYTES # BLD AUTO: 1.84 10*3/MM3 (ref 0.7–3.1)
LYMPHOCYTES NFR BLD AUTO: 18.8 % (ref 19.6–45.3)
MCH RBC QN AUTO: 30.5 PG (ref 26.6–33)
MCHC RBC AUTO-ENTMCNC: 33.1 G/DL (ref 31.5–35.7)
MCV RBC AUTO: 92.3 FL (ref 79–97)
MONOCYTES # BLD AUTO: 0.52 10*3/MM3 (ref 0.1–0.9)
MONOCYTES NFR BLD AUTO: 5.3 % (ref 5–12)
NEUTROPHILS # BLD AUTO: 7.31 10*3/MM3 (ref 1.7–7)
NEUTROPHILS NFR BLD AUTO: 74.6 % (ref 42.7–76)
NRBC BLD AUTO-RTO: 0 /100 WBC (ref 0–0.2)
OTHER ANTIBIOTIC SUSC ISLT: ABNORMAL
PLATELET # BLD AUTO: 111 10*3/MM3 (ref 140–450)
POTASSIUM SERPL-SCNC: 3.9 MMOL/L (ref 3.5–5.2)
RBC # BLD AUTO: 4.13 10*6/MM3 (ref 3.77–5.28)
SODIUM SERPL-SCNC: 142 MMOL/L (ref 136–145)
WBC # BLD AUTO: 9.8 10*3/MM3 (ref 3.4–10.8)

## 2023-12-26 LAB — NCCN CRITERIA FLAG: NORMAL

## 2023-12-27 ENCOUNTER — LAB (OUTPATIENT)
Dept: LAB | Facility: HOSPITAL | Age: 83
End: 2023-12-27
Payer: MEDICARE

## 2023-12-27 ENCOUNTER — HOSPITAL ENCOUNTER (OUTPATIENT)
Dept: MAMMOGRAPHY | Facility: HOSPITAL | Age: 83
Discharge: HOME | End: 2023-12-27
Payer: MEDICARE

## 2023-12-27 DIAGNOSIS — C50.912 MALIGNANT NEOPLASM OF LEFT BREAST IN FEMALE, ESTROGEN RECEPTOR POSITIVE, UNSPECIFIED SITE OF BREAST: ICD-10-CM

## 2023-12-27 DIAGNOSIS — Z17.0 MALIGNANT NEOPLASM OF LEFT BREAST IN FEMALE, ESTROGEN RECEPTOR POSITIVE, UNSPECIFIED SITE OF BREAST: ICD-10-CM

## 2023-12-27 DIAGNOSIS — R92.8 OTHER ABNORMAL AND INCONCLUSIVE FINDINGS ON DIAGNOSTIC IMAGING OF BREAST: ICD-10-CM

## 2023-12-27 DIAGNOSIS — Z86.010 HX OF ADENOMATOUS COLONIC POLYPS: ICD-10-CM

## 2023-12-27 LAB
ALBUMIN SERPL-MCNC: 4.2 G/DL (ref 3.5–5.2)
ALBUMIN/GLOB SERPL: 1.4 G/DL
ALP SERPL-CCNC: 101 U/L (ref 39–117)
ALT SERPL W P-5'-P-CCNC: 10 U/L (ref 1–33)
ANION GAP SERPL CALCULATED.3IONS-SCNC: 13 MMOL/L (ref 5–15)
AST SERPL-CCNC: 19 U/L (ref 1–32)
BASOPHILS # BLD AUTO: 0.05 10*3/MM3 (ref 0–0.2)
BASOPHILS NFR BLD AUTO: 0.7 % (ref 0–1.5)
BILIRUB SERPL-MCNC: 0.4 MG/DL (ref 0–1.2)
BUN SERPL-MCNC: 20 MG/DL (ref 8–23)
BUN/CREAT SERPL: 13.2 (ref 7–25)
CALCIUM SPEC-SCNC: 9.4 MG/DL (ref 8.6–10.5)
CEA SERPL-MCNC: 1.79 NG/ML
CHLORIDE SERPL-SCNC: 103 MMOL/L (ref 98–107)
CO2 SERPL-SCNC: 26 MMOL/L (ref 22–29)
CREAT SERPL-MCNC: 1.51 MG/DL (ref 0.57–1)
DEPRECATED RDW RBC AUTO: 47.2 FL (ref 37–54)
EGFRCR SERPLBLD CKD-EPI 2021: 33.9 ML/MIN/1.73
EOSINOPHIL # BLD AUTO: 0.11 10*3/MM3 (ref 0–0.4)
EOSINOPHIL NFR BLD AUTO: 1.6 % (ref 0.3–6.2)
ERYTHROCYTE [DISTWIDTH] IN BLOOD BY AUTOMATED COUNT: 13.8 % (ref 12.3–15.4)
FERRITIN SERPL-MCNC: 135.9 NG/ML (ref 13–150)
GLOBULIN UR ELPH-MCNC: 3.1 GM/DL
GLUCOSE SERPL-MCNC: 97 MG/DL (ref 65–99)
HCT VFR BLD AUTO: 41.7 % (ref 34–46.6)
HGB BLD-MCNC: 13 G/DL (ref 12–15.9)
IMM GRANULOCYTES # BLD AUTO: 0.02 10*3/MM3 (ref 0–0.05)
IMM GRANULOCYTES NFR BLD AUTO: 0.3 % (ref 0–0.5)
IRON 24H UR-MRATE: 75 MCG/DL (ref 37–145)
IRON SATN MFR SERPL: 17 % (ref 20–50)
LYMPHOCYTES # BLD AUTO: 2.11 10*3/MM3 (ref 0.7–3.1)
LYMPHOCYTES NFR BLD AUTO: 31.3 % (ref 19.6–45.3)
MCH RBC QN AUTO: 29.1 PG (ref 26.6–33)
MCHC RBC AUTO-ENTMCNC: 31.2 G/DL (ref 31.5–35.7)
MCV RBC AUTO: 93.5 FL (ref 79–97)
MONOCYTES # BLD AUTO: 0.48 10*3/MM3 (ref 0.1–0.9)
MONOCYTES NFR BLD AUTO: 7.1 % (ref 5–12)
NEUTROPHILS NFR BLD AUTO: 3.98 10*3/MM3 (ref 1.7–7)
NEUTROPHILS NFR BLD AUTO: 59 % (ref 42.7–76)
NRBC BLD AUTO-RTO: 0 /100 WBC (ref 0–0.2)
PLATELET # BLD AUTO: 170 10*3/MM3 (ref 140–450)
PMV BLD AUTO: 11.4 FL (ref 6–12)
POTASSIUM SERPL-SCNC: 3.9 MMOL/L (ref 3.5–5.2)
PROT SERPL-MCNC: 7.3 G/DL (ref 6–8.5)
RBC # BLD AUTO: 4.46 10*6/MM3 (ref 3.77–5.28)
SODIUM SERPL-SCNC: 142 MMOL/L (ref 136–145)
TIBC SERPL-MCNC: 432 MCG/DL (ref 298–536)
TRANSFERRIN SERPL-MCNC: 290 MG/DL (ref 200–360)
WBC NRBC COR # BLD AUTO: 6.75 10*3/MM3 (ref 3.4–10.8)

## 2023-12-27 PROCEDURE — 77067 SCR MAMMO BI INCL CAD: CPT

## 2023-12-27 PROCEDURE — 86300 IMMUNOASSAY TUMOR CA 15-3: CPT

## 2023-12-27 PROCEDURE — 82378 CARCINOEMBRYONIC ANTIGEN: CPT

## 2023-12-27 PROCEDURE — 85025 COMPLETE CBC W/AUTO DIFF WBC: CPT

## 2023-12-27 PROCEDURE — 77063 BREAST TOMOSYNTHESIS BI: CPT

## 2023-12-27 PROCEDURE — 80053 COMPREHEN METABOLIC PANEL: CPT

## 2023-12-27 PROCEDURE — 84466 ASSAY OF TRANSFERRIN: CPT

## 2023-12-27 PROCEDURE — 36415 COLL VENOUS BLD VENIPUNCTURE: CPT

## 2023-12-27 PROCEDURE — 83540 ASSAY OF IRON: CPT

## 2023-12-27 PROCEDURE — 82728 ASSAY OF FERRITIN: CPT

## 2023-12-28 LAB — CANCER AG27-29 SERPL-ACNC: 25.5 U/ML (ref 0–38.6)

## 2023-12-30 NOTE — PROGRESS NOTES
MGW ONC [facility] GROUP HEMATOLOGY AND ONCOLOGY  [address]  [facility] [address] [phone]    Patient Name: Heidi Ansari  Encounter Date: 01/03/2024  YOB: 1940  Patient Number: [id number]     REASON FOR VISIT: Heidi Ansari is an 83-year-old female who returns in follow-up of stage IIA invasive ductal/lobular breast carcinoma.  She has been on adjuvant Arimidex since 01/22/2020 (47.5 months).  She is here alone with her spouse [place] (previously with her daughter Fátima).    I have reviewed the HPI and verified with the patient the accuracy of it. No changes to interval history since the information was documented. Ender Franco MD 01/03/24      DIAGNOSTIC ABNORMALITIES:           1.   11/27/2019- palpable left breast lump x3 weeks.  Diagnostic mammogram and left breast ultrasound.  Impression: Spiculated mass approximately 2 cm upper outer quadrant of the left breast.  Oval well-circumscribed mass appears new in the lower slightly lateral left breast measuring approximately 10 mm.  Targeted left breast ultrasound at the 2 o'clock position 5 cm from nipple an irregular spiculated hypoechoic mass is seen measuring 1.7 x 1.5 x 1.6 cm.  Smaller adjacent hypoechoic irregular mass seen measuring 0.3 cm.  Third irregular hypoechoic mass at the 3 o'clock position measures 2.2 x 0.9 x 1.2 cm in size.  Ultrasound-guided biopsy recommended.  BI-RADS Category 5-highly suggestive of malignancy.  No mammographic evidence of right breast malignancy.          2.   12/06/2019- ultrasound-guided left breast biopsy.  Final diagnosis: 1.left breast mass 2 o'clock position: Core biopsy: Invasive ductal and lobular carcinoma.  Estimated grade 1, involving approximately 90% of the core biopsy specimens.  Focal ductal carcinoma in situ, nuclear grade 1.  Microcalcifications identified in invasive ductal carcinoma.  2.left breast mass 3 o'clock  position, core biopsy: Invasive lobular carcinoma, estimated grade 1, involving approximately 90% of the core biopsy specimens.  Microcalcifications not identified.          3.   12/26/2019- hemoglobin 12.3, hematocrit 37.2, MCV 93.9, platelets 90,000, WBC 6.81 with a normal differential.  CMP notable for a creatinine of 1.65 (GFR 30) otherwise normal.  Immunohistochemistries revealed: Estrogen +99 to 100%; progesterone +81 to 90%, HER-2 1-2+ (equivocal).  FISH: Negative (signal ratio: 1.2)          4.   01/17/2020-CT brain: Mild cerebral and cerebellar volume loss with chronic microvascular disease.  Focus of ill-defined hypodensity at the gray-white juncture in the right frontal lobe.  Site of previous infarction versus metastasis.  Suggest follow-up MRI with and without gadolinium.  No additional lesions present.          5.   01/17/2020- CT chest.  Groundglass nodule within the right upper lobe and left lower lobe.  Likely inflammatory in nature.  Coronary calcifications in the LAD and circumflex distribution.  Moderate cardiomegaly.  Postoperative changes left axilla from recent axillary dissection as well as mastectomy.  No enlarged mediastinal or axillary lymph nodes present.  Irregular nodule along the anterior margin of the medial left pectoralis major musculature within the mastectomy bed.  No adjacent inflammatory/postoperative stranding.  May represent postoperative seroma.  Recommend directed ultrasound over this area.          6.   01/17/2020-CT abdomen/pelvis.  Impression: Multiple hypodensities within the liver.  Favor hepatic cysts.  Recommend ultrasound for further evaluations.  Cannot be fully characterized without IV contrast.  Small cortical cyst of the right kidney suspected.  Postoperative changes of the right colon.  Multiple diverticula throughout the colon without evidence of diverticulitis or mechanical bowel obstruction.  Diastases of the abdominal musculature at the umbilicus.  Previous  umbilical hernia repair was performed with a mesh.  Residual recurrent tyra-umbilical hernia containing fat.  No evidence of herniated bowel loop.  7 mm groundglass nodule left lower lobe.          7.   01/22/2020-CMP notable for creatinine 1.6 and GFR 31 otherwise normal.  CEA 1.41, CA-27-29 22.8, ferritin 240.9, iron 88, iron saturation 23%, TIBC 390, RASHEL positive, homogenous/speckled pattern 1:80, otherwise negative reflex panel.  SPIEP negative (M spike not observed, EARLE: No monoclonality detected).  Hemoglobin 11.7, hematocrit 34.9, MCV 91.6, platelets 130,000, WBC 5.08 with a normal differential.  No schistocytes reported.          8.   02/05/2020- liver ultrasound.  Impression: Multiple hepatic cysts.  Additional tiny hepatic lesions are too small to characterize.          9.   02/05/2020- renal ultrasound.  Impression: Right renal cyst.  Otherwise negative exam.         10.  02/05/2020- chest ultrasound.  Impression: No abnormality identified in the left chest.  Area seen on recent CT exam may represent a postoperative seroma.         11.   02/20/2020- hemoglobin 12.2, hematocrit 36.4, MCV 91.9, platelets 147,000, WBC 6.11 with a normal differential.  CEA 1.37, CA-27-29 21.9.         12.   03/01/2020- Oncotype DX: Recurrence score:  0; distant recurrence risk at 9 years with AI or BARNHART alone: 3%; group average absolute chemotherapy benefit:< 1%         13.   06/02/2020- DEXA scan.  Impression: Osteopenia.  Low bone mass.  Bone density is between 1.0 and 2.5 standard deviations below the mean for young adult woman.  Increased risk for fracture in these patients.    PREVIOUS INTERVENTIONS:          1.   01/02/2020- left breast simple mastectomy with sentinel lymph node biopsy.  Final diagnosis: 1.left breast, left skin sparing mastectomy: Invasive ductal and lobular carcinoma, grade 1 (2.3 cm).  Associated low-grade ductal carcinoma in situ, solid type.  Margins of excision free of tumor.  Tumor approaches  closest deep margin and 1.6 cm.  2 axillary lymph nodes, negative for metastatic carcinoma.  2.left sentinel lymph node excision: One sentinel lymph node, negative for metastatic carcinoma (0/1).  AJCC pathologic stage: pT2, N0 (SN).    Synoptic checklist: Procedure: Total mastectomy.  Specimen laterality: Left.  Histologic type: Invasive carcinoma with ductal and lobular features (mixed type carcinoma).  Overall ndgndrndanddndend:nd nd2nd. Tumor size: Greatest dimension of largest invasive focus 23 mm.  Tumor focality: Single focus of invasive carcinoma.  DCIS: Present.  Negative for extensive intraductal component.  Grade 1.  Lobular carcinoma in situ: None in specimen.  Tumor extent: Lymphovascular invasion not identified.  Dermal lymphovascular invasion not identified.  Microcalcifications not identified.  Margins: Uninvolved by invasive carcinoma.  Lymph nodes: Number of lymph nodes examined: 3.  TNM classification: pT2, pN0 (SN).            2.   01/22/2020- adjuvant Arimidex 1 mg p.o. daily    LABS:  Lab Results - Last 18 Months   Lab Units 12/27/23  0857 12/21/23  1317 06/05/23  1106 04/28/23  0631 03/15/23  0822 12/07/22  1307   HEMOGLOBIN g/dL 13.0 12.6 12.2 11.6* 13.0 12.1   HEMATOCRIT % 41.7 38.1 39.4 36.9 40.1 38.9   MCV fL 93.5 92.3 100.0* 97.4* 93.7 99.5*   WBC 10*3/mm3 6.75 9.80 6.36 6.51 6.82 6.20   RDW % 13.8 12.9 14.0 14.2 13.5 14.3   MPV fL 11.4  --  12.2* 12.8*  --  12.1*   PLATELETS 10*3/mm3 170 111* 162 143 105* 127*   IMM GRAN % % 0.3  --   --   --   --  0.5   NEUTROS ABS 10*3/mm3 3.98 7.31* 4.69  --  4.09 3.72   LYMPHS ABS 10*3/mm3 2.11 1.84  --   --  CANCELED  2.01 1.93   MONOS ABS 10*3/mm3 0.48 0.52  --   --  0.57 0.38   EOS ABS 10*3/mm3 0.11 0.06  --   --  CANCELED 0.12   EOSINOPHIL ABS 10*3/mm3  --   --   --   --  0.14  --    EOSINOPHIL % %  --   --   --   --  2.1  --    BASOS ABS 10*3/mm3 0.05 0.04  --   --  CANCELED 0.02   IMMATURE GRANS (ABS) 10*3/mm3 0.02  --   --   --   --  0.03   NRBC /100 WBC 0.0  0.0  --   --   --  0.0   NEUTROPHIL % %  --   --  73.7  --  60.0  --    MONOCYTES % %  --   --  4.0*  --  8.4  --    ATYP LYMPH % %  --   --  1.0  --   --   --    ANISOCYTOSIS   --   --  Slight/1+  --   --   --        Lab Results - Last 18 Months   Lab Units 12/27/23  0857 12/21/23  1317 08/30/23  1400 06/05/23  1106 04/26/23  1155 03/15/23  0822 12/07/22  1307   GLUCOSE mg/dL 97 114* 101* 92 86 95 149*   SODIUM mmol/L 142 142 141 140 140 142 141   POTASSIUM mmol/L 3.9 3.9 4.7 5.1 4.5 4.5 4.2   CO2 mmol/L 26.0 25.4 23.7 26.0 24.0 24.2 22.0   CHLORIDE mmol/L 103 104 105 103 106 104 108*   ANION GAP mmol/L 13.0  --   --  11.0 10.0  --  11.0   CREATININE mg/dL 1.51* 1.56* 2.46* 2.03* 1.83* 1.68* 1.60*   BUN mg/dL 20 21 42* 31* 28* 20 23   BUN / CREAT RATIO  13.2 13.5 17.1 15.3 15.3 11.9 14.4   CALCIUM mg/dL 9.4 9.2 9.6 10.0 9.5 10.3 9.4   ALK PHOS U/L 101  --   --  84  --  96 106   TOTAL PROTEIN g/dL 7.3  --   --  7.2  --   --  6.6   ALT (SGPT) U/L 10  --   --  10  --  10 11   AST (SGOT) U/L 19  --   --  18  --  18 17   BILIRUBIN mg/dL 0.4  --   --  0.7  --  0.8 0.6   ALBUMIN g/dL 4.2  --   --  4.3  --  4.4 4.10   GLOBULIN gm/dL 3.1  --   --  2.9  --   --  2.5       Lab Results - Last 18 Months   Lab Units 12/27/23  0857 06/05/23  1106 12/07/22  1307   CEA ng/mL 1.79 1.76 1.93       Lab Results - Last 18 Months   Lab Units 12/27/23  0857 06/05/23  1106 03/15/23  0822 12/07/22  1307 09/15/22  0947   IRON mcg/dL 75 102  --  88  --    TIBC mcg/dL 432 451  --  432  --    IRON SATURATION (TSAT) % 17* 23  --  20  --    FERRITIN ng/mL 135.90 178.10*  --  125.30  --    TSH uIU/mL  --   --  3.220  --  2.820         PAST MEDICAL HISTORY:  ALLERGIES:  Allergies   Allergen Reactions    Bactrim [Sulfamethoxazole-Trimethoprim] Other (See Comments)     Patient has kidney disease (stage 4) shouldn't take Bactrim    Codeine Nausea Only    Percocet [Oxycodone-Acetaminophen] Nausea Only     Becomes, hot, nauseated, and made her head feel  "\"weird\".     CURRENT MEDICATIONS:  Outpatient Encounter Medications as of 1/3/2024   Medication Sig Dispense Refill    acetaminophen (TYLENOL) 500 MG tablet Take 1 tablet by mouth Every 6 (Six) Hours As Needed for Mild Pain.      albuterol sulfate  (90 Base) MCG/ACT inhaler Inhale 2 puffs Every 6 (Six) Hours As Needed for Wheezing. 18 g 0    allopurinol (ZYLOPRIM) 100 MG tablet Take 1 tablet by mouth 2 (Two) Times a Day.  3    anastrozole (ARIMIDEX) 1 MG tablet Take 1 tablet by mouth daily 30 tablet 3    bumetanide (BUMEX) 0.5 MG tablet Take 1 tablet by mouth once daily 90 tablet 0    calcium carbonate (OS-LAURA) 600 MG tablet Take 2 tablets by mouth Daily.      carboxymethylcellulose (REFRESH PLUS) 0.5 % solution Administer 1 drop to both eyes 3 (Three) Times a Day As Needed for Dry Eyes.      dronedarone (MULTAQ) 400 MG tablet Take 1 tablet by mouth 2 (Two) Times a Day With Meals.      levothyroxine (SYNTHROID, LEVOTHROID) 50 MCG tablet Take 1 tablet by mouth Daily. 90 tablet 3    metoprolol succinate XL (TOPROL-XL) 25 MG 24 hr tablet Take 1 tablet by mouth 2 (Two) Times a Day.  1    polyethylene glycol (MIRALAX) 17 GM/SCOOP powder Take 17 g by mouth Daily.      simvastatin (ZOCOR) 40 MG tablet Take 1 tablet by mouth Daily. 90 tablet 3    valsartan (DIOVAN) 160 MG tablet Take 1 tablet by mouth Daily.      Vitamin D, Cholecalciferol, 50 MCG (2000 UT) capsule Take  by mouth.      warfarin (COUMADIN) 5 MG tablet Take  by mouth Take As Directed. Take 1 whole tablet (5 mg) on Wednesday only. Take 1/2 tablet (2.5 mg) all other days.  5    [DISCONTINUED] benzonatate (Tessalon Perles) 100 MG capsule Take 1 capsule by mouth 3 (Three) Times a Day As Needed for Cough. (Patient not taking: Reported on 1/3/2024) 30 capsule 0    [DISCONTINUED] cefdinir (OMNICEF) 300 MG capsule Take 1 capsule by mouth Daily. (Patient not taking: Reported on 1/3/2024) 10 capsule 0     No facility-administered encounter medications on file as " of 1/3/2024.     Adult illnesses:  Hypothyroidism  Atrial fibrillation  Hyperlipidemia  Obesity  Chronic kidney disease stage 4 - Dr. Daniels    ADULT ILLNESSES:  Patient Active Problem List   Diagnosis Code    Hx of adenomatous colonic polyps Z86.010    HTN (hypertension), benign I10    Family hx of colon cancer Z80.0    Breast cancer, left C50.912    Chronic anticoagulation Z79.01    Endometrial thickening on ultrasound R93.89    Hypothyroidism E03.9    Kidney disease N28.9    Mixed hyperlipidemia E78.2    Atrial fibrillation, controlled I48.91    PMB (postmenopausal bleeding) N95.0    Brain lesion G93.9    Obesity (BMI 30-39.9) E66.9    Non-tobacco user Z78.9    Anemia in stage 4 chronic kidney disease N18.4, D63.1    Atrophic vaginitis N95.2    Cardiomegaly I51.7    Hematuria R31.9    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, 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    Rheumatoid arthritis M06.9    Seborrheic keratosis L82.1    Thrombocytopenia D69.6    Vitamin D deficiency E55.9    Moderate mitral regurgitation by prior echocardiogram I34.0    Dizziness R42    Suspected cerebrovascular accident (CVA) R09.89    Lipoma of upper extremity D17.20    Stage 4 chronic kidney disease N18.4     SURGERIES:  Past Surgical History:   Procedure Laterality Date    APPENDECTOMY      BREAST BIOPSY Left 2015    BREAST BIOPSY Right 12/31/2020    Procedure: RIGHT NEEDLE DIRECTED EXCISIONAL BREAST BIOPSY;  Surgeon: Nikki Zuniga MD;  Location: [facility] OR;  Service: General;  Laterality: Right;    BREAST EXCISIONAL BIOPSY Left 2001    BREAST EXCISIONAL BIOPSY Left 2001    CARPAL TUNNEL RELEASE Right     CATARACT EXTRACTION, BILATERAL      CHOLECYSTECTOMY      COLON SURGERY      Partial right    COLONOSCOPY      COLONOSCOPY N/A 10/17/2019    Procedure: COLONOSCOPY WITH ANESTHESIA;  Surgeon: Rigoberto Shaw MD;  Location: [facility] ENDOSCOPY;  Service: Gastroenterology    COLONOSCOPY W/ POLYPECTOMY  06/30/2016    2 Adenomatous polyps at 80 & 40 cm, Diverticulosis repeat exam  in 3 years    EXCISION MASS ARM Bilateral 4/28/2023    Procedure: lipoma excision left hand and right arm;  Surgeon: Nikki Zuniga MD;  Location:  [facility] OR;  Service: General;  Laterality: Bilateral;    EYE SURGERY Bilateral     cataract extraction    EYE SURGERY Left     detached retina    HERNIA REPAIR      JOINT REPLACEMENT  both knees 2-14-12 &10-30-12    MASTECTOMY Left 2019    MASTECTOMY W/ SENTINEL NODE BIOPSY Left 01/02/2020    Procedure: LEFT SIMPLE MASTECTOMY WITH SENTINEL NODE BIOPSY, INJECTION AND SCAN, RADIOLOGIST WILL INJECT;  Surgeon: Nikki Zuniga MD;  Location:  [facility] OR;  Service: General    REPLACEMENT TOTAL KNEE Bilateral     THROAT SURGERY      duct    TONSILLECTOMY  8 or 9 yrs old    UMBILICAL HERNIA REPAIR  years ago     HEALTH MAINTENANCE ITEMS:  Health Maintenance Due   Topic Date Due    COVID-19 Vaccine (6 - 2023-24 season) 09/01/2023       <no information>  Last Completed Colonoscopy            COLORECTAL CANCER SCREENING (COLONOSCOPY - Every 5 Years) Next due on 10/17/2024      10/17/2019  Surgical Procedure: COLONOSCOPY    10/17/2019  COLONOSCOPY    06/30/2016  SCANNED - COLONOSCOPY                  Immunization History   Administered Date(s) Administered    COVID-19 (MODERNA) 1st,2nd,3rd Dose Monovalent 02/25/2021, 03/25/2021, 10/26/2021    COVID-19 (MODERNA) Monovalent Original Booster 08/03/2022    COVID-19 (PFIZER) BIVALENT 12+YRS 01/11/2023    Fluad Quad 65+ 09/22/2020, 09/27/2021, 10/19/2022    Fluzone High Dose =>65 Years ([employer] ONLY) 10/19/2022    Fluzone High-Dose 65+yrs 09/13/2023    Fluzone Quad >6mos (Multi-dose) 09/27/2021    Influenza TIV (IM) 09/22/2015, 10/14/2016    Pneumococcal Conjugate 13-Valent (PCV13) 11/10/2016    Pneumococcal Polysaccharide (PPSV23) 11/02/2012    Shingrix 04/10/2018, 08/15/2018    Tdap 12/21/2020    Zostavax 04/23/2013     Last Completed Mammogram            Ordered - MAMMOGRAM (Yearly) Ordered on 11/28/2023 12/27/2023  Mammo  "Screening Modified With Tomosynthesis Right With CAD    12/07/2022  Mammo Screening Modified With Tomosynthesis Right With CAD    12/06/2021  Mammo Diagnostic Digital Tomosynthesis Right With CAD    12/03/2020  Mammo Diagnostic Digital Tomosynthesis Right With CAD    11/30/2020  Mammo Screening Modified With Tomosynthesis Right With CAD    Only the first 5 history entries have been loaded, but more history exists.                      FAMILY HISTORY:  Family History   Problem Relation Age of Onset    Cancer Mother         colon    Hypertension Mother     Colon cancer Mother 70    Colon polyps Mother     No Known Problems Father     Hearing loss Maternal Grandmother     Vision loss Maternal Grandmother     Arthritis Maternal Grandmother     Breast cancer Neg Hx     BRCA 1/2 Neg Hx     Endometrial cancer Neg Hx     Ovarian cancer Neg Hx      SOCIAL HISTORY:  Social History     Socioeconomic History    Marital status:    Tobacco Use    Smoking status: Never    Smokeless tobacco: Never   Vaping Use    Vaping Use: Never used   Substance and Sexual Activity    Alcohol use: No    Drug use: No    Sexual activity: Not Currently     Partners: Male   Homemaker    REVIEW OF SYSTEMS:  Review of Systems   Constitutional:  Positive for fatigue (baseline/chronic unchanged). Negative for activity change and appetite change.        Manages her ADLs, including chores, errands and driving. Again says she is still up and about \"all the time.\"    Arimidex tolerance:  No worsening hot flashes, \"not worse,\" no new arthralgias. Is still taking daily   HENT:  Positive for postnasal drip (seasonal) and rhinorrhea (seasonal).    Eyes: Negative.    Respiratory:  Positive for shortness of breath (Baseline exertional dyspnea and occasional sob with routine activities.  \"I just pace myself.\").    Cardiovascular:  Positive for palpitations (Intermittent.  Is on maintenance warfarin and Multaq).   Gastrointestinal: Negative.  Negative for " "blood in stool.   Endocrine: Negative.    Genitourinary: Negative.    Musculoskeletal:  Positive for arthralgias (intermittent of the hands, left hip, right shoulder and knees inspite of prior TKRs bilaterally) and back pain (lower back \"soreness.\").   Allergic/Immunologic: Negative.    Neurological:  Positive for numbness (left foot at times, unchanged). Negative for dizziness (postural - when standing too abruptly. \"Not in awhile.\").   Hematological:  Bruises/bleeds easily (coumadin).   Psychiatric/Behavioral: Negative.     Breasts:  Says she is more diligent with self exams.  Has not noticed any changes      /68   Pulse 72   Temp 97.3 °F (36.3 °C) (Temporal)   Resp 18   Ht 165.1 cm (65\")   Wt 99.3 kg (218 lb 14.4 oz)   SpO2 95%   BMI 36.43 kg/m²  Body surface area is 2.06 meters squared.  Pain Score    01/03/24 1051   PainSc: 0-No pain           Physical Exam:  Physical Exam   Constitutional: She is oriented to person, place, and time. No distress.   Pleasant, cooperative, obese, modestly kept elderly female.  Ambulatory.  ECOG 0.      She has lost 2 lb (in addition to 3 lb at her 2 prior visits-had gained 9 lb at her visit prior to those) since her last visit.   HENT:   Head: Normocephalic and atraumatic.   Mouth/Throat: No oropharyngeal exudate.   Eyes: Pupils are equal, round, and reactive to light. Conjunctivae are normal. No scleral icterus.   Neck: No JVD present. No tracheal deviation present. No thyromegaly (on synthroid) present.   Cardiovascular:   Irregular rate and rhythm   Pulmonary/Chest: Effort normal and breath sounds normal. No stridor. No respiratory distress. She has no wheezes. She has no rales.   Chaperoned exam: Janelle Zuniga MA (again)    Right breast no masses.  No associated axillary nor supraclavicular adenopathy.    Left mastectomy site well healed. No underlying nodularity.  No lymphedema of the left upper extremity.      No associated axillary nor supraclavicular " adenopathy bilaterally.     Abdominal: Soft. Bowel sounds are normal. She exhibits no distension and no mass. There is no abdominal tenderness. There is no rebound and no guarding.   Musculoskeletal: Normal range of motion. Deformity (changes of osteoarthritis of the hands) present. No swelling.      Right lower leg: Edema (1+ ankle) present.      Left lower leg: Edema (1+ ankle) present.   Lymphadenopathy:     She has no cervical adenopathy.        Right cervical: No superficial cervical, no deep cervical and no posterior cervical adenopathy present.       Left cervical: No superficial cervical, no deep cervical and no posterior cervical adenopathy present.        Right: No supraclavicular adenopathy present.        Left: No supraclavicular adenopathy present.   Neurological: She is alert and oriented to person, place, and time. No cranial nerve deficit.   Skin: Skin is warm and dry. No rash noted. No erythema. No pallor.   Psychiatric: Her behavior is normal. Mood, judgment and thought content normal.   Vitals reviewed.    ASSESSMENT:   1. Invasive ductal/lobular (mixed) carcinoma:   Stage: IB (pT2, pN0 [sn], MX, G1) ER (+) %, MA (+) 81-90%, HER-2/kelby 1-2 + (IHC) - negative. HER-2 1-2+ (equivocal).  FISH: Negative (signal ratio: 1.2)  Tumor burden: 23 mm tumor in the left breast. 0/3 axillary sentinel lymph node negative for metastatic carcinoma. Nuclear grade 1.    Oncotype DX: Recurrence score:  0; distant recurrence risk at 9 years with AI or BARNHART alone: 3%; group average absolute chemotherapy benefit:< 1%  Complications of tumor: None.   Tumor status: Adjuvant Arimidex in progress.  11/30/2020-mammogram.  Right breast calcifications for which additional imaging recommended.  BI-RADS Category 0.  12/28/2020-stereotactic biopsy of right breast calcifications.  Pathology: Benign intraductal papilloma, 3 mm greatest dimension with associated microcalcifications.  01/04/2021-Right breast, lumpectomy with  needle localization.  Final diagnosis: Extensive biopsy site changes including fat necrosis and fibrosis.  Benign fibrous breast parenchyma.  No evidence of in situ or invasive carcinoma.  12/27/2023-No mammographic evidence of malignancy of the RIGHT breast. Recommend routine annual mammography.  BI-RADS CATEGORY 2: Benign findings.           2.    Chronic kidney disease, stage III - IV. Followed by nephrology - Dr. Daniels  --GFR 33.9, 12/27/23 (prior:  GFR 17 - 36 mL/min).        3.    Hypothyroidism.  On Synthroid        4.    Normocytic anemia.  Contribution from chronic kidney disease suspected.  --Resolved.  Hgb 13; MCV 93.5, 12/27/23 (prior: Hgb 10.9 - 12.9; MCV 92 - 100).          5.    Abnormal CT scans of the head (see above) indicating ill-defined hypodensity at the gray-white juncture in the right frontal lobe, hepatic and right renal cysts.  Unable to perform MRI with contrast due to chronic kidney disease.            --03/23/2020-seen by Dr. Jaramillo.  Noncontrast brain MRI from 1/17/2020 reviewed.  Possibilities include prior stroke microvascular disease, demyelinating disease.  Risks of biopsy discussed.  Recommend serial imaging with biopsy of the lesion progresses.  Follow-up with MRI in 3 months.          --07/06/2020- MRI.  Mild atrophy of the brain.  Multiple foci of T2 abnormality involving the periventricular and higher white matter tracts.  Cortical lesions also present.  Overall stable from 03/2020.  Favor small vessel ischemic changes.  No evidence of restricted diffusion to suggest acute ischemia or infarct.  No mass-effect or shift in midline.          --01/06/2021-brain MRI.  No imaging evidence of intracranial metastatic disease.  Seen by Dr. Jaramillo of neurosurgery.  Favors microvascular disease.  Okay to discontinue imaging.        6.    (+) RASHEL with joint pains (hands). Has been seen by Dr. Caputo.        7.    Osteopenia (bone density 1.0 and 2.5 standard deviations below  the mean on DEXA scan, 06/02/2020).  On calcium and vitamin D. Followed by pcp. Addition of Fosamax being discussed.  She wants to ask Dr. Daniels about it first.        8.    Paroxysmal atrial fibrillation.  Remains on anticoagulation (warfarin) and Multaq.  Dr. Salas follows       PLAN:   1.  Apprised of the labs, 12/27/23.  Note resolution of anemia otherwise normal CBC with normal platelets.  GFR 33.9 (stable) otherwise normal CMP, normal (1.79) CEA, normal (25.5-prior: 17.6-25) CA-27-29, repleted iron levels. Arimidex tolerance discussed  2.  Fax labs, 12/27/23 to pcp and nephrology Re:  Current GFR  3.  Apprised of mammogram, 12/27/2023 (above)  4.  Previously noted the positive RASHEL but negative reflex panel, negative SPIEP, repleted iron levels.  The liver ultrasound, renal ultrasound and chest ultrasound findings (above) are noted.  Oncotype DX recurrence score (above) noted (RS 0; absolute chemotherapy benefit < 1%).  DEXA scan shows osteopenia with increased risk of fracture.  Arimidex tolerance discussed.  So far so good.  5.  Breast cancer.  NCCN guidelines version 1.2020 reviewed.  4 ductal, lobular, mixed tumors, pT1, pT2, pT3 and pN0 with tumors greater than 0.5 cm- 21 gene RT-PCR assay.  Recurrence score less than 26-adjuvant endocrine therapy.  Recurrence score 26-30- adjuvant endocrine therapy or adjuvant chemotherapy followed by endocrine therapy.  Recurrence score greater than 31-adjuvant chemotherapy followed by endocrine therapy.  However limited data to make chemotherapy recommendations for those greater than 70 years of age.  6.  The rationale for adjuvant hormonal manipulation with AI's are previously discussed. The potential risks (to include but not limited to: Hot flashes, asthenia, pain, arthralgia, arthritis, nausea/vomiting, headache, pharyngitis, depression, rash, hypertension, lymphedema, insomnia, edema, weight gain, dyspnea, abdominal pain, constipation, osteoporosis, fractures,  cough, bone pain, diarrhea, breast pain, paresthesias, infection, cataracts, myalgias, thromboembolism, angina, endometrial carcinoma, myocardial infarction, stroke, anemia, leukopenia, erythema multiforme, Belle-Collins syndrome) are discussed at length. Questions answered. She agrees to press on with therapy.    7.  Rx:   Arimidex 1 mg p.o. daily, #30 x 2 RF   Calcium 1200 mg + 800 units D3  p.o. daily - otc  Stay off ferrous sulfate    8.   Return to the [place] office in 24 weeks with pre-office CBC and differential, iron, fe sat , ferritin, CMP, CEA and CA-27-29.       I spent ~ 43 minutes caring for Heidi on this date of service. This time includes time spent by me in the following activities: preparing for the visit, reviewing tests, performing a medically appropriate examination and/or evaluation, counseling and educating the patient/family/caregiver, ordering medications, tests, or procedures and documenting information in the medical record

## 2024-01-03 ENCOUNTER — OFFICE VISIT (OUTPATIENT)
Dept: ONCOLOGY | Facility: CLINIC | Age: 84
End: 2024-01-03
Payer: MEDICARE

## 2024-01-03 VITALS
HEART RATE: 72 BPM | HEIGHT: 65 IN | TEMPERATURE: 97.3 F | OXYGEN SATURATION: 95 % | RESPIRATION RATE: 18 BRPM | DIASTOLIC BLOOD PRESSURE: 68 MMHG | SYSTOLIC BLOOD PRESSURE: 122 MMHG | WEIGHT: 218.9 LBS | BODY MASS INDEX: 36.47 KG/M2

## 2024-01-03 DIAGNOSIS — C50.912 MALIGNANT NEOPLASM OF LEFT BREAST IN FEMALE, ESTROGEN RECEPTOR POSITIVE, UNSPECIFIED SITE OF BREAST: Primary | ICD-10-CM

## 2024-01-03 DIAGNOSIS — Z17.0 MALIGNANT NEOPLASM OF LEFT BREAST IN FEMALE, ESTROGEN RECEPTOR POSITIVE, UNSPECIFIED SITE OF BREAST: Primary | ICD-10-CM

## 2024-01-03 RX ORDER — ANASTROZOLE 1 MG/1
1 TABLET ORAL DAILY
Qty: 30 TABLET | Refills: 11 | Status: SHIPPED | OUTPATIENT
Start: 2024-01-03

## 2024-01-08 ENCOUNTER — OFFICE VISIT (OUTPATIENT)
Dept: CARDIOLOGY CLINIC | Age: 84
End: 2024-01-08

## 2024-01-08 ENCOUNTER — ANTI-COAG VISIT (OUTPATIENT)
Dept: CARDIOLOGY CLINIC | Age: 84
End: 2024-01-08
Payer: MEDICARE

## 2024-01-08 VITALS
HEIGHT: 65 IN | DIASTOLIC BLOOD PRESSURE: 84 MMHG | SYSTOLIC BLOOD PRESSURE: 128 MMHG | BODY MASS INDEX: 36.49 KG/M2 | WEIGHT: 219 LBS | HEART RATE: 64 BPM

## 2024-01-08 DIAGNOSIS — E78.2 MIXED HYPERLIPIDEMIA: ICD-10-CM

## 2024-01-08 DIAGNOSIS — I10 HYPERTENSION, UNSPECIFIED TYPE: ICD-10-CM

## 2024-01-08 DIAGNOSIS — I48.0 PAF (PAROXYSMAL ATRIAL FIBRILLATION) (HCC): Primary | ICD-10-CM

## 2024-01-08 DIAGNOSIS — I48.91 ATRIAL FIBRILLATION, UNSPECIFIED TYPE (HCC): Primary | ICD-10-CM

## 2024-01-08 LAB
INTERNATIONAL NORMALIZATION RATIO, POC: 2.1
PROTHROMBIN TIME, POC: 22

## 2024-01-08 PROCEDURE — 93793 ANTICOAG MGMT PT WARFARIN: CPT | Performed by: NURSE PRACTITIONER

## 2024-01-08 NOTE — PATIENT INSTRUCTIONS
fewer snack items, fast foods, canned soups, and other high-salt, high-fat, processed foods.  Read food labels and try to avoid saturated and trans fats. They increase your risk of heart disease by raising cholesterol levels.  Limit the amount of solid fat-butter, margarine, and shortening-you eat. Use olive, peanut, or canola oil when you cook. Bake, broil, and steam foods instead of frying them.  Eat a variety of fruit and vegetables every day. Dark green, deep orange, red, or yellow fruits and vegetables are especially good for you. Examples include spinach, carrots, peaches, and berries.  Foods high in fiber can reduce your cholesterol and provide important vitamins and minerals. High-fiber foods include whole-grain cereals and breads, oatmeal, beans, brown rice, citrus fruits, and apples.  Eat lean proteins. Heart-healthy proteins include seafood, lean meats and poultry, eggs, beans, peas, nuts, seeds, and soy products.  Limit drinks and foods with added sugar. These include candy, desserts, and soda pop.  Lifestyle changes  If your doctor recommends it, get more exercise. Walking is a good choice. Bit by bit, increase the amount you walk every day. Try for at least 30 minutes on most days of the week. You also may want to swim, bike, or do other activities.  Do not smoke. If you need help quitting, talk to your doctor about stop-smoking programs and medicines. These can increase your chances of quitting for good. Quitting smoking may be the most important step you can take to protect your heart. It is never too late to quit.  Limit alcohol to 2 drinks a day for men and 1 drink a day for women. Too much alcohol can cause health problems.  Manage other health problems such as diabetes, high blood pressure, and high cholesterol. If you think you may have a problem with alcohol or drug use, talk to your doctor.  Medicines  Take your medicines exactly as prescribed. Call your doctor if you think you are having a

## 2024-01-08 NOTE — PROGRESS NOTES
[facility]  [address], [address], [place]  [address]  [phone] office  [phone] fax      OFFICE VISIT:  2024    Aniya Huff - : 1940  Reason For Visit:  Aniya is a 83 y.o. female who is here for Follow-up (Pt thinks she is  out of rhythm) and Atrial Fibrillation    History:   Diagnosis Orders   1. Atrial fibrillation, unspecified type (HCC)  EKG 12 lead      2. Mixed hyperlipidemia        3. Hypertension, unspecified type          The patient presented today initially for INR only visit. However, she reports sensing an increase in AF episodes on Multaq 400 mg BID.  INR today is 2.1 - no bleeding issues.   EKG today shows atrial fib/flutter in progress.   BP is well controlled on current regimen at 128/84.  The patient's PCP monitors cholesterol.      Subjective  Aniya denies exertional chest pain, shortness of breath, orthopnea, paroxysmal nocturnal dyspnea, syncope, presyncope,edema and fatigue.  The patient denies numbness or weakness to suggest cerebrovascular accident or transient ischemic attack.  + sensed arrhythmia.    Aniya Huff has the following history as recorded in [facility]:  Patient Active Problem List   Diagnosis Code    Hypothyroidism E03.9    Mixed hyperlipidemia E78.2    Hypertension I10    PAF (paroxysmal atrial fibrillation) (HCC) I48.0    Endometrial thickening on ultrasound R93.89    PMB (postmenopausal bleeding) N95.0    Acute frontal sinusitis J01.10    Chronic anticoagulation Z79.01    Moderate mitral regurgitation by prior echocardiogram I34.0     Past Medical History:   Diagnosis Date    Hyperlipidemia     Hypertension     Hypothyroidism     Kidney disease     type 4    LONG TERM ANTICOAGULENT USE     Lung infection     UTI (urinary tract infection)      Past Surgical History:   Procedure Laterality Date    BREAST BIOPSY      CARPAL TUNNEL RELEASE      CARPAL TUNNEL RELEASE

## 2024-01-11 ENCOUNTER — TELEPHONE (OUTPATIENT)
Dept: CARDIOLOGY CLINIC | Age: 84
End: 2024-01-11

## 2024-01-11 NOTE — TELEPHONE ENCOUNTER
Raven Zeng not working.  Can you call and check on this.    She may need to come in for a new monitor.  Thanks Reba

## 2024-01-11 NOTE — TELEPHONE ENCOUNTER
Pt called stating her monitor she is wearing is not blinking and the monitor company called her about it. Pt wants to know if she needs to wear it longer since it wasn't working correctly or do we get enough info before it stopped working. Pls call pt.

## 2024-01-17 ENCOUNTER — TELEPHONE (OUTPATIENT)
Dept: CARDIOLOGY CLINIC | Age: 84
End: 2024-01-17

## 2024-01-17 PROCEDURE — 93228 REMOTE 30 DAY ECG REV/REPORT: CPT | Performed by: NURSE PRACTITIONER

## 2024-01-17 NOTE — TELEPHONE ENCOUNTER
Raven report reviewed.  Analysis times 1/8/24-1/11/24  Underlying rhythm - atrial fib flutter.  Average heart rate 64  Minimum heart rate 51  Max heart rate 86  No VT or pauses.  No high degree heart block.  PVC burden 0.17%  Currently taking Toprol XL 25 mg BID, Multaq 400 mg BID and Coumadin.  KAYLA Landers

## 2024-01-17 NOTE — TELEPHONE ENCOUNTER
R[employer] Star monitor showed underlying rhythm atrial fib flutter.   Will ask patient about proceeding with outpatient DCCV.  [email or web address]

## 2024-01-18 DIAGNOSIS — I48.0 PAF (PAROXYSMAL ATRIAL FIBRILLATION) (HCC): Primary | ICD-10-CM

## 2024-01-18 DIAGNOSIS — I48.91 ATRIAL FIBRILLATION, UNSPECIFIED TYPE (HCC): ICD-10-CM

## 2024-01-18 NOTE — TELEPHONE ENCOUNTER
Called and spoke with patient, have DCCV w/Anesthesia scheduled for 1/30/24 with a tentative time of 10:30 am with arrival of 8:30 am.  Advised we will contact patient if time/date changes.  Patient is to be NPO after midnight.  Patient instructed to arrive through front entrance of hospital and make immediate left. Patient advised may take morning medications with sip of water. Patient does not have IV dye allergy.  Patient verbally understood.     Patient was instructed to not miss any doses of her Coumadin before procedure.

## 2024-01-28 NOTE — H&P
Patient:  Aniya Huff                  1940  MRN: [id number]    PROBLEM LIST:    Patient Active Problem List    Diagnosis Date Noted    Moderate mitral regurgitation by prior echocardiogram 12/03/2020     Priority: Low    Acute frontal sinusitis 05/16/2017     Priority: Low    Chronic anticoagulation 05/16/2017     Priority: Low     Overview Note:     Coumadin managed by Dr. Santana Sparks      Endometrial thickening on ultrasound 02/16/2017     Priority: Low     Overview Note:     Replacing Deprecated Diagnoses      PMB (postmenopausal bleeding) 02/16/2017     Priority: Low    PAF (paroxysmal atrial fibrillation) (HCC) 12/14/2015     Priority: Low    Hypothyroidism      Priority: Low    Mixed hyperlipidemia      Priority: Low    Hypertension      Priority: Low     1.  Paroxysmal atrial fibrillation on Multaq and Coumadin, normal LV ejection fraction.  2.  CKD stage IV, baseline creatinine 2.7.  3.  Left breast cancer 1/2020 status post mastectomy, on Arimidex  4.  New mild to moderate aortic stenosis by echo 5/19/2023 (mean gradient 18 mmHg).    PRESENTATION: Aniya Huff is a 83 y.o. year old female who presents with recurrent atrial fibrillation, on Multaq and Coumadin here for DCCV.  Recently evaluated by nurse practitioner noted to be in possible atypical atrial flutter by EKG 1/8/2024.  Previously in sinus by EKG 12/12/2022.  24-hour monitor that shows persistent atrial fibrillation and now being referred for DCCV.  Recent echo 5/19/2023 also shows normal EF with mild to moderate aortic stenosis which is new from 9/20/2021      REVIEW OF SYSTEMS:  Review of Systems   Constitutional:  Negative for activity change, fatigue and fever.   HENT:  Negative for ear pain, hearing loss and tinnitus.    Eyes:  Negative for discharge and visual disturbance.   Respiratory:  Negative for cough, shortness of breath and wheezing.    Cardiovascular:  Positive for palpitations. Negative for chest pain and leg swelling.

## 2024-01-30 ENCOUNTER — ANESTHESIA EVENT (OUTPATIENT)
Dept: CARDIAC CATH/INVASIVE PROCEDURES | Age: 84
End: 2024-01-30
Payer: MEDICARE

## 2024-01-30 ENCOUNTER — TELEPHONE (OUTPATIENT)
Dept: CARDIOLOGY CLINIC | Age: 84
End: 2024-01-30

## 2024-01-30 ENCOUNTER — ANESTHESIA (OUTPATIENT)
Dept: CARDIAC CATH/INVASIVE PROCEDURES | Age: 84
End: 2024-01-30
Payer: MEDICARE

## 2024-01-30 ENCOUNTER — HOSPITAL ENCOUNTER (OUTPATIENT)
Dept: CARDIAC CATH/INVASIVE PROCEDURES | Age: 84
Discharge: HOME | End: 2024-01-30
Attending: INTERNAL MEDICINE | Admitting: INTERNAL MEDICINE
Payer: MEDICARE

## 2024-01-30 VITALS
TEMPERATURE: 97.1 F | WEIGHT: 218.92 LBS | OXYGEN SATURATION: 97 % | HEIGHT: 65 IN | RESPIRATION RATE: 16 BRPM | DIASTOLIC BLOOD PRESSURE: 66 MMHG | SYSTOLIC BLOOD PRESSURE: 136 MMHG | BODY MASS INDEX: 36.47 KG/M2 | HEART RATE: 64 BPM

## 2024-01-30 DIAGNOSIS — I48.91 ATRIAL FIBRILLATION, UNSPECIFIED TYPE (HCC): ICD-10-CM

## 2024-01-30 DIAGNOSIS — I48.0 PAF (PAROXYSMAL ATRIAL FIBRILLATION) (HCC): Primary | ICD-10-CM

## 2024-01-30 DIAGNOSIS — I48.0 PAF (PAROXYSMAL ATRIAL FIBRILLATION) (HCC): ICD-10-CM

## 2024-01-30 DIAGNOSIS — I34.0 MODERATE MITRAL REGURGITATION BY PRIOR ECHOCARDIOGRAM: Primary | ICD-10-CM

## 2024-01-30 LAB
ANION GAP SERPL CALCULATED.3IONS-SCNC: 11 MMOL/L (ref 7–19)
BUN SERPL-MCNC: 23 MG/DL (ref 8–23)
CALCIUM SERPL-MCNC: 9.8 MG/DL (ref 8.8–10.2)
CHLORIDE SERPL-SCNC: 107 MMOL/L (ref 98–111)
CO2 SERPL-SCNC: 24 MMOL/L (ref 22–29)
CREAT SERPL-MCNC: 1.7 MG/DL (ref 0.5–0.9)
EKG P AXIS: NORMAL DEGREES
EKG P AXIS: NORMAL DEGREES
EKG P-R INTERVAL: NORMAL MS
EKG P-R INTERVAL: NORMAL MS
EKG Q-T INTERVAL: 424 MS
EKG Q-T INTERVAL: 476 MS
EKG QRS DURATION: 92 MS
EKG QRS DURATION: 94 MS
EKG QTC CALCULATION (BAZETT): 438 MS
EKG QTC CALCULATION (BAZETT): 483 MS
EKG T AXIS: -18 DEGREES
EKG T AXIS: -2 DEGREES
ERYTHROCYTE [DISTWIDTH] IN BLOOD BY AUTOMATED COUNT: 14.7 % (ref 11.5–14.5)
GLUCOSE SERPL-MCNC: 125 MG/DL (ref 74–109)
HCT VFR BLD AUTO: 41.2 % (ref 37–47)
HGB BLD-MCNC: 13.2 G/DL (ref 12–16)
INR PPP: 1.9 (ref 0.88–1.18)
MCH RBC QN AUTO: 30.1 PG (ref 27–31)
MCHC RBC AUTO-ENTMCNC: 32 G/DL (ref 33–37)
MCV RBC AUTO: 94.1 FL (ref 81–99)
PLATELET # BLD AUTO: 107 K/UL (ref 130–400)
PMV BLD AUTO: 12.5 FL (ref 9.4–12.3)
POTASSIUM SERPL-SCNC: 4.3 MMOL/L (ref 3.5–5)
PROTHROMBIN TIME: 21.3 SEC (ref 12–14.6)
RBC # BLD AUTO: 4.38 M/UL (ref 4.2–5.4)
SODIUM SERPL-SCNC: 142 MMOL/L (ref 136–145)
WBC # BLD AUTO: 6 K/UL (ref 4.8–10.8)

## 2024-01-30 PROCEDURE — 85027 COMPLETE CBC AUTOMATED: CPT

## 2024-01-30 PROCEDURE — 36415 COLL VENOUS BLD VENIPUNCTURE: CPT

## 2024-01-30 PROCEDURE — 3700000001 HC ADD 15 MINUTES (ANESTHESIA): Performed by: NURSE ANESTHETIST, CERTIFIED REGISTERED

## 2024-01-30 PROCEDURE — 85610 PROTHROMBIN TIME: CPT

## 2024-01-30 PROCEDURE — 80048 BASIC METABOLIC PNL TOTAL CA: CPT

## 2024-01-30 PROCEDURE — 3700000000 HC ANESTHESIA ATTENDED CARE: Performed by: NURSE ANESTHETIST, CERTIFIED REGISTERED

## 2024-01-30 PROCEDURE — 92960 CARDIOVERSION ELECTRIC EXT: CPT | Performed by: INTERNAL MEDICINE

## 2024-01-30 PROCEDURE — 93005 ELECTROCARDIOGRAM TRACING: CPT | Performed by: INTERNAL MEDICINE

## 2024-01-30 PROCEDURE — 6360000002 HC RX W HCPCS: Performed by: NURSE ANESTHETIST, CERTIFIED REGISTERED

## 2024-01-30 PROCEDURE — 93010 ELECTROCARDIOGRAM REPORT: CPT | Performed by: INTERNAL MEDICINE

## 2024-01-30 PROCEDURE — 92960 CARDIOVERSION ELECTRIC EXT: CPT

## 2024-01-30 PROCEDURE — 6360000002 HC RX W HCPCS: Performed by: INTERNAL MEDICINE

## 2024-01-30 PROCEDURE — 2500000003 HC RX 250 WO HCPCS: Performed by: NURSE ANESTHETIST, CERTIFIED REGISTERED

## 2024-01-30 PROCEDURE — 2580000003 HC RX 258: Performed by: INTERNAL MEDICINE

## 2024-01-30 RX ORDER — AMIODARONE HYDROCHLORIDE 200 MG/1
200 TABLET ORAL 2 TIMES DAILY
Qty: 20 TABLET | Refills: 0 | Status: SHIPPED | OUTPATIENT
Start: 2024-01-30 | End: 2024-02-09

## 2024-01-30 RX ORDER — SODIUM CHLORIDE 9 MG/ML
INJECTION, SOLUTION INTRAVENOUS PRN
Status: DISCONTINUED | OUTPATIENT
Start: 2024-01-30 | End: 2024-01-30 | Stop reason: HOSPADM

## 2024-01-30 RX ORDER — ENOXAPARIN SODIUM 100 MG/ML
80 INJECTION SUBCUTANEOUS ONCE
Status: COMPLETED | OUTPATIENT
Start: 2024-01-30 | End: 2024-01-30

## 2024-01-30 RX ORDER — AMIODARONE HYDROCHLORIDE 100 MG/1
200 TABLET ORAL DAILY
Qty: 90 TABLET | Refills: 3 | Status: SHIPPED | OUTPATIENT
Start: 2024-02-09

## 2024-01-30 RX ORDER — LIDOCAINE HYDROCHLORIDE 10 MG/ML
INJECTION, SOLUTION EPIDURAL; INFILTRATION; INTRACAUDAL; PERINEURAL PRN
Status: DISCONTINUED | OUTPATIENT
Start: 2024-01-30 | End: 2024-01-30 | Stop reason: SDUPTHER

## 2024-01-30 RX ORDER — SODIUM CHLORIDE 0.9 % (FLUSH) 0.9 %
5-40 SYRINGE (ML) INJECTION EVERY 12 HOURS SCHEDULED
Status: DISCONTINUED | OUTPATIENT
Start: 2024-01-30 | End: 2024-01-30 | Stop reason: HOSPADM

## 2024-01-30 RX ORDER — SODIUM CHLORIDE 0.9 % (FLUSH) 0.9 %
5-40 SYRINGE (ML) INJECTION PRN
Status: DISCONTINUED | OUTPATIENT
Start: 2024-01-30 | End: 2024-01-30 | Stop reason: HOSPADM

## 2024-01-30 RX ORDER — PROPOFOL 10 MG/ML
INJECTION, EMULSION INTRAVENOUS PRN
Status: DISCONTINUED | OUTPATIENT
Start: 2024-01-30 | End: 2024-01-30 | Stop reason: SDUPTHER

## 2024-01-30 RX ADMIN — ENOXAPARIN SODIUM 80 MG: 100 INJECTION SUBCUTANEOUS at 09:39

## 2024-01-30 RX ADMIN — LIDOCAINE HYDROCHLORIDE 50 MG: 10 INJECTION, SOLUTION EPIDURAL; INFILTRATION; INTRACAUDAL; PERINEURAL at 09:48

## 2024-01-30 RX ADMIN — PROPOFOL 100 MG: 10 INJECTION, EMULSION INTRAVENOUS at 09:48

## 2024-01-30 RX ADMIN — SODIUM CHLORIDE: 9 INJECTION, SOLUTION INTRAVENOUS at 09:02

## 2024-01-30 ASSESSMENT — ENCOUNTER SYMPTOMS
BACK PAIN: 0
VOMITING: 0
COUGH: 0
WHEEZING: 0
CONSTIPATION: 0
ABDOMINAL DISTENTION: 0
BLOOD IN STOOL: 0
SHORTNESS OF BREATH: 0
EYE DISCHARGE: 0
DIARRHEA: 0

## 2024-01-30 NOTE — ANESTHESIA PRE PROCEDURE
Department of Anesthesiology  Preprocedure Note       Name:  Aniya Huff   Age:  83 y.o.  :  1940                                          MRN:  [id number]         Date:  2024      Surgeon: * Surgery not found *    Procedure: Procedure(s):  DILATATION AND CURETTAGE HYSTEROSCOPY    Medications prior to admission:   Prior to Admission medications    Medication Sig Start Date End Date Taking? Authorizing Provider   MULTAQ 400 MG TABS TAKE 1 TABLET BY MOUTH TWICE DAILY WITH MEALS 23   Judy Blackwell APRN - NP   valsartan (DIOVAN) 160 MG tablet Take 1 tablet by mouth daily 23   Ian Woodward MD   metoprolol succinate (TOPROL XL) 25 MG extended release tablet Take 1 tablet by mouth twice daily 23   Britta Chase APRN   warfarin (COUMADIN) 5 MG tablet TAKE 1 TABLET BY MOUTH ONCE DAILY OR AS DIRECTED 23   Judy Blackwell APRN - NP   anastrozole (ARIMIDEX) 1 MG tablet Take 1 tablet by mouth once daily 20   Ian Woodward MD   Acetaminophen 325 MG CAPS Take by mouth daily    Ina Woodward MD   Carboxymethylcellulose Sodium (REFRESH TEARS OP) Apply 1 drop to eye as needed    Ian Woodward MD   vitamin D (CHOLECALCIFEROL) 1000 UNIT TABS tablet Take 1 tablet by mouth daily    Ian Woodward MD   allopurinol (ZYLOPRIM) 100 MG tablet Take 1 tablet by mouth 2 times daily 10/6/17   Ian Woodward MD   calcium carbonate 600 MG TABS tablet Take 1 tablet by mouth 2 times daily Indications: SUPPLEMENT    Ian Woodward MD   levothyroxine (SYNTHROID) 50 MCG tablet Take 1 tablet by mouth Daily Indications: HYPOTHROIDISM    Ian Woodward MD   polyethylene glycol (GLYCOLAX) 17 GM/SCOOP powder Take 17 g by mouth daily Indications: REGULARITY    Ian Woodward MD   bumetanide (BUMEX) 1 MG tablet Take 0.5 tablets by mouth daily Indications: FLUID RETENTION    Ian Woodward MD   simvastatin (ZOCOR) 40 MG tablet Take 1 tablet by mouth

## 2024-01-30 NOTE — PROGRESS NOTES
Cardiology progress note:    Patient here for DCCV.  INR today 1.9.  Has been consistently therapeutic with INR greater than 2.0 this last month on repeat lab draws over the last 6 months.  Will give Lovenox 80 mg subcu now.  Will proceed with DCCV today.

## 2024-01-30 NOTE — PROCEDURES
Date of Procedure:  1/30/2024     Indications:  Atrial fibrillation with an appropriate duration of anticoagulation     Conscious Sedation Protocol Used During this Procedure -anesthesia provided by anesthesia service        Anesthesia: Moderate   Sedation: 0 mg Midazolam (Versed)  0 mcg Fentanyl   Start time: 9:48 AM   Stop time: 9:57 AM   ASA Class: 3   EBL Not applicable     A trained medical personnel administered medications at my direction.  The patient was monitored continuously with ECG, pulse oximetry, blood pressure monitoring and direct observation.       After obtaining informed written consent and an appropriate level of conscious sedation, DCCV with 360 J x 2 was successful only briefly in restoring sinus rhythm with recurrent atrial fibrillation.  Further attempts deferred.    Complications:  none      Impression: Unsuccessful DC cardioversion of atrial fibrillation to normal sinus rhythm on Coumadin used for anticoagulation and Multaq.  Discontinue Multaq and initiate patient on amiodarone with loading 200 mg p.o. twice daily for 10 days and then 200 mg once daily.  Can return for repeat attempted DCCV after 2 weeks.  Will refer patient for consideration of A-fib ablation as she is symptomatic with fatigue.    Electronically signed by Marcial Schafer MD on 1/30/24

## 2024-01-30 NOTE — TELEPHONE ENCOUNTER
[facility] called stating there is a reaction with amiodarone and coumadin and zocor they wanted to make sure you want pt on these meds together.

## 2024-01-30 NOTE — PROGRESS NOTES
Discharge instructions reviewed with patient and patient states understanding. Patient discharged from cath holding with all belongings and AVS.  Electronically signed by Juana Hernandez RN on 1/30/2024 at 11:25 AM\

## 2024-01-30 NOTE — ANESTHESIA POSTPROCEDURE EVALUATION
Department of Anesthesiology  Postprocedure Note    Patient: Aniya Huff  MRN: [id number]  YOB: 1940  Date of evaluation: 1/30/2024    Procedure Summary     Date: 01/30/24 Room / Location: [facility] CATH LAB    Anesthesia Start: 0923 Anesthesia Stop: 1000    Procedure: CARDIOVERSION W/ ANES Diagnosis: Paroxysmal atrial fibrillation    Scheduled Providers:  Responsible Provider: Santana Grimm APRN - CRNA    Anesthesia Type: general, TIVA ASA Status: 3          Anesthesia Type: No value filed.    Chris Phase I:      Chris Phase II:      Anesthesia Post Evaluation    Patient location during evaluation: PACU  Patient participation: complete - patient participated  Level of consciousness: sleepy but conscious  Pain score: 0  Airway patency: patent  Nausea & Vomiting: no nausea  Cardiovascular status: hemodynamically stable  Respiratory status: acceptable  Hydration status: euvolemic  Pain management: adequate        No notable events documented.

## 2024-01-30 NOTE — DISCHARGE INSTRUCTIONS
Electrical Cardioversion: What to Expect at Home  Your recovery     Electrical cardioversion is a treatment for an abnormal heartbeat, such as atrial fibrillation, supraventricular tachycardia, or ventricular tachycardia (VT). Your doctor used a brief electrical shock to reset your heart's rhythm.  After the procedure, you may have redness, like a sunburn, where the patches were. The medicines you got to make you sleepy may make you feel drowsy for the rest of the day. You may feel soreness or discomfort in your chest wall for a few days.  Your doctor may have you take medicines to help the heart beat normally and to prevent blood clots.  This care sheet gives you a general idea about how long it will take for you to recover. But each person recovers at a different pace. Follow the steps below to feel better as quickly as possible.  How can you care for yourself at home?  Medicines    If the doctor gave you a sedative:  For 24 hours, don't do anything that requires attention to detail. It takes time for the medicine's effects to completely wear off.  For your safety, do not drive or operate any machinery that could be dangerous. Wait until the medicine wears off and you can think clearly and react easily.     Be safe with medicines. Take your medicines exactly as prescribed. Call your doctor if you think you are having a problem with your medicine. You may take one or more of the following medicines:  Rate-control medicines to slow the heart rate.  Rhythm-control medicines that help the heart keep a normal rhythm.  Blood thinners, also called anticoagulants, which help prevent blood clots.  You will get more details on the specific medicines your doctor prescribes. Be sure you know how to take your medicines safely.     Do not take any vitamins, over-the-counter medicines, or herbal products without talking to your doctor first.   Exercise    Talk to your doctor about what type and level of exercise are safe  life-threatening heart rhythm disorders of the ventricles (the lower chambers of the heart that allow blood to flow out of the heart). Amiodarone is used to treat ventricular tachycardia or ventricular fibrillation.  Amiodarone is for use only in treating life-threatening heart rhythm disorders.  Amiodarone may also be used for purposes not listed in this medication guide.  What should I discuss with my healthcare provider before taking amiodarone?  You should not use this medicine if you are allergic to amiodarone or iodine, or if you have:  a serious heart condition called \"AV block\" (2nd or 3rd degree), unless you have a pacemaker;  a history of slow heartbeats that have caused you to faint; or  if your heart cannot pump blood properly.  Amiodarone can cause dangerous side effects on your heart, liver, lungs, or thyroid.    Tell your doctor if you have ever had:  asthma or another lung disorder;  liver disease;  a thyroid disorder;  vision problems;  high or low blood pressure;  an electrolyte imbalance (such as low levels of potassium or magnesium in your blood); or  if you have a pacemaker or defibrillator implanted in your chest.  Taking amiodarone during pregnancy may harm an unborn baby, or cause thyroid problems or abnormal heartbeats in the baby after it is born. Amiodarone may also affect the child's growth or development (speech, movement, academic skills) later in life. Tell your doctor if you are pregnant or if you become pregnant.  You should not breast-feed while taking amiodarone, and for several months after stopping. Amiodarone takes a long time to clear from your body. Talk to your doctor about the best way to feed your baby during this time.  How should I take amiodarone?  Follow all directions on your prescription label and read all medication guides or instruction sheets. Your doctor may occasionally change your dose. Use the medicine exactly as directed.  You will receive your first few doses  interactions, allergic reactions, or adverse effects. If you have questions about the drugs you are taking, check with your doctor, nurse or pharmacist.  Copyright 0707-1229 [employer] Version: 7.01. Revision date: 11/6/2018.  Care instructions adapted under license by [employer]. If you have questions about a medical condition or this instruction, always ask your healthcare professional. [employer], [employer] disclaims any warranty or liability for your use of this information.

## 2024-01-30 NOTE — PROCEDURES
Patient Name: Aniya Huff    Medical Record Number: [id number]  Date: 1/30/2024     Performing Cardiologist: Dr Schafer  Procedure Start Time: 0948     Procedure End Time: 0957       Cardioversion Procedure Note  Indication: atrial fibrillation    Consent: The patient provided verbal consent for this procedure.    The patient was brought to [facility] Room: 11. Noted patient INR is 1.9 and Dr Schafer notified. Will give lovenox 80mg SQ and the proceed with DCCV.    Time Out: All team members present. Correct patient, correct procedure, correct procedure site, correct position verified. Allergies reviewed. Pertinent diagnostic results reviewed.    Pre-Medication: See Anesthesia Record    Procedure: The patient was placed in the supine position and the chest area was exposed. The cardioversion pads were applied in the standard manner and configuration.    Attempt #1: The defibrillator was set on the synchronous mode and charged to 360 joules.  A charge was then delivered which resulted in conversion to normal sinus rhythm. After about 2 minutes back in afib    Attempt #2: The defibrillator was set on the synchronous and charged to 360 joules.  A charge was then delivered which resulted in  conversion to normal sinus rhythm. But then back in afib/flutter    Attempt #3: Not necessary    The patient tolerated the procedure well.    Complications: None    Post procedure, patient taken to  [facility] Room # 9  in stable condition and report given.    A post procedure 12 lead ECG was completed and reviewed yes    A post procedure Device Interrogation completed N/A

## 2024-01-31 RX ORDER — ATORVASTATIN CALCIUM 20 MG/1
20 TABLET, FILM COATED ORAL DAILY
Qty: 30 TABLET | Refills: 3 | Status: SHIPPED | OUTPATIENT
Start: 2024-01-31

## 2024-01-31 NOTE — TELEPHONE ENCOUNTER
Called and spoke with patient, have DDCV w/Anesthesia scheduled for 2/13/24 with a tentative time of 8 am with arrival of 6 am.  Advised we will contact patient if time/date changes.  Patient is to be NPO after midnight.  Patient instructed to arrive through front entrance of hospital and make immediate left. Patient advised may take morning medications with sip of water. Patient does not have IV dye allergy.  Patient verbally understood.     Patient was instructed to stop her Zocor and start Lipitor 20 mg daily.  Patient was instructed to continue her Coumadin as prescribed. Patient voiced understanding.

## 2024-01-31 NOTE — TELEPHONE ENCOUNTER
Marcial Schafer MD  You; Michelle Colbert MA14 hours ago (6:06 PM)       Can switch Zocor to Lipitor 20 mg daily.  Continue amiodarone and Coumadin as before.  Will need to be rescheduled for possible DCCV after 2 weeks as well as an appointment to see EP with Dr. Delatorre for A-fib ablation.

## 2024-02-05 DIAGNOSIS — I87.303 STASIS EDEMA OF BOTH LOWER EXTREMITIES: ICD-10-CM

## 2024-02-05 RX ORDER — BUMETANIDE 0.5 MG/1
TABLET ORAL
Qty: 90 TABLET | Refills: 3 | Status: SHIPPED | OUTPATIENT
Start: 2024-02-05

## 2024-02-12 NOTE — H&P
Patient:  Aniya Huff                  1940  MRN: [id number]    PROBLEM LIST:    Patient Active Problem List    Diagnosis Date Noted    Moderate mitral regurgitation by prior echocardiogram 12/03/2020     Priority: Low    Acute frontal sinusitis 05/16/2017     Priority: Low    Chronic anticoagulation 05/16/2017     Priority: Low     Overview Note:     Coumadin managed by Dr. Santana Sparks      Endometrial thickening on ultrasound 02/16/2017     Priority: Low     Overview Note:     Replacing Deprecated Diagnoses      PMB (postmenopausal bleeding) 02/16/2017     Priority: Low    PAF (paroxysmal atrial fibrillation) (HCC) 12/14/2015     Priority: Low    Hypothyroidism      Priority: Low    Mixed hyperlipidemia      Priority: Low    Hypertension      Priority: Low     1.  Paroxysmal atrial fibrillation on Amiodarone and Coumadin (unsuccessful DCCV on Multaq 1/30/2024, initiated on amiodarone), normal LV ejection fraction.  2.  CKD stage IV, baseline creatinine 2.7.  3.  Left breast cancer 1/2020 status post mastectomy, on Arimidex  4.  New mild to moderate aortic stenosis by echo 5/19/2023 (mean gradient 18 mmHg).     PRESENTATION: Aniya Huff is a 83 y.o. year old female who presents with recurrent atrial fibrillation, on Amiodarone and Coumadin here for repeat attempt at  DCCV.  Recently evaluated by nurse practitioner noted to be in possible atypical atrial flutter by EKG 1/8/2024.  Previously in sinus by EKG 12/12/2022.  24-hour monitor that shows persistent atrial fibrillation, unsuccessful DCCV on Multaq 1/30/2024, initiated on amiodarone.  Recent echo 5/19/2023 also shows normal EF with mild to moderate aortic stenosis which is new from 9/20/2021.  She does have an appointment with Dr. Delatorre.  She has significant fatigue while in atrial fibrillation and is unable to complete any household chores without sitting down and resting.          REVIEW OF SYSTEMS:  Review of Systems   Constitutional:  Negative

## 2024-02-13 ENCOUNTER — ANESTHESIA EVENT (OUTPATIENT)
Dept: CARDIAC CATH/INVASIVE PROCEDURES | Age: 84
End: 2024-02-13
Payer: MEDICARE

## 2024-02-13 ENCOUNTER — ANESTHESIA (OUTPATIENT)
Dept: CARDIAC CATH/INVASIVE PROCEDURES | Age: 84
End: 2024-02-13
Payer: MEDICARE

## 2024-02-13 ENCOUNTER — HOSPITAL ENCOUNTER (OUTPATIENT)
Dept: CARDIAC CATH/INVASIVE PROCEDURES | Age: 84
Discharge: HOME | End: 2024-02-13
Attending: INTERNAL MEDICINE | Admitting: INTERNAL MEDICINE
Payer: MEDICARE

## 2024-02-13 VITALS
RESPIRATION RATE: 14 BRPM | SYSTOLIC BLOOD PRESSURE: 141 MMHG | DIASTOLIC BLOOD PRESSURE: 66 MMHG | HEIGHT: 65 IN | HEART RATE: 58 BPM | BODY MASS INDEX: 36.32 KG/M2 | OXYGEN SATURATION: 96 % | TEMPERATURE: 97.7 F | WEIGHT: 218 LBS

## 2024-02-13 LAB
ANION GAP SERPL CALCULATED.3IONS-SCNC: 14 MMOL/L (ref 7–19)
BUN SERPL-MCNC: 23 MG/DL (ref 8–23)
CALCIUM SERPL-MCNC: 9.7 MG/DL (ref 8.8–10.2)
CHLORIDE SERPL-SCNC: 105 MMOL/L (ref 98–111)
CO2 SERPL-SCNC: 24 MMOL/L (ref 22–29)
CREAT SERPL-MCNC: 1.3 MG/DL (ref 0.5–0.9)
EKG P AXIS: 48 DEGREES
EKG P AXIS: NORMAL DEGREES
EKG P-R INTERVAL: 202 MS
EKG P-R INTERVAL: NORMAL MS
EKG Q-T INTERVAL: 452 MS
EKG Q-T INTERVAL: 488 MS
EKG QRS DURATION: 94 MS
EKG QRS DURATION: 96 MS
EKG QTC CALCULATION (BAZETT): 464 MS
EKG QTC CALCULATION (BAZETT): 496 MS
EKG T AXIS: -11 DEGREES
EKG T AXIS: -7 DEGREES
ERYTHROCYTE [DISTWIDTH] IN BLOOD BY AUTOMATED COUNT: 14.9 % (ref 11.5–14.5)
GLUCOSE SERPL-MCNC: 97 MG/DL (ref 74–109)
HCT VFR BLD AUTO: 41.4 % (ref 37–47)
HGB BLD-MCNC: 13.4 G/DL (ref 12–16)
INR PPP: 1.61 (ref 0.88–1.18)
MCH RBC QN AUTO: 30.5 PG (ref 27–31)
MCHC RBC AUTO-ENTMCNC: 32.4 G/DL (ref 33–37)
MCV RBC AUTO: 94.1 FL (ref 81–99)
PLATELET # BLD AUTO: 102 K/UL (ref 130–400)
PMV BLD AUTO: 12.5 FL (ref 9.4–12.3)
POTASSIUM SERPL-SCNC: 3.7 MMOL/L (ref 3.5–5)
PROTHROMBIN TIME: 18.8 SEC (ref 12–14.6)
RBC # BLD AUTO: 4.4 M/UL (ref 4.2–5.4)
SODIUM SERPL-SCNC: 143 MMOL/L (ref 136–145)
WBC # BLD AUTO: 5.7 K/UL (ref 4.8–10.8)

## 2024-02-13 PROCEDURE — 85027 COMPLETE CBC AUTOMATED: CPT

## 2024-02-13 PROCEDURE — 92960 CARDIOVERSION ELECTRIC EXT: CPT | Performed by: INTERNAL MEDICINE

## 2024-02-13 PROCEDURE — 6360000002 HC RX W HCPCS: Performed by: NURSE ANESTHETIST, CERTIFIED REGISTERED

## 2024-02-13 PROCEDURE — 2580000003 HC RX 258: Performed by: INTERNAL MEDICINE

## 2024-02-13 PROCEDURE — 36415 COLL VENOUS BLD VENIPUNCTURE: CPT

## 2024-02-13 PROCEDURE — 85610 PROTHROMBIN TIME: CPT

## 2024-02-13 PROCEDURE — 80048 BASIC METABOLIC PNL TOTAL CA: CPT

## 2024-02-13 PROCEDURE — 92960 CARDIOVERSION ELECTRIC EXT: CPT

## 2024-02-13 PROCEDURE — 3700000000 HC ANESTHESIA ATTENDED CARE: Performed by: NURSE ANESTHETIST, CERTIFIED REGISTERED

## 2024-02-13 PROCEDURE — 2500000003 HC RX 250 WO HCPCS: Performed by: NURSE ANESTHETIST, CERTIFIED REGISTERED

## 2024-02-13 RX ORDER — SODIUM CHLORIDE 0.9 % (FLUSH) 0.9 %
5-40 SYRINGE (ML) INJECTION PRN
Status: DISCONTINUED | OUTPATIENT
Start: 2024-02-13 | End: 2024-02-13 | Stop reason: HOSPADM

## 2024-02-13 RX ORDER — SODIUM CHLORIDE 9 MG/ML
INJECTION, SOLUTION INTRAVENOUS PRN
Status: DISCONTINUED | OUTPATIENT
Start: 2024-02-13 | End: 2024-02-13 | Stop reason: HOSPADM

## 2024-02-13 RX ORDER — SODIUM CHLORIDE 0.9 % (FLUSH) 0.9 %
5-40 SYRINGE (ML) INJECTION EVERY 12 HOURS SCHEDULED
Status: DISCONTINUED | OUTPATIENT
Start: 2024-02-13 | End: 2024-02-13 | Stop reason: HOSPADM

## 2024-02-13 RX ORDER — PROPOFOL 10 MG/ML
INJECTION, EMULSION INTRAVENOUS PRN
Status: DISCONTINUED | OUTPATIENT
Start: 2024-02-13 | End: 2024-02-13 | Stop reason: SDUPTHER

## 2024-02-13 RX ORDER — CALCIUM CARBONATE/VITAMIN D3 600 MG-10
1 TABLET ORAL 2 TIMES DAILY
COMMUNITY
Start: 2024-01-23

## 2024-02-13 RX ORDER — LIDOCAINE HYDROCHLORIDE 10 MG/ML
INJECTION, SOLUTION EPIDURAL; INFILTRATION; INTRACAUDAL; PERINEURAL PRN
Status: DISCONTINUED | OUTPATIENT
Start: 2024-02-13 | End: 2024-02-13 | Stop reason: SDUPTHER

## 2024-02-13 RX ADMIN — LIDOCAINE HYDROCHLORIDE 50 MG: 10 INJECTION, SOLUTION EPIDURAL; INFILTRATION; INTRACAUDAL; PERINEURAL at 08:18

## 2024-02-13 RX ADMIN — PROPOFOL 20 MG: 10 INJECTION, EMULSION INTRAVENOUS at 08:19

## 2024-02-13 RX ADMIN — PROPOFOL 50 MG: 10 INJECTION, EMULSION INTRAVENOUS at 08:18

## 2024-02-13 RX ADMIN — SODIUM CHLORIDE 40 ML/HR: 9 INJECTION, SOLUTION INTRAVENOUS at 07:40

## 2024-02-13 NOTE — PROGRESS NOTES
Patient out of bed with RN at bedside and ambulated to bathroom and back. Patient tolerated activity without difficulty. PT NOW REDRESSING WITH DAUGHTER & SPOUSE PRESENT.

## 2024-02-13 NOTE — PROCEDURES
Patient Name: Aniya Huff    Medical Record Number: [id number]  Date: 2/13/2024     Performing Cardiologist: Gilmar  Procedure Start Time: 0817     Procedure End Time: 0821       Cardioversion Procedure Note  Indication: atrial fibrillation    Consent: The patient was counseled regarding the procedure, its indications, risks, potential complications and alternatives, and any questions were answered. Consent was obtained to proceed.    The patient was brought to [facility] Room: 11.    Time Out: All team members present. Correct patient, correct procedure, correct procedure site, correct position verified. Allergies reviewed. Pertinent diagnostic results reviewed.    Pre-Medication: See Anesthesia Record    Procedure: The patient was placed in the supine position and the chest area was exposed. The cardioversion pads were applied in the standard manner and configuration.    Attempt #1: The defibrillator was set on the synchronous mode and charged to 360 joules.  A charge was then delivered which resulted in conversion to normal sinus rhythm.    Attempt #2: Not necessary    Attempt #3: Not necessary    The patient tolerated the procedure well.    Complications: None    Post procedure, patient taken to  [facility] Room # 4  in stable condition and report given.    A post procedure 12 lead ECG was completed and reviewed N/A    A post procedure Device Interrogation completed N/A

## 2024-02-13 NOTE — ANESTHESIA POSTPROCEDURE EVALUATION
Department of Anesthesiology  Postprocedure Note    Patient: Aniya Huff  MRN: [id number]  YOB: 1940  Date of evaluation: 2/13/2024    Procedure Summary     Date: 02/13/24 Room / Location: [facility] CATH LAB    Anesthesia Start: 0816 Anesthesia Stop: 0823    Procedure: CARDIOVERSION W/ ANES Diagnosis: Paroxysmal atrial fibrillation    Scheduled Providers:  Responsible Provider: Latoya Luevano APRN - CRNA    Anesthesia Type: general, TIVA ASA Status: 3          Anesthesia Type: No value filed.    Chris Phase I:      Chris Phase II:      Anesthesia Post Evaluation    Patient location during evaluation: bedside  Patient participation: complete - patient participated  Level of consciousness: sleepy but conscious  Pain score: 0  Airway patency: patent  Nausea & Vomiting: no nausea and no vomiting  Cardiovascular status: hemodynamically stable  Respiratory status: acceptable  Hydration status: stable  Pain management: adequate        No notable events documented.

## 2024-02-13 NOTE — PROCEDURES
Date of Procedure:  2/13/2024     Indications:  Atrial fibrillation with an appropriate duration of anticoagulation     Conscious Sedation Protocol Used During this Procedure -anesthesia provided by anesthesia service        Anesthesia: Moderate   Sedation: 0 mg Midazolam (Versed)  0 mcg Fentanyl   Start time: 8:20 AM   Stop time: 8:25 AM   ASA Class: 3   EBL Not applicable     A trained medical personnel administered medications at my direction.  The patient was monitored continuously with ECG, pulse oximetry, blood pressure monitoring and direct observation.       After obtaining informed written consent and an appropriate level of conscious sedation, DCCV with 360 J x 1 was successful in restoring sinus rhythm.      Complications:  none      Impression:  Successful DC cardioversion of atrial fibrillation to normal sinus rhythm on Coumadin used for anticoagulation.  Initiated on amiodarone 200 mg once daily.  Has appointment with Dr. Delatorre for evaluation for possible A-fib ablation.    Electronically signed by Marcial Schafer MD on 2/13/24

## 2024-02-13 NOTE — ANESTHESIA PRE PROCEDURE
Department of Anesthesiology  Preprocedure Note       Name:  Aniya Huff   Age:  83 y.o.  :  1940                                          MRN:  [id number]         Date:  2024      Surgeon: * Surgery not found *    Procedure: Procedure(s):  DILATATION AND CURETTAGE HYSTEROSCOPY    Medications prior to admission:   Prior to Admission medications    Medication Sig Start Date End Date Taking? Authorizing Provider   calcium carb-cholecalciferol 600-10 MG-MCG TABS per tab Take 1 tablet by mouth 2 times daily 24   Ian Woodward MD   atorvastatin (LIPITOR) 20 MG tablet Take 1 tablet by mouth daily 24   Sebastián Hayes APRN   amiodarone (PACERONE) 100 MG tablet Take 2 tablets by mouth daily 24   Marcial Schafer MD   valsartan (DIOVAN) 160 MG tablet Take 1 tablet by mouth daily 23   Ian Woodward MD   metoprolol succinate (TOPROL XL) 25 MG extended release tablet Take 1 tablet by mouth twice daily 23   Britta Chase APRN   warfarin (COUMADIN) 5 MG tablet TAKE 1 TABLET BY MOUTH ONCE DAILY OR AS DIRECTED  Patient taking differently: Take 1 tablet by mouth See Admin Instructions PT STATED SHE TAKES 5 MG ON WED & ALL OTHER DAYS 2.5 MG 23   Judy Blackwell APRN - NP   anastrozole (ARIMIDEX) 1 MG tablet Take 1 tablet by mouth once daily 20   Ian Woodward MD   Acetaminophen 325 MG CAPS Take 650 mg by mouth as needed    Ian Woodward MD   Carboxymethylcellulose Sodium (REFRESH TEARS OP) Apply 1 drop to eye as needed    Ian Woodward MD   allopurinol (ZYLOPRIM) 100 MG tablet Take 1 tablet by mouth 2 times daily 10/6/17   Ian Woodward MD   levothyroxine (SYNTHROID) 50 MCG tablet Take 1 tablet by mouth Daily Indications: HYPOTHROIDISM    Ian Woodward MD   polyethylene glycol (GLYCOLAX) 17 GM/SCOOP powder Take 17 g by mouth daily Indications: REGULARITY    Ian Woodward MD   bumetanide (BUMEX) 1 MG tablet Take 0.5 tablets by

## 2024-02-19 ENCOUNTER — ANTI-COAG VISIT (OUTPATIENT)
Dept: CARDIOLOGY CLINIC | Age: 84
End: 2024-02-19
Payer: MEDICARE

## 2024-02-19 DIAGNOSIS — I48.91 ATRIAL FIBRILLATION, UNSPECIFIED TYPE (HCC): Primary | ICD-10-CM

## 2024-02-19 LAB
INTERNATIONAL NORMALIZATION RATIO, POC: 1.5
PROTHROMBIN TIME, POC: 16.1

## 2024-02-19 PROCEDURE — 93793 ANTICOAG MGMT PT WARFARIN: CPT | Performed by: NURSE PRACTITIONER

## 2024-02-27 ENCOUNTER — TELEPHONE (OUTPATIENT)
Dept: CARDIOLOGY CLINIC | Age: 84
End: 2024-02-27

## 2024-02-27 ENCOUNTER — OFFICE VISIT (OUTPATIENT)
Dept: CARDIOLOGY CLINIC | Age: 84
End: 2024-02-27
Payer: MEDICARE

## 2024-02-27 VITALS
DIASTOLIC BLOOD PRESSURE: 84 MMHG | HEART RATE: 74 BPM | WEIGHT: 218 LBS | BODY MASS INDEX: 36.32 KG/M2 | OXYGEN SATURATION: 96 % | SYSTOLIC BLOOD PRESSURE: 132 MMHG | HEIGHT: 65 IN

## 2024-02-27 DIAGNOSIS — Z79.01 CHRONIC ANTICOAGULATION: ICD-10-CM

## 2024-02-27 DIAGNOSIS — I48.0 PAF (PAROXYSMAL ATRIAL FIBRILLATION) (HCC): Primary | ICD-10-CM

## 2024-02-27 DIAGNOSIS — Z79.899 ON AMIODARONE THERAPY: ICD-10-CM

## 2024-02-27 DIAGNOSIS — I10 HYPERTENSION, UNSPECIFIED TYPE: ICD-10-CM

## 2024-02-27 LAB
INTERNATIONAL NORMALIZATION RATIO, POC: 2.7
PROTHROMBIN TIME, POC: 27.5

## 2024-02-27 PROCEDURE — 3075F SYST BP GE 130 - 139MM HG: CPT | Performed by: NURSE PRACTITIONER

## 2024-02-27 PROCEDURE — 3079F DIAST BP 80-89 MM HG: CPT | Performed by: NURSE PRACTITIONER

## 2024-02-27 PROCEDURE — 1123F ACP DISCUSS/DSCN MKR DOCD: CPT | Performed by: NURSE PRACTITIONER

## 2024-02-27 PROCEDURE — 93000 ELECTROCARDIOGRAM COMPLETE: CPT | Performed by: NURSE PRACTITIONER

## 2024-02-27 PROCEDURE — 99214 OFFICE O/P EST MOD 30 MIN: CPT | Performed by: NURSE PRACTITIONER

## 2024-02-27 NOTE — PATIENT INSTRUCTIONS
New instructions for today:  Coumadin can increase your risk of bleeding.  If you notice blood in urine or stool, bleeding gums, excessive bruising or cough productive of bloody sputum, notify the office.  Information on this blood thinner has been included in your after visit summary.    Amiodarone (Cordarone or Pacerone)  You were prescribed this medication for a heart rhythm problem.  It is important that you take the medication exactly as prescribed.  Like all medications, amiodarone can have side effects.  The most common are fatigue, nausea, loss of appetite and constipation.  It can affect the eyes, thyroid, liver and lungs.  Because of the potential side effects to these organs, it is important that you have regular tests to detect problems early.  The following is a list of the required tests that you will need to have every 6 months while taking this medication.       1.  Blood work:  A complete metabolic profile (CMP) and thyroid levels will be       drawn every 6 months.  You can have this blood work drawn at the [facility] outpatient lab or at a facility close to you.  You will be           contacted of the results.     2.  Chest X-ray:  You will need to have a chest x-ray every 6 months to evaluate for any lung changes.     3.  Pulmonary Function Study: This test is to evaluate your lung and breathing status.  You will be asked to breath into a tube several times to check for any changes in your lung function.  You will need to do this every 6 months.     4.  Eye exam: You will need to have your eyes examined by your doctor every       year.  You will need to tell your eye doctor that you take amiodarone.    If you are on coumadin, your blood tests will need to be monitored more closely when starting on amiodarone.  Coumadin can make amiodarone stronger in your bloodstream, which can cause the blood to become to thin.  Therefore, the dose of coumadin may need to be reduced.    It is best

## 2024-02-27 NOTE — PROGRESS NOTES
MD on 2/13/24 1/30/24 DCCV  Impression: Unsuccessful DC cardioversion of atrial fibrillation to normal sinus rhythm on Coumadin used for anticoagulation and Multaq.  Discontinue Multaq and initiate patient on amiodarone with loading 200 mg p.o. twice daily for 10 days and then 200 mg once daily.  Can return for repeat attempted DCCV after 2 weeks.  Will refer patient for consideration of A-fib ablation as she is symptomatic with fatigue.   Electronically signed by Marcial Schafer MD on 1/30/24 5/19/23 echo  Mildly dilated left atrium.   Normal left ventricular wall thickness.   Normal left ventricular size with preserved LV function and an estimated ejection fraction of approximately 55-60%.   Grade II diastolic dysfunction is present.   No regional wall motion abnormalities.   The aortic valve is trileaflet, moderately thickened & calcified with reduced leaflet separation.   The aortic valve area is .91 cm2 with a maximum gradient of 25 mmHg and a mean gradient of 18 mmHg.   Moderate aortic stenosis; no evidence of aortic regurgitation.   Electronically signed by Lida Brandon MD(Interpreting   physician) on 05/19/2023 01:08 PM    EKG today reviewed: Atrial fib flutter 66 bpm; QTc .440; no acute ischemic changes.     Lab Results   Component Value Date    INR 2.7 02/27/2024    INR 1.5 02/19/2024    INR 1.61 (H) 02/13/2024    PROTIME 27.5 02/27/2024    PROTIME 16.1 02/19/2024    PROTIME 18.8 (H) 02/13/2024     Lab Results   Component Value Date    WBC 5.7 02/13/2024    HGB 13.4 02/13/2024    HCT 41.4 02/13/2024    MCV 94.1 02/13/2024     (L) 02/13/2024     Lab Results   Component Value Date     02/13/2024    K 3.7 02/13/2024     02/13/2024    CO2 24 02/13/2024    BUN 23 02/13/2024    CREATININE 1.3 (H) 02/13/2024    GLUCOSE 97 02/13/2024    CALCIUM 9.7 02/13/2024    PROT 7.1 08/15/2018    LABALBU 4.3 08/15/2018    BILITOT 0.4 08/15/2018    ALKPHOS 78 08/15/2018    AST 20 08/15/2018    ALT

## 2024-02-28 NOTE — TELEPHONE ENCOUNTER
Hermes Zeng,  I saw Denia yesterday and she was back out of rhythm.  I informed Dr. Schafer and he said to continue as is and see Dr. Delatorre as scheduled.  Thanks, Reba

## 2024-03-05 ENCOUNTER — OFFICE VISIT (OUTPATIENT)
Dept: INTERNAL MEDICINE | Facility: CLINIC | Age: 84
End: 2024-03-05
Payer: MEDICARE

## 2024-03-05 VITALS
TEMPERATURE: 94 F | HEART RATE: 95 BPM | OXYGEN SATURATION: 96 % | HEIGHT: 65 IN | WEIGHT: 222 LBS | DIASTOLIC BLOOD PRESSURE: 82 MMHG | BODY MASS INDEX: 36.99 KG/M2 | SYSTOLIC BLOOD PRESSURE: 158 MMHG

## 2024-03-05 DIAGNOSIS — H66.001 NON-RECURRENT ACUTE SUPPURATIVE OTITIS MEDIA OF RIGHT EAR WITHOUT SPONTANEOUS RUPTURE OF TYMPANIC MEMBRANE: Primary | ICD-10-CM

## 2024-03-05 RX ORDER — ATORVASTATIN CALCIUM 20 MG/1
1 TABLET, FILM COATED ORAL DAILY
COMMUNITY
Start: 2024-01-31

## 2024-03-05 RX ORDER — TRIAMCINOLONE ACETONIDE 40 MG/ML
40 INJECTION, SUSPENSION INTRA-ARTICULAR; INTRAMUSCULAR ONCE
Status: COMPLETED | OUTPATIENT
Start: 2024-03-05 | End: 2024-03-05

## 2024-03-05 RX ORDER — AMIODARONE HYDROCHLORIDE 100 MG/1
2 TABLET ORAL DAILY
COMMUNITY
Start: 2024-02-09

## 2024-03-05 RX ORDER — CALCIUM CARBONATE/VITAMIN D3 600 MG-10
1 TABLET ORAL 2 TIMES DAILY
COMMUNITY
Start: 2024-01-23

## 2024-03-05 RX ORDER — AMOXICILLIN AND CLAVULANATE POTASSIUM 875; 125 MG/1; MG/1
1 TABLET, FILM COATED ORAL 2 TIMES DAILY
Qty: 14 TABLET | Refills: 0 | Status: SHIPPED | OUTPATIENT
Start: 2024-03-05 | End: 2024-03-05

## 2024-03-05 RX ORDER — AMOXICILLIN AND CLAVULANATE POTASSIUM 875; 125 MG/1; MG/1
1 TABLET, FILM COATED ORAL 2 TIMES DAILY
Qty: 20 TABLET | Refills: 0 | Status: SHIPPED | OUTPATIENT
Start: 2024-03-05 | End: 2024-03-15

## 2024-03-05 RX ADMIN — TRIAMCINOLONE ACETONIDE 40 MG: 40 INJECTION, SUSPENSION INTRA-ARTICULAR; INTRAMUSCULAR at 14:55

## 2024-03-05 NOTE — PROGRESS NOTES
Subjective     Chief Complaint:  Right ear fullness    HPI:  Patient presents today with complaints of right ear fullness.  Please see assessment and plan below.    Past Medical History:   Past Medical History:   Diagnosis Date    A-fib     Allergic codiene, percocet, bactrim    Arthritis     Arthritis     generalized    Asthma some shortness of breath    Atrial fibrillation     INTERMITTENT    Basal cell carcinoma     between eyes     Breast cancer     Cancer     Cataract 2007,2011    Cholelithiasis 8-03-06 surgery    Colon polyp removed    Disease of thyroid gland     Diverticulosis     Elevated cholesterol     Heart murmur a fib    Hx of colonic polyp     Hyperlipidemia     Hypertension     Kidney disease     STAGE 4    Kidney disease     Osteopenia left hip    Renal insufficiency some years ago    Urinary tract infection occasionally    Visual impairment blurry vision     Past Surgical History:  Past Surgical History:   Procedure Laterality Date    APPENDECTOMY      BREAST BIOPSY Left 2015    BREAST BIOPSY Right 12/31/2020    Procedure: RIGHT NEEDLE DIRECTED EXCISIONAL BREAST BIOPSY;  Surgeon: Nikki Zuniga MD;  Location: [facility] OR;  Service: General;  Laterality: Right;    BREAST EXCISIONAL BIOPSY Left 2001    BREAST EXCISIONAL BIOPSY Left 2001    CARPAL TUNNEL RELEASE Right     CATARACT EXTRACTION, BILATERAL      CHOLECYSTECTOMY      COLON SURGERY      Partial right    COLONOSCOPY      COLONOSCOPY N/A 10/17/2019    Procedure: COLONOSCOPY WITH ANESTHESIA;  Surgeon: Rigoberto Shaw MD;  Location: [facility] ENDOSCOPY;  Service: Gastroenterology    COLONOSCOPY W/ POLYPECTOMY  06/30/2016    2 Adenomatous polyps at 80 & 40 cm, Diverticulosis repeat exam in 3 years    EXCISION MASS ARM Bilateral 4/28/2023    Procedure: lipoma excision left hand and right arm;  Surgeon: Nikki Zuniga MD;  Location: [facility] OR;  Service: General;  Laterality: Bilateral;    EYE SURGERY Bilateral     cataract extraction     "EYE SURGERY Left     detached retina    HERNIA REPAIR      JOINT REPLACEMENT  both knees 2-14-12 &10-30-12    MASTECTOMY Left 2019    MASTECTOMY W/ SENTINEL NODE BIOPSY Left 01/02/2020    Procedure: LEFT SIMPLE MASTECTOMY WITH SENTINEL NODE BIOPSY, INJECTION AND SCAN, RADIOLOGIST WILL INJECT;  Surgeon: Nikki Zuniga MD;  Location: [facility] OR;  Service: General    REPLACEMENT TOTAL KNEE Bilateral     THROAT SURGERY      duct    TONSILLECTOMY  8 or 9 yrs old    UMBILICAL HERNIA REPAIR  years ago       Allergies:  Allergies   Allergen Reactions    Bactrim [Sulfamethoxazole-Trimethoprim] Other (See Comments)     Patient has kidney disease (stage 4) shouldn't take Bactrim    Codeine Nausea Only    Percocet [Oxycodone-Acetaminophen] Nausea Only     Becomes, hot, nauseated, and made her head feel \"weird\".     Medications:  Prior to Admission medications    Medication Sig Start Date End Date Taking? Authorizing Provider   acetaminophen (TYLENOL) 500 MG tablet Take 1 tablet by mouth Every 6 (Six) Hours As Needed for Mild Pain.   Yes Ceci Alcala MD   albuterol sulfate  (90 Base) MCG/ACT inhaler Inhale 2 puffs Every 6 (Six) Hours As Needed for Wheezing. 12/14/23  Yes Lizzy Conley APRN   allopurinol (ZYLOPRIM) 100 MG tablet Take 1 tablet by mouth 2 (Two) Times a Day. 5/29/19  Yes Ceci Alcala MD   amiodarone (PACERONE) 100 MG tablet Take 2 tablets by mouth Daily. 2/9/24  Yes Ceci Alcala MD   anastrozole (ARIMIDEX) 1 MG tablet Take 1 tablet by mouth Daily. 1/3/24  Yes Ender Franco MD   atorvastatin (LIPITOR) 20 MG tablet Take 1 tablet by mouth Daily. 1/31/24  Yes Ceci Alcala MD   bumetanide (BUMEX) 0.5 MG tablet Take 1 tablet by mouth once daily 2/5/24  Yes José Miguel Anderson MD   calcium carb-cholecalciferol 600-10 MG-MCG tablet per tablet Take 1 tablet by mouth 2 (Two) Times a Day. 1/23/24  Yes Ceci Alcala MD   Calcium Carbonate-Vitamin D 600-10 MG-MCG " "per tablet Take 1 tablet by mouth 2 (Two) Times a Day. 1/3/24  Yes Ender Franco MD   carboxymethylcellulose (REFRESH PLUS) 0.5 % solution Administer 1 drop to both eyes 3 (Three) Times a Day As Needed for Dry Eyes.   Yes Ceci Alcala MD   levothyroxine (SYNTHROID, LEVOTHROID) 50 MCG tablet Take 1 tablet by mouth Daily. 3/15/23  Yes José Miguel Anderson MD   metoprolol succinate XL (TOPROL-XL) 25 MG 24 hr tablet Take 1 tablet by mouth 2 (Two) Times a Day. 5/28/19  Yes Ceci Alcala MD   polyethylene glycol (MIRALAX) 17 GM/SCOOP powder Take 17 g by mouth Daily.   Yes Ceci Alcala MD   valsartan (DIOVAN) 160 MG tablet Take 1 tablet by mouth Daily. 6/6/23  Yes Ceci Alcala MD   Vitamin D, Cholecalciferol, 50 MCG (2000 UT) capsule Take  by mouth.   Yes Ceci Alcala MD   warfarin (COUMADIN) 5 MG tablet Take  by mouth Take As Directed. Take 1 whole tablet (5 mg) on Wednesday only. Take 1/2 tablet (2.5 mg) all other days. 6/17/19  Yes Ceci Alcala MD   dronedarone (MULTAQ) 400 MG tablet Take 1 tablet by mouth 2 (Two) Times a Day With Meals. 2/26/19   Ceci Alcala MD   simvastatin (ZOCOR) 40 MG tablet Take 1 tablet by mouth Daily. 8/3/23   José Miguel Anderson MD       Objective     Vital Signs: /82 (BP Location: Right arm)   Pulse 95   Temp 94 °F (34.4 °C) (Infrared)   Ht 165.1 cm (65\")   Wt 101 kg (222 lb)   SpO2 96%   BMI 36.94 kg/m²   Physical Exam  Vitals and nursing note reviewed.   Constitutional:       General: She is not in acute distress.     Appearance: Normal appearance. She is obese.   HENT:      Right Ear: A middle ear effusion is present. Tympanic membrane is erythematous and retracted.      Left Ear: Tympanic membrane is not erythematous or retracted.      Nose: Congestion present.      Mouth/Throat:      Pharynx: No posterior oropharyngeal erythema.   Cardiovascular:      Rate and Rhythm: Normal rate.      Pulses: Normal pulses. "      Heart sounds: Normal heart sounds.   Pulmonary:      Effort: Pulmonary effort is normal. No respiratory distress.      Breath sounds: Normal breath sounds.   Musculoskeletal:         General: Normal range of motion.   Skin:     General: Skin is warm and dry.      Capillary Refill: Capillary refill takes less than 2 seconds.      Findings: No bruising, erythema or rash.   Neurological:      Mental Status: She is alert and oriented to person, place, and time. Mental status is at baseline.      Motor: No weakness.       Results Reviewed:  Creatinine 1.3 on BMP obtained on 2/13/2024.    Assessment / Plan     Assessment/Plan:  Diagnoses and all orders for this visit:    1. Non-recurrent acute suppurative otitis media of right ear without spontaneous rupture of tympanic membrane (Primary)  -     Discontinue: amoxicillin-clavulanate (AUGMENTIN) 875-125 MG per tablet; Take 1 tablet by mouth 2 (Two) Times a Day for 7 days.  Dispense: 14 tablet; Refill: 0  -     triamcinolone acetonide (KENALOG-40) injection 40 mg  -     amoxicillin-clavulanate (AUGMENTIN) 875-125 MG per tablet; Take 1 tablet by mouth 2 (Two) Times a Day for 10 days.  Dispense: 20 tablet; Refill: 0       Patient presents today with complaints of right ear fullness.  She states that she has had some sinus congestion for a couple of weeks.  She reports that she developed right ear fullness around the time that her sinus congestion started.  Over the last couple of days she has developed a dull ache in her right ear.  She has been taking Mucinex to help with her sinus congestion but reports that she is still feeling stopped up and her voice is hoarse.  This has been manageable but felt that she needed to be evaluated due to worsening earache.  Patient does appear to have right otitis media.  Will plan to treat with Augmentin for 10 days.  She will be given Kenalog 40 mg injection in the clinic today.  Low-dose given due to atrial fibrillation.  Advised her  to begin using Flonase.  She may take a daily antihistamine such as Zyrtec or Claritin.  She will call if her symptoms worsen or fail to improve.    Return for Next scheduled follow up. unless patient needs to be seen sooner or acute issues arise.    I have discussed the patient results/orders and and plan/recommendation with them at today's visit.      Lizzy Conley, SUELLEN   03/05/2024

## 2024-03-06 ENCOUNTER — TELEPHONE (OUTPATIENT)
Dept: CARDIOLOGY CLINIC | Age: 84
End: 2024-03-06

## 2024-03-06 NOTE — TELEPHONE ENCOUNTER
Pt called stating that Dr. Delatorre is wanting pt to have INR checked every week up to pt's ablation in April. Pt will contact office to update on procedure date. Please contact pt to schedule. Thank you.

## 2024-03-11 ENCOUNTER — ANTI-COAG VISIT (OUTPATIENT)
Dept: CARDIOLOGY CLINIC | Age: 84
End: 2024-03-11
Payer: MEDICARE

## 2024-03-11 DIAGNOSIS — I48.0 PAF (PAROXYSMAL ATRIAL FIBRILLATION) (HCC): Primary | ICD-10-CM

## 2024-03-11 LAB
INTERNATIONAL NORMALIZATION RATIO, POC: 1.6
PROTHROMBIN TIME, POC: 17.1

## 2024-03-11 PROCEDURE — 85610 PROTHROMBIN TIME: CPT | Performed by: NURSE PRACTITIONER

## 2024-03-18 ENCOUNTER — ANTI-COAG VISIT (OUTPATIENT)
Dept: CARDIOLOGY CLINIC | Age: 84
End: 2024-03-18
Payer: MEDICARE

## 2024-03-18 DIAGNOSIS — I48.0 PAF (PAROXYSMAL ATRIAL FIBRILLATION) (HCC): Primary | ICD-10-CM

## 2024-03-18 LAB
INTERNATIONAL NORMALIZATION RATIO, POC: 2
PROTHROMBIN TIME, POC: 21.5

## 2024-03-18 PROCEDURE — 93793 ANTICOAG MGMT PT WARFARIN: CPT | Performed by: CLINICAL NURSE SPECIALIST

## 2024-03-22 ENCOUNTER — LAB (OUTPATIENT)
Dept: INTERNAL MEDICINE | Facility: CLINIC | Age: 84
End: 2024-03-22
Payer: MEDICARE

## 2024-03-22 DIAGNOSIS — N18.4 STAGE 4 CHRONIC KIDNEY DISEASE: ICD-10-CM

## 2024-03-22 DIAGNOSIS — E78.2 MIXED HYPERLIPIDEMIA: ICD-10-CM

## 2024-03-22 DIAGNOSIS — I10 HTN (HYPERTENSION), BENIGN: Primary | Chronic | ICD-10-CM

## 2024-03-22 DIAGNOSIS — E03.9 HYPOTHYROIDISM, UNSPECIFIED TYPE: ICD-10-CM

## 2024-03-22 DIAGNOSIS — E55.9 VITAMIN D DEFICIENCY: ICD-10-CM

## 2024-03-23 LAB
25(OH)D3+25(OH)D2 SERPL-MCNC: 51.9 NG/ML (ref 30–100)
ALBUMIN SERPL-MCNC: 4.2 G/DL (ref 3.5–5.2)
ALBUMIN/GLOB SERPL: 2.2 G/DL
ALP SERPL-CCNC: 102 U/L (ref 39–117)
ALT SERPL-CCNC: 17 U/L (ref 1–33)
APPEARANCE UR: CLEAR
AST SERPL-CCNC: 22 U/L (ref 1–32)
BACTERIA #/AREA URNS HPF: NORMAL /HPF
BASOPHILS # BLD AUTO: 0.04 10*3/MM3 (ref 0–0.2)
BASOPHILS NFR BLD AUTO: 0.6 % (ref 0–1.5)
BILIRUB SERPL-MCNC: 0.8 MG/DL (ref 0–1.2)
BILIRUB UR QL STRIP: NEGATIVE
BUN SERPL-MCNC: 22 MG/DL (ref 8–23)
BUN/CREAT SERPL: 16.5 (ref 7–25)
CALCIUM SERPL-MCNC: 9.1 MG/DL (ref 8.6–10.5)
CASTS URNS MICRO: NORMAL
CHLORIDE SERPL-SCNC: 107 MMOL/L (ref 98–107)
CHOLEST SERPL-MCNC: 149 MG/DL (ref 0–200)
CO2 SERPL-SCNC: 25.4 MMOL/L (ref 22–29)
COLOR UR: YELLOW
CREAT SERPL-MCNC: 1.33 MG/DL (ref 0.57–1)
EGFRCR SERPLBLD CKD-EPI 2021: 39.5 ML/MIN/1.73
EOSINOPHIL # BLD AUTO: 0.14 10*3/MM3 (ref 0–0.4)
EOSINOPHIL NFR BLD AUTO: 2 % (ref 0.3–6.2)
EPI CELLS #/AREA URNS HPF: NORMAL /HPF
ERYTHROCYTE [DISTWIDTH] IN BLOOD BY AUTOMATED COUNT: 14.6 % (ref 12.3–15.4)
GLOBULIN SER CALC-MCNC: 1.9 GM/DL
GLUCOSE SERPL-MCNC: 84 MG/DL (ref 65–99)
GLUCOSE UR QL STRIP: NEGATIVE
HCT VFR BLD AUTO: 44.1 % (ref 34–46.6)
HDLC SERPL-MCNC: 64 MG/DL (ref 40–60)
HGB BLD-MCNC: 14 G/DL (ref 12–15.9)
HGB UR QL STRIP: NEGATIVE
IMM GRANULOCYTES # BLD AUTO: 0.01 10*3/MM3 (ref 0–0.05)
IMM GRANULOCYTES NFR BLD AUTO: 0.1 % (ref 0–0.5)
KETONES UR QL STRIP: NEGATIVE
LDLC SERPL CALC-MCNC: 68 MG/DL (ref 0–100)
LEUKOCYTE ESTERASE UR QL STRIP: NEGATIVE
LYMPHOCYTES # BLD AUTO: 2.04 10*3/MM3 (ref 0.7–3.1)
LYMPHOCYTES NFR BLD AUTO: 29.8 % (ref 19.6–45.3)
MCH RBC QN AUTO: 30.1 PG (ref 26.6–33)
MCHC RBC AUTO-ENTMCNC: 31.7 G/DL (ref 31.5–35.7)
MCV RBC AUTO: 94.8 FL (ref 79–97)
MONOCYTES # BLD AUTO: 0.48 10*3/MM3 (ref 0.1–0.9)
MONOCYTES NFR BLD AUTO: 7 % (ref 5–12)
NEUTROPHILS # BLD AUTO: 4.14 10*3/MM3 (ref 1.7–7)
NEUTROPHILS NFR BLD AUTO: 60.5 % (ref 42.7–76)
NITRITE UR QL STRIP: NEGATIVE
NRBC BLD AUTO-RTO: 0 /100 WBC (ref 0–0.2)
PH UR STRIP: 6 [PH] (ref 5–8)
PLATELET # BLD AUTO: 73 10*3/MM3 (ref 140–450)
POTASSIUM SERPL-SCNC: 4.1 MMOL/L (ref 3.5–5.2)
PROT SERPL-MCNC: 6.1 G/DL (ref 6–8.5)
PROT UR QL STRIP: NEGATIVE
RBC # BLD AUTO: 4.65 10*6/MM3 (ref 3.77–5.28)
RBC #/AREA URNS HPF: NORMAL /HPF
SODIUM SERPL-SCNC: 143 MMOL/L (ref 136–145)
SP GR UR STRIP: 1.02 (ref 1–1.03)
TRIGL SERPL-MCNC: 91 MG/DL (ref 0–150)
TSH SERPL DL<=0.005 MIU/L-ACNC: 4.42 UIU/ML (ref 0.27–4.2)
UROBILINOGEN UR STRIP-MCNC: NORMAL MG/DL
VLDLC SERPL CALC-MCNC: 17 MG/DL (ref 5–40)
WBC # BLD AUTO: 6.85 10*3/MM3 (ref 3.4–10.8)
WBC #/AREA URNS HPF: NORMAL /HPF

## 2024-03-25 ENCOUNTER — OFFICE VISIT (OUTPATIENT)
Dept: INTERNAL MEDICINE | Facility: CLINIC | Age: 84
End: 2024-03-25
Payer: MEDICARE

## 2024-03-25 ENCOUNTER — ANTI-COAG VISIT (OUTPATIENT)
Dept: CARDIOLOGY CLINIC | Age: 84
End: 2024-03-25
Payer: MEDICARE

## 2024-03-25 VITALS
WEIGHT: 221 LBS | OXYGEN SATURATION: 99 % | BODY MASS INDEX: 36.82 KG/M2 | HEART RATE: 80 BPM | SYSTOLIC BLOOD PRESSURE: 140 MMHG | TEMPERATURE: 97.8 F | HEIGHT: 65 IN | DIASTOLIC BLOOD PRESSURE: 90 MMHG

## 2024-03-25 DIAGNOSIS — N18.4 STAGE 4 CHRONIC KIDNEY DISEASE: ICD-10-CM

## 2024-03-25 DIAGNOSIS — H61.21 IMPACTED CERUMEN, RIGHT EAR: ICD-10-CM

## 2024-03-25 DIAGNOSIS — I48.0 PAF (PAROXYSMAL ATRIAL FIBRILLATION) (HCC): Primary | ICD-10-CM

## 2024-03-25 DIAGNOSIS — H66.001 NON-RECURRENT ACUTE SUPPURATIVE OTITIS MEDIA OF RIGHT EAR WITHOUT SPONTANEOUS RUPTURE OF TYMPANIC MEMBRANE: ICD-10-CM

## 2024-03-25 DIAGNOSIS — I10 HTN (HYPERTENSION), BENIGN: Chronic | ICD-10-CM

## 2024-03-25 DIAGNOSIS — D69.6 THROMBOCYTOPENIA: ICD-10-CM

## 2024-03-25 DIAGNOSIS — Z79.01 CHRONIC ANTICOAGULATION: ICD-10-CM

## 2024-03-25 DIAGNOSIS — I48.91 ATRIAL FIBRILLATION, CONTROLLED: ICD-10-CM

## 2024-03-25 DIAGNOSIS — E55.9 VITAMIN D DEFICIENCY: ICD-10-CM

## 2024-03-25 DIAGNOSIS — E66.01 CLASS 2 SEVERE OBESITY DUE TO EXCESS CALORIES WITH SERIOUS COMORBIDITY AND BODY MASS INDEX (BMI) OF 36.0 TO 36.9 IN ADULT: ICD-10-CM

## 2024-03-25 DIAGNOSIS — E78.2 MIXED HYPERLIPIDEMIA: ICD-10-CM

## 2024-03-25 DIAGNOSIS — Z78.0 POST-MENOPAUSAL: Primary | ICD-10-CM

## 2024-03-25 PROBLEM — E66.9 OBESITY (BMI 30-39.9): Status: RESOLVED | Noted: 2020-03-23 | Resolved: 2024-03-25

## 2024-03-25 PROBLEM — E66.812 CLASS 2 SEVERE OBESITY DUE TO EXCESS CALORIES WITH SERIOUS COMORBIDITY AND BODY MASS INDEX (BMI) OF 36.0 TO 36.9 IN ADULT: Status: ACTIVE | Noted: 2024-03-25

## 2024-03-25 PROBLEM — E66.812 CLASS 2 SEVERE OBESITY DUE TO EXCESS CALORIES WITH SERIOUS COMORBIDITY AND BODY MASS INDEX (BMI) OF 36.0 TO 36.9 IN ADULT: Status: RESOLVED | Noted: 2024-03-25 | Resolved: 2024-03-25

## 2024-03-25 LAB
INTERNATIONAL NORMALIZATION RATIO, POC: 2.2
PROTHROMBIN TIME, POC: 22.1

## 2024-03-25 PROCEDURE — 93793 ANTICOAG MGMT PT WARFARIN: CPT | Performed by: NURSE PRACTITIONER

## 2024-03-25 RX ORDER — LEVOTHYROXINE SODIUM 0.05 MG/1
50 TABLET ORAL DAILY
Qty: 90 TABLET | Refills: 3 | Status: SHIPPED | OUTPATIENT
Start: 2024-03-25

## 2024-03-25 NOTE — PROGRESS NOTES
The ABCs of the Annual Wellness Visit  Subsequent Medicare Wellness Visit    Chief Complaint   Patient presents with    Medicare Wellness-subsequent    other     Having ablation in April with Dr. Winters in [place]     Earache     Right ear   Saw Lizzy on 3/5 was given medication for 7 days  Then started another 10 day course on 3/18      Subjective    History of Present Illness:  Heidi Ansari is a 83 y.o. female who presents for a Subsequent Medicare Wellness Visit.    The following portions of the patient's history were reviewed and   updated as appropriate: allergies, current medications, past family history, past medical history, past social history, past surgical history and problem list.    Compared to one year ago, the patient feels her physical   health is the same.    Compared to one year ago, the patient feels her mental   health is the same.    Recent Hospitalizations:  She was not admitted to the hospital during the last year.       Current Medical Providers:  Patient Care Team:  José Miguel Anderson MD as PCP - General (Internal Medicine)  Nikki Zuniga MD as Surgeon (General Surgery)    Outpatient Medications Prior to Visit   Medication Sig Dispense Refill    acetaminophen (TYLENOL) 500 MG tablet Take 1 tablet by mouth Every 6 (Six) Hours As Needed for Mild Pain.      allopurinol (ZYLOPRIM) 100 MG tablet Take 1 tablet by mouth 2 (Two) Times a Day.  3    amiodarone (PACERONE) 100 MG tablet Take 2 tablets by mouth Daily.      anastrozole (ARIMIDEX) 1 MG tablet Take 1 tablet by mouth Daily. 30 tablet 11    atorvastatin (LIPITOR) 20 MG tablet Take 1 tablet by mouth Daily.      bumetanide (BUMEX) 0.5 MG tablet Take 1 tablet by mouth once daily 90 tablet 3    Calcium Carbonate-Vitamin D 600-10 MG-MCG per tablet Take 1 tablet by mouth 2 (Two) Times a Day. 60 tablet 11    carboxymethylcellulose (REFRESH PLUS) 0.5 % solution Administer 1 drop to both eyes 3 (Three) Times a Day As Needed for Dry  Eyes.      levothyroxine (SYNTHROID, LEVOTHROID) 50 MCG tablet Take 1 tablet by mouth Daily. 90 tablet 3    metoprolol succinate XL (TOPROL-XL) 25 MG 24 hr tablet Take 1 tablet by mouth 2 (Two) Times a Day.  1    polyethylene glycol (MIRALAX) 17 GM/SCOOP powder Take 17 g by mouth Daily.      valsartan (DIOVAN) 160 MG tablet Take 1 tablet by mouth Daily.      warfarin (COUMADIN) 5 MG tablet Take  by mouth Take As Directed. Take 1 whole tablet (5 mg) on Wednesday only. Take 1/2 tablet (2.5 mg) all other days.  5    albuterol sulfate  (90 Base) MCG/ACT inhaler Inhale 2 puffs Every 6 (Six) Hours As Needed for Wheezing. (Patient not taking: Reported on 3/25/2024) 18 g 0    calcium carb-cholecalciferol 600-10 MG-MCG tablet per tablet Take 1 tablet by mouth 2 (Two) Times a Day.      dronedarone (MULTAQ) 400 MG tablet Take 1 tablet by mouth 2 (Two) Times a Day With Meals. (Patient not taking: Reported on 3/25/2024)      simvastatin (ZOCOR) 40 MG tablet Take 1 tablet by mouth Daily. (Patient not taking: Reported on 3/25/2024) 90 tablet 3    Vitamin D, Cholecalciferol, 50 MCG (2000 UT) capsule Take  by mouth.       No facility-administered medications prior to visit.       No opioid medication identified on active medication list. I have reviewed chart for other potential  high risk medication/s and harmful drug interactions in the elderly.        Aspirin is not on active medication list.  Aspirin use is not indicated based on review of current medical condition/s. Risk of harm outweighs potential benefits.  .    Patient Active Problem List   Diagnosis    Hx of adenomatous colonic polyps    HTN (hypertension), benign    Family hx of colon cancer    Breast cancer, left    Chronic anticoagulation    Endometrial thickening on ultrasound    Hypothyroidism    Kidney disease    Mixed hyperlipidemia    Atrial fibrillation, controlled    PMB (postmenopausal bleeding)    Brain lesion    Non-tobacco user    Anemia in stage 4  "chronic kidney disease    Atrophic vaginitis    Cardiomegaly    Hematuria    Myxedema heart disease    Rheumatoid arthritis    Seborrheic keratosis    Thrombocytopenia    Vitamin D deficiency    Moderate mitral regurgitation by prior echocardiogram    Dizziness    Suspected cerebrovascular accident (CVA)    Lipoma of upper extremity    Stage 4 chronic kidney disease    Class 2 severe obesity due to excess calories with serious comorbidity and body mass index (BMI) of 36.0 to 36.9 in adult     Advance Care Planning  Advance Directive is not on file.  ACP discussion was held with the patient during this visit. Patient does not have an advance directive, information provided.       Objective    Vitals:    24 1027   BP: 140/90   BP Location: Left arm   Patient Position: Sitting   Cuff Size: Adult   Pulse: 80   Temp: 97.8 °F (36.6 °C)   TempSrc: Temporal   SpO2: 99%   Weight: 100 kg (221 lb)   Height: 165.1 cm (65\")   PainSc: 0-No pain     Estimated body mass index is 36.78 kg/m² as calculated from the following:    Height as of this encounter: 165.1 cm (65\").    Weight as of this encounter: 100 kg (221 lb).           Does the patient have evidence of cognitive impairment? No      Lab Results   Component Value Date    CHLPL 149 2024    TRIG 91 2024    HDL 64 (H) 2024    LDL 68 2024    VLDL 17 2024            HEALTH RISK ASSESSMENT    Smoking Status:  Social History     Tobacco Use   Smoking Status Never   Smokeless Tobacco Never     Alcohol Consumption:  Social History     Substance and Sexual Activity   Alcohol Use No     Fall Risk Screen:    GETACHEWADI Fall Risk Assessment has not been completed.    Depression Screenin/3/2024    10:55 AM   PHQ-2/PHQ-9 Depression Screening   Little Interest or Pleasure in Doing Things 0-->not at all   Feeling Down, Depressed or Hopeless 0-->not at all   Trouble Falling or Staying Asleep, or Sleeping Too Much 0-->not at all   Feeling Tired or " Having Little Energy 0-->not at all   Poor Appetite or Overeating 0-->not at all   Feeling Bad about Yourself - or that You are a Failure or Have Let Yourself or Your Family Down 0-->not at all   Trouble Concentrating on Things, Such as Reading the Newspaper or Watching Television 0-->not at all   Moving or Speaking So Slowly that Other People Could Have Noticed? Or the Opposite - Being So Fidgety 0-->not at all   Thoughts that You Would be Better Off Dead or of Hurting Yourself in Some Way 0-->not at all   PHQ-9: Brief Depression Severity Measure Score 0       Health Habits and Functional and Cognitive Screening:      3/22/2023    10:22 AM   Functional & Cognitive Status   Do you have difficulty preparing food and eating? No   Do you have difficulty bathing yourself, getting dressed or grooming yourself? No   Do you have difficulty using the toilet? No   Do you have difficulty moving around from place to place? No   Do you have trouble with steps or getting out of a bed or a chair? No   Current Diet Well Balanced Diet   Dental Exam Up to date   Eye Exam Up to date   Exercise (times per week) 0 times per week   Current Exercises Include No Regular Exercise   Do you need help using the phone?  No   Are you deaf or do you have serious difficulty hearing?  No   Do you need help to go to places out of walking distance? No   Do you need help shopping? No   Do you need help preparing meals?  No   Do you need help with housework?  No   Do you need help with laundry? No   Do you need help taking your medications? No   Do you need help managing money? No   Do you ever drive or ride in a car without wearing a seat belt? No   Have you felt unusual stress, anger or loneliness in the last month? No   Who do you live with? Spouse   If you need help, do you have trouble finding someone available to you? No   Have you been bothered in the last four weeks by sexual problems? No   Do you have difficulty concentrating, remembering or  making decisions? No       Age-appropriate Screening Schedule:  Refer to the list below for future screening recommendations based on patient's age, sex and/or medical conditions. Orders for these recommended tests are listed in the plan section. The patient has been provided with a written plan.    Health Maintenance   Topic Date Due    RSV Vaccine - Adults (1 - 1-dose 60+ series) Never done    COVID-19 Vaccine (6 - 2023-24 season) 09/01/2023    BMI FOLLOWUP  05/08/2024    COLORECTAL CANCER SCREENING  10/17/2024    DXA SCAN  12/07/2024    MAMMOGRAM  12/27/2024    LIPID PANEL  03/22/2025    ANNUAL WELLNESS VISIT  03/25/2025    TDAP/TD VACCINES (2 - Td or Tdap) 12/21/2030    INFLUENZA VACCINE  Completed    Pneumococcal Vaccine 65+  Completed    ZOSTER VACCINE  Completed              Assessment & Plan   CMS Preventative Services Quick Reference  Risk Factors Identified During Encounter  Immunizations Discussed/Encouraged: RSV (Respiratory Syncytial Virus)  Dental Screening Recommended  Vision Screening Recommended  The above risks/problems have been discussed with the patient.  Follow up actions/plans if indicated are seen below in the Assessment/Plan Section.  Pertinent information has been shared with the patient in the After Visit Summary.    Diagnoses and all orders for this visit:    1. Post-menopausal (Primary)  -     DEXA Bone Density Axial; Future    2. Atrial fibrillation, controlled    3. Mixed hyperlipidemia    4. HTN (hypertension), benign    5. Thrombocytopenia    6. Chronic anticoagulation    7. Class 2 severe obesity due to excess calories with serious comorbidity and body mass index (BMI) of 36.0 to 36.9 in adult    8. Vitamin D deficiency    9. Stage 4 chronic kidney disease    10. Non-recurrent acute suppurative otitis media of right ear without spontaneous rupture of tympanic membrane        Recommend at least annual dental and vision screening.  Recommend annual influenza vaccination  Recommend a  "varied diet and appropriate portion sizes.   CDC recommendations for physical activity:  At least 150 minutes a week (for example, 30 minutes a day, 5 days a week) of moderate-intensity activity such as brisk walking. Or can consider 75 minutes a week of vigorous-intensity activity such as hiking, jogging, or running.  At least 2 days a week of activities that strengthen muscles.  Plus activities to improve balance.    She has a cubii that she could do while seated but she does not do it a lot.  Recommend doing it more.         Result Review :           Follow Up:   Return in about 6 months (around 9/25/2024), or if symptoms worsen or fail to improve, for Recheck.     An After Visit Summary and PPPS were made available to the patient.                         CRISTIANO Anderson MD, FACP, [facility]      Electronically signed by José Miguel Anderson MD, 03/25/24, 10:59 AM CDT.    Additional E&M Note during same encounter follows:  Patient has multiple medical problems which are significant and separately identifiable that require additional work above and beyond the Medicare Wellness Visit.      Chief Complaint  Medicare Wellness-subsequent, other (Having ablation in April with Dr. Winters in [place] ), and Earache (Right ear /Saw Lizzy on 3/5 was given medication for 7 days/Then started another 10 day course on 3/18)    Subjective        HPI  Heidimady Ansari is also being seen today for right ear infection recently, afib  Patient here for the above problems.  See Assessment and Plan for further HPI components.        Objective   Vitals:    03/25/24 1027   BP: 140/90   BP Location: Left arm   Patient Position: Sitting   Cuff Size: Adult   Pulse: 80   Temp: 97.8 °F (36.6 °C)   TempSrc: Temporal   SpO2: 99%   Weight: 100 kg (221 lb)   Height: 165.1 cm (65\")   PainSc: 0-No pain     Physical Exam  Vitals and nursing note reviewed.   Constitutional:       Appearance: She is not ill-appearing.   HENT:      Right Ear: No middle ear " effusion. There is impacted cerumen.      Left Ear:  No middle ear effusion. There is no impacted cerumen.   Eyes:      General: No scleral icterus.     Conjunctiva/sclera: Conjunctivae normal.   Pulmonary:      Effort: Pulmonary effort is normal. No respiratory distress.   Skin:     General: Skin is warm.      Coloration: Skin is not pale.   Neurological:      General: No focal deficit present.      Mental Status: She is alert and oriented to person, place, and time.   Psychiatric:         Mood and Affect: Mood normal.         Behavior: Behavior normal.              The following data was reviewed by: José Miguel Anderson MD on 03/25/2024:  CMP          12/21/2023    13:17 12/27/2023    08:57 3/22/2024    09:05   CMP   Glucose 114  97  84    BUN 21  20  22    Creatinine 1.56  1.51  1.33    EGFR  33.9     Sodium 142  142  143    Potassium 3.9  3.9  4.1    Chloride 104  103  107    Calcium 9.2  9.4  9.1    Total Protein   6.1    Total Protein  7.3     Albumin  4.2  4.2    Globulin   1.9    Globulin  3.1     Total Bilirubin  0.4  0.8    Alkaline Phosphatase  101  102    AST (SGOT)  19  22    ALT (SGPT)  10  17    Albumin/Globulin Ratio  1.4     BUN/Creatinine Ratio 13.5  13.2  16.5    Anion Gap  13.0       CBC w/diff          1/30/2024    08:57 2/13/2024    07:13 3/22/2024    09:05   CBC w/Diff   WBC 6.0     5.7     6.85    RBC 4.38     4.40     4.65    Hemoglobin 13.2     13.4     14.0    Hematocrit 41.2     41.4     44.1    MCV 94.1     94.1     94.8    MCH 30.1     30.5     30.1    MCHC 32.0     32.4     31.7    RDW 14.7     14.9     14.6    Platelets 107     102     73    Neutrophil Rel %   60.5    Lymphocyte Rel %   29.8    Monocyte Rel %   7.0    Eosinophil Rel %   2.0    Basophil Rel %   0.6       Details          This result is from an external source.             Lipid Panel          3/22/2024    09:05   Lipid Panel   Total Cholesterol 149    Triglycerides 91    HDL Cholesterol 64    VLDL Cholesterol 17    LDL  Cholesterol  68      TSH          3/22/2024    09:05   TSH   TSH 4.420          UA          12/21/2023    14:13 3/22/2024    09:05   Urinalysis   Ketones, UA Negative     Blood, UA  Negative    Leukocytes, UA Large (3+)  Negative    Nitrite, UA  Negative    RBC, UA  0-2    Bacteria, UA  Comment        Ear Cerumen Removal    Date/Time: 3/25/2024 12:52 PM    Performed by: José Miguel Anderson MD  Authorized by: José Miguel Anderson MD  Location details: right ear  Patient tolerance: patient tolerated the procedure well with no immediate complications  Comments: Improved after procedure.  TM Normal.  Procedure type: instrumentation, irrigation, curette   Sedation:  Patient sedated: no                Assessment and Plan   Diagnoses and all orders for this visit:    1. Post-menopausal (Primary)  -     DEXA Bone Density Axial; Future    2. Atrial fibrillation, controlled    3. Mixed hyperlipidemia    4. HTN (hypertension), benign    5. Thrombocytopenia    6. Chronic anticoagulation    7. Class 2 severe obesity due to excess calories with serious comorbidity and body mass index (BMI) of 36.0 to 36.9 in adult    8. Vitamin D deficiency    9. Stage 4 chronic kidney disease    10. Non-recurrent acute suppurative otitis media of right ear without spontaneous rupture of tympanic membrane      Recent middle ear effusion and infection.  She had a total of 17 days of antibiotics.  Patient has impacted cerumen in the right ear today.  This was removed with good results.  The patient did not have an effusion when I looked.  Patient has fullness.  Recommend OTC nasal sprays and antihistamine.  Finish up antibiotics.  If not better may have to trial prescription grade nasal sprays.    Patient has continued afib.  Patient follows with [facility] cardiology.  Patient is going to have ablation with Dr. Winters.  Patient reports that she has trialed two cardioversion with Dr. Salas without success.      Patient has chronic thrombocytopenia.  Has  platelet clumping, monitor.  Patient has stage 4 CKD.  Patient Cr a little better this time.    Patient INR was a little thick per her last check.  She follows with coumadin clinic at [facility].           Follow Up   Return in about 6 months (around 9/25/2024), or if symptoms worsen or fail to improve, for Recheck.  Patient was given instructions and counseling regarding her condition or for health maintenance advice. Please see specific information pulled into the AVS if appropriate.

## 2024-04-01 ENCOUNTER — ANTI-COAG VISIT (OUTPATIENT)
Dept: CARDIOLOGY CLINIC | Age: 84
End: 2024-04-01
Payer: MEDICARE

## 2024-04-01 DIAGNOSIS — I48.0 PAF (PAROXYSMAL ATRIAL FIBRILLATION) (HCC): Primary | ICD-10-CM

## 2024-04-01 DIAGNOSIS — Z79.01 LONG TERM (CURRENT) USE OF ANTICOAGULANTS: ICD-10-CM

## 2024-04-01 LAB
INTERNATIONAL NORMALIZATION RATIO, POC: 1.8
PROTHROMBIN TIME, POC: 19.6

## 2024-04-01 PROCEDURE — 93793 ANTICOAG MGMT PT WARFARIN: CPT | Performed by: NURSE PRACTITIONER

## 2024-04-09 ENCOUNTER — ANTI-COAG VISIT (OUTPATIENT)
Dept: CARDIOLOGY CLINIC | Age: 84
End: 2024-04-09
Payer: MEDICARE

## 2024-04-09 DIAGNOSIS — Z79.01 LONG TERM (CURRENT) USE OF ANTICOAGULANTS: ICD-10-CM

## 2024-04-09 DIAGNOSIS — I48.0 PAF (PAROXYSMAL ATRIAL FIBRILLATION) (HCC): Primary | ICD-10-CM

## 2024-04-09 LAB
INTERNATIONAL NORMALIZATION RATIO, POC: 2.9
PROTHROMBIN TIME, POC: 29.6

## 2024-04-09 PROCEDURE — 93793 ANTICOAG MGMT PT WARFARIN: CPT | Performed by: NURSE PRACTITIONER

## 2024-04-15 ENCOUNTER — ANTI-COAG VISIT (OUTPATIENT)
Dept: CARDIOLOGY CLINIC | Age: 84
End: 2024-04-15
Payer: MEDICARE

## 2024-04-15 DIAGNOSIS — I48.0 PAF (PAROXYSMAL ATRIAL FIBRILLATION) (HCC): Primary | ICD-10-CM

## 2024-04-15 LAB
INTERNATIONAL NORMALIZATION RATIO, POC: 2
PROTHROMBIN TIME, POC: 21.2

## 2024-04-15 PROCEDURE — 93793 ANTICOAG MGMT PT WARFARIN: CPT | Performed by: NURSE PRACTITIONER

## 2024-04-22 NOTE — ANESTHESIA POSTPROCEDURE EVALUATION
"Patient: Heidi Ansari    Procedure Summary     Date:  10/17/19 Room / Location:  [facility] ENDOSCOPY 6 /  [facility]    Anesthesia Start:  1043 Anesthesia Stop:  1103    Procedure:  COLONOSCOPY WITH ANESTHESIA (N/A ) Diagnosis:       Hx of adenomatous colonic polyps      (Hx of adenomatous colonic polyps [Z86.010])    Surgeon:  Rigoberto Shaw MD Provider:  Zhen Reid CRNA    Anesthesia Type:  MAC ASA Status:  3          Anesthesia Type: MAC  Last vitals  BP   (!) 181/70 (10/17/19 0937)   Temp   97.2 °F (36.2 °C) (10/17/19 0937)   Pulse   70 (10/17/19 0937)   Resp   18 (10/17/19 0937)     SpO2   95 % (10/17/19 0937)     Post Anesthesia Care and Evaluation    Patient location during evaluation: PHASE II  Patient participation: complete - patient participated  Level of consciousness: awake and alert  Pain score: 0  Pain management: adequate  Airway patency: patent  Anesthetic complications: No anesthetic complications  PONV Status: none  Cardiovascular status: acceptable  Respiratory status: acceptable  Hydration status: acceptable    Comments: Blood pressure (!) 181/70, pulse 70, temperature 97.2 °F (36.2 °C), temperature source Temporal, resp. rate 18, height 167.6 cm (66\"), weight 95.3 kg (210 lb), SpO2 95 %, not currently breastfeeding.    Pt discharged from PACU based on marcello score >8      "
4 = No assist / stand by assistance

## 2024-05-06 ENCOUNTER — HOSPITAL ENCOUNTER (OUTPATIENT)
Age: 84
Discharge: HOME | End: 2024-05-08
Attending: INTERNAL MEDICINE
Payer: MEDICARE

## 2024-05-06 VITALS
WEIGHT: 218 LBS | HEIGHT: 65 IN | DIASTOLIC BLOOD PRESSURE: 69 MMHG | SYSTOLIC BLOOD PRESSURE: 154 MMHG | BODY MASS INDEX: 36.32 KG/M2

## 2024-05-06 DIAGNOSIS — I34.0 MODERATE MITRAL REGURGITATION BY PRIOR ECHOCARDIOGRAM: ICD-10-CM

## 2024-05-06 LAB
ECHO AO ASC DIAM: 2.7 CM
ECHO AO ASCENDING AORTA INDEX: 1.32 CM/M2
ECHO AO ROOT DIAM: 2.4 CM
ECHO AO ROOT INDEX: 1.17 CM/M2
ECHO AO SINUS VALSALVA DIAM: 2.7 CM
ECHO AO SINUS VALSALVA INDEX: 1.32 CM/M2
ECHO AO ST JNCT DIAM: 1.9 CM
ECHO AV MEAN GRADIENT: 14 MMHG
ECHO AV MEAN VELOCITY: 1.7 M/S
ECHO AV PEAK GRADIENT: 22 MMHG
ECHO AV PEAK VELOCITY: 2.4 M/S
ECHO AV VTI: 54.3 CM
ECHO BSA: 2.13 M2
ECHO IVC PROX: 1.7 CM
ECHO LA AREA 2C: 27.1 CM2
ECHO LA AREA 4C: 24.4 CM2
ECHO LA DIAMETER INDEX: 2 CM/M2
ECHO LA DIAMETER: 4.1 CM
ECHO LA MAJOR AXIS: 6.4 CM
ECHO LA MINOR AXIS: 6.8 CM
ECHO LA TO AORTIC ROOT RATIO: 1.71
ECHO LA VOL BP: 83 ML (ref 22–52)
ECHO LA VOL MOD A2C: 87 ML (ref 22–52)
ECHO LA VOL MOD A4C: 75 ML (ref 22–52)
ECHO LA VOL/BSA BIPLANE: 40 ML/M2 (ref 16–34)
ECHO LA VOLUME INDEX MOD A2C: 42 ML/M2 (ref 16–34)
ECHO LA VOLUME INDEX MOD A4C: 37 ML/M2 (ref 16–34)
ECHO LV E' LATERAL VELOCITY: 12 CM/S
ECHO LV E' SEPTAL VELOCITY: 9 CM/S
ECHO LV EDV A2C: 60 ML
ECHO LV EDV A4C: 83 ML
ECHO LV EDV INDEX A4C: 40 ML/M2
ECHO LV EDV NDEX A2C: 29 ML/M2
ECHO LV EJECTION FRACTION A2C: 55 %
ECHO LV EJECTION FRACTION A4C: 51 %
ECHO LV EJECTION FRACTION BIPLANE: 52 % (ref 55–100)
ECHO LV ESV A2C: 27 ML
ECHO LV ESV A4C: 41 ML
ECHO LV ESV INDEX A2C: 13 ML/M2
ECHO LV ESV INDEX A4C: 20 ML/M2
ECHO LV FRACTIONAL SHORTENING: 32 % (ref 28–44)
ECHO LV INTERNAL DIMENSION DIASTOLE INDEX: 2.29 CM/M2
ECHO LV INTERNAL DIMENSION DIASTOLIC: 4.7 CM (ref 3.9–5.3)
ECHO LV INTERNAL DIMENSION SYSTOLIC INDEX: 1.56 CM/M2
ECHO LV INTERNAL DIMENSION SYSTOLIC: 3.2 CM
ECHO LV IVSD: 1.4 CM (ref 0.6–0.9)
ECHO LV MASS 2D: 265.2 G (ref 67–162)
ECHO LV MASS INDEX 2D: 129.4 G/M2 (ref 43–95)
ECHO LV POSTERIOR WALL DIASTOLIC: 1.4 CM (ref 0.6–0.9)
ECHO LV RELATIVE WALL THICKNESS RATIO: 0.6
ECHO LVOT AREA: 3.1 CM2
ECHO LVOT DIAM: 2 CM
ECHO MV A VELOCITY: 0.43 M/S
ECHO MV ANNULUS DIAMETER: 3.2 CM
ECHO MV E DECELERATION TIME (DT): 187 MS
ECHO MV E VELOCITY: 1.01 M/S
ECHO MV E/A RATIO: 2.35
ECHO MV E/E' LATERAL: 8.42
ECHO MV E/E' RATIO (AVERAGED): 9.82
ECHO MV MAX VELOCITY: 1.1 M/S
ECHO MV MEAN GRADIENT: 2 MMHG
ECHO MV MEAN VELOCITY: 0.5 M/S
ECHO MV PEAK GRADIENT: 5 MMHG
ECHO MV REGURGITANT ALIASING (NYQUIST) VELOCITY: 28 CM/S
ECHO MV REGURGITANT PEAK GRADIENT: 130 MMHG
ECHO MV REGURGITANT PEAK VELOCITY: 5.7 M/S
ECHO MV REGURGITANT RADIUS PISA: 0.5 CM
ECHO MV REGURGITANT VTIA: 191 CM
ECHO MV VTI: 18.3 CM
ECHO PULMONARY ARTERY SYSTOLIC PRESSURE (PASP): 40 MMHG
ECHO RA AREA 4C: 13.7 CM2
ECHO RA END SYSTOLIC VOLUME APICAL 4 CHAMBER INDEX BSA: 14 ML/M2
ECHO RA VOLUME: 28 ML
ECHO RV BASAL DIMENSION: 3.1 CM
ECHO RV INTERNAL DIMENSION: 2.6 CM
ECHO RV LONGITUDINAL DIMENSION: 4 CM
ECHO RV MID DIMENSION: 3.2 CM
ECHO RV TAPSE: 2.2 CM (ref 1.7–?)

## 2024-05-06 PROCEDURE — 93306 TTE W/DOPPLER COMPLETE: CPT

## 2024-05-06 PROCEDURE — 93306 TTE W/DOPPLER COMPLETE: CPT | Performed by: INTERNAL MEDICINE

## 2024-05-07 ENCOUNTER — TELEPHONE (OUTPATIENT)
Dept: CARDIOLOGY CLINIC | Age: 84
End: 2024-05-07

## 2024-05-07 NOTE — TELEPHONE ENCOUNTER
----- Message from Marcial Schafer MD sent at 5/6/2024  8:51 PM CDT -----  Please let patient know that I have reviewed her echocardiogram.  She does have leakage in the mitral valve which will will need to be monitored.  Overall heart function appears preserved.

## 2024-05-13 ENCOUNTER — ANTI-COAG VISIT (OUTPATIENT)
Dept: CARDIOLOGY CLINIC | Age: 84
End: 2024-05-13
Payer: MEDICARE

## 2024-05-13 DIAGNOSIS — I48.0 PAF (PAROXYSMAL ATRIAL FIBRILLATION) (HCC): Primary | ICD-10-CM

## 2024-05-13 LAB
INTERNATIONAL NORMALIZATION RATIO, POC: 1.5
PROTHROMBIN TIME, POC: 16.6

## 2024-05-13 PROCEDURE — 85610 PROTHROMBIN TIME: CPT | Performed by: CLINICAL NURSE SPECIALIST

## 2024-05-28 RX ORDER — ATORVASTATIN CALCIUM 20 MG/1
20 TABLET, FILM COATED ORAL DAILY
Qty: 30 TABLET | Refills: 0 | OUTPATIENT
Start: 2024-05-28

## 2024-05-29 ENCOUNTER — OFFICE VISIT (OUTPATIENT)
Dept: CARDIOLOGY CLINIC | Age: 84
End: 2024-05-29
Payer: MEDICARE

## 2024-05-29 ENCOUNTER — TELEPHONE (OUTPATIENT)
Dept: CARDIOLOGY CLINIC | Age: 84
End: 2024-05-29

## 2024-05-29 VITALS
HEIGHT: 65 IN | SYSTOLIC BLOOD PRESSURE: 154 MMHG | HEART RATE: 81 BPM | BODY MASS INDEX: 36.49 KG/M2 | WEIGHT: 219 LBS | DIASTOLIC BLOOD PRESSURE: 80 MMHG

## 2024-05-29 DIAGNOSIS — I48.0 PAF (PAROXYSMAL ATRIAL FIBRILLATION) (HCC): Primary | ICD-10-CM

## 2024-05-29 PROCEDURE — 93000 ELECTROCARDIOGRAM COMPLETE: CPT | Performed by: INTERNAL MEDICINE

## 2024-05-29 PROCEDURE — 1123F ACP DISCUSS/DSCN MKR DOCD: CPT | Performed by: INTERNAL MEDICINE

## 2024-05-29 PROCEDURE — 3078F DIAST BP <80 MM HG: CPT | Performed by: INTERNAL MEDICINE

## 2024-05-29 PROCEDURE — 3077F SYST BP >= 140 MM HG: CPT | Performed by: INTERNAL MEDICINE

## 2024-05-29 PROCEDURE — 99214 OFFICE O/P EST MOD 30 MIN: CPT | Performed by: INTERNAL MEDICINE

## 2024-05-29 RX ORDER — VALSARTAN 160 MG/1
160 TABLET ORAL 2 TIMES DAILY
Qty: 180 TABLET | Refills: 3 | Status: SHIPPED | COMMUNITY
Start: 2024-05-29

## 2024-05-29 ASSESSMENT — ENCOUNTER SYMPTOMS
COUGH: 0
SHORTNESS OF BREATH: 1
EYE DISCHARGE: 0
ABDOMINAL DISTENTION: 0
CONSTIPATION: 0
WHEEZING: 0
VOMITING: 0
BLOOD IN STOOL: 0
DIARRHEA: 0
BACK PAIN: 0

## 2024-05-29 NOTE — PROGRESS NOTES
[facility]  Cardiology Office Note  [address] [facility], [place] [address]  Phone: [phone]  Fax: [phone]                            Date:  5/29/2024  Patient: Aniya Huff  Age:  83 y.o., 1940    Referral: Dale Mckenna MD      PROBLEM LIST:    Patient Active Problem List    Diagnosis Date Noted    Moderate mitral regurgitation by prior echocardiogram 12/03/2020     Priority: Low    Acute frontal sinusitis 05/16/2017     Priority: Low    Chronic anticoagulation 05/16/2017     Priority: Low     Overview Note:     Coumadin managed by Dr. Santana Sparks      Endometrial thickening on ultrasound 02/16/2017     Priority: Low     Overview Note:     Replacing Deprecated Diagnoses      PMB (postmenopausal bleeding) 02/16/2017     Priority: Low    PAF (paroxysmal atrial fibrillation) (HCC) 12/14/2015     Priority: Low    Hypothyroidism      Priority: Low    Mixed hyperlipidemia      Priority: Low    Hypertension      Priority: Low     1.  Paroxysmal atrial fibrillation on Amiodarone and Coumadin (unsuccessful DCCV on Multaq 1/30/2024, initiated on amiodarone), repeat DCCV 2/13/2024, A-fib ablation 4/22/2024 with recurrent atrial fibrillation, normal LV ejection fraction.  2.  CKD stage IV, baseline creatinine 2.7.  3.  Left breast cancer 1/2020 status post mastectomy, on Arimidex  4.  New mild to moderate aortic stenosis by echo 5/19/2023 (mean gradient 18 mmHg).    PRESENTATION: Aniya Huff is a 83 y.o. year old female presents for follow-up evaluation.  She underwent DCCV 2/13/2024.  Following this she had A-fib ablation 4/22/2024.  She has reverted back to atrial fibrillation since then.  Does not report significant issues other than shortness of breath which is stable.  No leg swelling.  No chest pain.  Heart rates are stable.  She does want to come off amiodarone due to gait imbalance as well as memory issues which she is concerned about.    REVIEW OF

## 2024-05-29 NOTE — TELEPHONE ENCOUNTER
Spoke with Denia Huff in office, have DCCV with Anesthesia scheduled for 6/4/24 with arrival of 6:30 am.  Advised to be NPO after midnight, may take all morning medications with a sip of water.  Advised to check in at [facility] registration, come into the main entrance of the hospital, make immediate left, check in at end of hallway.  Advised patient will need someone to drive them home.     Cardioversion     Definition   Cardioversion is the delivery of an electric shock to the chest through electrodes or paddles. The shock is given to correct a dangerous heart rhythm or hearbeat.   Cardioversion can be done as an elective (scheduled) procedure or may be done urgently if an abnormal heartbeat is immediately life-threatening.     External Cardioversion        2011 "[employer].   Reasons for Procedure   If the heart is not beating regularly, it may prevent the normal circulation of blood through the body. This may deprive various organs, including the brain and heart, of oxygen. Without oxygen, the organs cannot properly function and will eventually die.   Nonemergency cardioversion may be used to treat the following conditions:   Atrial fibrillation very rapid, irregular twitching in the atrium, when the ventricular heart rate is not too fast   Atrial flutter rapid but regular contractions in the atrium, when the ventricular heart rate is not too fast   Emergency cardioversion may be used to treat the following types of irregular heartbeats, which can lead to death if they are not immediately converted to a more normal rhythm:   Atrial tachycardia rapid beating of the heart, originating in the atrium with rapid ventricular heart rate   Ventricular tachycardiarapid beating of the heart, originating in the ventricle   Ventricular fibrillation rapid movement of the ventricular muscle without effective pumping   Possible Complications   If you are planning to have cardioversion, your doctor will review a

## 2024-05-30 RX ORDER — ATORVASTATIN CALCIUM 20 MG/1
20 TABLET, FILM COATED ORAL DAILY
Qty: 90 TABLET | Refills: 3 | Status: SHIPPED | OUTPATIENT
Start: 2024-05-30

## 2024-06-04 ENCOUNTER — HOSPITAL ENCOUNTER (OUTPATIENT)
Age: 84
Discharge: HOME | End: 2024-06-06
Attending: INTERNAL MEDICINE
Payer: MEDICARE

## 2024-06-04 ENCOUNTER — ANESTHESIA EVENT (OUTPATIENT)
Age: 84
End: 2024-06-04
Payer: MEDICARE

## 2024-06-04 ENCOUNTER — ANESTHESIA (OUTPATIENT)
Age: 84
End: 2024-06-04
Payer: MEDICARE

## 2024-06-04 VITALS
RESPIRATION RATE: 15 BRPM | DIASTOLIC BLOOD PRESSURE: 71 MMHG | SYSTOLIC BLOOD PRESSURE: 143 MMHG | BODY MASS INDEX: 36.32 KG/M2 | TEMPERATURE: 97.5 F | WEIGHT: 218 LBS | HEART RATE: 64 BPM | HEIGHT: 65 IN | OXYGEN SATURATION: 96 %

## 2024-06-04 DIAGNOSIS — I48.0 PAROXYSMAL ATRIAL FIBRILLATION (HCC): ICD-10-CM

## 2024-06-04 LAB
ALBUMIN SERPL-MCNC: 4.3 G/DL (ref 3.5–5.2)
ALP SERPL-CCNC: 118 U/L (ref 35–104)
ALT SERPL-CCNC: 11 U/L (ref 5–33)
ANION GAP SERPL CALCULATED.3IONS-SCNC: 12 MMOL/L (ref 7–19)
AST SERPL-CCNC: 20 U/L (ref 5–32)
BILIRUB SERPL-MCNC: 0.8 MG/DL (ref 0.2–1.2)
BUN SERPL-MCNC: 21 MG/DL (ref 8–23)
CALCIUM SERPL-MCNC: 9.7 MG/DL (ref 8.8–10.2)
CHLORIDE SERPL-SCNC: 102 MMOL/L (ref 98–111)
CO2 SERPL-SCNC: 30 MMOL/L (ref 22–29)
CREAT SERPL-MCNC: 1.2 MG/DL (ref 0.5–0.9)
EKG P AXIS: 58 DEGREES
EKG P AXIS: NORMAL DEGREES
EKG P-R INTERVAL: 194 MS
EKG P-R INTERVAL: NORMAL MS
EKG Q-T INTERVAL: 426 MS
EKG Q-T INTERVAL: 460 MS
EKG QRS DURATION: 96 MS
EKG QRS DURATION: 98 MS
EKG QTC CALCULATION (BAZETT): 457 MS
EKG QTC CALCULATION (BAZETT): 463 MS
EKG T AXIS: -2 DEGREES
EKG T AXIS: -4 DEGREES
ERYTHROCYTE [DISTWIDTH] IN BLOOD BY AUTOMATED COUNT: 14.4 % (ref 11.5–14.5)
GLUCOSE SERPL-MCNC: 91 MG/DL (ref 74–109)
HCT VFR BLD AUTO: 46.7 % (ref 37–47)
HGB BLD-MCNC: 14.6 G/DL (ref 12–16)
MCH RBC QN AUTO: 31 PG (ref 27–31)
MCHC RBC AUTO-ENTMCNC: 31.3 G/DL (ref 33–37)
MCV RBC AUTO: 99.2 FL (ref 81–99)
PLATELET # BLD AUTO: 156 K/UL (ref 130–400)
PMV BLD AUTO: 11.7 FL (ref 9.4–12.3)
POTASSIUM SERPL-SCNC: 3.6 MMOL/L (ref 3.5–5)
PROT SERPL-MCNC: 7.2 G/DL (ref 6.6–8.7)
RBC # BLD AUTO: 4.71 M/UL (ref 4.2–5.4)
SODIUM SERPL-SCNC: 144 MMOL/L (ref 136–145)
WBC # BLD AUTO: 6.9 K/UL (ref 4.8–10.8)

## 2024-06-04 PROCEDURE — 2500000003 HC RX 250 WO HCPCS: Performed by: NURSE ANESTHETIST, CERTIFIED REGISTERED

## 2024-06-04 PROCEDURE — 85027 COMPLETE CBC AUTOMATED: CPT

## 2024-06-04 PROCEDURE — 36415 COLL VENOUS BLD VENIPUNCTURE: CPT

## 2024-06-04 PROCEDURE — 6360000002 HC RX W HCPCS: Performed by: NURSE ANESTHETIST, CERTIFIED REGISTERED

## 2024-06-04 PROCEDURE — 2580000003 HC RX 258: Performed by: INTERNAL MEDICINE

## 2024-06-04 PROCEDURE — 92960 CARDIOVERSION ELECTRIC EXT: CPT | Performed by: INTERNAL MEDICINE

## 2024-06-04 PROCEDURE — 80053 COMPREHEN METABOLIC PANEL: CPT

## 2024-06-04 RX ORDER — SODIUM CHLORIDE 0.9 % (FLUSH) 0.9 %
5-40 SYRINGE (ML) INJECTION EVERY 12 HOURS SCHEDULED
Status: DISCONTINUED | OUTPATIENT
Start: 2024-06-04 | End: 2024-06-08 | Stop reason: HOSPADM

## 2024-06-04 RX ORDER — SODIUM CHLORIDE 0.9 % (FLUSH) 0.9 %
5-40 SYRINGE (ML) INJECTION PRN
Status: DISCONTINUED | OUTPATIENT
Start: 2024-06-04 | End: 2024-06-08 | Stop reason: HOSPADM

## 2024-06-04 RX ORDER — AMIODARONE HYDROCHLORIDE 100 MG/1
100 TABLET ORAL 2 TIMES DAILY
Qty: 60 TABLET | Refills: 3 | Status: SHIPPED | OUTPATIENT
Start: 2024-06-04

## 2024-06-04 RX ORDER — SODIUM CHLORIDE 9 MG/ML
INJECTION, SOLUTION INTRAVENOUS PRN
Status: DISCONTINUED | OUTPATIENT
Start: 2024-06-04 | End: 2024-06-08 | Stop reason: HOSPADM

## 2024-06-04 RX ORDER — SODIUM CHLORIDE 9 MG/ML
INJECTION, SOLUTION INTRAVENOUS CONTINUOUS
Status: DISCONTINUED | OUTPATIENT
Start: 2024-06-04 | End: 2024-06-08 | Stop reason: HOSPADM

## 2024-06-04 RX ORDER — LIDOCAINE HYDROCHLORIDE 10 MG/ML
INJECTION, SOLUTION EPIDURAL; INFILTRATION; INTRACAUDAL; PERINEURAL PRN
Status: DISCONTINUED | OUTPATIENT
Start: 2024-06-04 | End: 2024-06-04 | Stop reason: SDUPTHER

## 2024-06-04 RX ORDER — PROPOFOL 10 MG/ML
INJECTION, EMULSION INTRAVENOUS PRN
Status: DISCONTINUED | OUTPATIENT
Start: 2024-06-04 | End: 2024-06-04 | Stop reason: SDUPTHER

## 2024-06-04 RX ADMIN — SODIUM CHLORIDE: 9 INJECTION, SOLUTION INTRAVENOUS at 08:24

## 2024-06-04 RX ADMIN — PROPOFOL 50 MG: 10 INJECTION, EMULSION INTRAVENOUS at 08:49

## 2024-06-04 RX ADMIN — LIDOCAINE HYDROCHLORIDE 50 MG: 10 INJECTION, SOLUTION EPIDURAL; INFILTRATION; INTRACAUDAL; PERINEURAL at 08:46

## 2024-06-04 NOTE — PROGRESS NOTES
Returned to room post cardioversion.  Awake and alert.  NSR on monitor.  Denies any c/o pain.   at bedside.   60

## 2024-06-04 NOTE — H&P
Electronically signed by Marcial Schafer MD on 6/3/2024 at 10:07 PM        Thisdictation was generated by voice recognition computer software.  Although all attempts are made to edit the dictation for accuracy, there may be errors in the transcription that are not intended.

## 2024-06-04 NOTE — PROGRESS NOTES
Discharged home with  in stable condition.  Transferred via wheelchair to front entrance.  Denies any c/o pain.

## 2024-06-04 NOTE — PROGRESS NOTES
Patient and  instructed on car post cardioversion.  Given written instructions.  Both stated understanding.

## 2024-06-04 NOTE — ANESTHESIA PRE PROCEDURE
Department of Anesthesiology  Preprocedure Note       Name:  Aniya Huff   Age:  83 y.o.  :  1940                                          MRN:  [id number]         Date:  2024      Surgeon: * No surgeons listed *    Procedure: * No procedures listed *    Medications prior to admission:   Prior to Admission medications    Medication Sig Start Date End Date Taking? Authorizing Provider   atorvastatin (LIPITOR) 20 MG tablet Take 1 tablet by mouth daily 24   Judy Blackwell APRN - NP   apixaban (ELIQUIS) 5 MG TABS tablet Take by mouth 2 times daily    Ian Woodward MD   valsartan (DIOVAN) 160 MG tablet Take 1 tablet by mouth 2 times daily 24   Marcial Schafer MD   calcium carb-cholecalciferol 600-10 MG-MCG TABS per tab Take 1 tablet by mouth 2 times daily 24   Ian Woodward MD   amiodarone (PACERONE) 100 MG tablet Take 2 tablets by mouth daily  Patient taking differently: Take 1 tablet by mouth in the morning and at bedtime 24   Marcial Schafer MD   metoprolol succinate (TOPROL XL) 25 MG extended release tablet Take 1 tablet by mouth twice daily 23   Britta Chase APRN   anastrozole (ARIMIDEX) 1 MG tablet Take 1 tablet by mouth once daily 20   Ian Woodward MD   Acetaminophen 325 MG CAPS Take 650 mg by mouth as needed    Ian Woodward MD   Carboxymethylcellulose Sodium (REFRESH TEARS OP) Apply 1 drop to eye as needed    Ian Woodward MD   allopurinol (ZYLOPRIM) 100 MG tablet Take 1 tablet by mouth 2 times daily 10/6/17   Ian Woodward MD   levothyroxine (SYNTHROID) 50 MCG tablet Take 1 tablet by mouth Daily Indications: HYPOTHROIDISM    Ian Woodward MD   polyethylene glycol (GLYCOLAX) 17 GM/SCOOP powder Take 17 g by mouth daily Indications: REGULARITY    Ian Woodward MD   bumetanide (BUMEX) 1 MG tablet Take 0.5 tablets by mouth daily Indications: FLUID RETENTION    Ian Woodward MD       Current medications:

## 2024-06-04 NOTE — ANESTHESIA POSTPROCEDURE EVALUATION
Department of Anesthesiology  Postprocedure Note    Patient: Aniya Huff  MRN: [id number]  YOB: 1940  Date of evaluation: 6/4/2024    Procedure Summary       Date: 06/04/24 Room / Location: [facility]    Anesthesia Start: 0844 Anesthesia Stop: 0859    Procedure: CARDIOVERSION EXTERNAL Diagnosis: Paroxysmal atrial fibrillation (HCC)    Scheduled Providers: Marcial Schafer MD Responsible Provider: Celeste Zambrano APRN - CRNA    Anesthesia Type: General, TIVA ASA Status: 3            Anesthesia Type: General, TIVA    Chris Phase I:      Chris Phase II:      Anesthesia Post Evaluation    Patient location during evaluation: bedside  Patient participation: complete - patient participated  Level of consciousness: awake and alert  Pain score: 0  Airway patency: patent  Nausea & Vomiting: no nausea and no vomiting  Cardiovascular status: blood pressure returned to baseline  Respiratory status: acceptable, spontaneous ventilation, oral airway and nonlabored ventilation  Hydration status: euvolemic  Comments: BP (!) 168/88   Pulse 73   Temp 97.5 °F (36.4 °C) (Temporal)   Resp 23   Ht 1.651 m (5' 5\")   Wt 98.9 kg (218 lb)   SpO2 96%   Breastfeeding No   BMI 36.28 kg/m²     Pain management: adequate    No notable events documented.

## 2024-06-04 NOTE — PROCEDURES
PROCEDURE NOTE  Date: 6/4/2024   Name: Aniya Huff  YOB: 1940    Procedures    Date of Procedure:  6/4/2024     Indications:  Atrial fibrillation with an appropriate duration of anticoagulation     Conscious Sedation Protocol Used During this Procedure -anesthesia provided by anesthesia service        Anesthesia: Moderate   Sedation: 0 mg Midazolam (Versed)  0 mcg Fentanyl   Start time: 8:55 AM   Stop time: 9 AM   ASA Class: 3   EBL Not applicable     A trained medical personnel administered medications at my direction.  The patient was monitored continuously with ECG, pulse oximetry, blood pressure monitoring and direct observation.       After obtaining informed written consent and an appropriate level of conscious sedation, DCCV with 360 J utilizing paddles x 1 was successful in restoring sinus rhythm.      Complications:  none      Impression:  Successful DC cardioversion of atrial fibrillation to normal sinus rhythm on Eliquis used for anticoagulation.  Continue amiodarone and follow-up with Dr. Delatorre with decision regarding continuance of amiodarone going forward.    Electronically signed by Marcial Schafer MD on 6/4/24

## 2024-06-12 DIAGNOSIS — I48.0 PAF (PAROXYSMAL ATRIAL FIBRILLATION) (HCC): ICD-10-CM

## 2024-06-13 RX ORDER — METOPROLOL SUCCINATE 25 MG/1
TABLET, EXTENDED RELEASE ORAL
Qty: 180 TABLET | Refills: 1 | Status: SHIPPED | OUTPATIENT
Start: 2024-06-13

## 2024-06-24 NOTE — PROGRESS NOTES
[facility] ONC [facility] GROUP HEMATOLOGY AND ONCOLOGY  [address]  [facility] [address] [phone]    Patient Name: Heidi Ansari  Encounter Date: 07/03/2024  YOB: 1940  Patient Number: [id number]     REASON FOR VISIT: Heidi Ansari is an 83-year-old female who returns in follow-up of stage IIA invasive ductal/lobular breast carcinoma.  She has been on adjuvant Arimidex since 01/22/2020 (53.5 months).  She is here alone with her spouse Montana (previously with her daughter Fátima).    I have reviewed the HPI and verified with the patient the accuracy of it. No changes to interval history since the information was documented. Ender Franco MD 07/03/24      DIAGNOSTIC ABNORMALITIES:           1.   11/27/2019- palpable left breast lump x3 weeks.  Diagnostic mammogram and left breast ultrasound.  Impression: Spiculated mass approximately 2 cm upper outer quadrant of the left breast.  Oval well-circumscribed mass appears new in the lower slightly lateral left breast measuring approximately 10 mm.  Targeted left breast ultrasound at the 2 o'clock position 5 cm from nipple an irregular spiculated hypoechoic mass is seen measuring 1.7 x 1.5 x 1.6 cm.  Smaller adjacent hypoechoic irregular mass seen measuring 0.3 cm.  Third irregular hypoechoic mass at the 3 o'clock position measures 2.2 x 0.9 x 1.2 cm in size.  Ultrasound-guided biopsy recommended.  BI-RADS Category 5-highly suggestive of malignancy.  No mammographic evidence of right breast malignancy.          2.   12/06/2019- ultrasound-guided left breast biopsy.  Final diagnosis: 1.left breast mass 2 o'clock position: Core biopsy: Invasive ductal and lobular carcinoma.  Estimated grade 1, involving approximately 90% of the core biopsy specimens.  Focal ductal carcinoma in situ, nuclear grade 1.  Microcalcifications identified in invasive ductal carcinoma.  2.left breast mass 3 o'clock  position, core biopsy: Invasive lobular carcinoma, estimated grade 1, involving approximately 90% of the core biopsy specimens.  Microcalcifications not identified.          3.   12/26/2019- hemoglobin 12.3, hematocrit 37.2, MCV 93.9, platelets 90,000, WBC 6.81 with a normal differential.  CMP notable for a creatinine of 1.65 (GFR 30) otherwise normal.  Immunohistochemistries revealed: Estrogen +99 to 100%; progesterone +81 to 90%, HER-2 1-2+ (equivocal).  FISH: Negative (signal ratio: 1.2)          4.   01/17/2020-CT brain: Mild cerebral and cerebellar volume loss with chronic microvascular disease.  Focus of ill-defined hypodensity at the gray-white juncture in the right frontal lobe.  Site of previous infarction versus metastasis.  Suggest follow-up MRI with and without gadolinium.  No additional lesions present.          5.   01/17/2020- CT chest.  Groundglass nodule within the right upper lobe and left lower lobe.  Likely inflammatory in nature.  Coronary calcifications in the LAD and circumflex distribution.  Moderate cardiomegaly.  Postoperative changes left axilla from recent axillary dissection as well as mastectomy.  No enlarged mediastinal or axillary lymph nodes present.  Irregular nodule along the anterior margin of the medial left pectoralis major musculature within the mastectomy bed.  No adjacent inflammatory/postoperative stranding.  May represent postoperative seroma.  Recommend directed ultrasound over this area.          6.   01/17/2020-CT abdomen/pelvis.  Impression: Multiple hypodensities within the liver.  Favor hepatic cysts.  Recommend ultrasound for further evaluations.  Cannot be fully characterized without IV contrast.  Small cortical cyst of the right kidney suspected.  Postoperative changes of the right colon.  Multiple diverticula throughout the colon without evidence of diverticulitis or mechanical bowel obstruction.  Diastases of the abdominal musculature at the umbilicus.  Previous  umbilical hernia repair was performed with a mesh.  Residual recurrent tyra-umbilical hernia containing fat.  No evidence of herniated bowel loop.  7 mm groundglass nodule left lower lobe.          7.   01/22/2020-CMP notable for creatinine 1.6 and GFR 31 otherwise normal.  CEA 1.41, CA-27-29 22.8, ferritin 240.9, iron 88, iron saturation 23%, TIBC 390, RASHEL positive, homogenous/speckled pattern 1:80, otherwise negative reflex panel.  SPIEP negative (M spike not observed, EARLE: No monoclonality detected).  Hemoglobin 11.7, hematocrit 34.9, MCV 91.6, platelets 130,000, WBC 5.08 with a normal differential.  No schistocytes reported.          8.   02/05/2020- liver ultrasound.  Impression: Multiple hepatic cysts.  Additional tiny hepatic lesions are too small to characterize.          9.   02/05/2020- renal ultrasound.  Impression: Right renal cyst.  Otherwise negative exam.         10.  02/05/2020- chest ultrasound.  Impression: No abnormality identified in the left chest.  Area seen on recent CT exam may represent a postoperative seroma.         11.   02/20/2020- hemoglobin 12.2, hematocrit 36.4, MCV 91.9, platelets 147,000, WBC 6.11 with a normal differential.  CEA 1.37, CA-27-29 21.9.         12.   03/01/2020- Oncotype DX: Recurrence score:  0; distant recurrence risk at 9 years with AI or BARNHART alone: 3%; group average absolute chemotherapy benefit:< 1%         13.   06/02/2020- DEXA scan.  Impression: Osteopenia.  Low bone mass.  Bone density is between 1.0 and 2.5 standard deviations below the mean for young adult woman.  Increased risk for fracture in these patients.    PREVIOUS INTERVENTIONS:          1.   01/02/2020- left breast simple mastectomy with sentinel lymph node biopsy.  Final diagnosis: 1.left breast, left skin sparing mastectomy: Invasive ductal and lobular carcinoma, grade 1 (2.3 cm).  Associated low-grade ductal carcinoma in situ, solid type.  Margins of excision free of tumor.  Tumor approaches  closest deep margin and 1.6 cm.  2 axillary lymph nodes, negative for metastatic carcinoma.  2.left sentinel lymph node excision: One sentinel lymph node, negative for metastatic carcinoma (0/1).  AJCC pathologic stage: pT2, N0 (SN).    Synoptic checklist: Procedure: Total mastectomy.  Specimen laterality: Left.  Histologic type: Invasive carcinoma with ductal and lobular features (mixed type carcinoma).  Overall ndgndrndanddndend:nd nd2nd. Tumor size: Greatest dimension of largest invasive focus 23 mm.  Tumor focality: Single focus of invasive carcinoma.  DCIS: Present.  Negative for extensive intraductal component.  Grade 1.  Lobular carcinoma in situ: None in specimen.  Tumor extent: Lymphovascular invasion not identified.  Dermal lymphovascular invasion not identified.  Microcalcifications not identified.  Margins: Uninvolved by invasive carcinoma.  Lymph nodes: Number of lymph nodes examined: 3.  TNM classification: pT2, pN0 (SN).            2.   01/22/2020- adjuvant Arimidex 1 mg p.o. daily    LABS:  Lab Results - Last 18 Months   Lab Units 06/26/24  1046 06/04/24  0736 03/22/24  0905 02/13/24  0713 01/30/24  0857 12/27/23  0857 12/21/23  1317 06/05/23  1106 04/28/23  0631 03/15/23  0822   HEMOGLOBIN g/dL 13.4 14.6 14.0 13.4 13.2 13.0 12.6 12.2   < > 13.0   HEMATOCRIT % 42.0 46.7 44.1 41.4 41.2 41.7 38.1 39.4   < > 40.1   MCV fL 97.2* 99.2* 94.8 94.1 94.1 93.5 92.3 100.0*   < > 93.7   WBC 10*3/mm3 5.21 6.9 6.85 5.7 6.0 6.75 9.80 6.36   < > 6.82   RDW % 13.6 14.4 14.6 14.9* 14.7* 13.8 12.9 14.0   < > 13.5   MPV fL 11.2 11.7  --  12.5* 12.5* 11.4  --  12.2*   < >  --    PLATELETS 10*3/mm3 116* 156 73* 102* 107* 170 111* 162   < > 105*   IMM GRAN % % 0.2  --   --   --   --  0.3  --   --   --   --    NEUTROS ABS 10*3/mm3 3.16  --  4.14  --   --  3.98 7.31* 4.69  --  4.09   LYMPHS ABS 10*3/mm3 1.52  --  2.04  --   --  2.11 1.84  --   --  CANCELED  2.01   MONOS ABS 10*3/mm3 0.39  --  0.48  --   --  0.48 0.52  --   --  0.57   EOS  ABS 10*3/mm3 0.10  --  0.14  --   --  0.11 0.06  --   --  CANCELED   EOSINOPHIL ABS 10*3/mm3  --   --   --   --   --   --   --   --   --  0.14   EOSINOPHIL % %  --   --   --   --   --   --   --   --   --  2.1   BASOS ABS 10*3/mm3 0.03  --  0.04  --   --  0.05 0.04  --   --  CANCELED   IMMATURE GRANS (ABS) 10*3/mm3 0.01  --   --   --   --  0.02  --   --   --   --    NRBC /100 WBC  --   --  0.0  --   --  0.0 0.0  --   --   --    NEUTROPHIL % %  --   --   --   --   --   --   --  73.7  --  60.0   MONOCYTES % %  --   --   --   --   --   --   --  4.0*  --  8.4   ATYP LYMPH % %  --   --   --   --   --   --   --  1.0  --   --    ANISOCYTOSIS   --   --   --   --   --   --   --  Slight/1+  --   --     < > = values in this interval not displayed.       Lab Results - Last 18 Months   Lab Units 06/26/24  1046 06/04/24  0736 03/22/24  0905 12/27/23  0857 12/21/23  1317 08/30/23  1400 06/05/23  1106 04/26/23  1155 04/26/23  1155 03/15/23  0822   GLUCOSE mg/dL 109* 91 84 97 114* 101* 92   < > 86 95   SODIUM mmol/L 144 144 143 142 142 141 140   < > 140 142   POTASSIUM mmol/L 4.5 3.6 4.1 3.9 3.9 4.7 5.1   < > 4.5 4.5   TOTAL CO2 mmol/L  --  30*  --   --   --   --   --   --   --   --    CO2 mmol/L 31.0*  --  25.4 26.0 25.4 23.7 26.0   < > 24.0 24.2   CHLORIDE mmol/L 105 102 107 103 104 105 103   < > 106 104   ANION GAP mmol/L 8.0 12  --  13.0  --   --  11.0  --  10.0  --    CREATININE mg/dL 1.36* 1.2* 1.33* 1.51* 1.56* 2.46* 2.03*   < > 1.83* 1.68*   BUN mg/dL 19 21 22 20 21 42* 31*   < > 28* 20   BUN / CREAT RATIO  14.0  --  16.5 13.2 13.5 17.1 15.3   < > 15.3 11.9   CALCIUM mg/dL 9.5 9.7 9.1 9.4 9.2 9.6 10.0   < > 9.5 10.3   ALK PHOS U/L 124* 118* 102 101  --   --  84  --   --  96   TOTAL PROTEIN g/dL 6.8 7.2  --  7.3  --   --  7.2  --   --   --    ALT (SGPT) U/L 13 11 17 10  --   --  10  --   --  10   AST (SGOT) U/L 21 20 22 19  --   --  18  --   --  18   BILIRUBIN mg/dL 0.6 0.8 0.8 0.4  --   --  0.7  --   --  0.8   ALBUMIN g/dL  "4.1 4.3 4.2 4.2  --   --  4.3  --   --  4.4   GLOBULIN gm/dL 2.7  --   --  3.1  --   --  2.9  --   --   --     < > = values in this interval not displayed.       Lab Results - Last 18 Months   Lab Units 06/26/24  1046 12/27/23  0857 06/05/23  1106   CEA ng/mL 2.43 1.79 1.76       Lab Results - Last 18 Months   Lab Units 06/26/24  1046 03/22/24  0905 12/27/23  0857 06/05/23  1106 03/15/23  0822   IRON mcg/dL 98  --  75 102  --    TIBC mcg/dL 431  --  432 451  --    IRON SATURATION (TSAT) % 23  --  17* 23  --    FERRITIN ng/mL 95.22  --  135.90 178.10*  --    TSH uIU/mL  --  4.420*  --   --  3.220         PAST MEDICAL HISTORY:  ALLERGIES:  Allergies   Allergen Reactions    Bactrim [Sulfamethoxazole-Trimethoprim] Other (See Comments)     Patient has kidney disease (stage 4) shouldn't take Bactrim    Codeine Nausea Only    Percocet [Oxycodone-Acetaminophen] Nausea Only     Becomes, hot, nauseated, and made her head feel \"weird\".     CURRENT MEDICATIONS:  Outpatient Encounter Medications as of 7/3/2024   Medication Sig Dispense Refill    acetaminophen (TYLENOL) 500 MG tablet Take 1 tablet by mouth Every 6 (Six) Hours As Needed for Mild Pain.      allopurinol (ZYLOPRIM) 100 MG tablet Take 1 tablet by mouth 2 (Two) Times a Day.  3    amiodarone (PACERONE) 100 MG tablet Take 2 tablets by mouth Daily.      anastrozole (ARIMIDEX) 1 MG tablet Take 1 tablet by mouth Daily. 30 tablet 11    atorvastatin (LIPITOR) 20 MG tablet Take 1 tablet by mouth Daily.      bumetanide (BUMEX) 0.5 MG tablet Take 1 tablet by mouth once daily 90 tablet 3    Calcium Carbonate-Vitamin D 600-10 MG-MCG per tablet Take 1 tablet by mouth 2 (Two) Times a Day. 60 tablet 11    carboxymethylcellulose (REFRESH PLUS) 0.5 % solution Administer 1 drop to both eyes 3 (Three) Times a Day As Needed for Dry Eyes.      levothyroxine (SYNTHROID, LEVOTHROID) 50 MCG tablet Take 1 tablet by mouth once daily 90 tablet 3    metoprolol succinate XL (TOPROL-XL) 25 MG 24 hr " tablet Take 1 tablet by mouth 2 (Two) Times a Day.  1    polyethylene glycol (MIRALAX) 17 GM/SCOOP powder Take 17 g by mouth Daily.      valsartan (DIOVAN) 160 MG tablet Take 1 tablet by mouth Daily.      apixaban (Eliquis) 5 MG tablet tablet Take 1 tablet by mouth 2 (Two) Times a Day.      [DISCONTINUED] warfarin (COUMADIN) 5 MG tablet Take  by mouth Take As Directed. Take 1 whole tablet (5 mg) on Wednesday only. Take 1/2 tablet (2.5 mg) all other days.  5     No facility-administered encounter medications on file as of 7/3/2024.     Adult illnesses:  Hypothyroidism  Atrial fibrillation  Hyperlipidemia  Obesity  Chronic kidney disease stage 4 - Dr. Daniels    ADULT ILLNESSES:  Patient Active Problem List   Diagnosis Code    Hx of adenomatous colonic polyps Z86.010    HTN (hypertension), benign I10    Family hx of colon cancer Z80.0    Breast cancer, left C50.912    Chronic anticoagulation Z79.01    Endometrial thickening on ultrasound R93.89    Hypothyroidism E03.9    Kidney disease N28.9    Mixed hyperlipidemia E78.2    Atrial fibrillation, controlled I48.91    PMB (postmenopausal bleeding) N95.0    Brain lesion G93.9    Non-tobacco user Z78.9    Anemia in stage 4 chronic kidney disease N18.4, D63.1    Atrophic vaginitis N95.2    Cardiomegaly I51.7    Hematuria R31.9    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, 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    Rheumatoid arthritis M06.9    Seborrheic keratosis L82.1    Thrombocytopenia D69.6    Vitamin D deficiency E55.9    Moderate mitral regurgitation by prior echocardiogram I34.0    Dizziness R42    Suspected cerebrovascular accident (CVA) R09.89    Lipoma of upper extremity D17.20    Stage 4 chronic kidney disease N18.4    Class 2 severe obesity due to excess calories with serious comorbidity and body mass index (BMI) of 36.0 to 36.9 in adult E66.01, Z68.36     SURGERIES:  Past Surgical History:   Procedure Laterality Date    APPENDECTOMY      BREAST BIOPSY Left 2015    BREAST BIOPSY Right 12/31/2020     Procedure: RIGHT NEEDLE DIRECTED EXCISIONAL BREAST BIOPSY;  Surgeon: Nikki Zuniga MD;  Location: [facility] OR;  Service: General;  Laterality: Right;    BREAST EXCISIONAL BIOPSY Left 2001    BREAST EXCISIONAL BIOPSY Left 2001    CARPAL TUNNEL RELEASE Right     CATARACT EXTRACTION, BILATERAL      CHOLECYSTECTOMY      COLON SURGERY      Partial right    COLONOSCOPY      COLONOSCOPY N/A 10/17/2019    Procedure: COLONOSCOPY WITH ANESTHESIA;  Surgeon: Rigoberto Shaw MD;  Location: [facility] ENDOSCOPY;  Service: Gastroenterology    COLONOSCOPY W/ POLYPECTOMY  06/30/2016    2 Adenomatous polyps at 80 & 40 cm, Diverticulosis repeat exam in 3 years    EXCISION MASS ARM Bilateral 4/28/2023    Procedure: lipoma excision left hand and right arm;  Surgeon: Nikki Zuniga MD;  Location:  [facility] OR;  Service: General;  Laterality: Bilateral;    EYE SURGERY Bilateral     cataract extraction    EYE SURGERY Left     detached retina    HERNIA REPAIR      JOINT REPLACEMENT  both knees 2-14-12 &10-30-12    MASTECTOMY Left 2019    MASTECTOMY W/ SENTINEL NODE BIOPSY Left 01/02/2020    Procedure: LEFT SIMPLE MASTECTOMY WITH SENTINEL NODE BIOPSY, INJECTION AND SCAN, RADIOLOGIST WILL INJECT;  Surgeon: Nikki Zuniga MD;  Location:  [facility] OR;  Service: General    REPLACEMENT TOTAL KNEE Bilateral     THROAT SURGERY      duct    TONSILLECTOMY  8 or 9 yrs old    UMBILICAL HERNIA REPAIR  years ago     HEALTH MAINTENANCE ITEMS:  Health Maintenance Due   Topic Date Due    RSV Vaccine - Adults (1 - 1-dose 60+ series) Never done    COVID-19 Vaccine (6 - 2023-24 season) 09/01/2023    BMI FOLLOWUP  05/08/2024    COLORECTAL CANCER SCREENING  10/17/2024       <no information>  Last Completed Colonoscopy       This patient has no relevant Health Maintenance data.          Immunization History   Administered Date(s) Administered    COVID-19 (MODERNA) 1st,2nd,3rd Dose Monovalent 02/25/2021, 03/25/2021, 10/26/2021    COVID-19 (MODERNA) Monovalent  Original Booster 08/03/2022    COVID-19 (PFIZER) BIVALENT 12+YRS 01/11/2023    Fluad Quad 65+ 09/22/2020, 09/27/2021, 10/19/2022    Fluzone High Dose =>65 Years ([employer] ONLY) 10/19/2022    Fluzone High-Dose 65+yrs 09/13/2023    Fluzone Quad >6mos (Multi-dose) 09/27/2021    Influenza TIV (IM) 09/22/2015, 10/14/2016    Pneumococcal Conjugate 13-Valent (PCV13) 11/10/2016    Pneumococcal Polysaccharide (PPSV23) 11/02/2012    Shingrix 04/10/2018, 08/15/2018    Tdap 12/21/2020    Zostavax 04/23/2013     Last Completed Mammogram            Ordered - MAMMOGRAM (Yearly) Ordered on 11/28/2023 12/27/2023  Mammo Screening Modified With Tomosynthesis Right With CAD    12/07/2022  Mammo Screening Modified With Tomosynthesis Right With CAD    12/06/2021  Mammo Diagnostic Digital Tomosynthesis Right With CAD    12/03/2020  Mammo Diagnostic Digital Tomosynthesis Right With CAD    11/30/2020  Mammo Screening Modified With Tomosynthesis Right With CAD    Only the first 5 history entries have been loaded, but more history exists.                      FAMILY HISTORY:  Family History   Problem Relation Age of Onset    Cancer Mother         colon    Hypertension Mother     Colon cancer Mother 70    Colon polyps Mother     No Known Problems Father     Hearing loss Maternal Grandmother     Vision loss Maternal Grandmother     Arthritis Maternal Grandmother     Breast cancer Neg Hx     BRCA 1/2 Neg Hx     Endometrial cancer Neg Hx     Ovarian cancer Neg Hx      SOCIAL HISTORY:  Social History     Socioeconomic History    Marital status:    Tobacco Use    Smoking status: Never    Smokeless tobacco: Never   Vaping Use    Vaping status: Never Used   Substance and Sexual Activity    Alcohol use: No    Drug use: No    Sexual activity: Not Currently     Partners: Male   Homemaker    REVIEW OF SYSTEMS:  Review of Systems   Constitutional:  Positive for fatigue (baseline/chronic unchanged). Negative for activity change and appetite  "change.        Manages her ADLs, including chores, errands and driving. Again says she is still up and about \"all the time.\"    Arimidex tolerance:  No worsening hot flashes, \"not worse,\" no new arthralgias. Is still taking daily   HENT:  Positive for postnasal drip (seasonal) and rhinorrhea (seasonal).    Eyes: Negative.    Respiratory:  Positive for shortness of breath (Baseline exertional dyspnea and occasional sob with routine activities.  \"I just pace myself.\").    Cardiovascular:  Positive for palpitations (Intermittent.  None since RF ablation 4/22/24 by Dr. Winters.  Is on maintenance Eliquis).   Gastrointestinal: Negative.  Negative for blood in stool.   Endocrine: Negative.    Genitourinary: Negative.    Musculoskeletal:  Positive for arthralgias (intermittent of the hands, left hip, right shoulder and knees inspite of prior TKRs bilaterally) and back pain (lower back \"soreness.\").   Allergic/Immunologic: Negative.    Neurological:  Positive for numbness (left foot at times, unchanged). Negative for dizziness (postural - when standing too abruptly. \"Not in awhile.\").   Hematological:  Bruises/bleeds easily (coumadin).   Psychiatric/Behavioral: Negative.     Breasts:  Says she is more diligent with self exams.  Has not noticed any changes      /82   Pulse 74   Temp 97.4 °F (36.3 °C) (Temporal)   Resp 18   Ht 165.1 cm (65\")   Wt 100 kg (221 lb 6.4 oz)   SpO2 96%   BMI 36.84 kg/m²  Body surface area is 2.06 meters squared.  Pain Score    07/03/24 1110   PainSc: 0-No pain             Physical Exam:  Physical Exam   Constitutional: She is oriented to person, place, and time. No distress.   Pleasant, cooperative, obese, modestly kept elderly female.  Ambulatory.  ECOG 0.      She has regained 3 lb (had lost 5 lb at her 3 prior visits-had gained 9 lb at her visit prior to those) since her last visit.   HENT:   Head: Normocephalic and atraumatic.   Mouth/Throat: No oropharyngeal exudate.   Eyes: Pupils " are equal, round, and reactive to light. Conjunctivae are normal. No scleral icterus.   Neck: No JVD present. No tracheal deviation present. No thyromegaly (on synthroid) present.   Cardiovascular: Normal rate.   Pulmonary/Chest: Effort normal and breath sounds normal. No stridor. No respiratory distress. She has no wheezes. She has no rales.   Chaperoned exam: Janelle Zuniga MA (again)    Right breast no masses.  No associated axillary nor supraclavicular adenopathy.    Left mastectomy site well healed. No underlying nodularity.  No lymphedema of the left upper extremity.      No associated axillary nor supraclavicular adenopathy bilaterally.     Abdominal: Soft. Bowel sounds are normal. She exhibits no distension and no mass. There is no abdominal tenderness. There is no rebound and no guarding.   Musculoskeletal: Normal range of motion. Deformity (changes of osteoarthritis of the hands) present. No swelling.      Right lower leg: Edema (1+ ankle) present.      Left lower leg: Edema (1+ ankle) present.   Lymphadenopathy:     She has no cervical adenopathy.        Right cervical: No superficial cervical, no deep cervical and no posterior cervical adenopathy present.       Left cervical: No superficial cervical, no deep cervical and no posterior cervical adenopathy present.        Right: No supraclavicular adenopathy present.        Left: No supraclavicular adenopathy present.   Neurological: She is alert and oriented to person, place, and time. No cranial nerve deficit.   Skin: Skin is warm and dry. No rash noted. No erythema. No pallor.   Psychiatric: Her behavior is normal. Mood, judgment and thought content normal.   Vitals reviewed.    ASSESSMENT:   1. Invasive ductal/lobular (mixed) carcinoma:   Stage: IB (pT2, pN0 [sn], MX, G1) ER (+) %, WV (+) 81-90%, HER-2/kelby 1-2 + (IHC) - negative. HER-2 1-2+ (equivocal).  FISH: Negative (signal ratio: 1.2)  Tumor burden: 23 mm tumor in the left breast. 0/3  axillary sentinel lymph node negative for metastatic carcinoma. Nuclear grade 1.    Oncotype DX: Recurrence score:  0; distant recurrence risk at 9 years with AI or BARNHART alone: 3%; group average absolute chemotherapy benefit:< 1%  Complications of tumor: None.   Tumor status: Adjuvant Arimidex in progress.  11/30/2020-mammogram.  Right breast calcifications for which additional imaging recommended.  BI-RADS Category 0.  12/28/2020-stereotactic biopsy of right breast calcifications.  Pathology: Benign intraductal papilloma, 3 mm greatest dimension with associated microcalcifications.  01/04/2021-Right breast, lumpectomy with needle localization.  Final diagnosis: Extensive biopsy site changes including fat necrosis and fibrosis.  Benign fibrous breast parenchyma.  No evidence of in situ or invasive carcinoma.  12/27/2023-No mammographic evidence of malignancy of the RIGHT breast. Recommend routine annual mammography.  BI-RADS CATEGORY 2: Benign findings.           2.    Chronic kidney disease, stage III - IV. Followed by nephrology - Dr. Daniels  --GFR 38.5, 6/26/2024 (prior:  GFR 17 - 36 mL/min).        3.    Hypothyroidism.  On Synthroid        4.    Normocytic anemia.  Contribution from chronic kidney disease suspected.  --Resolved.  Hgb 13.4; MCV 97.2, 9/26/2024 (prior: Hgb 10.9 -14.6; MCV 92 - 100).          5.    Abnormal CT scans of the head (see above) indicating ill-defined hypodensity at the gray-white juncture in the right frontal lobe, hepatic and right renal cysts.  Unable to perform MRI with contrast due to chronic kidney disease.            --03/23/2020-seen by Dr. Jaramillo.  Noncontrast brain MRI from 1/17/2020 reviewed.  Possibilities include prior stroke microvascular disease, demyelinating disease.  Risks of biopsy discussed.  Recommend serial imaging with biopsy of the lesion progresses.  Follow-up with MRI in 3 months.          --07/06/2020- MRI.  Mild atrophy of the brain.  Multiple foci of T2  abnormality involving the periventricular and higher white matter tracts.  Cortical lesions also present.  Overall stable from 03/2020.  Favor small vessel ischemic changes.  No evidence of restricted diffusion to suggest acute ischemia or infarct.  No mass-effect or shift in midline.          --01/06/2021-brain MRI.  No imaging evidence of intracranial metastatic disease.  Seen by Dr. Jaramillo of neurosurgery.  Favors microvascular disease.  Okay to discontinue imaging.        6.    (+) RASHEL with joint pains (hands). Has been seen by Dr. Caputo.        7.    Osteopenia (bone density 1.0 and 2.5 standard deviations below the mean on DEXA scan, 06/02/2020).  On calcium and vitamin D. Followed by pcp. Addition of Fosamax being discussed.  She wants to ask Dr. Daniels about it first.        8.    Paroxysmal atrial fibrillation.  Underwent RF--April, 2024 per Dr. Winters.  Dr. Salas follows        9.    Trivially elevated alk phos.  Osteopenia?       10.   Thrombocytopenia.  Mild.  Variable.  Stable.  MDS?  ITP?  -116,000, 6/26/2024 (prior charlette: 73,000).       PLAN:   1.  Apprised of the labs, 6/26/2024.  Note resolution of anemia and platelets of 116,000 (stable), and normal WBC.  GFR 38.5 (stable), alk phos 124 otherwise normal CMP, normal (1.79) CEA, normal (25.5-prior: 17.6-25) CA-27-29, repleted iron levels. Arimidex tolerance discussed  2.  Schedule mammogram, 12/28/2024  3.  Previously noted the positive RASHEL but negative reflex panel, negative SPIEP, repleted iron levels.  The liver ultrasound, renal ultrasound and chest ultrasound findings (above) are noted.  Oncotype DX recurrence score (above) noted (RS 0; absolute chemotherapy benefit < 1%).  DEXA scan shows osteopenia with increased risk of fracture.  Arimidex tolerance discussed.  So far so good.  4.  Breast cancer.  NCCN guidelines version 1.2020 reviewed.  4 ductal, lobular, mixed tumors, pT1, pT2, pT3 and pN0 with tumors greater than 0.5 cm- 21 gene  RT-PCR assay.  Recurrence score less than 26-adjuvant endocrine therapy.  Recurrence score 26-30- adjuvant endocrine therapy or adjuvant chemotherapy followed by endocrine therapy.  Recurrence score greater than 31-adjuvant chemotherapy followed by endocrine therapy.  However limited data to make chemotherapy recommendations for those greater than 70 years of age.  5.  The rationale for adjuvant hormonal manipulation with AI's are previously discussed. The potential risks (to include but not limited to: Hot flashes, asthenia, pain, arthralgia, arthritis, nausea/vomiting, headache, pharyngitis, depression, rash, hypertension, lymphedema, insomnia, edema, weight gain, dyspnea, abdominal pain, constipation, osteoporosis, fractures, cough, bone pain, diarrhea, breast pain, paresthesias, infection, cataracts, myalgias, thromboembolism, angina, endometrial carcinoma, myocardial infarction, stroke, anemia, leukopenia, erythema multiforme, Belle-Collins syndrome) are discussed at length. Questions answered. She agrees to press on with therapy.    6.  Rx:   Arimidex 1 mg p.o. daily, #30 x 2 RF   Calcium 1200 mg + 800 units D3  p.o. daily - otc  Stay off ferrous sulfate    7.   Return to the [place] office in 24 weeks with pre-office CBC and differential, iron, fe sat , ferritin, CMP, CEA and CA-27-29.       I spent ~ 34 minutes caring for Heidi on this date of service. This time includes time spent by me in the following activities: preparing for the visit, reviewing tests, performing a medically appropriate examination and/or evaluation, counseling and educating the patient/family/caregiver, ordering medications, tests, or procedures and documenting information in the medical record

## 2024-06-26 ENCOUNTER — LAB (OUTPATIENT)
Dept: LAB | Facility: HOSPITAL | Age: 84
End: 2024-06-26
Payer: MEDICARE

## 2024-06-26 DIAGNOSIS — C50.912 MALIGNANT NEOPLASM OF LEFT BREAST IN FEMALE, ESTROGEN RECEPTOR POSITIVE, UNSPECIFIED SITE OF BREAST: ICD-10-CM

## 2024-06-26 DIAGNOSIS — Z17.0 MALIGNANT NEOPLASM OF LEFT BREAST IN FEMALE, ESTROGEN RECEPTOR POSITIVE, UNSPECIFIED SITE OF BREAST: ICD-10-CM

## 2024-06-26 LAB
ALBUMIN SERPL-MCNC: 4.1 G/DL (ref 3.5–5.2)
ALBUMIN/GLOB SERPL: 1.5 G/DL
ALP SERPL-CCNC: 124 U/L (ref 39–117)
ALT SERPL W P-5'-P-CCNC: 13 U/L (ref 1–33)
ANION GAP SERPL CALCULATED.3IONS-SCNC: 8 MMOL/L (ref 5–15)
AST SERPL-CCNC: 21 U/L (ref 1–32)
BASOPHILS # BLD AUTO: 0.03 10*3/MM3 (ref 0–0.2)
BASOPHILS NFR BLD AUTO: 0.6 % (ref 0–1.5)
BILIRUB SERPL-MCNC: 0.6 MG/DL (ref 0–1.2)
BUN SERPL-MCNC: 19 MG/DL (ref 8–23)
BUN/CREAT SERPL: 14 (ref 7–25)
CALCIUM SPEC-SCNC: 9.5 MG/DL (ref 8.6–10.5)
CEA SERPL-MCNC: 2.43 NG/ML
CHLORIDE SERPL-SCNC: 105 MMOL/L (ref 98–107)
CO2 SERPL-SCNC: 31 MMOL/L (ref 22–29)
CREAT SERPL-MCNC: 1.36 MG/DL (ref 0.57–1)
DEPRECATED RDW RBC AUTO: 49.1 FL (ref 37–54)
EGFRCR SERPLBLD CKD-EPI 2021: 38.5 ML/MIN/1.73
EOSINOPHIL # BLD AUTO: 0.1 10*3/MM3 (ref 0–0.4)
EOSINOPHIL NFR BLD AUTO: 1.9 % (ref 0.3–6.2)
ERYTHROCYTE [DISTWIDTH] IN BLOOD BY AUTOMATED COUNT: 13.6 % (ref 12.3–15.4)
FERRITIN SERPL-MCNC: 95.22 NG/ML (ref 13–150)
GLOBULIN UR ELPH-MCNC: 2.7 GM/DL
GLUCOSE SERPL-MCNC: 109 MG/DL (ref 65–99)
HCT VFR BLD AUTO: 42 % (ref 34–46.6)
HGB BLD-MCNC: 13.4 G/DL (ref 12–15.9)
IMM GRANULOCYTES # BLD AUTO: 0.01 10*3/MM3 (ref 0–0.05)
IMM GRANULOCYTES NFR BLD AUTO: 0.2 % (ref 0–0.5)
IRON 24H UR-MRATE: 98 MCG/DL (ref 37–145)
IRON SATN MFR SERPL: 23 % (ref 20–50)
LYMPHOCYTES # BLD AUTO: 1.52 10*3/MM3 (ref 0.7–3.1)
LYMPHOCYTES NFR BLD AUTO: 29.2 % (ref 19.6–45.3)
MCH RBC QN AUTO: 31 PG (ref 26.6–33)
MCHC RBC AUTO-ENTMCNC: 31.9 G/DL (ref 31.5–35.7)
MCV RBC AUTO: 97.2 FL (ref 79–97)
MONOCYTES # BLD AUTO: 0.39 10*3/MM3 (ref 0.1–0.9)
MONOCYTES NFR BLD AUTO: 7.5 % (ref 5–12)
NEUTROPHILS NFR BLD AUTO: 3.16 10*3/MM3 (ref 1.7–7)
NEUTROPHILS NFR BLD AUTO: 60.6 % (ref 42.7–76)
PLATELET # BLD AUTO: 116 10*3/MM3 (ref 140–450)
PMV BLD AUTO: 11.2 FL (ref 6–12)
POTASSIUM SERPL-SCNC: 4.5 MMOL/L (ref 3.5–5.2)
PROT SERPL-MCNC: 6.8 G/DL (ref 6–8.5)
RBC # BLD AUTO: 4.32 10*6/MM3 (ref 3.77–5.28)
SODIUM SERPL-SCNC: 144 MMOL/L (ref 136–145)
TIBC SERPL-MCNC: 431 MCG/DL (ref 298–536)
TRANSFERRIN SERPL-MCNC: 289 MG/DL (ref 200–360)
WBC NRBC COR # BLD AUTO: 5.21 10*3/MM3 (ref 3.4–10.8)

## 2024-06-26 PROCEDURE — 86300 IMMUNOASSAY TUMOR CA 15-3: CPT

## 2024-06-26 PROCEDURE — 80053 COMPREHEN METABOLIC PANEL: CPT

## 2024-06-26 PROCEDURE — 84466 ASSAY OF TRANSFERRIN: CPT

## 2024-06-26 PROCEDURE — 83540 ASSAY OF IRON: CPT

## 2024-06-26 PROCEDURE — 85025 COMPLETE CBC W/AUTO DIFF WBC: CPT

## 2024-06-26 PROCEDURE — 36415 COLL VENOUS BLD VENIPUNCTURE: CPT

## 2024-06-26 PROCEDURE — 82728 ASSAY OF FERRITIN: CPT

## 2024-06-26 PROCEDURE — 82378 CARCINOEMBRYONIC ANTIGEN: CPT

## 2024-06-27 LAB — CANCER AG27-29 SERPL-ACNC: 30.1 U/ML (ref 0–38.6)

## 2024-07-03 ENCOUNTER — OFFICE VISIT (OUTPATIENT)
Dept: ONCOLOGY | Facility: CLINIC | Age: 84
End: 2024-07-03
Payer: MEDICARE

## 2024-07-03 VITALS
DIASTOLIC BLOOD PRESSURE: 82 MMHG | WEIGHT: 221.4 LBS | SYSTOLIC BLOOD PRESSURE: 122 MMHG | OXYGEN SATURATION: 96 % | BODY MASS INDEX: 36.89 KG/M2 | HEIGHT: 65 IN | HEART RATE: 74 BPM | RESPIRATION RATE: 18 BRPM | TEMPERATURE: 97.4 F

## 2024-07-03 DIAGNOSIS — R92.8 OTHER ABNORMAL AND INCONCLUSIVE FINDINGS ON DIAGNOSTIC IMAGING OF BREAST: ICD-10-CM

## 2024-07-03 DIAGNOSIS — C50.912 MALIGNANT NEOPLASM OF LEFT BREAST IN FEMALE, ESTROGEN RECEPTOR POSITIVE, UNSPECIFIED SITE OF BREAST: Primary | ICD-10-CM

## 2024-07-03 DIAGNOSIS — Z17.0 MALIGNANT NEOPLASM OF LEFT BREAST IN FEMALE, ESTROGEN RECEPTOR POSITIVE, UNSPECIFIED SITE OF BREAST: Primary | ICD-10-CM

## 2024-07-10 ENCOUNTER — OFFICE VISIT (OUTPATIENT)
Dept: CARDIOLOGY CLINIC | Age: 84
End: 2024-07-10
Payer: MEDICARE

## 2024-07-10 VITALS
BODY MASS INDEX: 37.15 KG/M2 | SYSTOLIC BLOOD PRESSURE: 140 MMHG | DIASTOLIC BLOOD PRESSURE: 86 MMHG | WEIGHT: 223 LBS | HEART RATE: 65 BPM | HEIGHT: 65 IN

## 2024-07-10 DIAGNOSIS — I48.0 PAF (PAROXYSMAL ATRIAL FIBRILLATION) (HCC): Primary | ICD-10-CM

## 2024-07-10 DIAGNOSIS — I10 HYPERTENSION, UNSPECIFIED TYPE: ICD-10-CM

## 2024-07-10 PROCEDURE — 99214 OFFICE O/P EST MOD 30 MIN: CPT | Performed by: CLINICAL NURSE SPECIALIST

## 2024-07-10 PROCEDURE — 1123F ACP DISCUSS/DSCN MKR DOCD: CPT | Performed by: CLINICAL NURSE SPECIALIST

## 2024-07-10 PROCEDURE — 85610 PROTHROMBIN TIME: CPT | Performed by: CLINICAL NURSE SPECIALIST

## 2024-07-10 PROCEDURE — 3077F SYST BP >= 140 MM HG: CPT | Performed by: CLINICAL NURSE SPECIALIST

## 2024-07-10 PROCEDURE — 3079F DIAST BP 80-89 MM HG: CPT | Performed by: CLINICAL NURSE SPECIALIST

## 2024-07-10 PROCEDURE — 93000 ELECTROCARDIOGRAM COMPLETE: CPT | Performed by: CLINICAL NURSE SPECIALIST

## 2024-07-10 ASSESSMENT — ENCOUNTER SYMPTOMS
WHEEZING: 0
CHEST TIGHTNESS: 0
EYE REDNESS: 0
VOMITING: 0
COUGH: 0
NAUSEA: 0
ABDOMINAL PAIN: 0
FACIAL SWELLING: 0
SHORTNESS OF BREATH: 0

## 2024-07-10 NOTE — PATIENT INSTRUCTIONS
Return for APRN, as scheduled.  Stop Amiodarone  Let us know if Eliquis not affordable and we can work on pt assistance  Call for palpitations

## 2024-07-10 NOTE — PROGRESS NOTES
[facility]  [address] [address]  Phone: [phone]  Fax: [phone]    OFFICE VISIT:  7/10/2024    Aniya Huff - : 1940    Reason For Visit:  Aniya is a 83 y.o. female who is here for Follow-up (No cardiac symptoms) and PAF (paroxysmal atrial fibrillation)       Diagnosis Orders   1. PAF (paroxysmal atrial fibrillation) (HCC)  EKG 12 lead    POCT INR      2. Hypertension, unspecified type  EKG 12 lead    POCT INR            HPI  1.  Paroxysmal atrial fibrillation on Amiodarone and Coumadin (unsuccessful DCCV on Multaq 2024, initiated on amiodarone), repeat DCCV 2024, A-fib ablation 2024 with recurrent atrial fibrillation, normal LV ejection fraction.  2.  CKD stage IV, baseline creatinine 2.7.  3.  Left breast cancer 2020 status post mastectomy, on Arimidex  4.  New mild to moderate aortic stenosis by echo 2023 (mean gradient 18 mmHg).    Patient had recurrent atrial fibrillation following her ablation requiring repeat cardioversion on 2024.  She has remained on her amiodarone.  She saw Dr. Delatorre, electrophysiologist, on 2024.  Amiodarone dose was decreased and she was told she could stop the amiodarone at office visit today with us if she was in sinus rhythm.  She is in sinus rhythm per EKG today.    She has some occasional brief flutters that lasts only seconds.  She feels she has had no significant recurrence of atrial fibrillation.  No unusual dyspnea, chest pain, weakness or syncope    She has been obtaining Eliquis via samples and is unsure of her prescription coverage     Dale Mckenna MD is PCP.  Aniyaliseth Huff has the following history as recorded in [facility]:    Patient Active Problem List    Diagnosis Date Noted    Moderate mitral regurgitation by prior echocardiogram 2020    Acute frontal sinusitis 2017    Chronic anticoagulation 2017    Endometrial thickening on ultrasound 2017    PMB

## 2024-07-15 RX ORDER — VALSARTAN 160 MG/1
160 TABLET ORAL 2 TIMES DAILY
Qty: 180 TABLET | Refills: 1 | Status: SHIPPED | OUTPATIENT
Start: 2024-07-15

## 2024-09-23 ENCOUNTER — OFFICE VISIT (OUTPATIENT)
Dept: INTERNAL MEDICINE | Facility: CLINIC | Age: 84
End: 2024-09-23
Payer: MEDICARE

## 2024-09-23 VITALS
HEART RATE: 75 BPM | OXYGEN SATURATION: 95 % | TEMPERATURE: 98.6 F | BODY MASS INDEX: 37.15 KG/M2 | SYSTOLIC BLOOD PRESSURE: 138 MMHG | HEIGHT: 65 IN | WEIGHT: 223 LBS | DIASTOLIC BLOOD PRESSURE: 80 MMHG

## 2024-09-23 DIAGNOSIS — I34.0 MODERATE MITRAL REGURGITATION BY PRIOR ECHOCARDIOGRAM: ICD-10-CM

## 2024-09-23 DIAGNOSIS — E55.9 VITAMIN D DEFICIENCY: ICD-10-CM

## 2024-09-23 DIAGNOSIS — N18.32 STAGE 3B CHRONIC KIDNEY DISEASE: ICD-10-CM

## 2024-09-23 DIAGNOSIS — I10 HTN (HYPERTENSION), BENIGN: Primary | Chronic | ICD-10-CM

## 2024-09-23 DIAGNOSIS — E03.9 HYPOTHYROIDISM, UNSPECIFIED TYPE: ICD-10-CM

## 2024-09-23 DIAGNOSIS — E78.2 MIXED HYPERLIPIDEMIA: ICD-10-CM

## 2024-09-23 DIAGNOSIS — J30.1 NON-SEASONAL ALLERGIC RHINITIS DUE TO POLLEN: ICD-10-CM

## 2024-09-23 DIAGNOSIS — E66.01 CLASS 2 SEVERE OBESITY DUE TO EXCESS CALORIES WITH SERIOUS COMORBIDITY AND BODY MASS INDEX (BMI) OF 37.0 TO 37.9 IN ADULT: ICD-10-CM

## 2024-09-23 PROBLEM — E66.812 CLASS 2 SEVERE OBESITY DUE TO EXCESS CALORIES WITH SERIOUS COMORBIDITY AND BODY MASS INDEX (BMI) OF 36.0 TO 36.9 IN ADULT: Status: RESOLVED | Noted: 2024-03-25 | Resolved: 2024-09-23

## 2024-09-23 PROBLEM — E66.812 CLASS 2 OBESITY DUE TO EXCESS CALORIES WITH BODY MASS INDEX (BMI) OF 37.0 TO 37.9 IN ADULT: Status: ACTIVE | Noted: 2024-09-23

## 2024-09-23 PROBLEM — N18.4 STAGE 4 CHRONIC KIDNEY DISEASE: Status: RESOLVED | Noted: 2023-09-16 | Resolved: 2024-09-23

## 2024-09-23 PROBLEM — E66.09 CLASS 2 OBESITY DUE TO EXCESS CALORIES WITH BODY MASS INDEX (BMI) OF 37.0 TO 37.9 IN ADULT: Status: ACTIVE | Noted: 2024-09-23

## 2024-09-23 PROCEDURE — 1126F AMNT PAIN NOTED NONE PRSNT: CPT | Performed by: INTERNAL MEDICINE

## 2024-09-23 PROCEDURE — 3075F SYST BP GE 130 - 139MM HG: CPT | Performed by: INTERNAL MEDICINE

## 2024-09-23 PROCEDURE — G2211 COMPLEX E/M VISIT ADD ON: HCPCS | Performed by: INTERNAL MEDICINE

## 2024-09-23 PROCEDURE — 3079F DIAST BP 80-89 MM HG: CPT | Performed by: INTERNAL MEDICINE

## 2024-09-23 PROCEDURE — 99214 OFFICE O/P EST MOD 30 MIN: CPT | Performed by: INTERNAL MEDICINE

## 2024-09-23 RX ORDER — FLUTICASONE PROPIONATE 50 UG/1
2 SPRAY, METERED NASAL DAILY
Qty: 9.9 ML | Refills: 2 | Status: SHIPPED | OUTPATIENT
Start: 2024-09-23

## 2024-10-29 ENCOUNTER — CLINICAL SUPPORT (OUTPATIENT)
Dept: INTERNAL MEDICINE | Facility: CLINIC | Age: 84
End: 2024-10-29
Payer: MEDICARE

## 2024-10-29 DIAGNOSIS — Z23 NEED FOR INFLUENZA VACCINATION: Primary | ICD-10-CM

## 2024-10-29 PROCEDURE — G0008 ADMIN INFLUENZA VIRUS VAC: HCPCS | Performed by: INTERNAL MEDICINE

## 2024-10-29 PROCEDURE — 90662 IIV NO PRSV INCREASED AG IM: CPT | Performed by: INTERNAL MEDICINE

## 2024-11-11 ENCOUNTER — OFFICE VISIT (OUTPATIENT)
Dept: GASTROENTEROLOGY | Facility: CLINIC | Age: 84
End: 2024-11-11
Payer: MEDICARE

## 2024-11-11 VITALS
HEIGHT: 65 IN | BODY MASS INDEX: 37.69 KG/M2 | WEIGHT: 226.2 LBS | OXYGEN SATURATION: 97 % | HEART RATE: 78 BPM | SYSTOLIC BLOOD PRESSURE: 140 MMHG | TEMPERATURE: 97.7 F | DIASTOLIC BLOOD PRESSURE: 80 MMHG

## 2024-11-11 DIAGNOSIS — Z78.9 NONSMOKER: ICD-10-CM

## 2024-11-11 DIAGNOSIS — Z86.0101 HX OF ADENOMATOUS COLONIC POLYPS: Primary | ICD-10-CM

## 2024-11-11 DIAGNOSIS — Z80.0 FAMILY HX OF COLON CANCER: ICD-10-CM

## 2024-11-11 DIAGNOSIS — I10 HTN (HYPERTENSION), BENIGN: ICD-10-CM

## 2024-11-11 DIAGNOSIS — Z79.01 ANTICOAGULATED: ICD-10-CM

## 2024-11-11 PROCEDURE — 99214 OFFICE O/P EST MOD 30 MIN: CPT | Performed by: CLINICAL NURSE SPECIALIST

## 2024-11-11 PROCEDURE — 1159F MED LIST DOCD IN RCRD: CPT | Performed by: CLINICAL NURSE SPECIALIST

## 2024-11-11 PROCEDURE — 3079F DIAST BP 80-89 MM HG: CPT | Performed by: CLINICAL NURSE SPECIALIST

## 2024-11-11 PROCEDURE — 1160F RVW MEDS BY RX/DR IN RCRD: CPT | Performed by: CLINICAL NURSE SPECIALIST

## 2024-11-11 PROCEDURE — 3077F SYST BP >= 140 MM HG: CPT | Performed by: CLINICAL NURSE SPECIALIST

## 2024-11-11 NOTE — PROGRESS NOTES
Heidi Ansari  1940 11/11/2024  Chief Complaint   Patient presents with    Colonoscopy     Colon recall-hx of polyps     Subjective   HPI  Heidi Ansari is a 83 y.o. female who presents as a referral for preventative maintenance. She has no complaints of nausea or vomiting. No change in bowels. No wt loss. No BRBPR. No melena. There is positive family hx for colon cancer. No abdominal pain.    Past Medical History:   Diagnosis Date    A-fib     Allergic codiene, percocet, bactrim    Arthritis     Arthritis     generalized    Asthma some shortness of breath    Atrial fibrillation     INTERMITTENT    Basal cell carcinoma     between eyes     Breast cancer     Cancer     Cataract 2007,2011    Cholelithiasis 8-03-06 surgery    Colon polyp removed    Disease of thyroid gland     Diverticulosis     Elevated cholesterol     Heart murmur a fib    Hx of colonic polyp     Hyperlipidemia     Hypertension     Kidney disease     STAGE 4    Kidney disease     Osteopenia left hip    Renal insufficiency some years ago    Urinary tract infection occasionally    Visual impairment blurry vision     Past Surgical History:   Procedure Laterality Date    APPENDECTOMY      BREAST BIOPSY Left 2015    BREAST BIOPSY Right 12/31/2020    Procedure: RIGHT NEEDLE DIRECTED EXCISIONAL BREAST BIOPSY;  Surgeon: Nikki Zuniga MD;  Location: [facility] OR;  Service: General;  Laterality: Right;    BREAST EXCISIONAL BIOPSY Left 2001    BREAST EXCISIONAL BIOPSY Left 2001    CARDIAC ABLATION  04/2024    CARPAL TUNNEL RELEASE Right     CATARACT EXTRACTION, BILATERAL      CHOLECYSTECTOMY      COLON SURGERY      Partial right    COLONOSCOPY      COLONOSCOPY N/A 10/17/2019    Dr. galindo-- One 5 mm adenomatous polyp in the transverse colon, removed with a cold snare. Resected and retrieved. - Diverticulosis in the sigmoid colon and in the descending colon    COLONOSCOPY W/ POLYPECTOMY  06/30/2016    2 Adenomatous polyps at 80 & 40 cm,  Diverticulosis repeat exam in 3 years    EXCISION MASS ARM Bilateral 04/28/2023    Procedure: lipoma excision left hand and right arm;  Surgeon: Nikki Zuniga MD;  Location:  [facility] OR;  Service: General;  Laterality: Bilateral;    EYE SURGERY Bilateral     cataract extraction    EYE SURGERY Left     detached retina    HERNIA REPAIR      JOINT REPLACEMENT  both knees 2-14-12 &10-30-12    MASTECTOMY Left 2019    MASTECTOMY W/ SENTINEL NODE BIOPSY Left 01/02/2020    Procedure: LEFT SIMPLE MASTECTOMY WITH SENTINEL NODE BIOPSY, INJECTION AND SCAN, RADIOLOGIST WILL INJECT;  Surgeon: Nikki Zuniga MD;  Location:  [facility] OR;  Service: General    REPLACEMENT TOTAL KNEE Bilateral     THROAT SURGERY      duct    TONSILLECTOMY  8 or 9 yrs old    UMBILICAL HERNIA REPAIR  years ago     Outpatient Medications Marked as Taking for the 11/11/24 encounter (Office Visit) with Clarice Ramirez APRN   Medication Sig Dispense Refill    allopurinol (ZYLOPRIM) 100 MG tablet Take 1 tablet by mouth 2 (Two) Times a Day.  3    anastrozole (ARIMIDEX) 1 MG tablet Take 1 tablet by mouth Daily. 30 tablet 11    apixaban (Eliquis) 5 MG tablet tablet Take 1 tablet by mouth 2 (Two) Times a Day.      atorvastatin (LIPITOR) 20 MG tablet Take 1 tablet by mouth Daily.      bumetanide (BUMEX) 0.5 MG tablet Take 1 tablet by mouth once daily 90 tablet 3    Calcium Carbonate-Vitamin D 600-10 MG-MCG per tablet Take 1 tablet by mouth 2 (Two) Times a Day. 60 tablet 11    carboxymethylcellulose (REFRESH PLUS) 0.5 % solution Administer 1 drop to both eyes 3 (Three) Times a Day As Needed for Dry Eyes.      fluticasone (FLONASE) 50 MCG/ACT nasal spray Administer 2 sprays into the nostril(s) as directed by provider Daily. 9.9 mL 2    levothyroxine (SYNTHROID, LEVOTHROID) 50 MCG tablet Take 1 tablet by mouth once daily 90 tablet 3    metoprolol succinate XL (TOPROL-XL) 25 MG 24 hr tablet Take 1 tablet by mouth 2 (Two) Times a Day.  1    polyethylene  "glycol (MIRALAX) 17 GM/SCOOP powder Take 17 g by mouth Daily.      valsartan (DIOVAN) 160 MG tablet Take 1 tablet by mouth Daily.       Allergies   Allergen Reactions    Bactrim [Sulfamethoxazole-Trimethoprim] Other (See Comments)     Patient has kidney disease (stage 4) shouldn't take Bactrim    Codeine Nausea Only    Percocet [Oxycodone-Acetaminophen] Nausea Only     Becomes, hot, nauseated, and made her head feel \"weird\".     Social History     Socioeconomic History    Marital status:    Tobacco Use    Smoking status: Never    Smokeless tobacco: Never   Vaping Use    Vaping status: Never Used   Substance and Sexual Activity    Alcohol use: No    Drug use: No    Sexual activity: Not Currently     Partners: Male     Family History   Problem Relation Age of Onset    Cancer Mother         colon    Hypertension Mother     Colon cancer Mother 70    Colon polyps Mother     No Known Problems Father     Hearing loss Maternal Grandmother     Vision loss Maternal Grandmother     Arthritis Maternal Grandmother     Breast cancer Neg Hx     BRCA 1/2 Neg Hx     Endometrial cancer Neg Hx     Ovarian cancer Neg Hx      Health Maintenance   Topic Date Due    RSV Vaccine - Adults (1 - 1-dose 75+ series) Never done    COVID-19 Vaccine (6 - 2024-25 season) 09/01/2024    COLORECTAL CANCER SCREENING  10/17/2024    DXA SCAN  12/07/2024    LIPID PANEL  03/22/2025    ANNUAL WELLNESS VISIT  03/25/2025    BMI FOLLOWUP  09/23/2025    TDAP/TD VACCINES (2 - Td or Tdap) 12/21/2030    INFLUENZA VACCINE  Completed    Pneumococcal Vaccine 65+  Completed    ZOSTER VACCINE  Completed       REVIEW OF SYSTEMS  General: well appearing, no fever chills or sweats, no unexplained wt loss  HEENT: no acute visual or hearing disturbances  Cardiovascular: No chest pain or palpitations  Pulmonary: No shortness of breath, coughing, wheezing or hemoptysis  : No burning, urgency, hematuria, or dysuria  Musculoskeletal: No joint pain or " "stiffness  Peripheral: no edema  Skin: No lesions or rashes  Neuro: No dizziness, headaches, stroke, syncope  Endocrine: No hot or cold intolerances  Hematological: No blood dyscrasias    Objective   Vitals:    11/11/24 0918   BP: 140/80   BP Location: Left arm   Pulse: 78   Temp: 97.7 °F (36.5 °C)   TempSrc: Temporal   SpO2: 97%   Weight: 103 kg (226 lb 3.2 oz)   Height: 165.1 cm (65\")     Body mass index is 37.64 kg/m².         PHYSICAL EXAM  General: age appropriate well nourished well appearing, no acute distress  Head: normocephalic and atraumatic  Global assessment-supple  Neck-No JVD noted, no lymphadenopathy  Pulmonary-clear to auscultation bilaterally, normal respiratory effort  Cardiovascular-normal rate and rhythm, normal heart sounds, S1 and S2 noted  Abdomen-soft, non tender, non distended, normal bowel sounds all 4 quadrants, no hepatosplenomegaly noted  Extremities-No clubbing cyanosis or edema  Neuro-Non focal, converses appropriately, awake, alert, oriented    Assessment & Plan     Diagnoses and all orders for this visit:    1. Hx of adenomatous colonic polyps (Primary)  -     Case Request; Standing  -     Case Request  -     polyethylene glycol (GoLYTELY) 236 g solution; Take as directed by office instructions.  Dispense: 4000 mL; Refill: 0    2. Nonsmoker    3. HTN (hypertension), benign  Comments:  cont BP medication the day of procedure    4. Anticoagulated  Comments:  Dr Salas to hold Eliquis she takes due to hx of Afib. in April 2024 ablation    5. Family hx of colon cancer    Other orders  -     Implement Anesthesia Orders Day of Procedure; Standing  -     Follow Anesthesia Guidelines / Protocol; Future  -     Verify bowel prep was successful; Standing        COLONOSCOPY WITH ANESTHESIA (N/A)  Body mass index is 37.64 kg/m².    Patient instructions on prep prior to procedure provided to the patient.    All risks, benefits, alternatives, and indications of colonoscopy procedure have been " discussed with the patient. Risks to include perforation of the colon requiring possible surgery or colostomy, risk of bleeding from biopsies or removal of colon tissue, possibility of missing a colon polyp or cancer, or adverse drug reaction.  Benefits to include the diagnosis and management of disease of the colon and rectum. Alternatives to include barium enema, radiographic evaluation, lab testing or no intervention. Pt verbalizes understanding and agrees.     Clarice Ramirez, APRN  2024  09:40 CST      IF YOU SMOKE OR USE TOBACCO PLEASE READ THE FOLLOWIN minutes reading provided    Why is smoking bad for me?  Smoking increases the risk of heart disease, lung disease, vascular disease, stroke, and cancer.     If you smoke, STOP!    If you would like more information on quitting smoking, please visit the [employer] website: [email or web address]   This link will provide additional resources including the QUIT line and the Beat the Pack support groups.     For more information:    Quit Now [place]  -QUIT-NOW  [email or web address]

## 2024-11-19 ENCOUNTER — TELEPHONE (OUTPATIENT)
Dept: CARDIOLOGY CLINIC | Age: 84
End: 2024-11-19

## 2024-11-19 NOTE — TELEPHONE ENCOUNTER
Date: 1/7/25    Cardiologist: Gilmar    Procedure: Colonoscopy    Surgeon: Mic    Last Office Visit: 7/10/24  Reason for office visit and medical concerns addressed at this office visit: htn, hyperlipidemia, afib, ckd,     Testing Performed and Date of Service:  6/13/24 DCCV Impression:  Successful DC cardioversion of atrial fibrillation to normal sinus rhythm on Eliquis used for anticoagulation.  Continue amiodarone and follow-up with Dr. Delatorre with decision regarding continuance of amiodarone going forward.     Does the patient have a stent? If so, what type?  none    Current Medications: valsartan, metoprolol, levothyroxine, bumex, atorvastatin, eliquis, arimidex, allopurinol,     Is the patient currently taking an anticoagulant? If so, what is the diagnosis the patient has been given to warrant the need for the anticoagulant? Eliquis for afib    Additional Notes: requesting to hold eliquis

## 2024-12-10 DIAGNOSIS — I48.0 PAF (PAROXYSMAL ATRIAL FIBRILLATION) (HCC): ICD-10-CM

## 2024-12-11 RX ORDER — METOPROLOL SUCCINATE 25 MG/1
TABLET, EXTENDED RELEASE ORAL
Qty: 180 TABLET | Refills: 1 | Status: SHIPPED | OUTPATIENT
Start: 2024-12-11

## 2024-12-13 ENCOUNTER — HOSPITAL ENCOUNTER (OUTPATIENT)
Dept: BONE DENSITY | Facility: HOSPITAL | Age: 84
Discharge: HOME | End: 2024-12-13
Payer: MEDICARE

## 2024-12-13 DIAGNOSIS — Z78.0 POST-MENOPAUSAL: ICD-10-CM

## 2024-12-13 PROCEDURE — 77080 DXA BONE DENSITY AXIAL: CPT

## 2024-12-24 LAB — NCCN CRITERIA FLAG: NORMAL

## 2025-01-02 ENCOUNTER — LAB (OUTPATIENT)
Dept: LAB | Facility: HOSPITAL | Age: 85
End: 2025-01-02
Payer: MEDICARE

## 2025-01-02 DIAGNOSIS — C50.912 MALIGNANT NEOPLASM OF LEFT BREAST IN FEMALE, ESTROGEN RECEPTOR POSITIVE, UNSPECIFIED SITE OF BREAST: ICD-10-CM

## 2025-01-02 DIAGNOSIS — Z17.0 MALIGNANT NEOPLASM OF LEFT BREAST IN FEMALE, ESTROGEN RECEPTOR POSITIVE, UNSPECIFIED SITE OF BREAST: ICD-10-CM

## 2025-01-02 LAB
ALBUMIN SERPL-MCNC: 4.1 G/DL (ref 3.5–5.2)
ALBUMIN/GLOB SERPL: 1.5 G/DL
ALP SERPL-CCNC: 110 U/L (ref 39–117)
ALT SERPL W P-5'-P-CCNC: 13 U/L (ref 1–33)
ANION GAP SERPL CALCULATED.3IONS-SCNC: 11 MMOL/L (ref 5–15)
AST SERPL-CCNC: 19 U/L (ref 1–32)
BASOPHILS # BLD AUTO: 0.04 10*3/MM3 (ref 0–0.2)
BASOPHILS NFR BLD AUTO: 0.7 % (ref 0–1.5)
BILIRUB SERPL-MCNC: 0.6 MG/DL (ref 0–1.2)
BUN SERPL-MCNC: 20 MG/DL (ref 8–23)
BUN/CREAT SERPL: 16.3 (ref 7–25)
CALCIUM SPEC-SCNC: 9.3 MG/DL (ref 8.6–10.5)
CEA SERPL-MCNC: 2.04 NG/ML
CHLORIDE SERPL-SCNC: 104 MMOL/L (ref 98–107)
CO2 SERPL-SCNC: 26 MMOL/L (ref 22–29)
CREAT SERPL-MCNC: 1.23 MG/DL (ref 0.57–1)
DEPRECATED RDW RBC AUTO: 49.2 FL (ref 37–54)
EGFRCR SERPLBLD CKD-EPI 2021: 43.2 ML/MIN/1.73
EOSINOPHIL # BLD AUTO: 0.16 10*3/MM3 (ref 0–0.4)
EOSINOPHIL NFR BLD AUTO: 2.8 % (ref 0.3–6.2)
ERYTHROCYTE [DISTWIDTH] IN BLOOD BY AUTOMATED COUNT: 13.9 % (ref 12.3–15.4)
FERRITIN SERPL-MCNC: 121.6 NG/ML (ref 13–150)
GLOBULIN UR ELPH-MCNC: 2.7 GM/DL
GLUCOSE SERPL-MCNC: 123 MG/DL (ref 65–99)
HCT VFR BLD AUTO: 41.2 % (ref 34–46.6)
HGB BLD-MCNC: 13.4 G/DL (ref 12–15.9)
IMM GRANULOCYTES # BLD AUTO: 0.01 10*3/MM3 (ref 0–0.05)
IMM GRANULOCYTES NFR BLD AUTO: 0.2 % (ref 0–0.5)
IRON 24H UR-MRATE: 102 MCG/DL (ref 37–145)
IRON SATN MFR SERPL: 23 % (ref 20–50)
LYMPHOCYTES # BLD AUTO: 1.73 10*3/MM3 (ref 0.7–3.1)
LYMPHOCYTES NFR BLD AUTO: 30.3 % (ref 19.6–45.3)
MCH RBC QN AUTO: 31.4 PG (ref 26.6–33)
MCHC RBC AUTO-ENTMCNC: 32.5 G/DL (ref 31.5–35.7)
MCV RBC AUTO: 96.5 FL (ref 79–97)
MONOCYTES # BLD AUTO: 0.49 10*3/MM3 (ref 0.1–0.9)
MONOCYTES NFR BLD AUTO: 8.6 % (ref 5–12)
NEUTROPHILS NFR BLD AUTO: 3.28 10*3/MM3 (ref 1.7–7)
NEUTROPHILS NFR BLD AUTO: 57.4 % (ref 42.7–76)
PLATELET # BLD AUTO: 114 10*3/MM3 (ref 140–450)
PMV BLD AUTO: 11.2 FL (ref 6–12)
POTASSIUM SERPL-SCNC: 4 MMOL/L (ref 3.5–5.2)
PROT SERPL-MCNC: 6.8 G/DL (ref 6–8.5)
RBC # BLD AUTO: 4.27 10*6/MM3 (ref 3.77–5.28)
SODIUM SERPL-SCNC: 141 MMOL/L (ref 136–145)
TIBC SERPL-MCNC: 435 MCG/DL (ref 298–536)
TRANSFERRIN SERPL-MCNC: 292 MG/DL (ref 200–360)
WBC NRBC COR # BLD AUTO: 5.71 10*3/MM3 (ref 3.4–10.8)

## 2025-01-02 PROCEDURE — 83540 ASSAY OF IRON: CPT

## 2025-01-02 PROCEDURE — 86300 IMMUNOASSAY TUMOR CA 15-3: CPT

## 2025-01-02 PROCEDURE — 85025 COMPLETE CBC W/AUTO DIFF WBC: CPT

## 2025-01-02 PROCEDURE — 82728 ASSAY OF FERRITIN: CPT

## 2025-01-02 PROCEDURE — 80053 COMPREHEN METABOLIC PANEL: CPT

## 2025-01-02 PROCEDURE — 36415 COLL VENOUS BLD VENIPUNCTURE: CPT

## 2025-01-02 PROCEDURE — 82378 CARCINOEMBRYONIC ANTIGEN: CPT

## 2025-01-02 PROCEDURE — 84466 ASSAY OF TRANSFERRIN: CPT

## 2025-01-02 RX ORDER — VALSARTAN 160 MG/1
160 TABLET ORAL 2 TIMES DAILY
Qty: 180 TABLET | Refills: 1 | Status: SHIPPED | OUTPATIENT
Start: 2025-01-02

## 2025-01-03 ENCOUNTER — HOSPITAL ENCOUNTER (OUTPATIENT)
Dept: MAMMOGRAPHY | Facility: HOSPITAL | Age: 85
Discharge: HOME | End: 2025-01-03
Admitting: INTERNAL MEDICINE
Payer: MEDICARE

## 2025-01-03 DIAGNOSIS — C50.912 MALIGNANT NEOPLASM OF LEFT BREAST IN FEMALE, ESTROGEN RECEPTOR POSITIVE, UNSPECIFIED SITE OF BREAST: ICD-10-CM

## 2025-01-03 DIAGNOSIS — Z17.0 MALIGNANT NEOPLASM OF LEFT BREAST IN FEMALE, ESTROGEN RECEPTOR POSITIVE, UNSPECIFIED SITE OF BREAST: ICD-10-CM

## 2025-01-03 DIAGNOSIS — R92.8 OTHER ABNORMAL AND INCONCLUSIVE FINDINGS ON DIAGNOSTIC IMAGING OF BREAST: ICD-10-CM

## 2025-01-03 LAB — CANCER AG27-29 SERPL-ACNC: 31.3 U/ML (ref 0–38.6)

## 2025-01-03 PROCEDURE — 77063 BREAST TOMOSYNTHESIS BI: CPT

## 2025-01-03 PROCEDURE — 77067 SCR MAMMO BI INCL CAD: CPT

## 2025-01-07 RX ORDER — APIXABAN 5 MG/1
5 TABLET, FILM COATED ORAL 2 TIMES DAILY
Qty: 60 TABLET | Refills: 2 | Status: SHIPPED | OUTPATIENT
Start: 2025-01-07

## 2025-01-11 NOTE — PROGRESS NOTES
[facility] ONC [facility] GROUP HEMATOLOGY AND ONCOLOGY  [address]  [place] [facility] [address] [phone]    Patient Name: Heidi Ansari  Encounter Date: 01/16/2025  YOB: 1940  Patient Number: [id number]     REASON FOR VISIT: Heidi Ansari is an 84-year-old female who returns in follow-up of stage IIA invasive ductal/lobular breast carcinoma.  She has been on adjuvant Arimidex since 01/22/2020 (60 months).  She is here alone with her spouse [place] (previously with her daughter Fátima).     I have reviewed the HPI and verified with the patient the accuracy of it. No changes to interval history since the information was documented. Ender Franco MD 01/16/25      DIAGNOSTIC ABNORMALITIES:           1.   11/27/2019- palpable left breast lump x3 weeks.  Diagnostic mammogram and left breast ultrasound.  Impression: Spiculated mass approximately 2 cm upper outer quadrant of the left breast.  Oval well-circumscribed mass appears new in the lower slightly lateral left breast measuring approximately 10 mm.  Targeted left breast ultrasound at the 2 o'clock position 5 cm from nipple an irregular spiculated hypoechoic mass is seen measuring 1.7 x 1.5 x 1.6 cm.  Smaller adjacent hypoechoic irregular mass seen measuring 0.3 cm.  Third irregular hypoechoic mass at the 3 o'clock position measures 2.2 x 0.9 x 1.2 cm in size.  Ultrasound-guided biopsy recommended.  BI-RADS Category 5-highly suggestive of malignancy.  No mammographic evidence of right breast malignancy.          2.   12/06/2019- ultrasound-guided left breast biopsy.  Final diagnosis: 1.left breast mass 2 o'clock position: Core biopsy: Invasive ductal and lobular carcinoma.  Estimated grade 1, involving approximately 90% of the core biopsy specimens.  Focal ductal carcinoma in situ, nuclear grade 1.  Microcalcifications identified in invasive ductal carcinoma.  2.left breast mass 3 o'clock  position, core biopsy: Invasive lobular carcinoma, estimated grade 1, involving approximately 90% of the core biopsy specimens.  Microcalcifications not identified.          3.   12/26/2019- hemoglobin 12.3, hematocrit 37.2, MCV 93.9, platelets 90,000, WBC 6.81 with a normal differential.  CMP notable for a creatinine of 1.65 (GFR 30) otherwise normal.  Immunohistochemistries revealed: Estrogen +99 to 100%; progesterone +81 to 90%, HER-2 1-2+ (equivocal).  FISH: Negative (signal ratio: 1.2)          4.   01/17/2020-CT brain: Mild cerebral and cerebellar volume loss with chronic microvascular disease.  Focus of ill-defined hypodensity at the gray-white juncture in the right frontal lobe.  Site of previous infarction versus metastasis.  Suggest follow-up MRI with and without gadolinium.  No additional lesions present.          5.   01/17/2020- CT chest.  Groundglass nodule within the right upper lobe and left lower lobe.  Likely inflammatory in nature.  Coronary calcifications in the LAD and circumflex distribution.  Moderate cardiomegaly.  Postoperative changes left axilla from recent axillary dissection as well as mastectomy.  No enlarged mediastinal or axillary lymph nodes present.  Irregular nodule along the anterior margin of the medial left pectoralis major musculature within the mastectomy bed.  No adjacent inflammatory/postoperative stranding.  May represent postoperative seroma.  Recommend directed ultrasound over this area.          6.   01/17/2020-CT abdomen/pelvis.  Impression: Multiple hypodensities within the liver.  Favor hepatic cysts.  Recommend ultrasound for further evaluations.  Cannot be fully characterized without IV contrast.  Small cortical cyst of the right kidney suspected.  Postoperative changes of the right colon.  Multiple diverticula throughout the colon without evidence of diverticulitis or mechanical bowel obstruction.  Diastases of the abdominal musculature at the umbilicus.  Previous  umbilical hernia repair was performed with a mesh.  Residual recurrent tyra-umbilical hernia containing fat.  No evidence of herniated bowel loop.  7 mm groundglass nodule left lower lobe.          7.   01/22/2020-CMP notable for creatinine 1.6 and GFR 31 otherwise normal.  CEA 1.41, CA-27-29 22.8, ferritin 240.9, iron 88, iron saturation 23%, TIBC 390, RASHEL positive, homogenous/speckled pattern 1:80, otherwise negative reflex panel.  SPIEP negative (M spike not observed, EARLE: No monoclonality detected).  Hemoglobin 11.7, hematocrit 34.9, MCV 91.6, platelets 130,000, WBC 5.08 with a normal differential.  No schistocytes reported.          8.   02/05/2020- liver ultrasound.  Impression: Multiple hepatic cysts.  Additional tiny hepatic lesions are too small to characterize.          9.   02/05/2020- renal ultrasound.  Impression: Right renal cyst.  Otherwise negative exam.         10.  02/05/2020- chest ultrasound.  Impression: No abnormality identified in the left chest.  Area seen on recent CT exam may represent a postoperative seroma.         11.   02/20/2020- hemoglobin 12.2, hematocrit 36.4, MCV 91.9, platelets 147,000, WBC 6.11 with a normal differential.  CEA 1.37, CA-27-29 21.9.         12.   03/01/2020- Oncotype DX: Recurrence score:  0; distant recurrence risk at 9 years with AI or BARNHART alone: 3%; group average absolute chemotherapy benefit:< 1%         13.   06/02/2020- DEXA scan.  Impression: Osteopenia.  Low bone mass.  Bone density is between 1.0 and 2.5 standard deviations below the mean for young adult woman.  Increased risk for fracture in these patients.    PREVIOUS INTERVENTIONS:          1.   01/02/2020- left breast simple mastectomy with sentinel lymph node biopsy.  Final diagnosis: 1.left breast, left skin sparing mastectomy: Invasive ductal and lobular carcinoma, grade 1 (2.3 cm).  Associated low-grade ductal carcinoma in situ, solid type.  Margins of excision free of tumor.  Tumor approaches  closest deep margin and 1.6 cm.  2 axillary lymph nodes, negative for metastatic carcinoma.  2.left sentinel lymph node excision: One sentinel lymph node, negative for metastatic carcinoma (0/1).  AJCC pathologic stage: pT2, N0 (SN).    Synoptic checklist: Procedure: Total mastectomy.  Specimen laterality: Left.  Histologic type: Invasive carcinoma with ductal and lobular features (mixed type carcinoma).  Overall ndgndrndanddndend:nd nd2nd. Tumor size: Greatest dimension of largest invasive focus 23 mm.  Tumor focality: Single focus of invasive carcinoma.  DCIS: Present.  Negative for extensive intraductal component.  Grade 1.  Lobular carcinoma in situ: None in specimen.  Tumor extent: Lymphovascular invasion not identified.  Dermal lymphovascular invasion not identified.  Microcalcifications not identified.  Margins: Uninvolved by invasive carcinoma.  Lymph nodes: Number of lymph nodes examined: 3.  TNM classification: pT2, pN0 (SN).            2.   01/22/2020- adjuvant Arimidex 1 mg p.o. daily    LABS:  Lab Results - Last 18 Months   Lab Units 01/02/25  1018 06/26/24  1046 06/04/24  0736 03/22/24  0905 02/13/24  0713 01/30/24  0857 12/27/23  0857 12/21/23  1317   HEMOGLOBIN g/dL 13.4 13.4 14.6 14.0 13.4 13.2 13.0 12.6   HEMATOCRIT % 41.2 42.0 46.7 44.1 41.4 41.2 41.7 38.1   MCV fL 96.5 97.2* 99.2* 94.8 94.1 94.1 93.5 92.3   WBC 10*3/mm3 5.71 5.21 6.9 6.85 5.7 6.0 6.75 9.80   RDW % 13.9 13.6 14.4 14.6 14.9* 14.7* 13.8 12.9   MPV fL 11.2 11.2 11.7  --  12.5* 12.5* 11.4  --    PLATELETS 10*3/mm3 114* 116* 156 73* 102* 107* 170 111*   IMM GRAN % % 0.2 0.2  --   --   --   --  0.3  --    NEUTROS ABS 10*3/mm3 3.28 3.16  --  4.14  --   --  3.98 7.31*   LYMPHS ABS 10*3/mm3 1.73 1.52  --  2.04  --   --  2.11 1.84   MONOS ABS 10*3/mm3 0.49 0.39  --  0.48  --   --  0.48 0.52   EOS ABS 10*3/mm3 0.16 0.10  --  0.14  --   --  0.11 0.06   BASOS ABS 10*3/mm3 0.04 0.03  --  0.04  --   --  0.05 0.04   IMMATURE GRANS (ABS) 10*3/mm3 0.01 0.01  --    "--   --   --  0.02  --    NRBC /100 WBC  --   --   --  0.0  --   --  0.0 0.0       Lab Results - Last 18 Months   Lab Units 01/02/25  1018 06/26/24  1046 06/04/24  0736 03/22/24  0905 12/27/23  0857 12/21/23  1317 08/30/23  1400   GLUCOSE mg/dL 123* 109* 91 84 97 114* 101*   SODIUM mmol/L 141 144 144 143 142 142 141   POTASSIUM mmol/L 4.0 4.5 3.6 4.1 3.9 3.9 4.7   TOTAL CO2 mmol/L  --   --  30*  --   --   --   --    CO2 mmol/L 26.0 31.0*  --  25.4 26.0 25.4 23.7   CHLORIDE mmol/L 104 105 102 107 103 104 105   ANION GAP mmol/L 11.0 8.0 12  --  13.0  --   --    CREATININE mg/dL 1.23* 1.36* 1.2* 1.33* 1.51* 1.56* 2.46*   BUN mg/dL 20 19 21 22 20 21 42*   BUN / CREAT RATIO  16.3 14.0  --  16.5 13.2 13.5 17.1   CALCIUM mg/dL 9.3 9.5 9.7 9.1 9.4 9.2 9.6   ALK PHOS U/L 110 124* 118* 102 101  --   --    TOTAL PROTEIN g/dL 6.8 6.8 7.2  --  7.3  --   --    ALT (SGPT) U/L 13 13 11 17 10  --   --    AST (SGOT) U/L 19 21 20 22 19  --   --    BILIRUBIN mg/dL 0.6 0.6 0.8 0.8 0.4  --   --    ALBUMIN g/dL 4.1 4.1 4.3 4.2 4.2  --   --    GLOBULIN gm/dL 2.7 2.7  --   --  3.1  --   --        Lab Results - Last 18 Months   Lab Units 01/02/25  1018 06/26/24  1046 12/27/23  0857   CEA ng/mL 2.04 2.43 1.79       Lab Results - Last 18 Months   Lab Units 01/02/25  1018 06/26/24  1046 03/22/24  0905 12/27/23  0857   IRON mcg/dL 102 98  --  75   TIBC mcg/dL 435 431  --  432   IRON SATURATION (TSAT) % 23 23  --  17*   FERRITIN ng/mL 121.60 95.22  --  135.90   TSH uIU/mL  --   --  4.420*  --          PAST MEDICAL HISTORY:  ALLERGIES:  Allergies   Allergen Reactions    Bactrim [Sulfamethoxazole-Trimethoprim] Other (See Comments)     Patient has kidney disease (stage 4) shouldn't take Bactrim    Codeine Nausea Only    Percocet [Oxycodone-Acetaminophen] Nausea Only     Becomes, hot, nauseated, and made her head feel \"weird\".     CURRENT MEDICATIONS:  Outpatient Encounter Medications as of 1/16/2025   Medication Sig Dispense Refill    acetaminophen " (TYLENOL) 500 MG tablet Take 1 tablet by mouth Every 6 (Six) Hours As Needed for Mild Pain.      allopurinol (ZYLOPRIM) 100 MG tablet Take 1 tablet by mouth 2 (Two) Times a Day.  3    anastrozole (ARIMIDEX) 1 MG tablet Take 1 tablet by mouth once daily 30 tablet 0    apixaban (Eliquis) 5 MG tablet tablet Take 1 tablet by mouth 2 (Two) Times a Day.      atorvastatin (LIPITOR) 20 MG tablet Take 1 tablet by mouth Daily.      bumetanide (BUMEX) 0.5 MG tablet Take 1 tablet by mouth once daily 90 tablet 3    Calcium Carbonate-Vitamin D 600-10 MG-MCG per tablet Take 1 tablet by mouth 2 (Two) Times a Day. 60 tablet 11    carboxymethylcellulose (REFRESH PLUS) 0.5 % solution Administer 1 drop to both eyes 3 (Three) Times a Day As Needed for Dry Eyes.      levothyroxine (SYNTHROID, LEVOTHROID) 50 MCG tablet Take 1 tablet by mouth once daily 90 tablet 3    metoprolol succinate XL (TOPROL-XL) 25 MG 24 hr tablet Take 1 tablet by mouth 2 (Two) Times a Day.  1    polyethylene glycol (GoLYTELY) 236 g solution Take as directed by office instructions. 4000 mL 0    polyethylene glycol (MIRALAX) 17 GM/SCOOP powder Take 17 g by mouth Daily.      valsartan (DIOVAN) 160 MG tablet Take 1 tablet by mouth Daily.      [DISCONTINUED] anastrozole (ARIMIDEX) 1 MG tablet Take 1 tablet by mouth Daily. 30 tablet 11    [DISCONTINUED] fluticasone (FLONASE) 50 MCG/ACT nasal spray Administer 2 sprays into the nostril(s) as directed by provider Daily. 9.9 mL 2     No facility-administered encounter medications on file as of 1/16/2025.     Adult illnesses:  Hypothyroidism  Atrial fibrillation  Hyperlipidemia  Obesity  Chronic kidney disease stage 4 - Dr. Daniels    ADULT ILLNESSES:  Patient Active Problem List   Diagnosis Code    Hx of adenomatous colonic polyps Z86.0101    HTN (hypertension), benign I10    Family hx of colon cancer Z80.0    Breast cancer, left C50.912    Chronic anticoagulation Z79.01    Endometrial thickening on ultrasound R93.89     Hypothyroidism E03.9    Kidney disease N28.9    Mixed hyperlipidemia E78.2    Atrial fibrillation, controlled I48.91    PMB (postmenopausal bleeding) N95.0    Brain lesion G93.9    Non-tobacco user Z78.9    Anemia in stage 4 chronic kidney disease N18.4, D63.1    Atrophic vaginitis N95.2    Cardiomegaly I51.7    Hematuria R31.9    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, 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    Rheumatoid arthritis M06.9    Seborrheic keratosis L82.1    Thrombocytopenia D69.6    Vitamin D deficiency E55.9    Moderate mitral regurgitation by prior echocardiogram I34.0    Dizziness R42    Suspected cerebrovascular accident (CVA) R09.89    Lipoma of upper extremity D17.20    Class 2 obesity due to excess calories with body mass index (BMI) of 37.0 to 37.9 in adult E66.812, E66.09, Z68.37    Stage 3b chronic kidney disease N18.32     SURGERIES:  Past Surgical History:   Procedure Laterality Date    APPENDECTOMY      BREAST BIOPSY Left 2015    BREAST BIOPSY Right 12/31/2020    Procedure: RIGHT NEEDLE DIRECTED EXCISIONAL BREAST BIOPSY;  Surgeon: Nikki Zuniga MD;  Location:  [facility] OR;  Service: General;  Laterality: Right;    BREAST EXCISIONAL BIOPSY Left 2001    BREAST EXCISIONAL BIOPSY Left 2001    CARDIAC ABLATION  04/2024    CARPAL TUNNEL RELEASE Right     CATARACT EXTRACTION, BILATERAL      CHOLECYSTECTOMY      COLON SURGERY      Partial right    COLONOSCOPY      COLONOSCOPY N/A 10/17/2019    Dr. galindo-- One 5 mm adenomatous polyp in the transverse colon, removed with a cold snare. Resected and retrieved. - Diverticulosis in the sigmoid colon and in the descending colon    COLONOSCOPY W/ POLYPECTOMY  06/30/2016    2 Adenomatous polyps at 80 & 40 cm, Diverticulosis repeat exam in 3 years    EXCISION MASS ARM Bilateral 04/28/2023    Procedure: lipoma excision left hand and right arm;  Surgeon: Nikki Zuniga MD;  Location:  [facility] OR;  Service: General;  Laterality: Bilateral;    EYE SURGERY Bilateral     cataract extraction    EYE  SURGERY Left     detached retina    HERNIA REPAIR      JOINT REPLACEMENT  both knees 2-14-12 &10-30-12    MASTECTOMY Left 2019    MASTECTOMY W/ SENTINEL NODE BIOPSY Left 01/02/2020    Procedure: LEFT SIMPLE MASTECTOMY WITH SENTINEL NODE BIOPSY, INJECTION AND SCAN, RADIOLOGIST WILL INJECT;  Surgeon: Nikki Zuniga MD;  Location: [facility] OR;  Service: General    REPLACEMENT TOTAL KNEE Bilateral     THROAT SURGERY      duct    TONSILLECTOMY  8 or 9 yrs old    UMBILICAL HERNIA REPAIR  years ago     HEALTH MAINTENANCE ITEMS:  Health Maintenance Due   Topic Date Due    RSV Vaccine - Adults (1 - 1-dose 75+ series) Never done    COVID-19 Vaccine (6 - 2024-25 season) 09/01/2024    COLORECTAL CANCER SCREENING  10/17/2024    ANNUAL WELLNESS VISIT  03/25/2025       <no information>  Last Completed Colonoscopy       This patient has no relevant Health Maintenance data.          Immunization History   Administered Date(s) Administered    COVID-19 (MODERNA) 1st,2nd,3rd Dose Monovalent 02/25/2021, 03/25/2021, 10/26/2021    COVID-19 (MODERNA) Monovalent Original Booster 08/03/2022    COVID-19 (PFIZER) BIVALENT 12+YRS 01/11/2023    Fluad Quad 65+ 09/22/2020, 09/27/2021, 10/19/2022    Fluzone High-Dose 65+YRS 10/19/2022, 10/29/2024    Fluzone High-Dose 65+yrs 09/13/2023    Fluzone Quad >6mos (Multi-dose) 09/27/2021    Hep A / Hep B 07/10/2024, 08/12/2024    Influenza TIV (IM) 09/22/2015, 10/14/2016    Pneumococcal Conjugate 13-Valent (PCV13) 11/10/2016    Pneumococcal Polysaccharide (PPSV23) 11/02/2012    Shingrix 04/10/2018, 08/15/2018    Tdap 12/21/2020    Zostavax 04/23/2013     Last Completed Mammogram            Discontinued - MAMMOGRAM  Discontinued        Frequency changed to Never automatically (Topic No Longer Applies)    01/03/2025  Mammo Screening Modified With Tomosynthesis Right With CAD    12/27/2023  Mammo Screening Modified With Tomosynthesis Right With CAD    12/07/2022  Mammo Screening Modified With Tomosynthesis  "Right With CAD    12/06/2021  Mammo Diagnostic Digital Tomosynthesis Right With CAD    Only the first 5 history entries have been loaded, but more history exists.                      FAMILY HISTORY:  Family History   Problem Relation Age of Onset    Cancer Mother         colon    Hypertension Mother     Colon cancer Mother 70    Colon polyps Mother     No Known Problems Father     Hearing loss Maternal Grandmother     Vision loss Maternal Grandmother     Arthritis Maternal Grandmother     Breast cancer Neg Hx     BRCA 1/2 Neg Hx     Endometrial cancer Neg Hx     Ovarian cancer Neg Hx      SOCIAL HISTORY:  Social History     Socioeconomic History    Marital status:    Tobacco Use    Smoking status: Never    Smokeless tobacco: Never   Vaping Use    Vaping status: Never Used   Substance and Sexual Activity    Alcohol use: No    Drug use: No    Sexual activity: Not Currently     Partners: Male   Homemaker    REVIEW OF SYSTEMS:  Review of Systems   Constitutional:  Positive for fatigue (baseline/chronic unchanged). Negative for activity change and appetite change.        Manages her ADLs, including chores, errands and driving. Again says she is still up and about \"all the time.\"    Arimidex tolerance:  No worsening hot flashes, \"not worse,\" no new arthralgias. Is still taking daily   HENT:  Positive for postnasal drip (seasonal) and rhinorrhea (seasonal).    Eyes: Negative.    Respiratory:  Positive for shortness of breath (Baseline exertional dyspnea and occasional sob with routine activities.  \"I just pace myself.\").    Cardiovascular:  Positive for palpitations (Intermittent.  None since RF ablation 4/22/24 by Dr. Winters.  Is on maintenance Eliquis).   Gastrointestinal: Negative.  Negative for blood in stool.   Endocrine: Negative.    Genitourinary: Negative.    Musculoskeletal:  Positive for arthralgias (intermittent of the hands, left hip, right shoulder and knees inspite of prior TKRs bilaterally) and back " "pain (lower back \"soreness.\").   Allergic/Immunologic: Negative.    Neurological:  Positive for numbness (left foot at times, unchanged). Negative for dizziness (postural - when standing too abruptly. \"Not in awhile.\").   Hematological:  Bruises/bleeds easily (coumadin).   Psychiatric/Behavioral: Negative.     Breasts:  Says she is more diligent with self exams.  Has not noticed any changes      /78   Pulse 91   Temp 98.1 °F (36.7 °C) (Temporal)   Resp 18   Ht 165.1 cm (65\")   Wt 103 kg (227 lb)   SpO2 98%   BMI 37.77 kg/m²  Body surface area is 2.09 meters squared.  Pain Score    01/16/25 1531   PainSc: 0-No pain         Physical Exam:  Physical Exam   Constitutional: She is oriented to person, place, and time. No distress.   Pleasant, cooperative, obese, modestly kept elderly female.  Ambulatory.  ECOG 0.      She has regained 6 lb (in addition to 3 lb at her prior visit - had lost 5 lb at her 3 prior visits) since her last visit.   HENT:   Head: Normocephalic and atraumatic. Mouth/Throat: No oropharyngeal exudate.   Eyes: Pupils are equal, round, and reactive to light. Conjunctivae are normal. No scleral icterus.   Neck: No JVD present. No tracheal deviation present. No thyromegaly (on synthroid) present.   Cardiovascular: Normal rate.   Pulmonary/Chest: Effort normal and breath sounds normal. No stridor. No respiratory distress. She has no wheezes. She has no rales.   Chaperoned exam: Janelle Zungia MA (again)    Right breast no masses.  No associated axillary nor supraclavicular adenopathy.    Left mastectomy site well healed. No underlying nodularity.  No lymphedema of the left upper extremity.      No associated axillary nor supraclavicular adenopathy bilaterally.     Abdominal: Soft. Bowel sounds are normal. She exhibits no distension and no mass. There is no abdominal tenderness. There is no rebound and no guarding.   Musculoskeletal: Normal range of motion. Deformity (changes of " osteoarthritis of the hands) present. No swelling.      Right lower leg: Edema (1+ ankle) present.      Left lower leg: Edema (1+ ankle) present.   Lymphadenopathy:     She has no cervical adenopathy.        Right cervical: No superficial cervical, no deep cervical and no posterior cervical adenopathy present.       Left cervical: No superficial cervical, no deep cervical and no posterior cervical adenopathy present.        Right: No supraclavicular adenopathy present.        Left: No supraclavicular adenopathy present.   Neurological: She is alert and oriented to person, place, and time. No cranial nerve deficit.   Skin: Skin is warm and dry. No rash noted. No erythema. No pallor.   Psychiatric: Her behavior is normal. Mood, judgment and thought content normal.   Nursing note and vitals reviewed.    ASSESSMENT:   1. Invasive ductal/lobular (mixed) carcinoma:   Stage: IB (pT2, pN0 [sn], MX, G1) ER (+) %, RI (+) 81-90%, HER-2/kelby 1-2 + (IHC) - negative. HER-2 1-2+ (equivocal).  FISH: Negative (signal ratio: 1.2)  Tumor burden: 23 mm tumor in the left breast. 0/3 axillary sentinel lymph node negative for metastatic carcinoma. Nuclear grade 1.    Oncotype DX: Recurrence score:  0; distant recurrence risk at 9 years with AI or BARNHART alone: 3%; group average absolute chemotherapy benefit:< 1%  Complications of tumor: None.   Tumor status: Adjuvant Arimidex in progress.  11/30/2020-mammogram.  Right breast calcifications for which additional imaging recommended.  BI-RADS Category 0.  12/28/2020-stereotactic biopsy of right breast calcifications.  Pathology: Benign intraductal papilloma, 3 mm greatest dimension with associated microcalcifications.  01/04/2021-Right breast, lumpectomy with needle localization.  Final diagnosis: Extensive biopsy site changes including fat necrosis and fibrosis.  Benign fibrous breast parenchyma.  No evidence of in situ or invasive carcinoma.  1/3/2025-No mammographic evidence of  malignancy of the RIGHT breast. Recommend routine annual mammography.  BI-RADS CATEGORY 2: Benign findings.           2.    Chronic kidney disease, stage III - IV. Followed by nephrology - Dr. Daniels  --GFR 43.2, 1/2/2025 (prior:  GFR 17 - 38.5 mL/min).        3.    Hypothyroidism.  On Synthroid        4.    Normocytic anemia.  Contribution from chronic kidney disease suspected.  --Resolved.  Hgb 13.4; MCV 96.5, 1/2/2025 (prior: Hgb 10.9 -14.6; MCV 92 - 100).          5.    Abnormal CT scans of the head (see above) indicating ill-defined hypodensity at the gray-white juncture in the right frontal lobe, hepatic and right renal cysts.  Unable to perform MRI with contrast due to chronic kidney disease.            --03/23/2020-seen by Dr. Jaramillo.  Noncontrast brain MRI from 1/17/2020 reviewed.  Possibilities include prior stroke microvascular disease, demyelinating disease.  Risks of biopsy discussed.  Recommend serial imaging with biopsy of the lesion progresses.  Follow-up with MRI in 3 months.          --07/06/2020- MRI.  Mild atrophy of the brain.  Multiple foci of T2 abnormality involving the periventricular and higher white matter tracts.  Cortical lesions also present.  Overall stable from 03/2020.  Favor small vessel ischemic changes.  No evidence of restricted diffusion to suggest acute ischemia or infarct.  No mass-effect or shift in midline.          --01/06/2021-brain MRI.  No imaging evidence of intracranial metastatic disease.  Seen by Dr. Jaramillo of neurosurgery.  Favors microvascular disease.  Okay to discontinue imaging.        6.    (+) RASHEL with joint pains (hands). Has been seen by Dr. Caputo.        7.    Osteopenia (bone density 1.0 and 2.5 standard deviations below the mean on DEXA scan, 06/02/2020).    - On calcium and vitamin D. Followed by pcp. Addition of Fosamax being discussed.  She wants to ask Dr. Daniels about it first.  - 12/13/24- DEXA scan- Osteoporosis and high fracture  "risk.When compared with the previous study, there is interval bone loss.        8.    Paroxysmal atrial fibrillation.  Underwent RF--April, 2024 per Dr. Winters.  Dr. Salas follows        9.    Trivially elevated alk phos.  Resolved.         10.   Thrombocytopenia.  Mild.  Variable.  Stable.  MDS?  ITP?  -114,000, 1/2/2025 (prior charlette: 73,000).       PLAN:   1.  Apprised of the labs, 1/2/2025.  Note resolution of anemia and platelets of 116,000 (stable), and normal WBC.  GFR 43.2 (prior: 38.5), alk phos 110 otherwise normal CMP, normal (2.04) CEA, normal (25.5-prior: 17.6-25) CA-27-29, repleted iron levels. Arimidex tolerance discussed    2.  Review mammogram, 1/3/25 (above).  SUZE    3.  Review DEXA scan, 12/13/24 (above). Osteoporosis and high fracture risk.  Interval bone loss.  Patient states she is discussing the issue with Dr. Anderson.  \"I really do not want to go on Prolia if I can help it\"    4.   Reapppoint to pcp -Dr. Anderson Re:  Worsening osteoporosis    5.   Previously noted the positive RASHEL but negative reflex panel, negative SPIEP, repleted iron levels.  The liver ultrasound, renal ultrasound and chest ultrasound findings (above) are noted.  Oncotype DX recurrence score (above) noted (RS 0; absolute chemotherapy benefit < 1%).  DEXA scan shows osteopenia with increased risk of fracture.  Arimidex tolerance discussed.  So far so good.  6.  Breast cancer.  NCCN guidelines version 1.2020 reviewed.  4 ductal, lobular, mixed tumors, pT1, pT2, pT3 and pN0 with tumors greater than 0.5 cm- 21 gene RT-PCR assay.  Recurrence score less than 26-adjuvant endocrine therapy.  Recurrence score 26-30- adjuvant endocrine therapy or adjuvant chemotherapy followed by endocrine therapy.  Recurrence score greater than 31-adjuvant chemotherapy followed by endocrine therapy.  However limited data to make chemotherapy recommendations for those greater than 70 years of age.  7.  The rationale for adjuvant hormonal manipulation " with AI's are previously discussed. The potential risks (to include but not limited to: Hot flashes, asthenia, pain, arthralgia, arthritis, nausea/vomiting, headache, pharyngitis, depression, rash, hypertension, lymphedema, insomnia, edema, weight gain, dyspnea, abdominal pain, constipation, osteoporosis, fractures, cough, bone pain, diarrhea, breast pain, paresthesias, infection, cataracts, myalgias, thromboembolism, angina, endometrial carcinoma, myocardial infarction, stroke, anemia, leukopenia, erythema multiforme, Belle-Collins syndrome) are discussed at length. Questions answered. She agrees to press on with therapy.    8.  Rx:   Arimidex 1 mg p.o. daily, #90 x 11 RF   Calcium 1200 mg + 800 units D3  p.o. daily - otc  Stay off ferrous sulfate    9.   Return to the [place] office in 24 weeks with pre-office CBC and differential, iron, fe sat , ferritin, CMP, CEA and CA-27-29.       I spent ~ 44 minutes caring for Heidi on this date of service. This time includes time spent by me in the following activities: preparing for the visit, reviewing tests, performing a medically appropriate examination and/or evaluation, counseling and educating the patient/family/caregiver, ordering medications, tests, or procedures and documenting information in the medical record

## 2025-01-14 DIAGNOSIS — Z17.0 MALIGNANT NEOPLASM OF LEFT BREAST IN FEMALE, ESTROGEN RECEPTOR POSITIVE, UNSPECIFIED SITE OF BREAST: Primary | ICD-10-CM

## 2025-01-14 DIAGNOSIS — C50.912 MALIGNANT NEOPLASM OF LEFT BREAST IN FEMALE, ESTROGEN RECEPTOR POSITIVE, UNSPECIFIED SITE OF BREAST: Primary | ICD-10-CM

## 2025-01-14 RX ORDER — ANASTROZOLE 1 MG/1
1 TABLET ORAL DAILY
Qty: 30 TABLET | Refills: 0 | Status: SHIPPED | OUTPATIENT
Start: 2025-01-14 | End: 2025-01-16 | Stop reason: SDUPTHER

## 2025-01-16 ENCOUNTER — OFFICE VISIT (OUTPATIENT)
Dept: ONCOLOGY | Facility: CLINIC | Age: 85
End: 2025-01-16
Payer: MEDICARE

## 2025-01-16 VITALS
WEIGHT: 227 LBS | RESPIRATION RATE: 18 BRPM | HEART RATE: 91 BPM | OXYGEN SATURATION: 98 % | HEIGHT: 65 IN | TEMPERATURE: 98.1 F | DIASTOLIC BLOOD PRESSURE: 78 MMHG | BODY MASS INDEX: 37.82 KG/M2 | SYSTOLIC BLOOD PRESSURE: 116 MMHG

## 2025-01-16 DIAGNOSIS — Z17.0 MALIGNANT NEOPLASM OF LEFT BREAST IN FEMALE, ESTROGEN RECEPTOR POSITIVE, UNSPECIFIED SITE OF BREAST: ICD-10-CM

## 2025-01-16 DIAGNOSIS — C50.912 MALIGNANT NEOPLASM OF LEFT BREAST IN FEMALE, ESTROGEN RECEPTOR POSITIVE, UNSPECIFIED SITE OF BREAST: ICD-10-CM

## 2025-01-16 RX ORDER — ANASTROZOLE 1 MG/1
1 TABLET ORAL DAILY
Qty: 90 TABLET | Refills: 11 | Status: SHIPPED | OUTPATIENT
Start: 2025-01-16

## 2025-02-06 ENCOUNTER — ANESTHESIA (OUTPATIENT)
Dept: GASTROENTEROLOGY | Facility: HOSPITAL | Age: 85
End: 2025-02-06
Payer: MEDICARE

## 2025-02-06 ENCOUNTER — HOSPITAL ENCOUNTER (OUTPATIENT)
Facility: HOSPITAL | Age: 85
Setting detail: HOSPITAL OUTPATIENT SURGERY
Discharge: HOME | End: 2025-02-06
Attending: INTERNAL MEDICINE | Admitting: INTERNAL MEDICINE
Payer: MEDICARE

## 2025-02-06 ENCOUNTER — ANESTHESIA EVENT (OUTPATIENT)
Dept: GASTROENTEROLOGY | Facility: HOSPITAL | Age: 85
End: 2025-02-06
Payer: MEDICARE

## 2025-02-06 VITALS
WEIGHT: 227 LBS | BODY MASS INDEX: 37.82 KG/M2 | OXYGEN SATURATION: 95 % | SYSTOLIC BLOOD PRESSURE: 150 MMHG | DIASTOLIC BLOOD PRESSURE: 72 MMHG | HEIGHT: 65 IN | TEMPERATURE: 98 F | HEART RATE: 72 BPM | RESPIRATION RATE: 20 BRPM

## 2025-02-06 DIAGNOSIS — Z86.0101 HX OF ADENOMATOUS COLONIC POLYPS: ICD-10-CM

## 2025-02-06 PROCEDURE — 25010000002 PROPOFOL 10 MG/ML EMULSION

## 2025-02-06 PROCEDURE — 25010000002 LIDOCAINE PF 2% 2 % SOLUTION

## 2025-02-06 PROCEDURE — 45385 COLONOSCOPY W/LESION REMOVAL: CPT | Performed by: INTERNAL MEDICINE

## 2025-02-06 PROCEDURE — 88305 TISSUE EXAM BY PATHOLOGIST: CPT | Performed by: INTERNAL MEDICINE

## 2025-02-06 PROCEDURE — 25810000003 SODIUM CHLORIDE 0.9 % SOLUTION: Performed by: ANESTHESIOLOGY

## 2025-02-06 RX ORDER — LIDOCAINE HYDROCHLORIDE 10 MG/ML
0.5 INJECTION, SOLUTION EPIDURAL; INFILTRATION; INTRACAUDAL; PERINEURAL ONCE AS NEEDED
Status: DISCONTINUED | OUTPATIENT
Start: 2025-02-06 | End: 2025-02-06 | Stop reason: HOSPADM

## 2025-02-06 RX ORDER — LIDOCAINE HYDROCHLORIDE 20 MG/ML
INJECTION, SOLUTION EPIDURAL; INFILTRATION; INTRACAUDAL; PERINEURAL AS NEEDED
Status: DISCONTINUED | OUTPATIENT
Start: 2025-02-06 | End: 2025-02-06 | Stop reason: SURG

## 2025-02-06 RX ORDER — SODIUM CHLORIDE 0.9 % (FLUSH) 0.9 %
10 SYRINGE (ML) INJECTION AS NEEDED
Status: DISCONTINUED | OUTPATIENT
Start: 2025-02-06 | End: 2025-02-06 | Stop reason: HOSPADM

## 2025-02-06 RX ORDER — SODIUM CHLORIDE 9 MG/ML
500 INJECTION, SOLUTION INTRAVENOUS ONCE
Status: COMPLETED | OUTPATIENT
Start: 2025-02-06 | End: 2025-02-06

## 2025-02-06 RX ORDER — PROPOFOL 10 MG/ML
VIAL (ML) INTRAVENOUS AS NEEDED
Status: DISCONTINUED | OUTPATIENT
Start: 2025-02-06 | End: 2025-02-06 | Stop reason: SURG

## 2025-02-06 RX ADMIN — PROPOFOL 350 MG: 10 INJECTION, EMULSION INTRAVENOUS at 10:21

## 2025-02-06 RX ADMIN — SODIUM CHLORIDE 500 ML: 9 INJECTION, SOLUTION INTRAVENOUS at 08:49

## 2025-02-06 RX ADMIN — LIDOCAINE HYDROCHLORIDE 50 MG: 20 INJECTION, SOLUTION EPIDURAL; INFILTRATION; INTRACAUDAL; PERINEURAL at 10:21

## 2025-02-06 NOTE — ANESTHESIA PREPROCEDURE EVALUATION
Anesthesia Evaluation     no history of anesthetic complications:   NPO Solid Status: > 8 hours  NPO Liquid Status: > 2 hours           Airway   Mallampati: II  No difficulty expected  Dental      Pulmonary    Cardiovascular   Exercise tolerance: poor (<4 METS)    (+) hypertension, valvular problems/murmurs MR, dysrhythmias (s/p ablation) Atrial Fib, hyperlipidemia    ROS comment: Echo 5/2024  Left Ventricle: Low normal left ventricular systolic function with a   visually estimated EF of 50 - 55%. Left ventricle size is normal.   Moderately increased wall thickness.   ·  Aortic Valve: Trileaflet valve. Moderately thickened cusp. Mild   regurgitation.   · Mitral Valve: Mildly thickened leaflet. Mildly calcified leaflet.   Moderate annular calcification at the posterior leaflet of the mitral   valve. Moderate to severe regurgitation.   ·  Tricuspid Valve: Moderate regurgitation. RVSP estimated at 40 mmHg.   ·  Left Atrium: Left atrium is moderately dilated.   ·  Image quality is good.         Neuro/Psych  (-) seizures, TIA, CVA  GI/Hepatic/Renal/Endo    (+) obesity, renal disease- CRI, thyroid problem hypothyroidism    Musculoskeletal     Abdominal    Substance History      OB/GYN          Other   arthritis,                   Anesthesia Plan    ASA 3     MAC     intravenous induction     Anesthetic plan, risks, benefits, and alternatives have been provided, discussed and informed consent has been obtained with: patient.    CODE STATUS:

## 2025-02-06 NOTE — H&P
[facility] Gastroenterology  Pre Procedure History & Physical    Chief Complaint:   Polyps    Subjective     HPI:   Here for colonoscopy.  History of polyps    Past Medical History:   Past Medical History:   Diagnosis Date    A-fib     Allergic codiene, percocet, bactrim    Arthritis     Arthritis     generalized    Asthma some shortness of breath    Atrial fibrillation     INTERMITTENT    Basal cell carcinoma     between eyes     Breast cancer     Cancer     Cataract 2007,2011    Cholelithiasis 8-03-06 surgery    Colon polyp removed    Disease of thyroid gland     Diverticulosis     Elevated cholesterol     Heart murmur a fib    Hx of colonic polyp     Hyperlipidemia     Hypertension     Kidney disease     STAGE 4    Kidney disease     Osteopenia left hip    Renal insufficiency some years ago    Urinary tract infection occasionally    Visual impairment blurry vision       Past Surgical History:  Past Surgical History:   Procedure Laterality Date    APPENDECTOMY      BREAST BIOPSY Left 2015    BREAST BIOPSY Right 12/31/2020    Procedure: RIGHT NEEDLE DIRECTED EXCISIONAL BREAST BIOPSY;  Surgeon: Nikki Zuniga MD;  Location: [facility] OR;  Service: General;  Laterality: Right;    BREAST EXCISIONAL BIOPSY Left 2001    BREAST EXCISIONAL BIOPSY Left 2001    CARDIAC ABLATION  04/2024    CARPAL TUNNEL RELEASE Right     CATARACT EXTRACTION, BILATERAL      CHOLECYSTECTOMY      COLON SURGERY      Partial right    COLONOSCOPY      COLONOSCOPY N/A 10/17/2019    Dr. galindo-- One 5 mm adenomatous polyp in the transverse colon, removed with a cold snare. Resected and retrieved. - Diverticulosis in the sigmoid colon and in the descending colon    COLONOSCOPY W/ POLYPECTOMY  06/30/2016    2 Adenomatous polyps at 80 & 40 cm, Diverticulosis repeat exam in 3 years    EXCISION MASS ARM Bilateral 04/28/2023    Procedure: lipoma excision left hand and right arm;  Surgeon: Nikki Zuniga MD;  Location:  [facility] OR;  Service: General;   Laterality: Bilateral;    EYE SURGERY Bilateral     cataract extraction    EYE SURGERY Left     detached retina    HERNIA REPAIR      JOINT REPLACEMENT  both knees 2-14-12 &10-30-12    MASTECTOMY Left 2019    MASTECTOMY W/ SENTINEL NODE BIOPSY Left 01/02/2020    Procedure: LEFT SIMPLE MASTECTOMY WITH SENTINEL NODE BIOPSY, INJECTION AND SCAN, RADIOLOGIST WILL INJECT;  Surgeon: Nikki Zuniga MD;  Location: [facility] OR;  Service: General    REPLACEMENT TOTAL KNEE Bilateral     THROAT SURGERY      duct    TONSILLECTOMY  8 or 9 yrs old    UMBILICAL HERNIA REPAIR  years ago       Family History:  Family History   Problem Relation Age of Onset    Cancer Mother         colon    Hypertension Mother     Colon cancer Mother 70    Colon polyps Mother     No Known Problems Father     Hearing loss Maternal Grandmother     Vision loss Maternal Grandmother     Arthritis Maternal Grandmother     Breast cancer Neg Hx     BRCA 1/2 Neg Hx     Endometrial cancer Neg Hx     Ovarian cancer Neg Hx        Social History:   reports that she has never smoked. She has never used smokeless tobacco. She reports that she does not drink alcohol and does not use drugs.    Medications:   Prior to Admission medications    Medication Sig Start Date End Date Taking? Authorizing Provider   acetaminophen (TYLENOL) 500 MG tablet Take 1 tablet by mouth Every 6 (Six) Hours As Needed for Mild Pain.   Yes Ceci Alcala MD   allopurinol (ZYLOPRIM) 100 MG tablet Take 1 tablet by mouth 2 (Two) Times a Day. 5/29/19  Yes Ceci Alcala MD   anastrozole (ARIMIDEX) 1 MG tablet Take 1 tablet by mouth Daily. 1/16/25  Yes Ender Franco MD   atorvastatin (LIPITOR) 20 MG tablet Take 1 tablet by mouth Daily. 1/31/24  Yes Ceci Alcala MD   bumetanide (BUMEX) 0.5 MG tablet Take 1 tablet by mouth once daily 2/5/24  Yes José Miguel Anderson MD   Calcium Carbonate-Vitamin D 600-10 MG-MCG per tablet Take 1 tablet by mouth 2 (Two) Times a Day.  "1/16/25  Yes Ender Franco MD   carboxymethylcellulose (REFRESH PLUS) 0.5 % solution Administer 1 drop to both eyes 3 (Three) Times a Day As Needed for Dry Eyes.   Yes Ceci Alcala MD   levothyroxine (SYNTHROID, LEVOTHROID) 50 MCG tablet Take 1 tablet by mouth once daily 3/25/24  Yes José Miguel Anderson MD   metoprolol succinate XL (TOPROL-XL) 25 MG 24 hr tablet Take 1 tablet by mouth 2 (Two) Times a Day. 5/28/19  Yes Ceci Alcala MD   polyethylene glycol (MIRALAX) 17 GM/SCOOP powder Take 17 g by mouth Daily.   Yes Ceci Alcala MD   valsartan (DIOVAN) 160 MG tablet Take 1 tablet by mouth Daily. 6/6/23  Yes Ceci Alcala MD   apixaban (Eliquis) 5 MG tablet tablet Take 1 tablet by mouth 2 (Two) Times a Day.    Ceci Alcala MD   polyethylene glycol (GoLYTELY) 236 g solution Take as directed by office instructions. 11/11/24 2/6/25  Clarice Ramirez APRN       Allergies:  Bactrim [sulfamethoxazole-trimethoprim], Codeine, and Percocet [oxycodone-acetaminophen]    Objective     Blood pressure 172/94, pulse 84, temperature 98 °F (36.7 °C), temperature source Temporal, resp. rate 20, height 165.1 cm (65\"), weight 103 kg (227 lb), SpO2 94%, not currently breastfeeding.    Physical Exam   Constitutional: Pt is oriented to person, place, and in no distress.   HENT: Mouth/Throat: Oropharynx is clear.   Cardiovascular: Normal rate, regular rhythm.    Pulmonary/Chest: Effort normal. No respiratory distress. No  wheezes.   Abdominal: Soft. Non-distended.  Skin: Skin is warm and dry.   Psychiatric: Mood, memory, affect and judgment appear normal.     Assessment & Plan     Diagnosis:  History of polyps    Anticipated Surgical Procedure:    Proceed with colonoscopy as scheduled    The following major R/B/A were discussed with the patient, however the list is not all inclusive . Risk:  Bleeding (immediate and delayed), perforation (rupture or tear), reaction to medication, missed " lesion/cancer, pain during the procedure, infection, need for surgery, need for ostomy, need for mechanical ventilation (breathing machine), death.  Benefits: removal of polyp/tissue, burn/clip/or inject to stop bleeding, removal of foreign body, dilate any stricture.  Alternatives: Xray or CT, surgery, do nothing with associated risk   The patient was given time to ask question and received explanation, and agrees to proceed as per History and Physical.   No guarantee given or expressed.    EMR Dragon/transcription disclaimer: Much of this encounter note is an electronic transcription/translation of spoken language to printed text.  The electronic translation of spoken language may permit erroneous, or at times, nonsensical words or phrases to be inadvertently transcribed.  Although I have reviewed the note for such errors, some may still exist.    Rigoberto Shaw MD  10:12 CST  2/6/2025

## 2025-02-06 NOTE — ANESTHESIA POSTPROCEDURE EVALUATION
"Patient: Heidi Ansari    Procedure Summary       Date: 02/06/25 Room / Location: [facility] ENDOSCOPY 2 / [facility]    Anesthesia Start: 1018 Anesthesia Stop: 1040    Procedure: COLONOSCOPY WITH ANESTHESIA Diagnosis:       Hx of adenomatous colonic polyps      (Hx of adenomatous colonic polyps [Z86.0101])    Surgeons: Rigoberto Shaw MD Provider: Eric Ruffin CRNA    Anesthesia Type: MAC ASA Status: 3            Anesthesia Type: MAC    Vitals  Vitals Value Taken Time   BP     Temp     Pulse 79 02/06/25 1040   Resp     SpO2 87 % 02/06/25 1040   Vitals shown include unfiled device data.        Post Anesthesia Care and Evaluation    Patient location during evaluation: PHASE II  Patient participation: complete - patient participated  Level of consciousness: awake and alert  Pain management: adequate    Airway patency: patent  Anesthetic complications: No anesthetic complications  PONV Status: none  Cardiovascular status: acceptable  Respiratory status: acceptable  Hydration status: acceptable    Comments: Blood pressure 172/94, pulse 84, temperature 98 °F (36.7 °C), temperature source Temporal, resp. rate 20, height 165.1 cm (65\"), weight 103 kg (227 lb), SpO2 94%, not currently breastfeeding.    No anesthesia care post op    "

## 2025-02-09 LAB
CYTO UR: NORMAL
LAB AP CASE REPORT: NORMAL
Lab: NORMAL
PATH REPORT.FINAL DX SPEC: NORMAL
PATH REPORT.GROSS SPEC: NORMAL

## 2025-02-26 DIAGNOSIS — I87.303 STASIS EDEMA OF BOTH LOWER EXTREMITIES: ICD-10-CM

## 2025-02-26 RX ORDER — BUMETANIDE 0.5 MG/1
TABLET ORAL
Qty: 90 TABLET | Refills: 3 | Status: SHIPPED | OUTPATIENT
Start: 2025-02-26

## 2025-03-10 ENCOUNTER — OFFICE VISIT (OUTPATIENT)
Dept: CARDIOLOGY CLINIC | Age: 85
End: 2025-03-10
Payer: MEDICARE

## 2025-03-10 VITALS
SYSTOLIC BLOOD PRESSURE: 128 MMHG | HEART RATE: 86 BPM | OXYGEN SATURATION: 96 % | HEIGHT: 66 IN | BODY MASS INDEX: 37.28 KG/M2 | WEIGHT: 232 LBS | DIASTOLIC BLOOD PRESSURE: 76 MMHG

## 2025-03-10 DIAGNOSIS — I10 HYPERTENSION, UNSPECIFIED TYPE: ICD-10-CM

## 2025-03-10 DIAGNOSIS — I34.0 NONRHEUMATIC MITRAL VALVE REGURGITATION: ICD-10-CM

## 2025-03-10 DIAGNOSIS — I34.0 MODERATE MITRAL REGURGITATION BY PRIOR ECHOCARDIOGRAM: ICD-10-CM

## 2025-03-10 DIAGNOSIS — I48.0 PAF (PAROXYSMAL ATRIAL FIBRILLATION) (HCC): Primary | ICD-10-CM

## 2025-03-10 DIAGNOSIS — E78.2 MIXED HYPERLIPIDEMIA: ICD-10-CM

## 2025-03-10 PROCEDURE — 3074F SYST BP LT 130 MM HG: CPT | Performed by: NURSE PRACTITIONER

## 2025-03-10 PROCEDURE — 3078F DIAST BP <80 MM HG: CPT | Performed by: NURSE PRACTITIONER

## 2025-03-10 PROCEDURE — 1159F MED LIST DOCD IN RCRD: CPT | Performed by: NURSE PRACTITIONER

## 2025-03-10 PROCEDURE — 99214 OFFICE O/P EST MOD 30 MIN: CPT | Performed by: NURSE PRACTITIONER

## 2025-03-10 PROCEDURE — 1160F RVW MEDS BY RX/DR IN RCRD: CPT | Performed by: NURSE PRACTITIONER

## 2025-03-10 PROCEDURE — 1123F ACP DISCUSS/DSCN MKR DOCD: CPT | Performed by: NURSE PRACTITIONER

## 2025-03-10 NOTE — PATIENT INSTRUCTIONS
New instructions for today:  Eliquis can increase your risk of bleeding.  If you notice blood in urine or stool, bleeding gums, excessive bruising or cough productive of bloody sputum, notify the office.  Information on this blood thinner has been included in your after visit summary.    Patient Instructions:  Continue current medications as prescribed.   Always keep a current medication list. Bring your medications to every office visit.   Continue to follow up with primary care provider for non cardiac medical problems.  Call the office with any problems, questions or concerns at [phone].  If you have been asked to keep a blood pressure log, do so for 2 weeks. Call the office to report readings to the triage nurse at [phone].  Follow up with cardiologist as scheduled.  The following educational material has been included in this after visit summary for your review: Life simple 7.  Heart health.     Life simple 7  1) Manage blood pressure - high blood pressure is a major risk factor for heart disease and stroke. Keeping blood pressure in health range reduces strain on your heart, arteries and kidneys.  Blood pressure goal is less than 130/80.   2) Control cholesterol - contributes to plaque, which can clog arteries and lead to heart disease and stroke. When you control your cholesterol you are giving your arteries their best chance to remain clear.  It is recommended that you get cholesterol lab work done once a year.  3) Reduce blood sugar - most of the food we eat is turning into glucose or blood sugar that our body uses for energy.  Over time, high levels of blood sugar can damage your heart, kidneys, eyes and nerves.  4) Get active - living an active life is one of the most rewarding gifts you can give yourself and those you love.  Simply put, daily physical activity increases your length and quality of life. Strive to exercise 15 minutes most days of the week.  5)  Eat better - A healthy diet is one

## 2025-03-10 NOTE — PROGRESS NOTES
[facility]MACKENZIEur[facility] LindseyC[facility]  [address], [address], [place] [facility] [place]  [address]  [phone] office  [phone] fax      OFFICE VISIT:  3/10/2025    Aniya Huff - : 1940  Reason For Visit:  Aniya is a 84 y.o. female who is here for 6 Month Follow-Up (Patient has been more SOB the last couple months./Swelling in feet and ankles in the last month.///)    History:   Diagnosis Orders   1. PAF (paroxysmal atrial fibrillation) (HCC)        2. Hypertension, unspecified type        3. Moderate mitral regurgitation by prior echocardiogram        4. Mixed hyperlipidemia        5. Nonrheumatic mitral valve regurgitation  Echo (TTE) complete (PRN contrast/bubble/strain/3D)        The patient presents today for cardiology follow up.   She is an 84 year old with the following history:  1.  Paroxysmal atrial fibrillation on Amiodarone and Coumadin (unsuccessful DCCV on Multaq 2024, initiated on amiodarone), repeat DCCV 2024, A-fib ablation 2024 with recurrent atrial fibrillation, normal LV ejection fraction.  2.  CKD stage IV, baseline creatinine 2.7.  3.  Left breast cancer 2020 status post mastectomy, on Arimidex  4.  New mild to moderate aortic stenosis by echo 2023 (mean gradient 18 mmHg).    The patient had AF ablation by Dr. Delatorre in .  Her last DCCV was in  by Dr. Schafer.  The patient continues on Eliquis with no report of bleeding issues or falling.  She has not sensed arrhythmia.    The patient feels her shortness of breath with activity is some worse. She continues on Toprol XL.  She reports minimal palpitations on occasion. The patient states \"my low back aches a lot.  My doctor wants me to start Prolia and I have a lot of concerns about that at my age.\" The patient's PCP monitors cholesterol.      Subjective  Aniya denies exertional chest pain, orthopnea, paroxysmal nocturnal dyspnea, syncope, presyncope, sensed arrhythmia,

## 2025-03-18 ENCOUNTER — HOSPITAL ENCOUNTER (OUTPATIENT)
Age: 85
Discharge: HOME | End: 2025-03-20
Payer: MEDICARE

## 2025-03-18 VITALS
SYSTOLIC BLOOD PRESSURE: 128 MMHG | WEIGHT: 232 LBS | DIASTOLIC BLOOD PRESSURE: 76 MMHG | HEIGHT: 65 IN | BODY MASS INDEX: 38.65 KG/M2

## 2025-03-18 DIAGNOSIS — I10 HTN (HYPERTENSION), BENIGN: Chronic | ICD-10-CM

## 2025-03-18 DIAGNOSIS — I34.0 NONRHEUMATIC MITRAL VALVE REGURGITATION: ICD-10-CM

## 2025-03-18 DIAGNOSIS — E55.9 VITAMIN D DEFICIENCY: ICD-10-CM

## 2025-03-18 DIAGNOSIS — E78.2 MIXED HYPERLIPIDEMIA: ICD-10-CM

## 2025-03-18 DIAGNOSIS — E03.9 HYPOTHYROIDISM, UNSPECIFIED TYPE: ICD-10-CM

## 2025-03-18 LAB
25(OH)D3+25(OH)D2 SERPL-MCNC: 44.7 NG/ML (ref 30–100)
ALBUMIN SERPL-MCNC: 4 G/DL (ref 3.5–5.2)
ALBUMIN/GLOB SERPL: 1.5 G/DL
ALP SERPL-CCNC: 112 U/L (ref 39–117)
ALT SERPL-CCNC: 15 U/L (ref 1–33)
APPEARANCE UR: ABNORMAL
AST SERPL-CCNC: 25 U/L (ref 1–32)
BACTERIA #/AREA URNS HPF: ABNORMAL /HPF
BASOPHILS # BLD AUTO: 0.03 10*3/MM3 (ref 0–0.2)
BASOPHILS NFR BLD AUTO: 0.5 % (ref 0–1.5)
BILIRUB SERPL-MCNC: 0.5 MG/DL (ref 0–1.2)
BILIRUB UR QL STRIP: NEGATIVE
BUN SERPL-MCNC: 20 MG/DL (ref 8–23)
BUN/CREAT SERPL: 14.1 (ref 7–25)
CALCIUM SERPL-MCNC: 9.5 MG/DL (ref 8.6–10.5)
CASTS URNS MICRO: ABNORMAL
CHLORIDE SERPL-SCNC: 103 MMOL/L (ref 98–107)
CHOLEST SERPL-MCNC: 150 MG/DL (ref 0–200)
CO2 SERPL-SCNC: 25.1 MMOL/L (ref 22–29)
COLOR UR: YELLOW
CREAT SERPL-MCNC: 1.42 MG/DL (ref 0.57–1)
ECHO AO ASC DIAM: 2.6 CM
ECHO AO ASCENDING AORTA INDEX: 1.23 CM/M2
ECHO AO ROOT DIAM: 1.4 CM
ECHO AO ROOT INDEX: 0.66 CM/M2
ECHO AO SINUS VALSALVA DIAM: 2.4 CM
ECHO AO SINUS VALSALVA INDEX: 1.14 CM/M2
ECHO AO ST JNCT DIAM: 2.2 CM
ECHO AR MAX VEL PISA: 2.9 M/S
ECHO AV AREA PEAK VELOCITY: 1 CM2
ECHO AV AREA VTI: 1.1 CM2
ECHO AV AREA/BSA PEAK VELOCITY: 0.5 CM2/M2
ECHO AV AREA/BSA VTI: 0.5 CM2/M2
ECHO AV MEAN GRADIENT: 19 MMHG
ECHO AV MEAN VELOCITY: 2.1 M/S
ECHO AV PEAK GRADIENT: 29 MMHG
ECHO AV PEAK VELOCITY: 2.7 M/S
ECHO AV REGURGITANT PHT: 806 MS
ECHO AV VELOCITY RATIO: 0.3
ECHO AV VTI: 66.4 CM
ECHO BSA: 2.2 M2
ECHO EST RA PRESSURE: 3 MMHG
ECHO IVC PROX: 1.4 CM
ECHO LA AREA 2C: 19.7 CM2
ECHO LA AREA 4C: 19.9 CM2
ECHO LA DIAMETER INDEX: 2.04 CM/M2
ECHO LA DIAMETER: 4.3 CM
ECHO LA MAJOR AXIS: 5.4 CM
ECHO LA MINOR AXIS: 5.5 CM
ECHO LA TO AORTIC ROOT RATIO: 3.07
ECHO LA VOL BP: 58 ML (ref 22–52)
ECHO LA VOL MOD A2C: 58 ML (ref 22–52)
ECHO LA VOL MOD A4C: 58 ML (ref 22–52)
ECHO LA VOL/BSA BIPLANE: 27 ML/M2 (ref 16–34)
ECHO LA VOLUME INDEX MOD A2C: 27 ML/M2 (ref 16–34)
ECHO LA VOLUME INDEX MOD A4C: 27 ML/M2 (ref 16–34)
ECHO LV E' LATERAL VELOCITY: 8.05 CM/S
ECHO LV E' SEPTAL VELOCITY: 5.44 CM/S
ECHO LV EDV A2C: 107 ML
ECHO LV EDV A4C: 104 ML
ECHO LV EDV INDEX A4C: 49 ML/M2
ECHO LV EDV NDEX A2C: 51 ML/M2
ECHO LV EF PHYSICIAN: 60 %
ECHO LV EJECTION FRACTION A2C: 62 %
ECHO LV EJECTION FRACTION A4C: 60 %
ECHO LV EJECTION FRACTION BIPLANE: 62 % (ref 55–100)
ECHO LV ESV A2C: 41 ML
ECHO LV ESV A4C: 41 ML
ECHO LV ESV INDEX A2C: 19 ML/M2
ECHO LV ESV INDEX A4C: 19 ML/M2
ECHO LV FRACTIONAL SHORTENING: 26 % (ref 28–44)
ECHO LV INTERNAL DIMENSION DIASTOLE INDEX: 2.23 CM/M2
ECHO LV INTERNAL DIMENSION DIASTOLIC: 4.7 CM (ref 3.9–5.3)
ECHO LV INTERNAL DIMENSION SYSTOLIC INDEX: 1.66 CM/M2
ECHO LV INTERNAL DIMENSION SYSTOLIC: 3.5 CM
ECHO LV IVSD: 1.4 CM (ref 0.6–0.9)
ECHO LV MASS 2D: 251.4 G (ref 67–162)
ECHO LV MASS INDEX 2D: 119.1 G/M2 (ref 43–95)
ECHO LV POSTERIOR WALL DIASTOLIC: 1.3 CM (ref 0.6–0.9)
ECHO LV RELATIVE WALL THICKNESS RATIO: 0.55
ECHO LVOT AREA: 3.1 CM2
ECHO LVOT AV VTI INDEX: 0.34
ECHO LVOT DIAM: 2 CM
ECHO LVOT MEAN GRADIENT: 2 MMHG
ECHO LVOT PEAK GRADIENT: 3 MMHG
ECHO LVOT PEAK VELOCITY: 0.8 M/S
ECHO LVOT STROKE VOLUME INDEX: 33.2 ML/M2
ECHO LVOT SV: 70 ML
ECHO LVOT VTI: 22.3 CM
ECHO MV A VELOCITY: 0.72 M/S
ECHO MV AREA VTI: 2.4 CM2
ECHO MV E DECELERATION TIME (DT): 187 MS
ECHO MV E VELOCITY: 0.79 M/S
ECHO MV E/A RATIO: 1.1
ECHO MV E/E' LATERAL: 9.81
ECHO MV E/E' RATIO (AVERAGED): 12.17
ECHO MV E/E' SEPTAL: 14.52
ECHO MV LVOT VTI INDEX: 1.31
ECHO MV MAX VELOCITY: 0.9 M/S
ECHO MV MEAN GRADIENT: 2 MMHG
ECHO MV MEAN VELOCITY: 0.6 M/S
ECHO MV PEAK GRADIENT: 3 MMHG
ECHO MV REGURGITANT PEAK GRADIENT: 92 MMHG
ECHO MV REGURGITANT PEAK VELOCITY: 4.8 M/S
ECHO MV REGURGITANT VTIA: 183 CM
ECHO MV VTI: 29.2 CM
ECHO RA AREA 4C: 9.3 CM2
ECHO RA END SYSTOLIC VOLUME APICAL 4 CHAMBER INDEX BSA: 8 ML/M2
ECHO RA VOLUME: 16 ML
ECHO RIGHT VENTRICULAR SYSTOLIC PRESSURE (RVSP): 35 MMHG
ECHO RV BASAL DIMENSION: 3.3 CM
ECHO RV INTERNAL DIMENSION: 3.3 CM
ECHO RV LONGITUDINAL DIMENSION: 5.4 CM
ECHO RV MID DIMENSION: 2.2 CM
ECHO RV TAPSE: 1.5 CM (ref 1.7–?)
ECHO TV REGURGITANT MAX VELOCITY: 2.83 M/S
ECHO TV REGURGITANT PEAK GRADIENT: 32 MMHG
EGFRCR SERPLBLD CKD-EPI 2021: 36.3 ML/MIN/1.73
EOSINOPHIL # BLD AUTO: 0.17 10*3/MM3 (ref 0–0.4)
EOSINOPHIL NFR BLD AUTO: 2.6 % (ref 0.3–6.2)
EPI CELLS #/AREA URNS HPF: ABNORMAL /HPF
ERYTHROCYTE [DISTWIDTH] IN BLOOD BY AUTOMATED COUNT: 12.7 % (ref 12.3–15.4)
GLOBULIN SER CALC-MCNC: 2.6 GM/DL
GLUCOSE SERPL-MCNC: 101 MG/DL (ref 65–99)
GLUCOSE UR QL STRIP: NEGATIVE
HCT VFR BLD AUTO: 40.5 % (ref 34–46.6)
HDLC SERPL-MCNC: 43 MG/DL (ref 40–60)
HGB BLD-MCNC: 13.1 G/DL (ref 12–15.9)
HGB UR QL STRIP: ABNORMAL
IMM GRANULOCYTES # BLD AUTO: 0.02 10*3/MM3 (ref 0–0.05)
IMM GRANULOCYTES NFR BLD AUTO: 0.3 % (ref 0–0.5)
KETONES UR QL STRIP: NEGATIVE
LDLC SERPL CALC-MCNC: 75 MG/DL (ref 0–100)
LEUKOCYTE ESTERASE UR QL STRIP: ABNORMAL
LYMPHOCYTES # BLD AUTO: 1.89 10*3/MM3 (ref 0.7–3.1)
LYMPHOCYTES NFR BLD AUTO: 28.5 % (ref 19.6–45.3)
MCH RBC QN AUTO: 32.1 PG (ref 26.6–33)
MCHC RBC AUTO-ENTMCNC: 32.3 G/DL (ref 31.5–35.7)
MCV RBC AUTO: 99.3 FL (ref 79–97)
MONOCYTES # BLD AUTO: 0.49 10*3/MM3 (ref 0.1–0.9)
MONOCYTES NFR BLD AUTO: 7.4 % (ref 5–12)
NEUTROPHILS # BLD AUTO: 4.04 10*3/MM3 (ref 1.7–7)
NEUTROPHILS NFR BLD AUTO: 60.7 % (ref 42.7–76)
NITRITE UR QL STRIP: NEGATIVE
PH UR STRIP: 6 [PH] (ref 5–8)
PLATELET # BLD AUTO: 186 10*3/MM3 (ref 140–450)
POTASSIUM SERPL-SCNC: 4.3 MMOL/L (ref 3.5–5.2)
PROT SERPL-MCNC: 6.6 G/DL (ref 6–8.5)
PROT UR QL STRIP: ABNORMAL
RBC # BLD AUTO: 4.08 10*6/MM3 (ref 3.77–5.28)
RBC #/AREA URNS HPF: ABNORMAL /HPF
SODIUM SERPL-SCNC: 142 MMOL/L (ref 136–145)
SP GR UR STRIP: 1.02 (ref 1–1.03)
TRIGL SERPL-MCNC: 192 MG/DL (ref 0–150)
TSH SERPL DL<=0.005 MIU/L-ACNC: 3.99 UIU/ML (ref 0.27–4.2)
UROBILINOGEN UR STRIP-MCNC: ABNORMAL MG/DL
VLDLC SERPL CALC-MCNC: 32 MG/DL (ref 5–40)
WBC # BLD AUTO: 6.64 10*3/MM3 (ref 3.4–10.8)
WBC #/AREA URNS HPF: ABNORMAL /HPF

## 2025-03-18 PROCEDURE — C8929 TTE W OR WO FOL WCON,DOPPLER: HCPCS

## 2025-03-18 PROCEDURE — 93306 TTE W/DOPPLER COMPLETE: CPT | Performed by: INTERNAL MEDICINE

## 2025-03-18 PROCEDURE — 6360000004 HC RX CONTRAST MEDICATION: Performed by: NURSE PRACTITIONER

## 2025-03-18 RX ADMIN — SULFUR HEXAFLUORIDE 2 ML: 60.7; .19; .19 INJECTION, POWDER, LYOPHILIZED, FOR SUSPENSION INTRAVENOUS; INTRAVESICAL at 12:02

## 2025-03-24 ENCOUNTER — RESULTS FOLLOW-UP (OUTPATIENT)
Age: 85
End: 2025-03-24

## 2025-03-24 RX ORDER — ATORVASTATIN CALCIUM 20 MG/1
20 TABLET, FILM COATED ORAL DAILY
Qty: 90 TABLET | Refills: 0 | Status: SHIPPED | OUTPATIENT
Start: 2025-03-24

## 2025-03-26 ENCOUNTER — OFFICE VISIT (OUTPATIENT)
Dept: INTERNAL MEDICINE | Facility: CLINIC | Age: 85
End: 2025-03-26
Payer: MEDICARE

## 2025-03-26 VITALS
HEART RATE: 76 BPM | HEIGHT: 65 IN | TEMPERATURE: 97.9 F | SYSTOLIC BLOOD PRESSURE: 156 MMHG | DIASTOLIC BLOOD PRESSURE: 80 MMHG | OXYGEN SATURATION: 96 % | BODY MASS INDEX: 38.15 KG/M2 | WEIGHT: 229 LBS

## 2025-03-26 DIAGNOSIS — N18.32 STAGE 3B CHRONIC KIDNEY DISEASE: ICD-10-CM

## 2025-03-26 DIAGNOSIS — I48.91 ATRIAL FIBRILLATION, CONTROLLED: ICD-10-CM

## 2025-03-26 DIAGNOSIS — E03.9 HYPOTHYROIDISM, UNSPECIFIED TYPE: ICD-10-CM

## 2025-03-26 DIAGNOSIS — I10 HTN (HYPERTENSION), BENIGN: ICD-10-CM

## 2025-03-26 DIAGNOSIS — E66.01 CLASS 2 SEVERE OBESITY DUE TO EXCESS CALORIES WITH SERIOUS COMORBIDITY AND BODY MASS INDEX (BMI) OF 38.0 TO 38.9 IN ADULT: ICD-10-CM

## 2025-03-26 DIAGNOSIS — E78.2 MIXED HYPERLIPIDEMIA: ICD-10-CM

## 2025-03-26 DIAGNOSIS — E66.812 CLASS 2 SEVERE OBESITY DUE TO EXCESS CALORIES WITH SERIOUS COMORBIDITY AND BODY MASS INDEX (BMI) OF 38.0 TO 38.9 IN ADULT: ICD-10-CM

## 2025-03-26 DIAGNOSIS — E55.9 VITAMIN D DEFICIENCY: Primary | ICD-10-CM

## 2025-03-26 PROBLEM — N18.4 ANEMIA IN STAGE 4 CHRONIC KIDNEY DISEASE: Status: RESOLVED | Noted: 2020-06-22 | Resolved: 2025-03-26

## 2025-03-26 PROBLEM — D63.1 ANEMIA IN STAGE 4 CHRONIC KIDNEY DISEASE: Status: RESOLVED | Noted: 2020-06-22 | Resolved: 2025-03-26

## 2025-03-26 RX ORDER — LEVOTHYROXINE SODIUM 50 UG/1
50 TABLET ORAL DAILY
Qty: 90 TABLET | Refills: 3 | Status: SHIPPED | OUTPATIENT
Start: 2025-03-26

## 2025-03-26 NOTE — PROGRESS NOTES
Subjective   The ABCs of the Annual Wellness Visit  Medicare Wellness Visit      Heidi Ansari is a 84 y.o. patient who presents for a Medicare Wellness Visit.    The following portions of the patient's history were reviewed and   updated as appropriate: allergies, current medications, past family history, past medical history, past social history, past surgical history, and problem list.    Compared to one year ago, the patient's physical   health is the same.  Some things slightly worse.  Compared to one year ago, the patient's mental   health is the same.    Recent Hospitalizations:  She was not admitted to the hospital during the last year.     Current Medical Providers:  Patient Care Team:  José Miguel Anderson MD as PCP - General (Internal Medicine)  Nikki Zuniga MD as Surgeon (General Surgery)    Outpatient Medications Prior to Visit   Medication Sig Dispense Refill    acetaminophen (TYLENOL) 500 MG tablet Take 1 tablet by mouth Every 6 (Six) Hours As Needed for Mild Pain.      allopurinol (ZYLOPRIM) 100 MG tablet Take 1 tablet by mouth 2 (Two) Times a Day.  3    anastrozole (ARIMIDEX) 1 MG tablet Take 1 tablet by mouth Daily. 90 tablet 11    apixaban (Eliquis) 5 MG tablet tablet Take 1 tablet by mouth 2 (Two) Times a Day.      atorvastatin (LIPITOR) 20 MG tablet Take 1 tablet by mouth Daily.      bumetanide (BUMEX) 0.5 MG tablet Take 1 tablet by mouth once daily 90 tablet 3    Calcium Carbonate-Vitamin D 600-10 MG-MCG per tablet Take 1 tablet by mouth 2 (Two) Times a Day. 60 tablet 11    carboxymethylcellulose (REFRESH PLUS) 0.5 % solution Administer 1 drop to both eyes 3 (Three) Times a Day As Needed for Dry Eyes.      levothyroxine (SYNTHROID, LEVOTHROID) 50 MCG tablet Take 1 tablet by mouth once daily 90 tablet 3    metoprolol succinate XL (TOPROL-XL) 25 MG 24 hr tablet Take 1 tablet by mouth 2 (Two) Times a Day.  1    polyethylene glycol (MIRALAX) 17 GM/SCOOP powder Take 17 g by mouth  "Daily.      valsartan (DIOVAN) 160 MG tablet Take 1 tablet by mouth Daily.       No facility-administered medications prior to visit.     No opioid medication identified on active medication list. I have reviewed chart for other potential  high risk medication/s and harmful drug interactions in the elderly.      Aspirin is not on active medication list.  Aspirin use is not indicated based on review of current medical condition/s. Risk of harm outweighs potential benefits.  .    Patient Active Problem List   Diagnosis    Hx of adenomatous colonic polyps    HTN (hypertension), benign    Family hx of colon cancer    Breast cancer, left    Chronic anticoagulation    Endometrial thickening on ultrasound    Hypothyroidism    Kidney disease    Mixed hyperlipidemia    Atrial fibrillation, controlled    PMB (postmenopausal bleeding)    Brain lesion    Non-tobacco user    Atrophic vaginitis    Cardiomegaly    Hematuria    Myxedema heart disease    Rheumatoid arthritis    Seborrheic keratosis    Thrombocytopenia    Vitamin D deficiency    Moderate mitral regurgitation by prior echocardiogram    Dizziness    Suspected cerebrovascular accident (CVA)    Lipoma of upper extremity    Class 2 severe obesity due to excess calories with serious comorbidity and body mass index (BMI) of 38.0 to 38.9 in adult    Stage 3b chronic kidney disease     Advance Care Planning Advance Directive is not on file.  ACP discussion was held with the patient during this visit. Patient has an advance directive (not in EMR), copy requested.            Objective   Vitals:    03/26/25 1054   BP: 156/80  Comment: Took her meds about an hour and a half ago.   BP Location: Left arm   Patient Position: Sitting   Cuff Size: Adult   Pulse: 76   Temp: 97.9 °F (36.6 °C)   TempSrc: Temporal   SpO2: 96%   Weight: 104 kg (229 lb)   Height: 165.1 cm (65\")   PainSc: 0-No pain       Estimated body mass index is 38.11 kg/m² as calculated from the following:    Height as " "of this encounter: 165.1 cm (65\").    Weight as of this encounter: 104 kg (229 lb).                Does the patient have evidence of cognitive impairment? No  Lab Results   Component Value Date    CHLPL 150 03/18/2025    TRIG 192 (H) 03/18/2025    HDL 43 03/18/2025    LDL 75 03/18/2025    VLDL 32 03/18/2025                                                                                               Health  Risk Assessment    Smoking Status:  Social History     Tobacco Use   Smoking Status Never   Smokeless Tobacco Never     Alcohol Consumption:  Social History     Substance and Sexual Activity   Alcohol Use Never       Fall Risk Screen  STEADI Fall Risk Assessment was completed, and patient is at LOW risk for falls.Assessment completed on:3/26/2025    Depression Screening   Little interest or pleasure in doing things? Not at all   Feeling down, depressed, or hopeless? Not at all   PHQ-2 Total Score 0      Health Habits and Functional and Cognitive Screening:      3/26/2025    11:00 AM   Functional & Cognitive Status   Do you have difficulty preparing food and eating? No   Do you have difficulty bathing yourself, getting dressed or grooming yourself? No   Do you have difficulty using the toilet? No   Do you have difficulty moving around from place to place? No   Do you have trouble with steps or getting out of a bed or a chair? No   Current Diet Well Balanced Diet   Dental Exam Up to date   Eye Exam Up to date   Exercise (times per week) 0 times per week   Current Exercises Include No Regular Exercise   Do you need help using the phone?  No   Are you deaf or do you have serious difficulty hearing?  No   Do you need help to go to places out of walking distance? No   Do you need help shopping? No   Do you need help preparing meals?  No   Do you need help with housework?  No   Do you need help with laundry? No   Do you need help taking your medications? No   Do you need help managing money? No   Do you ever drive or " ride in a car without wearing a seat belt? No   Have you felt unusual stress, anger or loneliness in the last month? No   Who do you live with? Spouse   If you need help, do you have trouble finding someone available to you? No   Have you been bothered in the last four weeks by sexual problems? No   Do you have difficulty concentrating, remembering or making decisions? Yes           Age-appropriate Screening Schedule:  Refer to the list below for future screening recommendations based on patient's age, sex and/or medical conditions. Orders for these recommended tests are listed in the plan section. The patient has been provided with a written plan.    Health Maintenance List  Health Maintenance   Topic Date Due    RSV Vaccine - Adults (1 - 1-dose 75+ series) Never done    COVID-19 Vaccine (6 - 2024-25 season) 09/01/2024    LIPID PANEL  03/18/2026    ANNUAL WELLNESS VISIT  03/26/2026    DXA SCAN  12/13/2026    COLORECTAL CANCER SCREENING  02/06/2030    TDAP/TD VACCINES (2 - Td or Tdap) 12/21/2030    INFLUENZA VACCINE  Completed    Pneumococcal Vaccine 50+  Completed    ZOSTER VACCINE  Completed    MAMMOGRAM  Discontinued                                                                                                                                                 CMS Preventative Services Quick Reference  Risk Factors Identified During Encounter  Immunizations Discussed/Encouraged: RSV (Respiratory Syncytial Virus)  Dental Screening Recommended  Vision Screening Recommended    The above risks/problems have been discussed with the patient.  Pertinent information has been shared with the patient in the After Visit Summary.  An After Visit Summary and PPPS were made available to the patient.    Follow Up:   Next Medicare Wellness visit to be scheduled in 1 year.        Diagnoses and all orders for this visit:    1. Vitamin D deficiency (Primary)    2. HTN (hypertension), benign    3. Stage 3b chronic kidney disease    4.  Class 2 severe obesity due to excess calories with serious comorbidity and body mass index (BMI) of 38.0 to 38.9 in adult    5. Mixed hyperlipidemia    6. Atrial fibrillation, controlled    7. Hypothyroidism, unspecified type        Recommend at least annual dental and vision screening.  Recommend annual influenza vaccination  Recommend a varied diet and appropriate portion sizes.   CDC recommendations for physical activity:  At least 150 minutes a week (for example, 30 minutes a day, 5 days a week) of moderate-intensity activity such as brisk walking. Or can consider 75 minutes a week of vigorous-intensity activity such as hiking, jogging, or running.  At least 2 days a week of activities that strengthen muscles.  Plus activities to improve balance.  Likely unable to do the above amount of exercise, but do want patient to increase activity and be intentional about it.      Health Maintenance Due   Topic Date Due    RSV Vaccine - Adults (1 - 1-dose 75+ series) Never done    COVID-19 Vaccine (6 - 2024-25 season) 09/01/2024     See below for other discussion  Reviewed labs with patient  She has seen cardiology and nephrology recently.        Result Review :           Follow Up:   Return in about 6 months (around 9/26/2025), or if symptoms worsen or fail to improve.     An After Visit Summary and PPPS were made available to the patient.                   CRISTIANO Anderson MD, FACP, [facility]      Electronically signed by José Miguel Anderson MD, 03/26/25, 11:21 AM CDT.    Additional E&M Note during same encounter follows:  Patient has additional, significant, and separately identifiable condition(s)/problem(s) that require work above and beyond the Medicare Wellness Visit       Chief Complaint  Medicare Wellness-subsequent, Ear Fullness (R ear along with some pain, that comes and goes), Back Pain (Lower back for about a month.), and Shortness of Breath (Has gotten worse in the past couple of months. More so if she goes into [employer] to shop, has to have a cart to hold on to.  Sits down when she needs to.)    Subjective        HPI  Heidi Ansari is also being seen today for above issues  Patient here for the above problems.  See Assessment and Plan for further HPI components.        Objective   Vitals:    03/26/25 1054   BP: 156/80  Comment: Took her meds about an hour and a half ago.   BP Location: Left arm   Patient Position: Sitting   Cuff Size: Adult   Pulse: 76   Temp: 97.9 °F (36.6 °C)   TempSrc: Temporal   SpO2: 96%   Weight: 104 kg (229 lb)   Height: 165.1 cm (65\")   PainSc: 0-No pain     Physical Exam  Vitals and nursing note reviewed.   Constitutional:       Appearance: She is obese. She is not ill-appearing.   HENT:      Ears:      Comments: Mild clear fluid behind ear, no purulence  Eyes:      General: No scleral icterus.     Conjunctiva/sclera: Conjunctivae normal.   Cardiovascular:      Rate and Rhythm: Normal rate and regular rhythm.      Heart sounds: Murmur heard.   Pulmonary:      Effort: Pulmonary effort is normal. No respiratory distress.   Skin:     General: Skin is warm.      Coloration: Skin is not pale.   Neurological:      General: No focal deficit present.      Mental Status: She is alert and oriented to person, place, and time.   Psychiatric:         Mood and Affect: Mood normal.         Behavior: Behavior normal.              The following data was reviewed by: José Miguel Anderson MD on 03/26/2025:  CMP          6/26/2024    10:46 1/2/2025    10:18 3/18/2025    08:09   CMP   Glucose 109  123  101    BUN 19  20  20    Creatinine 1.36  1.23  1.42    EGFR 38.5  43.2  36.3    Sodium 144  141  142    Potassium 4.5  4.0  4.3    Chloride 105  104  103    Calcium 9.5  9.3  9.5    Total Protein 6.8  6.8  6.6    Albumin 4.1  4.1  4.0    Globulin 2.7  2.7  2.6    Total Bilirubin 0.6  0.6  0.5    Alkaline Phosphatase 124  110  112    AST (SGOT) 21  19  25    ALT (SGPT) 13  13  15    Albumin/Globulin Ratio 1.5  1.5  1.5  "   BUN/Creatinine Ratio 14.0  16.3  14.1    Anion Gap 8.0  11.0       CBC w/diff          6/26/2024    10:46 1/2/2025    10:18 3/18/2025    08:09   CBC w/Diff   WBC 5.21  5.71  6.64    RBC 4.32  4.27  4.08    Hemoglobin 13.4  13.4  13.1    Hematocrit 42.0  41.2  40.5    MCV 97.2  96.5  99.3    MCH 31.0  31.4  32.1    MCHC 31.9  32.5  32.3    RDW 13.6  13.9  12.7    Platelets 116  114  186    Neutrophil Rel % 60.6  57.4  60.7    Immature Granulocyte Rel % 0.2  0.2     Lymphocyte Rel % 29.2  30.3  28.5    Monocyte Rel % 7.5  8.6  7.4    Eosinophil Rel % 1.9  2.8  2.6    Basophil Rel % 0.6  0.7  0.5      Lipid Panel          3/18/2025    08:09   Lipid Panel   Total Cholesterol 150    Triglycerides 192    HDL Cholesterol 43    VLDL Cholesterol 32    LDL Cholesterol  75      TSH          3/18/2025    08:09   TSH   TSH 3.990        UA          3/18/2025    08:09   Urinalysis   Blood, UA Trace    Leukocytes, UA See below:    Nitrite, UA Negative    RBC, UA 0-2    Bacteria, UA Comment               Assessment and Plan   Diagnoses and all orders for this visit:    1. Vitamin D deficiency (Primary)    2. HTN (hypertension), benign    3. Stage 3b chronic kidney disease    4. Class 2 severe obesity due to excess calories with serious comorbidity and body mass index (BMI) of 38.0 to 38.9 in adult    5. Mixed hyperlipidemia    6. Atrial fibrillation, controlled    7. Hypothyroidism, unspecified type      History of Present Illness  The patient is an 84-year-old female who presents today for follow-up.    She reports experiencing intermittent, mild back pain, which she attributes to arthritis. The pain is typically triggered by physical activities such as walking or household chores.    She has been experiencing worsening shortness of breath, which improves upon rest. She does not experience any associated chest pain. She has consulted with all her physicians regarding this issue. She had a consultation with Dr. Pena 2 weeks  ago and has an upcoming appointment with Dr. Edward on 06/06/2025. She also underwent an echocardiogram, which revealed a stable leaky valve. She occasionally experiences missed heartbeats, but it is uncertain whether this is related to her atrial fibrillation. She has a history of ablation and several shocks.    She had a sinus infection in 09/2024, for which she was prescribed fluticasone and Mucinex. She used these medications consistently for a couple of months but discontinued them due to the development of hoarseness and loss of her singing voice. She suspects that the nasal spray may have caused drainage down her throat. She now uses Mucinex only when she experiences ear pain, which she finds effective in drying up her nose and relieving her ear symptoms.    She has kidney disease and is under the care of Dr. Daniels. She reports no burning sensation during urination but mentions a slight internal sensation.    MEDICATIONS  Current: Fluticasone, Mucinex    Assessment & Plan  1. Back pain.  The patient's back pain is likely due to arthritis, as evidenced by an x-ray taken a few years ago showing degenerative disk disease at L4-L5 and spondylolisthesis. She was advised to take Tylenol before engaging in activities that may exacerbate the pain. Additionally, the use of lidocaine patches across the middle of her back was recommended for days when she anticipates increased activity.    2. Shortness of breath.  The patient's shortness of breath may be related to her cardiac condition, specifically diastolic dysfunction and mild valve issues. Her weight has remained stable over the winter period. A Holter monitor may be considered to assess for atrial fibrillation. Pulmonary function tests were discussed as a potential diagnostic tool to rule out lung-related issues. She was advised to gradually increase her activity level and monitor her response. If symptoms persist, pulmonary function tests may be  conducted.  She would like to hold on PFTs at present  Consider holter or stress test with her cardiologist for dyspnea on exertion.    Reviewed recent echo from [facility]    3. Sinus infection.  The patient's hoarseness and loss of singing voice could be attributed to either the nasal spray or postnasal drainage. The use of hot teas with honey and voice rest was suggested. She was also advised to avoid excessive consumption of soft drinks. The use of a neti pot or Navage was recommended to help clear her sinuses. Mucinex may also be beneficial.    4. Kidney disease.  The patient's GFR has remained relatively stable between 36-39, placing her in the 3B category. She was advised to maintain adequate hydration. If she experiences any symptoms or complications, she should inform us so that an antibiotic can be prescribed.    5. Health Maintenance.  The patient was reminded to consider getting the RSV vaccine, which is available at the pharmacy and covered by Medicare.         Follow Up   Return in about 6 months (around 9/26/2025), or if symptoms worsen or fail to improve.  Patient was given instructions and counseling regarding her condition or for health maintenance advice. Please see specific information pulled into the AVS if appropriate.   Patient or patient representative verbalized consent for the use of Ambient Listening during the visit with  José Miguel Anderson MD for chart documentation. 3/26/2025  12:42 CDT

## 2025-04-25 ENCOUNTER — OFFICE VISIT (OUTPATIENT)
Dept: INTERNAL MEDICINE | Facility: CLINIC | Age: 85
End: 2025-04-25
Payer: MEDICARE

## 2025-04-25 VITALS
TEMPERATURE: 98.4 F | WEIGHT: 230 LBS | HEART RATE: 86 BPM | RESPIRATION RATE: 18 BRPM | OXYGEN SATURATION: 96 % | DIASTOLIC BLOOD PRESSURE: 90 MMHG | BODY MASS INDEX: 38.32 KG/M2 | HEIGHT: 65 IN | SYSTOLIC BLOOD PRESSURE: 140 MMHG

## 2025-04-25 DIAGNOSIS — J30.2 SEASONAL ALLERGIES: ICD-10-CM

## 2025-04-25 DIAGNOSIS — B96.89 ACUTE BACTERIAL SINUSITIS: Primary | ICD-10-CM

## 2025-04-25 DIAGNOSIS — I48.91 HISTORY OF CARDIAC ABLATION FOR ATRIAL FIBRILLATION: ICD-10-CM

## 2025-04-25 DIAGNOSIS — Z98.890 HISTORY OF CARDIAC ABLATION FOR ATRIAL FIBRILLATION: ICD-10-CM

## 2025-04-25 DIAGNOSIS — J01.90 ACUTE BACTERIAL SINUSITIS: Primary | ICD-10-CM

## 2025-04-25 DIAGNOSIS — I10 ESSENTIAL HYPERTENSION: ICD-10-CM

## 2025-04-25 DIAGNOSIS — N18.32 STAGE 3B CHRONIC KIDNEY DISEASE: ICD-10-CM

## 2025-04-25 LAB
EXPIRATION DATE: NORMAL
EXPIRATION DATE: NORMAL
FLUAV AG UPPER RESP QL IA.RAPID: NOT DETECTED
FLUBV AG UPPER RESP QL IA.RAPID: NOT DETECTED
INTERNAL CONTROL: NORMAL
INTERNAL CONTROL: NORMAL
Lab: NORMAL
Lab: NORMAL
S PYO AG THROAT QL: NEGATIVE
SARS-COV-2 AG UPPER RESP QL IA.RAPID: NOT DETECTED

## 2025-04-25 RX ORDER — PREDNISONE 20 MG/1
20 TABLET ORAL DAILY
Qty: 3 TABLET | Refills: 0 | Status: SHIPPED | OUTPATIENT
Start: 2025-04-25

## 2025-04-25 RX ORDER — CETIRIZINE HYDROCHLORIDE 5 MG/1
5 TABLET ORAL DAILY
Qty: 30 TABLET | Refills: 1 | Status: SHIPPED | OUTPATIENT
Start: 2025-04-25

## 2025-04-25 RX ORDER — CALCIUM CARBONATE/VITAMIN D3 600 MG-10
1 TABLET ORAL EVERY 12 HOURS SCHEDULED
COMMUNITY
Start: 2025-04-11

## 2025-04-25 NOTE — PROGRESS NOTES
"    Chief Complaint  Cough (Patient states cough started Wednesday and has gotten worse each day.), Sore Throat (Patient states her throat has started feeling raw.), and Sinus Problem (Patient states she has sinus congestion.)    Subjective        Heidi Ansari presents to [facility]  Cough  Associated symptoms include a sore throat.   Sore Throat   Associated symptoms include coughing.   Sinus Problem  Associated symptoms include coughing and a sore throat.   See below.     Objective   Vital Signs:  /90 (BP Location: Left arm, Patient Position: Sitting, Cuff Size: Large Adult)   Pulse 86   Temp 98.4 °F (36.9 °C) (Infrared)   Resp 18   Ht 165.1 cm (65\")   Wt 104 kg (230 lb)   SpO2 96%   BMI 38.27 kg/m²   Estimated body mass index is 38.27 kg/m² as calculated from the following:    Height as of this encounter: 165.1 cm (65\").    Weight as of this encounter: 104 kg (230 lb).           Physical Exam  Constitutional:       Comments: Seen and discussed with her .   HENT:      Head: Normocephalic and atraumatic.      Right Ear: Tympanic membrane and ear canal normal.      Left Ear: Tympanic membrane and ear canal normal.      Nose: Congestion and rhinorrhea present.      Mouth/Throat:      Mouth: Mucous membranes are moist.      Pharynx: Posterior oropharyngeal erythema present. No oropharyngeal exudate.   Eyes:      Conjunctiva/sclera: Conjunctivae normal.      Pupils: Pupils are equal, round, and reactive to light.   Cardiovascular:      Rate and Rhythm: Normal rate and regular rhythm.      Heart sounds: Normal heart sounds.      Comments: Sinus rhythm by auscultation and palpation.  Pulmonary:      Effort: Pulmonary effort is normal. No respiratory distress.      Breath sounds: Normal breath sounds.   Musculoskeletal:         General: No swelling.      Cervical back: Neck supple.   Lymphadenopathy:      Cervical: No cervical adenopathy.   Skin:     General: Skin is " warm and dry.      Findings: No rash.   Neurological:      General: No focal deficit present.      Mental Status: She is alert and oriented to person, place, and time.   Psychiatric:         Mood and Affect: Mood normal.         Behavior: Behavior normal.         Thought Content: Thought content normal.         Judgment: Judgment normal.        Result Review :           Assessment and Plan   Diagnoses and all orders for this visit:    1. Acute bacterial sinusitis (Primary)  -     POCT SARS-CoV-2 + Flu Antigen CHRISTINA  -     POCT rapid strep A  -     amoxicillin-clavulanate (AUGMENTIN) 875-125 MG per tablet; Take 1 tablet by mouth 2 (Two) Times a Day.  Dispense: 14 tablet; Refill: 0  -     predniSONE (DELTASONE) 20 MG tablet; Take 1 tablet by mouth Daily.  Dispense: 3 tablet; Refill: 0    2. Seasonal allergies  -     cetirizine (zyrTEC) 5 MG tablet; Take 1 tablet by mouth Daily.  Dispense: 30 tablet; Refill: 1    3. History of cardiac ablation for atrial fibrillation    4. Essential hypertension    5. Stage 3b chronic kidney disease       Regular patient of Dr. Anderson.  Presents today for problem visit.    She has been having problems with worsening sinus congestion, sinus headache, sore throat, predominantly dry cough since Tuesday/Wednesday.    She has been taking Mucinex.    She is negative for influenza and COVID-19.  She does well with Augmentin.  She spoke about steroids.  Feel that we should not give an injection or do a prolonged exposure.  She does have a history of ablation for atrial fibrillation with Dr. Winters in [place].  She is in sinus rhythm today.  Explained that steroids always carry some degree of risk of exacerbating arrhythmia.  Over-the-counter medications could also exacerbate heart rhythm issues and blood pressure.  Her blood pressure is a bit elevated today and she should watch that.    I also made note that she has chronic kidney disease.  Her creatinine was 1.4 in March.  This is basically  her baseline (1.2-1.3).     She does not do well with Flonase.  It dries her too much and makes her lose her voice.  Suggested Zyrtec 5 mg.    She sees Dr. Anderson again in October.  She knows she can reach out sooner if problems.      Follow Up   Return in about 6 months (around 10/25/2025).  Patient was given instructions and counseling regarding her condition or for health maintenance advice. Please see specific information pulled into the AVS if appropriate.      CRISTIANO Roper DO       Electronically signed by HOLLI Roper DO, 04/25/25, 1:44 PM CDT.

## 2025-04-28 RX ORDER — APIXABAN 5 MG/1
5 TABLET, FILM COATED ORAL 2 TIMES DAILY
Qty: 60 TABLET | Refills: 1 | Status: SHIPPED | OUTPATIENT
Start: 2025-04-28

## 2025-06-04 DIAGNOSIS — I48.0 PAF (PAROXYSMAL ATRIAL FIBRILLATION) (HCC): ICD-10-CM

## 2025-06-04 RX ORDER — METOPROLOL SUCCINATE 25 MG/1
25 TABLET, EXTENDED RELEASE ORAL 2 TIMES DAILY
Qty: 180 TABLET | Refills: 1 | Status: SHIPPED | OUTPATIENT
Start: 2025-06-04 | End: 2025-06-06

## 2025-06-06 ENCOUNTER — OFFICE VISIT (OUTPATIENT)
Dept: CARDIOLOGY CLINIC | Age: 85
End: 2025-06-06
Payer: MEDICARE

## 2025-06-06 VITALS
HEIGHT: 65 IN | HEART RATE: 77 BPM | DIASTOLIC BLOOD PRESSURE: 100 MMHG | BODY MASS INDEX: 37.99 KG/M2 | SYSTOLIC BLOOD PRESSURE: 164 MMHG | WEIGHT: 228 LBS

## 2025-06-06 DIAGNOSIS — I48.0 PAF (PAROXYSMAL ATRIAL FIBRILLATION) (HCC): Primary | ICD-10-CM

## 2025-06-06 PROCEDURE — 3080F DIAST BP >= 90 MM HG: CPT | Performed by: INTERNAL MEDICINE

## 2025-06-06 PROCEDURE — 93000 ELECTROCARDIOGRAM COMPLETE: CPT | Performed by: INTERNAL MEDICINE

## 2025-06-06 PROCEDURE — 3077F SYST BP >= 140 MM HG: CPT | Performed by: INTERNAL MEDICINE

## 2025-06-06 PROCEDURE — 1123F ACP DISCUSS/DSCN MKR DOCD: CPT | Performed by: INTERNAL MEDICINE

## 2025-06-06 PROCEDURE — 99214 OFFICE O/P EST MOD 30 MIN: CPT | Performed by: INTERNAL MEDICINE

## 2025-06-06 PROCEDURE — 1159F MED LIST DOCD IN RCRD: CPT | Performed by: INTERNAL MEDICINE

## 2025-06-06 RX ORDER — METOPROLOL SUCCINATE 50 MG/1
50 TABLET, EXTENDED RELEASE ORAL 2 TIMES DAILY
Qty: 180 TABLET | Refills: 3 | Status: SHIPPED | OUTPATIENT
Start: 2025-06-06

## 2025-06-06 ASSESSMENT — ENCOUNTER SYMPTOMS
COUGH: 0
EYE DISCHARGE: 0
WHEEZING: 0
BACK PAIN: 0
ABDOMINAL DISTENTION: 0
SHORTNESS OF BREATH: 1
BLOOD IN STOOL: 0
CONSTIPATION: 0
DIARRHEA: 0
VOMITING: 0

## 2025-06-06 NOTE — PROGRESS NOTES
[facility]  Cardiology Office Note  [address] [facility] [place], [place]  [address]  Phone: [phone]  Fax: [phone]                            Date:  6/6/2025  Patient: Aniya Huff  Age:  84 y.o., 1940    Referral: Dale Mckenna MD      PROBLEM LIST:    Patient Active Problem List    Diagnosis Date Noted    Moderate mitral regurgitation by prior echocardiogram 12/03/2020     Priority: Low    Acute frontal sinusitis 05/16/2017     Priority: Low    Chronic anticoagulation 05/16/2017     Priority: Low     Overview Note:     Coumadin managed by Dr. Santana Sparks      Endometrial thickening on ultrasound 02/16/2017     Priority: Low     Overview Note:     Replacing Deprecated Diagnoses      PMB (postmenopausal bleeding) 02/16/2017     Priority: Low    PAF (paroxysmal atrial fibrillation) (HCC) 12/14/2015     Priority: Low    Hypothyroidism      Priority: Low    Mixed hyperlipidemia      Priority: Low    Hypertension      Priority: Low     1.  Paroxysmal atrial fibrillation on Eliquis (unsuccessful DCCV on Multaq 1/30/2024, initiated on amiodarone), repeat DCCV 2/13/2024, A-fib ablation 4/22/2024 with recurrent atrial fibrillation, DCCV 6/4/2024, normal LV ejection fraction.  2.  CKD stage III with prior worsening.  3.  Left breast cancer 1/2020 status post mastectomy, on Arimidex  4.  Mild to moderate aortic stenosis by echo 5/19/2023 (mean gradient 18 mmHg), stable by echo 3/18/2025.        PRESENTATION: Aniya Huff is a 84 y.o. year old female presents for follow-up evaluation.  Since her last DCCV she has had no recurrent atrial fibrillation though she does have some brief skipped beats.  Has remained off amiodarone after following up with Dr. Delatorre.  Does get short of breath but this is stable.  Leg swelling is stable as well.  Does not check her blood pressure frequently.  Had switched to Eliquis from Coumadin.    REVIEW OF SYSTEMS:  Review of Systems

## 2025-06-16 RX ORDER — APIXABAN 5 MG/1
5 TABLET, FILM COATED ORAL 2 TIMES DAILY
Qty: 90 TABLET | Refills: 3 | Status: SHIPPED | OUTPATIENT
Start: 2025-06-16

## 2025-07-02 RX ORDER — VALSARTAN 160 MG/1
160 TABLET ORAL 2 TIMES DAILY
Qty: 180 TABLET | Refills: 1 | Status: SHIPPED | OUTPATIENT
Start: 2025-07-02

## 2025-07-10 ENCOUNTER — LAB (OUTPATIENT)
Dept: LAB | Facility: HOSPITAL | Age: 85
End: 2025-07-10
Payer: MEDICARE

## 2025-07-10 DIAGNOSIS — Z17.0 MALIGNANT NEOPLASM OF LEFT BREAST IN FEMALE, ESTROGEN RECEPTOR POSITIVE, UNSPECIFIED SITE OF BREAST: ICD-10-CM

## 2025-07-10 DIAGNOSIS — C50.912 MALIGNANT NEOPLASM OF LEFT BREAST IN FEMALE, ESTROGEN RECEPTOR POSITIVE, UNSPECIFIED SITE OF BREAST: ICD-10-CM

## 2025-07-10 LAB
ALBUMIN SERPL-MCNC: 4.3 G/DL (ref 3.5–5.2)
ALBUMIN/GLOB SERPL: 1.7 G/DL
ALP SERPL-CCNC: 106 U/L (ref 39–117)
ALT SERPL W P-5'-P-CCNC: 14 U/L (ref 1–33)
ANION GAP SERPL CALCULATED.3IONS-SCNC: 13 MMOL/L (ref 5–15)
AST SERPL-CCNC: 23 U/L (ref 1–32)
BASOPHILS # BLD AUTO: 0.04 10*3/MM3 (ref 0–0.2)
BASOPHILS NFR BLD AUTO: 0.7 % (ref 0–1.5)
BILIRUB SERPL-MCNC: 0.8 MG/DL (ref 0–1.2)
BUN SERPL-MCNC: 26.7 MG/DL (ref 8–23)
BUN/CREAT SERPL: 20.7 (ref 7–25)
CALCIUM SPEC-SCNC: 9.9 MG/DL (ref 8.6–10.5)
CEA SERPL-MCNC: 2.23 NG/ML
CHLORIDE SERPL-SCNC: 104 MMOL/L (ref 98–107)
CO2 SERPL-SCNC: 24 MMOL/L (ref 22–29)
CREAT SERPL-MCNC: 1.29 MG/DL (ref 0.57–1)
DEPRECATED RDW RBC AUTO: 47.8 FL (ref 37–54)
EGFRCR SERPLBLD CKD-EPI 2021: 40.8 ML/MIN/1.73
EOSINOPHIL # BLD AUTO: 0.12 10*3/MM3 (ref 0–0.4)
EOSINOPHIL NFR BLD AUTO: 2 % (ref 0.3–6.2)
ERYTHROCYTE [DISTWIDTH] IN BLOOD BY AUTOMATED COUNT: 13.7 % (ref 12.3–15.4)
FERRITIN SERPL-MCNC: 147.8 NG/ML (ref 13–150)
GLOBULIN UR ELPH-MCNC: 2.5 GM/DL
GLUCOSE SERPL-MCNC: 130 MG/DL (ref 65–99)
HCT VFR BLD AUTO: 41.9 % (ref 34–46.6)
HGB BLD-MCNC: 13.5 G/DL (ref 12–15.9)
IMM GRANULOCYTES # BLD AUTO: 0.01 10*3/MM3 (ref 0–0.05)
IMM GRANULOCYTES NFR BLD AUTO: 0.2 % (ref 0–0.5)
IRON 24H UR-MRATE: 124 MCG/DL (ref 37–145)
IRON SATN MFR SERPL: 27 % (ref 20–50)
LYMPHOCYTES # BLD AUTO: 1.99 10*3/MM3 (ref 0.7–3.1)
LYMPHOCYTES NFR BLD AUTO: 33.8 % (ref 19.6–45.3)
MCH RBC QN AUTO: 30.6 PG (ref 26.6–33)
MCHC RBC AUTO-ENTMCNC: 32.2 G/DL (ref 31.5–35.7)
MCV RBC AUTO: 95 FL (ref 79–97)
MONOCYTES # BLD AUTO: 0.46 10*3/MM3 (ref 0.1–0.9)
MONOCYTES NFR BLD AUTO: 7.8 % (ref 5–12)
NEUTROPHILS NFR BLD AUTO: 3.27 10*3/MM3 (ref 1.7–7)
NEUTROPHILS NFR BLD AUTO: 55.5 % (ref 42.7–76)
NRBC BLD AUTO-RTO: 0 /100 WBC (ref 0–0.2)
PLATELET # BLD AUTO: 102 10*3/MM3 (ref 140–450)
PMV BLD AUTO: 12.1 FL (ref 6–12)
POTASSIUM SERPL-SCNC: 4.3 MMOL/L (ref 3.5–5.2)
PROT SERPL-MCNC: 6.8 G/DL (ref 6–8.5)
RBC # BLD AUTO: 4.41 10*6/MM3 (ref 3.77–5.28)
SODIUM SERPL-SCNC: 141 MMOL/L (ref 136–145)
TIBC SERPL-MCNC: 456 MCG/DL (ref 298–536)
TRANSFERRIN SERPL-MCNC: 306 MG/DL (ref 200–360)
WBC NRBC COR # BLD AUTO: 5.89 10*3/MM3 (ref 3.4–10.8)

## 2025-07-10 PROCEDURE — 82378 CARCINOEMBRYONIC ANTIGEN: CPT

## 2025-07-10 PROCEDURE — 86300 IMMUNOASSAY TUMOR CA 15-3: CPT

## 2025-07-10 PROCEDURE — 80053 COMPREHEN METABOLIC PANEL: CPT

## 2025-07-10 PROCEDURE — 36415 COLL VENOUS BLD VENIPUNCTURE: CPT

## 2025-07-10 PROCEDURE — 82728 ASSAY OF FERRITIN: CPT

## 2025-07-10 PROCEDURE — 84466 ASSAY OF TRANSFERRIN: CPT

## 2025-07-10 PROCEDURE — 83540 ASSAY OF IRON: CPT

## 2025-07-10 PROCEDURE — 85025 COMPLETE CBC W/AUTO DIFF WBC: CPT

## 2025-07-10 RX ORDER — ATORVASTATIN CALCIUM 20 MG/1
20 TABLET, FILM COATED ORAL DAILY
Qty: 90 TABLET | Refills: 3 | Status: SHIPPED | OUTPATIENT
Start: 2025-07-10

## 2025-07-11 LAB — CANCER AG27-29 SERPL-ACNC: 36.5 U/ML (ref 0–38.6)

## 2025-07-12 NOTE — PROGRESS NOTES
[facility] ON[facility] GROUP HEMATOLOGY AND ONCOLOGY  [address]  [place] [facility] [address] [phone]    Patient Name: Heidi Ansari  Encounter Date: 07/17/2025  YOB: 1940  Patient Number: [id number]     REASON FOR VISIT: Heidi Ansari is an 84-year-old female who returns in follow-up of stage IIA invasive ductal/lobular breast carcinoma.  She has been on adjuvant Arimidex since 01/22/2020 (66 months).  She is here with her daughter, Fátima (previously with her spouse Montana).    I have reviewed the HPI and verified with the patient the accuracy of it. No changes to interval history since the information was documented. Ender Franco MD 07/17/25      DIAGNOSTIC ABNORMALITIES:           1.   11/27/2019- palpable left breast lump x3 weeks.  Diagnostic mammogram and left breast ultrasound.  Impression: Spiculated mass approximately 2 cm upper outer quadrant of the left breast.  Oval well-circumscribed mass appears new in the lower slightly lateral left breast measuring approximately 10 mm.  Targeted left breast ultrasound at the 2 o'clock position 5 cm from nipple an irregular spiculated hypoechoic mass is seen measuring 1.7 x 1.5 x 1.6 cm.  Smaller adjacent hypoechoic irregular mass seen measuring 0.3 cm.  Third irregular hypoechoic mass at the 3 o'clock position measures 2.2 x 0.9 x 1.2 cm in size.  Ultrasound-guided biopsy recommended.  BI-RADS Category 5-highly suggestive of malignancy.  No mammographic evidence of right breast malignancy.          2.   12/06/2019- ultrasound-guided left breast biopsy.  Final diagnosis: 1.left breast mass 2 o'clock position: Core biopsy: Invasive ductal and lobular carcinoma.  Estimated grade 1, involving approximately 90% of the core biopsy specimens.  Focal ductal carcinoma in situ, nuclear grade 1.  Microcalcifications identified in invasive ductal carcinoma.  2.left breast mass 3 o'clock position, core  biopsy: Invasive lobular carcinoma, estimated grade 1, involving approximately 90% of the core biopsy specimens.  Microcalcifications not identified.          3.   12/26/2019- hemoglobin 12.3, hematocrit 37.2, MCV 93.9, platelets 90,000, WBC 6.81 with a normal differential.  CMP notable for a creatinine of 1.65 (GFR 30) otherwise normal.  Immunohistochemistries revealed: Estrogen +99 to 100%; progesterone +81 to 90%, HER-2 1-2+ (equivocal).  FISH: Negative (signal ratio: 1.2)          4.   01/17/2020-CT brain: Mild cerebral and cerebellar volume loss with chronic microvascular disease.  Focus of ill-defined hypodensity at the gray-white juncture in the right frontal lobe.  Site of previous infarction versus metastasis.  Suggest follow-up MRI with and without gadolinium.  No additional lesions present.          5.   01/17/2020- CT chest.  Groundglass nodule within the right upper lobe and left lower lobe.  Likely inflammatory in nature.  Coronary calcifications in the LAD and circumflex distribution.  Moderate cardiomegaly.  Postoperative changes left axilla from recent axillary dissection as well as mastectomy.  No enlarged mediastinal or axillary lymph nodes present.  Irregular nodule along the anterior margin of the medial left pectoralis major musculature within the mastectomy bed.  No adjacent inflammatory/postoperative stranding.  May represent postoperative seroma.  Recommend directed ultrasound over this area.          6.   01/17/2020-CT abdomen/pelvis.  Impression: Multiple hypodensities within the liver.  Favor hepatic cysts.  Recommend ultrasound for further evaluations.  Cannot be fully characterized without IV contrast.  Small cortical cyst of the right kidney suspected.  Postoperative changes of the right colon.  Multiple diverticula throughout the colon without evidence of diverticulitis or mechanical bowel obstruction.  Diastases of the abdominal musculature at the umbilicus.  Previous umbilical  hernia repair was performed with a mesh.  Residual recurrent tyra-umbilical hernia containing fat.  No evidence of herniated bowel loop.  7 mm groundglass nodule left lower lobe.          7.   01/22/2020-CMP notable for creatinine 1.6 and GFR 31 otherwise normal.  CEA 1.41, CA-27-29 22.8, ferritin 240.9, iron 88, iron saturation 23%, TIBC 390, RASHEL positive, homogenous/speckled pattern 1:80, otherwise negative reflex panel.  SPIEP negative (M spike not observed, EARLE: No monoclonality detected).  Hemoglobin 11.7, hematocrit 34.9, MCV 91.6, platelets 130,000, WBC 5.08 with a normal differential.  No schistocytes reported.          8.   02/05/2020- liver ultrasound.  Impression: Multiple hepatic cysts.  Additional tiny hepatic lesions are too small to characterize.          9.   02/05/2020- renal ultrasound.  Impression: Right renal cyst.  Otherwise negative exam.         10.  02/05/2020- chest ultrasound.  Impression: No abnormality identified in the left chest.  Area seen on recent CT exam may represent a postoperative seroma.         11.   02/20/2020- hemoglobin 12.2, hematocrit 36.4, MCV 91.9, platelets 147,000, WBC 6.11 with a normal differential.  CEA 1.37, CA-27-29 21.9.         12.   03/01/2020- Oncotype DX: Recurrence score:  0; distant recurrence risk at 9 years with AI or BARNHART alone: 3%; group average absolute chemotherapy benefit:< 1%         13.   06/02/2020- DEXA scan.  Impression: Osteopenia.  Low bone mass.  Bone density is between 1.0 and 2.5 standard deviations below the mean for young adult woman.  Increased risk for fracture in these patients.    PREVIOUS INTERVENTIONS:          1.   01/02/2020- left breast simple mastectomy with sentinel lymph node biopsy.  Final diagnosis: 1.left breast, left skin sparing mastectomy: Invasive ductal and lobular carcinoma, grade 1 (2.3 cm).  Associated low-grade ductal carcinoma in situ, solid type.  Margins of excision free of tumor.  Tumor approaches closest deep  margin and 1.6 cm.  2 axillary lymph nodes, negative for metastatic carcinoma.  2.left sentinel lymph node excision: One sentinel lymph node, negative for metastatic carcinoma (0/1).  AJCC pathologic stage: pT2, N0 (SN).    Synoptic checklist: Procedure: Total mastectomy.  Specimen laterality: Left.  Histologic type: Invasive carcinoma with ductal and lobular features (mixed type carcinoma).  Overall ndgndrndanddndend:nd nd2nd. Tumor size: Greatest dimension of largest invasive focus 23 mm.  Tumor focality: Single focus of invasive carcinoma.  DCIS: Present.  Negative for extensive intraductal component.  Grade 1.  Lobular carcinoma in situ: None in specimen.  Tumor extent: Lymphovascular invasion not identified.  Dermal lymphovascular invasion not identified.  Microcalcifications not identified.  Margins: Uninvolved by invasive carcinoma.  Lymph nodes: Number of lymph nodes examined: 3.  TNM classification: pT2, pN0 (SN).            2.   01/22/2020- adjuvant Arimidex 1 mg p.o. daily    LABS:  Lab Results - Last 18 Months   Lab Units 07/10/25  1104 03/18/25  0809 01/02/25  1018 06/26/24  1046 06/04/24  0736 03/22/24  0905 02/13/24  0713 01/30/24  0857   HEMOGLOBIN g/dL 13.5 13.1 13.4 13.4 14.6 14.0 13.4 13.2   HEMATOCRIT % 41.9 40.5 41.2 42.0 46.7 44.1 41.4 41.2   MCV fL 95.0 99.3* 96.5 97.2* 99.2* 94.8 94.1 94.1   WBC 10*3/mm3 5.89 6.64 5.71 5.21 6.9 6.85 5.7 6.0   RDW % 13.7 12.7 13.9 13.6 14.4 14.6 14.9* 14.7*   MPV fL 12.1*  --  11.2 11.2 11.7  --  12.5* 12.5*   PLATELETS 10*3/mm3 102* 186 114* 116* 156 73* 102* 107*   IMM GRAN % % 0.2  --  0.2 0.2  --   --   --   --    NEUTROS ABS 10*3/mm3 3.27 4.04 3.28 3.16  --  4.14  --   --    LYMPHS ABS 10*3/mm3 1.99 1.89 1.73 1.52  --  2.04  --   --    MONOS ABS 10*3/mm3 0.46 0.49 0.49 0.39  --  0.48  --   --    EOS ABS 10*3/mm3 0.12 0.17 0.16 0.10  --  0.14  --   --    BASOS ABS 10*3/mm3 0.04 0.03 0.04 0.03  --  0.04  --   --    IMMATURE GRANS (ABS) 10*3/mm3 0.01  --  0.01 0.01  --   --   " --   --    NRBC /100 WBC 0.0  --   --   --   --  0.0  --   --        Lab Results - Last 18 Months   Lab Units 07/10/25  1104 03/18/25  0809 01/02/25  1018 06/26/24  1046 06/04/24  0736 03/22/24  0905   GLUCOSE mg/dL 130* 101* 123* 109* 91 84   SODIUM mmol/L 141 142 141 144 144 143   POTASSIUM mmol/L 4.3 4.3 4.0 4.5 3.6 4.1   TOTAL CO2 mmol/L  --   --   --   --  30*  --    CO2 mmol/L 24.0 25.1 26.0 31.0*  --  25.4   CHLORIDE mmol/L 104 103 104 105 102 107   ANION GAP mmol/L 13.0  --  11.0 8.0 12  --    CREATININE mg/dL 1.29* 1.42* 1.23* 1.36* 1.2* 1.33*   BUN mg/dL 26.7* 20 20 19 21 22   BUN / CREAT RATIO  20.7 14.1 16.3 14.0  --  16.5   CALCIUM mg/dL 9.9 9.5 9.3 9.5 9.7 9.1   ALK PHOS U/L 106 112 110 124* 118* 102   TOTAL PROTEIN g/dL 6.8 6.6 6.8 6.8 7.2 6.1   ALT (SGPT) U/L 14 15 13 13 11 17   AST (SGOT) U/L 23 25 19 21 20 22   BILIRUBIN mg/dL 0.8 0.5 0.6 0.6 0.8 0.8   ALBUMIN g/dL 4.3 4.0 4.1 4.1 4.3 4.2   GLOBULIN gm/dL 2.5  --  2.7 2.7  --   --    GLOBULINREF gm/dL  --  2.6  --   --   --  1.9       Lab Results - Last 18 Months   Lab Units 07/10/25  1104 01/02/25  1018 06/26/24  1046   CEA ng/mL 2.23 2.04 2.43       Lab Results - Last 18 Months   Lab Units 07/10/25  1104 03/18/25  0809 01/02/25  1018 06/26/24  1046 03/22/24  0905   IRON mcg/dL 124  --  102 98  --    TIBC mcg/dL 456  --  435 431  --    IRON SATURATION (TSAT) % 27  --  23 23  --    FERRITIN ng/mL 147.80  --  121.60 95.22  --    TSH uIU/mL  --  3.990  --   --  4.420*         PAST MEDICAL HISTORY:  ALLERGIES:  Allergies   Allergen Reactions    Bactrim [Sulfamethoxazole-Trimethoprim] Other (See Comments)     Patient has kidney disease (stage 4) shouldn't take Bactrim    Codeine Nausea Only    Percocet [Oxycodone-Acetaminophen] Nausea Only     Becomes, hot, nauseated, and made her head feel \"weird\".     CURRENT MEDICATIONS:  Outpatient Encounter Medications as of 7/17/2025   Medication Sig Dispense Refill    acetaminophen (TYLENOL) 500 MG tablet Take " 1 tablet by mouth Every 6 (Six) Hours As Needed for Mild Pain.      allopurinol (ZYLOPRIM) 100 MG tablet Take 1 tablet by mouth 2 (Two) Times a Day.  3    anastrozole (ARIMIDEX) 1 MG tablet Take 1 tablet by mouth Daily. 90 tablet 11    apixaban (Eliquis) 5 MG tablet tablet Take 1 tablet by mouth 2 (Two) Times a Day.      atorvastatin (LIPITOR) 20 MG tablet Take 1 tablet by mouth Daily.      bumetanide (BUMEX) 0.5 MG tablet Take 1 tablet by mouth once daily 90 tablet 3    calcium carb-cholecalciferol 600-10 MG-MCG tablet per tablet Take 1 tablet by mouth Every 12 (Twelve) Hours.      carboxymethylcellulose (REFRESH PLUS) 0.5 % solution Administer 1 drop to both eyes 3 (Three) Times a Day As Needed for Dry Eyes.      levothyroxine (SYNTHROID, LEVOTHROID) 50 MCG tablet Take 1 tablet by mouth once daily 90 tablet 3    metoprolol succinate XL (TOPROL-XL) 25 MG 24 hr tablet Take 1 tablet by mouth 2 (Two) Times a Day.  1    polyethylene glycol (MIRALAX) 17 GM/SCOOP powder Take 17 g by mouth Daily.      valsartan (DIOVAN) 160 MG tablet Take 1 tablet by mouth Daily.      [DISCONTINUED] amoxicillin-clavulanate (AUGMENTIN) 875-125 MG per tablet Take 1 tablet by mouth 2 (Two) Times a Day. 14 tablet 0    [DISCONTINUED] cetirizine (zyrTEC) 5 MG tablet Take 1 tablet by mouth Daily. 30 tablet 1    [DISCONTINUED] predniSONE (DELTASONE) 20 MG tablet Take 1 tablet by mouth Daily. 3 tablet 0     No facility-administered encounter medications on file as of 7/17/2025.     Adult illnesses:  Hypothyroidism  Atrial fibrillation  Hyperlipidemia  Obesity  Chronic kidney disease stage 4 - Dr. 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, 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    Rheumatoid arthritis M06.9    Seborrheic keratosis L82.1    Thrombocytopenia D69.6    Vitamin D deficiency E55.9    Moderate mitral regurgitation by prior echocardiogram I34.0    Dizziness R42    Suspected cerebrovascular accident (CVA) R09.89    Lipoma of upper extremity D17.20    Class 2 severe obesity due to excess calories with serious comorbidity and body mass index (BMI) of 38.0 to 38.9 in adult E66.812, E66.01, Z68.38    Stage 3b chronic kidney disease N18.32     SURGERIES:  Past Surgical History:   Procedure Laterality Date    APPENDECTOMY      BREAST BIOPSY Left 2015    BREAST BIOPSY Right 12/31/2020    Procedure: RIGHT NEEDLE DIRECTED EXCISIONAL BREAST BIOPSY;  Surgeon: Nikki Zuniga MD;  Location: [facility] OR;  Service: General;  Laterality: Right;    BREAST EXCISIONAL BIOPSY Left 2001    BREAST EXCISIONAL BIOPSY Left 2001    CARDIAC ABLATION  04/2024    CARPAL TUNNEL RELEASE Right     CATARACT EXTRACTION, BILATERAL      CHOLECYSTECTOMY      COLON SURGERY      Partial right    COLONOSCOPY      COLONOSCOPY N/A 10/17/2019    Dr. galindo-- One 5 mm adenomatous polyp in the transverse colon, removed with a cold snare. Resected and retrieved. - Diverticulosis in the sigmoid colon and in the descending colon    COLONOSCOPY N/A 2/6/2025    Procedure: COLONOSCOPY WITH ANESTHESIA;  Surgeon: Rigoberto Galindo MD;  Location: [facility] ENDOSCOPY;  Service: Gastroenterology;  Laterality: N/A;  Pre: Hx of adenomatous colonic polyps  Post: Diverticulosis, Polyps  José Miguel Anderson MD    COLONOSCOPY W/ POLYPECTOMY  06/30/2016    2 Adenomatous polyps at 80 & 40 cm, Diverticulosis repeat exam in 3 years    EXCISION MASS ARM Bilateral 04/28/2023    Procedure: lipoma  excision left hand and right arm;  Surgeon: Nikki Zuniga MD;  Location:  [facility] OR;  Service: General;  Laterality: Bilateral;    EYE SURGERY Bilateral     cataract extraction    EYE SURGERY Left     detached retina    HERNIA REPAIR      JOINT REPLACEMENT  both knees 2-14-12 &10-30-12    MASTECTOMY Left 2019    MASTECTOMY W/ SENTINEL NODE BIOPSY Left 01/02/2020    Procedure: LEFT SIMPLE MASTECTOMY WITH SENTINEL NODE BIOPSY, INJECTION AND SCAN, RADIOLOGIST WILL INJECT;  Surgeon: Nikki Zuniga MD;  Location:  [facility] OR;  Service: General    REPLACEMENT TOTAL KNEE Bilateral     THROAT SURGERY      duct    TONSILLECTOMY  8 or 9 yrs old    UMBILICAL HERNIA REPAIR  years ago     HEALTH MAINTENANCE ITEMS:  Health Maintenance Due   Topic Date Due    RSV Vaccine - Adults (1 - 1-dose 75+ series) Never done    COVID-19 Vaccine (6 - 2024-25 season) 09/01/2024       <no information>  Last Completed Colonoscopy            Needs Review       COLORECTAL CANCER SCREENING (COLONOSCOPY - Every 5 Years) Tentatively due on 2/6/2030 02/06/2025  Colonoscopy    02/06/2025  Surgical Procedure: COLONOSCOPY    10/17/2019  COLONOSCOPY    10/17/2019  Surgical Procedure: COLONOSCOPY    06/30/2016  SCANNED - COLONOSCOPY     Only the first 5 history entries have been loaded, but more history exists.                        Immunization History   Administered Date(s) Administered    COVID-19 (MODERNA) 1st,2nd,3rd Dose Monovalent 02/25/2021, 03/25/2021, 10/26/2021    COVID-19 (MODERNA) Monovalent Original Booster 08/03/2022    COVID-19 (PFIZER) BIVALENT 12+YRS 01/11/2023    Fluad Quad 65+ 09/22/2020, 09/27/2021, 10/19/2022    Fluzone (or Fluarix & Flulaval for VFC) >6mos 10/10/2017    Fluzone High-Dose 65+YRS 10/19/2022, 10/29/2024    Fluzone High-Dose 65+yrs 09/13/2023    Fluzone Quad >6mos (Multi-dose) 09/27/2021    Hep A / Hep B 07/10/2024, 08/12/2024, 01/23/2025    Influenza TIV (IM) 09/22/2015, 10/14/2016    Pneumococcal Conjugate  "13-Valent (PCV13) 11/10/2016    Pneumococcal Polysaccharide (PPSV23) 11/02/2012    Shingrix 04/10/2018, 08/15/2018    Tdap 12/21/2020    Zostavax 04/23/2013     Last Completed Mammogram            Completed or No Longer Recommended       MAMMOGRAM  Discontinued        Frequency changed to Never automatically (Topic No Longer Applies)    01/03/2025  Mammo Screening Modified With Tomosynthesis Right With CAD    12/27/2023  Mammo Screening Modified With Tomosynthesis Right With CAD    12/07/2022  Mammo Screening Modified With Tomosynthesis Right With CAD    12/06/2021  Mammo Diagnostic Digital Tomosynthesis Right With CAD      Only the first 5 history entries have been loaded, but more history exists.                              FAMILY HISTORY:  Family History   Problem Relation Age of Onset    Cancer Mother         colon    Hypertension Mother     Colon cancer Mother 70    Colon polyps Mother     No Known Problems Father     Hearing loss Maternal Grandmother     Vision loss Maternal Grandmother     Arthritis Maternal Grandmother     Breast cancer Neg Hx     BRCA 1/2 Neg Hx     Endometrial cancer Neg Hx     Ovarian cancer Neg Hx      SOCIAL HISTORY:  Social History     Socioeconomic History    Marital status:    Tobacco Use    Smoking status: Never    Smokeless tobacco: Never   Vaping Use    Vaping status: Never Used   Substance and Sexual Activity    Alcohol use: Never    Drug use: No    Sexual activity: Not Currently     Partners: Male   Homemaker    REVIEW OF SYSTEMS:  Review of Systems   Constitutional:  Positive for fatigue (baseline/chronic unchanged). Negative for activity change and appetite change.        Manages her ADLs, including chores, errands and driving. Again says she is still up and about \"all the time.\"    Arimidex tolerance:  No worsening hot flashes, \"not worse,\" no new arthralgias. Is still taking daily   HENT:  Positive for postnasal drip (seasonal) and rhinorrhea (seasonal).    Eyes: " "Negative.    Respiratory:  Positive for shortness of breath (Baseline exertional dyspnea and occasional sob with routine activities.  \"I just pace myself.\").    Cardiovascular:  Palpitations: Intermittent.  None since RF ablation 4/22/24 by Dr. Winters.  Is on maintenance Eliquis.   Gastrointestinal: Negative.  Negative for blood in stool.   Endocrine: Negative.    Genitourinary: Negative.    Musculoskeletal:  Positive for arthralgias (intermittent of the hands, left hip, right shoulder and knees inspite of prior TKRs bilaterally) and back pain (lower back \"soreness.\").   Allergic/Immunologic: Negative.    Neurological:  Positive for numbness (left foot at times, unchanged). Negative for dizziness (postural - when standing too abruptly. \"Not in awhile.\").   Hematological:  Bruises/bleeds easily.   Psychiatric/Behavioral: Negative.     Breasts:  Says she is more diligent with self exams.  Has not noticed any changes      /76   Pulse 91   Temp 97.1 °F (36.2 °C) (Temporal)   Resp 18   Ht 165.1 cm (65\")   Wt 105 kg (231 lb 1.6 oz)   SpO2 94%   BMI 38.46 kg/m²  Body surface area is 2.1 meters squared.  Pain Score    07/17/25 1000   PainSc: 0-No pain       Physical Exam:  Physical Exam   Constitutional: She is oriented to person, place, and time. No distress.   Pleasant, cooperative, obese, modestly kept elderly female.  Ambulatory.  ECOG 0.      She has regained 4 lb (in addition to 9 lb at her 2 prior visits) since her last visit.   HENT:   Head: Normocephalic and atraumatic. Mouth/Throat: No oropharyngeal exudate.   Eyes: Pupils are equal, round, and reactive to light. Conjunctivae are normal. No scleral icterus.   Neck: No JVD present. No tracheal deviation present. No thyromegaly (on synthroid) present.   Cardiovascular: Normal rate.   Pulmonary/Chest: Effort normal and breath sounds normal. No stridor. No respiratory distress. She has no wheezes. She has no rales.   Chaperoned exam: Janelle Zuniga MA " (again)    Right breast no masses.  No associated axillary nor supraclavicular adenopathy.    Left mastectomy site well healed. No underlying nodularity.  No lymphedema of the left upper extremity.      No associated axillary nor supraclavicular adenopathy bilaterally.     Abdominal: Soft. Bowel sounds are normal. She exhibits no distension and no mass. There is no abdominal tenderness. There is no rebound and no guarding.   Musculoskeletal: Normal range of motion. Deformity (changes of osteoarthritis of the hands) present. No swelling.      Right lower leg: Edema (1+ ankle) present.      Left lower leg: Edema (1+ ankle) present.   Lymphadenopathy:     She has no cervical adenopathy.        Right cervical: No superficial cervical, no deep cervical and no posterior cervical adenopathy present.       Left cervical: No superficial cervical, no deep cervical and no posterior cervical adenopathy present.        Right: No supraclavicular adenopathy present.        Left: No supraclavicular adenopathy present.   Neurological: She is alert and oriented to person, place, and time. No cranial nerve deficit.   Skin: Skin is warm and dry. No rash noted. No erythema. No pallor.   Psychiatric: Her behavior is normal. Mood, judgment and thought content normal.   Nursing note and vitals reviewed.    ASSESSMENT:   1. Invasive ductal/lobular (mixed) carcinoma:   Stage: IB (pT2, pN0 [sn], MX, G1) ER (+) %, AK (+) 81-90%, HER-2/kelby 1-2 + (IHC) - negative. HER-2 1-2+ (equivocal).  FISH: Negative (signal ratio: 1.2)  Tumor burden: 23 mm tumor in the left breast. 0/3 axillary sentinel lymph node negative for metastatic carcinoma. Nuclear grade 1.    Oncotype DX: Recurrence score:  0; distant recurrence risk at 9 years with AI or BARNHART alone: 3%; group average absolute chemotherapy benefit:< 1%  Complications of tumor: None.   Tumor status: Adjuvant Arimidex in progress.  11/30/2020-mammogram.  Right breast calcifications for which  additional imaging recommended.  BI-RADS Category 0.  12/28/2020-stereotactic biopsy of right breast calcifications.  Pathology: Benign intraductal papilloma, 3 mm greatest dimension with associated microcalcifications.  01/04/2021-Right breast, lumpectomy with needle localization.  Final diagnosis: Extensive biopsy site changes including fat necrosis and fibrosis.  Benign fibrous breast parenchyma.  No evidence of in situ or invasive carcinoma.  1/3/2025-No mammographic evidence of malignancy of the RIGHT breast. Recommend routine annual mammography.  BI-RADS CATEGORY 2: Benign findings.           2.    Chronic kidney disease, stage III - IV. Followed by nephrology - Dr. Daniels  --GFR 40.8, 7/10/2025 (prior:  GFR 17 - 43.1 mL/min).        3.    Hypothyroidism.  On Synthroid        4.    Normocytic anemia.  Contribution from chronic kidney disease suspected.  --Resolved.  Hgb 13.5; MCV 95, 7/10/2025 (prior: Hgb 10.9 -14.6; MCV 92 - 100).          5.    Abnormal CT scans of the head (see above) indicating ill-defined hypodensity at the gray-white juncture in the right frontal lobe, hepatic and right renal cysts.  Unable to perform MRI with contrast due to chronic kidney disease.            --03/23/2020-seen by Dr. Jaramillo.  Noncontrast brain MRI from 1/17/2020 reviewed.  Possibilities include prior stroke microvascular disease, demyelinating disease.  Risks of biopsy discussed.  Recommend serial imaging with biopsy of the lesion progresses.  Follow-up with MRI in 3 months.          --07/06/2020- MRI.  Mild atrophy of the brain.  Multiple foci of T2 abnormality involving the periventricular and higher white matter tracts.  Cortical lesions also present.  Overall stable from 03/2020.  Favor small vessel ischemic changes.  No evidence of restricted diffusion to suggest acute ischemia or infarct.  No mass-effect or shift in midline.          --01/06/2021-brain MRI.  No imaging evidence of intracranial metastatic  "disease.  Seen by Dr. Jaramillo of neurosurgery.  Favors microvascular disease.  Okay to discontinue imaging.        6.    (+) RASHEL with joint pains (hands). Has been seen by Dr. Caputo.        7.    Osteopenia (bone density 1.0 and 2.5 standard deviations below the mean on DEXA scan, 06/02/2020).    - On calcium and vitamin D. Followed by pcp. Addition of Fosamax being discussed.  She wants to ask Dr. Daniels about it first.  - 12/13/24- DEXA scan- Osteoporosis and high fracture risk.When compared with the previous study, there is interval bone loss.        8.    Paroxysmal atrial fibrillation.  Underwent RF--April, 2024 per Dr. Winters.  Dr. Salas follows        9.    Trivially elevated alk phos.  Resolved.         10.   Thrombocytopenia.  Mild.  Variable.  Stable.  MDS?  ITP?  -102,000, 7/10/2025 (prior charlette: 73,000).       PLAN:   1.  Apprised of the labs, 1/2/2025-7/10/2025.  Note resolution of anemia and platelets of 102,000 (stable), and normal WBC.  Glucose 130, GFR 40.8 (prior: 43.2), alk phos 106 otherwise normal CMP, normal (2.23) CEA, normal (36.5-prior: 17.6-31.3) CA-27-29, repleted iron levels. Arimidex tolerance discussed    2.  Review mammogram, 1/3/25 (above).  SUZE  3.   Schedule mammogram, 1/4/26    4.  Prior review DEXA scan, 12/13/24 (above). Osteoporosis and high fracture risk.  Interval bone loss.  Patient states she has discussed the issue with Dr. Anderson.  \"I really do not want to go on Prolia\"    5.   Previously noted the positive RASHEL but negative reflex panel, negative SPIEP, repleted iron levels.  The liver ultrasound, renal ultrasound and chest ultrasound findings (above) are noted.  Oncotype DX recurrence score (above) noted (RS 0; absolute chemotherapy benefit < 1%).  DEXA scan shows osteopenia with increased risk of fracture.  Arimidex tolerance discussed.  So far so good.  6.  Breast cancer.  NCCN guidelines version 1.2020 reviewed.  4 ductal, lobular, mixed tumors, pT1, pT2, pT3 " and pN0 with tumors greater than 0.5 cm- 21 gene RT-PCR assay.  Recurrence score less than 26-adjuvant endocrine therapy.  Recurrence score 26-30- adjuvant endocrine therapy or adjuvant chemotherapy followed by endocrine therapy.  Recurrence score greater than 31-adjuvant chemotherapy followed by endocrine therapy.  However limited data to make chemotherapy recommendations for those greater than 70 years of age.  7.  The rationale for adjuvant endocrine therapy with AI's previously discussed. The potential risks (to include but not limited to: Hot flashes, asthenia, pain, arthralgia, arthritis, nausea/vomiting, headache, pharyngitis, depression, rash, hypertension, lymphedema, insomnia, edema, weight gain, dyspnea, abdominal pain, constipation, osteoporosis, fractures, cough, bone pain, diarrhea, breast pain, paresthesias, infection, cataracts, myalgias, thromboembolism, angina, endometrial carcinoma, myocardial infarction, stroke, anemia, leukopenia, erythema multiforme, Belle-Collins syndrome) are discussed at length. Questions answered. She agrees to press on with therapy.    8.  Rx:   Arimidex 1 mg p.o. daily, #90 x 11 RF   Calcium 1200 mg + 800 units D3  p.o. daily - otc  Stay off ferrous sulfate    9.   Return to the [facility] office in 24 weeks with pre-office CBC and differential, iron, fe sat , ferritin, CMP, CEA and CA-27-29.       I spent ~ 40 minutes caring for Heidi on this date of service. This time includes time spent by me in the following activities: preparing for the visit, reviewing tests, performing a medically appropriate examination and/or evaluation, counseling and educating the patient/family/caregiver, ordering medications, tests, or procedures and documenting information in the medical record

## 2025-07-17 ENCOUNTER — OFFICE VISIT (OUTPATIENT)
Dept: ONCOLOGY | Facility: CLINIC | Age: 85
End: 2025-07-17
Payer: MEDICARE

## 2025-07-17 VITALS
WEIGHT: 231.1 LBS | DIASTOLIC BLOOD PRESSURE: 76 MMHG | SYSTOLIC BLOOD PRESSURE: 130 MMHG | OXYGEN SATURATION: 94 % | HEART RATE: 91 BPM | BODY MASS INDEX: 38.51 KG/M2 | TEMPERATURE: 97.1 F | HEIGHT: 65 IN | RESPIRATION RATE: 18 BRPM

## 2025-07-17 DIAGNOSIS — C50.912 MALIGNANT NEOPLASM OF LEFT BREAST IN FEMALE, ESTROGEN RECEPTOR POSITIVE, UNSPECIFIED SITE OF BREAST: Primary | ICD-10-CM

## 2025-07-17 DIAGNOSIS — R92.2 INCONCLUSIVE MAMMOGRAM: ICD-10-CM

## 2025-07-17 DIAGNOSIS — Z17.0 MALIGNANT NEOPLASM OF LEFT BREAST IN FEMALE, ESTROGEN RECEPTOR POSITIVE, UNSPECIFIED SITE OF BREAST: Primary | ICD-10-CM

## (undated) DEVICE — ELECTRD BLD EZ CLN MOD XLNG 2.75IN

## (undated) DEVICE — ANTIBACTERIAL UNDYED BRAIDED (POLYGLACTIN 910), SYNTHETIC ABSORBABLE SUTURE: Brand: COATED VICRYL

## (undated) DEVICE — MASK,OXYGEN,MED CONC,ADLT,7' TUB, UC: Brand: PENDING

## (undated) DEVICE — SOLUTION IV IRRIG POUR BRL 0.9% SODIUM CHL 2F7124

## (undated) DEVICE — THE SINGLE USE ETRAP – POLYP TRAP IS USED FOR SUCTION RETRIEVAL OF ENDOSCOPICALLY REMOVED POLYPS.: Brand: ETRAP

## (undated) DEVICE — PAD MINOR UNIVERSAL: Brand: MEDLINE INDUSTRIES, INC.

## (undated) DEVICE — ELECTRD BLD EDGE/INSUL1P 2.4X5.1MM STRL

## (undated) DEVICE — SURGICAL PROCEDURE PACK GYNECOLOGIC MIN LOURDES HOSP

## (undated) DEVICE — GOWN,PLEAT,SPECIALTY,XL,STRL: Brand: MEDLINE

## (undated) DEVICE — Device: Brand: DEFENDO AIR/WATER/SUCTION AND BIOPSY VALVE

## (undated) DEVICE — GLV SURG BIOGEL LTX PF 6 1/2

## (undated) DEVICE — DRSNG SURESITE WNDW 4X4.5

## (undated) DEVICE — YANKAUER,BULB TIP WITH VENT: Brand: ARGYLE

## (undated) DEVICE — DISPOSABLE SPECIMEN RADIOGRAPHY SYSTEM WITH LOCALIZING GRID, 4.65" RADIOLUCENT GRID: Brand: ACCUGRID

## (undated) DEVICE — 3M™ STERI-STRIP™ REINFORCED ADHESIVE SKIN CLOSURES, R1547, 1/2 IN X 4 IN (12 MM X 100 MM), 6 STRIPS/ENVELOPE: Brand: 3M™ STERI-STRIP™

## (undated) DEVICE — TRY PREP SCRB VAG PVP

## (undated) DEVICE — CUFF,BP,DISP,1 TUBE,ADULT,HP: Brand: MEDLINE

## (undated) DEVICE — ARGYLE YANKAUER BULB TIP WITH VENT: Brand: ARGYLE

## (undated) DEVICE — BNDG ELAS CO-FLEX SLF ADHR 4IN5YD LF STRL

## (undated) DEVICE — ADHS LIQ MASTISOL 2/3ML

## (undated) DEVICE — TBG SMPL FLTR LINE NASL 02/C02 A/ BX/100

## (undated) DEVICE — SPNG GZ STRL 2S 4X4 12PLY

## (undated) DEVICE — BAPTIST TURNOVER KIT: Brand: MEDLINE INDUSTRIES, INC.

## (undated) DEVICE — LEGGINGS, PAIR, CLEAR, STERILE: Brand: MEDLINE

## (undated) DEVICE — THE CHANNEL CLEANING BRUSH IS A NYLON FLEXI BRUSH ATTACHED TO A FLEXIBLE PLASTIC SHEATH DESIGNED TO SAFELY REMOVE DEBRIS FROM FLEXIBLE ENDOSCOPES.

## (undated) DEVICE — 4-PORT MANIFOLD: Brand: NEPTUNE 2

## (undated) DEVICE — RESERVOIR,SUCTION,100CC,SILICONE: Brand: MEDLINE

## (undated) DEVICE — TOWEL,OR,DSP,ST,BLUE,DLX,4/PK,20PK/CS: Brand: MEDLINE

## (undated) DEVICE — STCKNT STRL 4X48 IN

## (undated) DEVICE — PK TURNOVER RM ADV

## (undated) DEVICE — DRN JP FLT NO TROC SIL FUL/PERF 7MM

## (undated) DEVICE — SUT SILK 2/0 FS BLK 18IN 685G

## (undated) DEVICE — SUT SILK 2/0 SH 30IN K833H

## (undated) DEVICE — VAGINAL PREP TRAY: Brand: MEDLINE INDUSTRIES, INC.

## (undated) DEVICE — ENDOGATOR AUXILIARY WATER JET CONNECTOR: Brand: ENDOGATOR

## (undated) DEVICE — Z INACTIVE USE 2660664 SOLUTION IRRIG 3000ML 0.9% SOD CHL USP UROMATIC PLAS CONT

## (undated) DEVICE — CVR PROB GEN PURP W ISOSILK 6X48

## (undated) DEVICE — TRAP FLD MINIVAC MEGADYNE 100ML

## (undated) DEVICE — SENSR O2 OXIMAX FNGR A/ 18IN NONSTR

## (undated) DEVICE — SOUND/DILATOR (ACCESSORY FOR CLEARVIEW UTERINE MANIPULATOR): Brand: CLEARVIEW

## (undated) DEVICE — Y-TYPE TUR/BLADDER IRRIGATION SET, REGULATING CLAMP

## (undated) DEVICE — Device: Brand: SINGLE USE ELECTROSURGICAL SNARE SD-400